# Patient Record
Sex: FEMALE | Race: WHITE | NOT HISPANIC OR LATINO | Employment: OTHER | URBAN - METROPOLITAN AREA
[De-identification: names, ages, dates, MRNs, and addresses within clinical notes are randomized per-mention and may not be internally consistent; named-entity substitution may affect disease eponyms.]

---

## 2020-02-13 ENCOUNTER — OFFICE VISIT (OUTPATIENT)
Dept: INTERNAL MEDICINE | Facility: CLINIC | Age: 85
End: 2020-02-13
Payer: MEDICARE

## 2020-02-13 VITALS
SYSTOLIC BLOOD PRESSURE: 116 MMHG | TEMPERATURE: 98.1 F | HEART RATE: 73 BPM | HEIGHT: 64 IN | DIASTOLIC BLOOD PRESSURE: 60 MMHG | RESPIRATION RATE: 16 BRPM | WEIGHT: 161 LBS | OXYGEN SATURATION: 96 % | BODY MASS INDEX: 27.49 KG/M2

## 2020-02-13 DIAGNOSIS — R73.03 PREDIABETES: ICD-10-CM

## 2020-02-13 DIAGNOSIS — R00.2 PALPITATION: ICD-10-CM

## 2020-02-13 DIAGNOSIS — R73.09 ABNORMAL GLUCOSE: ICD-10-CM

## 2020-02-13 DIAGNOSIS — K21.9 GASTROESOPHAGEAL REFLUX DISEASE WITHOUT ESOPHAGITIS: Primary | ICD-10-CM

## 2020-02-13 DIAGNOSIS — Z85.3 HISTORY OF BREAST CANCER: ICD-10-CM

## 2020-02-13 DIAGNOSIS — R05.9 COUGH: ICD-10-CM

## 2020-02-13 DIAGNOSIS — I10 ESSENTIAL HYPERTENSION: ICD-10-CM

## 2020-02-13 LAB
ALBUMIN SERPL-MCNC: 3.9 G/DL (ref 3.4–5)
ALP SERPL-CCNC: 47 IU/L (ref 35–126)
ALT SERPL-CCNC: 23 IU/L (ref 11–54)
ANION GAP SERPL CALC-SCNC: 10 MEQ/L (ref 3–15)
AST SERPL-CCNC: 16 IU/L (ref 15–41)
BASOPHILS # BLD: 0.06 K/UL (ref 0.01–0.1)
BASOPHILS NFR BLD: 0.9 %
BILIRUB SERPL-MCNC: 1.7 MG/DL (ref 0.3–1.2)
BUN SERPL-MCNC: 25 MG/DL (ref 8–20)
CALCIUM SERPL-MCNC: 9.7 MG/DL (ref 8.9–10.3)
CHLORIDE SERPL-SCNC: 102 MEQ/L (ref 98–109)
CHOLEST SERPL-MCNC: 188 MG/DL
CO2 SERPL-SCNC: 27 MEQ/L (ref 22–32)
CREAT SERPL-MCNC: 1.1 MG/DL (ref 0.6–1.1)
DIFFERENTIAL METHOD BLD: ABNORMAL
EOSINOPHIL # BLD: 0.09 K/UL (ref 0.04–0.36)
EOSINOPHIL NFR BLD: 1.3 %
ERYTHROCYTE [DISTWIDTH] IN BLOOD BY AUTOMATED COUNT: 13.1 % (ref 11.7–14.4)
EST. AVERAGE GLUCOSE BLD GHB EST-MCNC: 126 MG/DL
GFR SERPL CREATININE-BSD FRML MDRD: 47.4 ML/MIN/1.73M*2
GLUCOSE SERPL-MCNC: 78 MG/DL (ref 70–99)
HBA1C MFR BLD HPLC: 6 %
HCT VFR BLDCO AUTO: 40.9 % (ref 35–45)
HDLC SERPL-MCNC: 55 MG/DL
HDLC SERPL: 3.4 {RATIO}
HGB BLD-MCNC: 13.3 G/DL (ref 11.8–15.7)
IMM GRANULOCYTES # BLD AUTO: 0.01 K/UL (ref 0–0.08)
IMM GRANULOCYTES NFR BLD AUTO: 0.1 %
LDLC SERPL CALC-MCNC: 99 MG/DL
LYMPHOCYTES # BLD: 2.03 K/UL (ref 1.2–3.5)
LYMPHOCYTES NFR BLD: 29.8 %
MCH RBC QN AUTO: 28.7 PG (ref 28–33.2)
MCHC RBC AUTO-ENTMCNC: 32.5 G/DL (ref 32.2–35.5)
MCV RBC AUTO: 88.3 FL (ref 83–98)
MONOCYTES # BLD: 0.52 K/UL (ref 0.28–0.8)
MONOCYTES NFR BLD: 7.6 %
NEUTROPHILS # BLD: 4.1 K/UL (ref 1.7–7)
NEUTS SEG NFR BLD: 60.3 %
NONHDLC SERPL-MCNC: 133 MG/DL
NRBC BLD-RTO: 0 %
PDW BLD AUTO: 13.5 FL (ref 9.4–12.3)
PLATELET # BLD AUTO: 149 K/UL (ref 150–369)
POTASSIUM SERPL-SCNC: 4 MEQ/L (ref 3.6–5.1)
PROT SERPL-MCNC: 6.4 G/DL (ref 6–8.2)
RBC # BLD AUTO: 4.63 M/UL (ref 3.93–5.22)
SODIUM SERPL-SCNC: 139 MEQ/L (ref 136–144)
TRIGL SERPL-MCNC: 170 MG/DL (ref 30–149)
TSH SERPL DL<=0.05 MIU/L-ACNC: 2.71 MIU/L (ref 0.34–5.6)
WBC # BLD AUTO: 6.81 K/UL (ref 3.8–10.5)

## 2020-02-13 PROCEDURE — 99204 OFFICE O/P NEW MOD 45 MIN: CPT | Performed by: INTERNAL MEDICINE

## 2020-02-13 PROCEDURE — 80061 LIPID PANEL: CPT | Performed by: INTERNAL MEDICINE

## 2020-02-13 PROCEDURE — 83036 HEMOGLOBIN GLYCOSYLATED A1C: CPT | Performed by: INTERNAL MEDICINE

## 2020-02-13 PROCEDURE — 84443 ASSAY THYROID STIM HORMONE: CPT | Performed by: INTERNAL MEDICINE

## 2020-02-13 PROCEDURE — 85025 COMPLETE CBC W/AUTO DIFF WBC: CPT | Performed by: INTERNAL MEDICINE

## 2020-02-13 PROCEDURE — 80053 COMPREHEN METABOLIC PANEL: CPT | Performed by: INTERNAL MEDICINE

## 2020-02-13 RX ORDER — VIT C/E/ZN/COPPR/LUTEIN/ZEAXAN 250MG-90MG
1 CAPSULE ORAL 2 TIMES DAILY
COMMUNITY

## 2020-02-13 RX ORDER — LOSARTAN POTASSIUM 50 MG/1
50 TABLET ORAL AS NEEDED
COMMUNITY
Start: 2019-11-29 | End: 2022-10-20

## 2020-02-13 RX ORDER — OMEPRAZOLE 20 MG/1
20 CAPSULE, DELAYED RELEASE ORAL EVERY OTHER DAY
COMMUNITY
Start: 2019-12-14 | End: 2021-07-22

## 2020-02-13 RX ORDER — HYDROCODONE/ACETAMINOPHEN 5 MG-500MG
6 TABLET ORAL DAILY
COMMUNITY

## 2020-02-13 RX ORDER — HYDROCHLOROTHIAZIDE 25 MG/1
TABLET ORAL
Status: ON HOLD | COMMUNITY
Start: 2019-11-29 | End: 2022-02-09 | Stop reason: ALTCHOICE

## 2020-02-13 RX ORDER — AA/PROT/LYSINE/METHIO/VIT C/B6 50-12.5 MG
10 TABLET ORAL DAILY
COMMUNITY

## 2020-02-13 RX ORDER — CHOLECALCIFEROL (VITAMIN D3) 25 MCG
1000 TABLET ORAL DAILY
COMMUNITY
End: 2024-07-08

## 2020-02-13 RX ORDER — METOPROLOL TARTRATE 25 MG/1
25 TABLET, FILM COATED ORAL 2 TIMES DAILY
COMMUNITY
Start: 2019-11-29 | End: 2020-06-04

## 2020-02-13 RX ORDER — ASPIRIN 325 MG
325 TABLET ORAL DAILY
COMMUNITY
End: 2020-12-14

## 2020-02-13 RX ORDER — MULTIVITAMIN
1 TABLET ORAL 2 TIMES DAILY
COMMUNITY
End: 2023-05-02 | Stop reason: ALTCHOICE

## 2020-02-13 RX ORDER — AZITHROMYCIN 250 MG/1
TABLET, FILM COATED ORAL
Qty: 6 TABLET | Refills: 0 | Status: SHIPPED | OUTPATIENT
Start: 2020-02-13 | End: 2020-06-04

## 2020-02-13 SDOH — HEALTH STABILITY: MENTAL HEALTH: HOW OFTEN DO YOU HAVE A DRINK CONTAINING ALCOHOL?: 2-3 TIMES A WEEK

## 2020-02-13 ASSESSMENT — ENCOUNTER SYMPTOMS
TROUBLE SWALLOWING: 0
NECK PAIN: 0
CONFUSION: 0
SHORTNESS OF BREATH: 0
ABDOMINAL PAIN: 0
EYE REDNESS: 0
FEVER: 0
RHINORRHEA: 0
CHILLS: 0
COUGH: 1
DYSURIA: 0

## 2020-02-13 NOTE — PATIENT INSTRUCTIONS
Gastroesophageal reflux disease without esophagitis  Continue omeprazole every other day     Essential hypertension  Continue same medications.     Palpitation  Continue metoprolol  Continue to follow up with cardiology     Cough  Start azithromycin  Get chest xray done.       Orders Placed This Encounter   Procedures   • X-RAY CHEST 2 VIEWS     New Medications Ordered This Visit   Medications   • multivit-min/iron/folic/lutein (CENTRUM SILVER WOMEN ORAL)     Sig: Take by mouth daily.   • azithromycin (ZITHROMAX) 250 mg tablet     Sig: Take 2 tablets the first day, then 1 tablet daily for 4 days.     Dispense:  6 tablet     Refill:  0       Return in about 4 months (around 6/13/2020).  SANDI BLACKBURN MD

## 2020-02-13 NOTE — PROGRESS NOTES
Chief Complaint   Patient presents with   • New Patient       Subjective      Patient ID: Latrell Ojeda is a 84 y.o. female.    New patient:  Living in [facility] for 1 year and was seeing her old primary doctor in [place].  Switching here for convenience    Viral syndrome: About 3 weeks ago had nasal congestion, chest congestion.  Empirically treated with Tamiflu for possible influenza  Feels better now.  Still with cough    GERD: History of this.  Has reduced her PPI to q. OD.  Symptoms involved constant throat clearing.  This has remained stable on every other day dose    Palpitations: Stable on metoprolol.  Sees cardiology    HTN: Stable      The following have been reviewed and updated as appropriate in this visit:  Allergies  Meds  Problems         Past Medical History: She  has a past medical history of Breast cancer (CMS/HCC), High blood pressure, and UTI (urinary tract infection).  Past Surgical History: She  has a past surgical history that includes Hysterectomy (1985); Mastectomy (2005); and Laparoscopic liver cyst fenestration (1995).  Family Medical Historyfamily history includes Diabetes in her biological brother and biological father; Multiple myeloma in her biological son.   Medication:   Current Outpatient Medications:   •  aspirin 325 mg tablet, Take 325 mg by mouth daily., Disp: , Rfl:   •  calcium carbonate-vitamin D3 (CALCIUM WITH VITAMIN D) 600 mg(1,500mg) -400 unit per tablet, Take 2 tablets by mouth daily., Disp: , Rfl:   •  cholecalciferol, vitamin D3, 1,000 unit (25 mcg) tablet, Take 1,000 Units by mouth daily., Disp: , Rfl:   •  coenzyme Q10 (COQ10) 10 mg capsule, Take 100 mg by mouth daily.  , Disp: , Rfl:   •  hydrochlorothiazide (HYDRODIURIL) 25 mg tablet, , Disp: , Rfl:   •  losartan (COZAAR) 25 mg tablet, , Disp: , Rfl:   •  lutein 6 mg capsule, Take 6 mg by mouth., Disp: , Rfl:   •  metoprolol tartrate (LOPRESSOR) 25 mg tablet, Take 25 mg by mouth 2 (two) times a day. ,  "Disp: , Rfl:   •  multivit-min/iron/folic/lutein (CENTRUM SILVER WOMEN ORAL), Take by mouth daily., Disp: , Rfl:   •  omeprazole (PriLOSEC) 20 mg capsule, Take 20 mg by mouth every other day. , Disp: , Rfl:   •  red yeast rice 600 mg capsule, Take 1 capsule by mouth daily., Disp: , Rfl:   •  azithromycin (ZITHROMAX) 250 mg tablet, Take 2 tablets the first day, then 1 tablet daily for 4 days., Disp: 6 tablet, Rfl: 0  •  multivit-min/FA/lycopen/lutein (CENTRUM SILVER MEN ORAL), Take by mouth., Disp: , Rfl:   Allergies: She is allergic to clindamycin; codeine; and oxycodone-acetaminophen.  Social History: She  reports that she has never smoked. She has never used smokeless tobacco. She reports that she drinks alcohol. She reports that she does not use drugs.    Review of Systems:  Review of Systems   Constitutional: Negative for chills and fever.   HENT: Negative for congestion, rhinorrhea and trouble swallowing.    Eyes: Negative for redness.   Respiratory: Positive for cough. Negative for shortness of breath.    Cardiovascular: Negative for chest pain.   Gastrointestinal: Negative for abdominal pain.   Genitourinary: Negative for dysuria.   Musculoskeletal: Negative for neck pain.   Skin: Negative for rash.   Neurological: Negative for syncope.   Psychiatric/Behavioral: Negative for confusion.       Vitals:    02/13/20 1425   BP: 116/60   BP Location: Left upper arm   Patient Position: Sitting   Pulse: 73   Resp: 16   Temp: 36.7 °C (98.1 °F)   TempSrc: Oral   SpO2: 96%   Weight: 73 kg (161 lb)   Height: 1.626 m (5' 4\")     Physical Exam   Constitutional: She is oriented to person, place, and time. She appears well-developed. No distress.   HENT:   Head: Normocephalic and atraumatic.   Eyes: Conjunctivae are normal.   Neck: Neck supple.   Cardiovascular: Normal rate and regular rhythm.   Pulmonary/Chest: Effort normal.   Left mid and lower lung crackles   Abdominal: Soft. She exhibits no distension. There is no " tenderness.   Musculoskeletal: She exhibits no edema.   Neurological: She is alert and oriented to person, place, and time.   Skin: Skin is warm. Capillary refill takes less than 2 seconds.   Psychiatric: She has a normal mood and affect.   Vitals reviewed.      Assessment/Plan   Gastroesophageal reflux disease without esophagitis  Had symptoms of throat clearing which has improved  -Continue omeprazole every other day     Essential hypertension  Stable  -Continue losartan, metoprolol, HCTZ  -Check labs    Palpitation  History of SVT  Stable  -Continue metoprolol  -Continue to follow up with cardiology     Cough  Left mid and lower lung crackles on exam.  Recently had viral infection.  Possible superimposed pneumonia  -Start azithromycin  -Get chest xray done.     Prediabetes  Abnormal glucose in the past  -Check A1c    History of breast cancer  History of bilateral mastectomy with reconstruction in 2005 and 2009      Orders Placed This Encounter   Procedures   • X-RAY CHEST 2 VIEWS   • CBC and Differential   • Comprehensive metabolic panel   • Hemoglobin A1c   • TSH w reflex FT4   • Lipid panel     New Medications Ordered This Visit   Medications   • multivit-min/iron/folic/lutein (CENTRUM SILVER WOMEN ORAL)     Sig: Take by mouth daily.   • azithromycin (ZITHROMAX) 250 mg tablet     Sig: Take 2 tablets the first day, then 1 tablet daily for 4 days.     Dispense:  6 tablet     Refill:  0       Return in about 4 months (around 6/13/2020).  SANDI BLACKBURN MD

## 2020-02-13 NOTE — ASSESSMENT & PLAN NOTE
Left mid and lower lung crackles on exam.  Recently had viral infection.  Possible superimposed pneumonia  -Start azithromycin  -Get chest xray done.

## 2020-02-14 ENCOUNTER — HOSPITAL ENCOUNTER (OUTPATIENT)
Dept: RADIOLOGY | Age: 85
Discharge: HOME | End: 2020-02-14
Attending: INTERNAL MEDICINE
Payer: MEDICARE

## 2020-02-14 PROCEDURE — 71046 X-RAY EXAM CHEST 2 VIEWS: CPT

## 2020-06-04 ENCOUNTER — TELEMEDICINE (OUTPATIENT)
Dept: INTERNAL MEDICINE | Facility: CLINIC | Age: 85
End: 2020-06-04
Payer: MEDICARE

## 2020-06-04 DIAGNOSIS — R00.2 PALPITATION: ICD-10-CM

## 2020-06-04 DIAGNOSIS — R73.03 PREDIABETES: ICD-10-CM

## 2020-06-04 DIAGNOSIS — I10 ESSENTIAL HYPERTENSION: Primary | ICD-10-CM

## 2020-06-04 DIAGNOSIS — K21.9 GASTROESOPHAGEAL REFLUX DISEASE WITHOUT ESOPHAGITIS: ICD-10-CM

## 2020-06-04 PROBLEM — R05.9 COUGH: Status: RESOLVED | Noted: 2020-02-13 | Resolved: 2020-06-04

## 2020-06-04 PROCEDURE — 99443 PR PHYS/QHP TELEPHONE EVALUATION 21-30 MIN: CPT | Mod: 95 | Performed by: INTERNAL MEDICINE

## 2020-06-04 RX ORDER — METOPROLOL TARTRATE 50 MG/1
50 TABLET ORAL 2 TIMES DAILY
COMMUNITY
End: 2021-06-10

## 2020-06-04 NOTE — PROGRESS NOTES
Patient ID: Latrell Ojeda is a 84 y.o. female.    Verification of Patient Location:  The patient affirms they are currently located in the following state: [place]    Request for Consent:  You and I are about to have a telemedicine check-in or visit. This is allowed because you are already my patient, and you have requested it.  This telemedicine visit will be billed to your health insurance or you, if you are self-insured.  You understand you will be responsible for any copayments or coinsurances that apply to your telemedicine visit.  Before starting our telemedicine visit, I am required to get your consent for this virtual check-in or visit by telemedicine. Do you consent?      Patient Response to Request for Consent: Yes    The following have been reviewed and updated as appropriate in this visit:  Allergies  Meds  Problems              Visit Documentation:  HTN: Taking metoprolol double dose now.  BP this morning was 156/74 and then 127/72 later in the day    GERD: Stable    SVT: Was having increased palpitations and metoprolol increased.  Feels better now    Pre-DM: Stable      Current Outpatient Medications:   •  metoprolol tartrate (LOPRESSOR) 50 mg tablet, Take 50 mg by mouth 2 (two) times a day., Disp: , Rfl:   •  aspirin 325 mg tablet, Take 325 mg by mouth daily., Disp: , Rfl:   •  calcium carbonate-vitamin D3 (CALCIUM WITH VITAMIN D) 600 mg(1,500mg) -400 unit per tablet, Take 2 tablets by mouth daily., Disp: , Rfl:   •  cholecalciferol, vitamin D3, 1,000 unit (25 mcg) tablet, Take 1,000 Units by mouth daily., Disp: , Rfl:   •  coenzyme Q10 (COQ10) 10 mg capsule, Take 100 mg by mouth daily.  , Disp: , Rfl:   •  hydrochlorothiazide (HYDRODIURIL) 25 mg tablet, , Disp: , Rfl:   •  losartan (COZAAR) 25 mg tablet, , Disp: , Rfl:   •  lutein 6 mg capsule, Take 6 mg by mouth., Disp: , Rfl:   •  multivit-min/FA/lycopen/lutein (CENTRUM SILVER MEN ORAL), Take by mouth., Disp: , Rfl:   •   multivit-min/iron/folic/lutein (CENTRUM SILVER WOMEN ORAL), Take by mouth daily., Disp: , Rfl:   •  omeprazole (PriLOSEC) 20 mg capsule, Take 20 mg by mouth every other day. , Disp: , Rfl:   •  red yeast rice 600 mg capsule, Take 1 capsule by mouth daily., Disp: , Rfl:     Assessment/Plan   Essential hypertension  Higher dose of metoprolol now  -Continue losartan, metoprolol, HCTZ    Gastroesophageal reflux disease without esophagitis  Stable with omeprazole every other day    Palpitation  History of SVT.  Metoprolol increased due to more symptoms and now improved  -Continue higher dose of metoprolol  -Continue to follow with cardiology    Prediabetes  Last A1c 6.0  -Continue to monitor diet      No orders of the defined types were placed in this encounter.    New Medications Ordered This Visit   Medications   • metoprolol tartrate (LOPRESSOR) 50 mg tablet     Sig: Take 50 mg by mouth 2 (two) times a day.       Return in about 4 months (around 10/4/2020).  SANDI BLACKBURN MD      Time Spent in Medical Discussion During This Encounter:  22 minutes

## 2020-06-05 ENCOUNTER — CLINICAL SUPPORT (OUTPATIENT)
Dept: INTERNAL MEDICINE | Facility: CLINIC | Age: 85
End: 2020-06-05
Payer: MEDICARE

## 2020-06-05 ENCOUNTER — LAB REQUISITION (OUTPATIENT)
Dept: LAB | Facility: HOSPITAL | Age: 85
End: 2020-06-05
Attending: INTERNAL MEDICINE
Payer: MEDICARE

## 2020-06-05 DIAGNOSIS — I10 ESSENTIAL HYPERTENSION: ICD-10-CM

## 2020-06-05 DIAGNOSIS — E78.49 OTHER HYPERLIPIDEMIA: ICD-10-CM

## 2020-06-05 LAB
ALBUMIN SERPL-MCNC: 3.9 G/DL (ref 3.4–5)
ALP SERPL-CCNC: 59 IU/L (ref 35–126)
ALT SERPL-CCNC: 32 IU/L (ref 11–54)
ANION GAP SERPL CALC-SCNC: 9 MEQ/L (ref 3–15)
AST SERPL-CCNC: 25 IU/L (ref 15–41)
BILIRUB SERPL-MCNC: 3 MG/DL (ref 0.3–1.2)
BUN SERPL-MCNC: 23 MG/DL (ref 8–20)
CALCIUM SERPL-MCNC: 9.2 MG/DL (ref 8.9–10.3)
CHLORIDE SERPL-SCNC: 100 MEQ/L (ref 98–109)
CHOLEST SERPL-MCNC: 197 MG/DL
CO2 SERPL-SCNC: 28 MEQ/L (ref 22–32)
CREAT SERPL-MCNC: 1 MG/DL (ref 0.6–1.1)
ERYTHROCYTE [DISTWIDTH] IN BLOOD BY AUTOMATED COUNT: 13.2 % (ref 11.7–14.4)
GFR SERPL CREATININE-BSD FRML MDRD: 53 ML/MIN/1.73M*2
GLUCOSE SERPL-MCNC: 99 MG/DL (ref 70–99)
HCT VFR BLDCO AUTO: 42.8 % (ref 35–45)
HDLC SERPL-MCNC: 64 MG/DL
HDLC SERPL: 3.1 {RATIO}
HGB BLD-MCNC: 14.4 G/DL (ref 11.8–15.7)
LDLC SERPL CALC-MCNC: 118 MG/DL
MAGNESIUM SERPL-MCNC: 1.8 MG/DL (ref 1.8–2.5)
MCH RBC QN AUTO: 29.3 PG (ref 28–33.2)
MCHC RBC AUTO-ENTMCNC: 33.6 G/DL (ref 32.2–35.5)
MCV RBC AUTO: 87.2 FL (ref 83–98)
NONHDLC SERPL-MCNC: 133 MG/DL
PDW BLD AUTO: 12.8 FL (ref 9.4–12.3)
PLATELET # BLD AUTO: 142 K/UL (ref 150–369)
POTASSIUM SERPL-SCNC: 4 MEQ/L (ref 3.6–5.1)
PROT SERPL-MCNC: 6.5 G/DL (ref 6–8.2)
RBC # BLD AUTO: 4.91 M/UL (ref 3.93–5.22)
SODIUM SERPL-SCNC: 137 MEQ/L (ref 136–144)
TRIGL SERPL-MCNC: 77 MG/DL (ref 30–149)
TSH SERPL DL<=0.05 MIU/L-ACNC: 3.79 MIU/L (ref 0.34–5.6)
WBC # BLD AUTO: 6.45 K/UL (ref 3.8–10.5)

## 2020-06-05 PROCEDURE — 80053 COMPREHEN METABOLIC PANEL: CPT | Performed by: INTERNAL MEDICINE

## 2020-06-05 PROCEDURE — 36415 COLL VENOUS BLD VENIPUNCTURE: CPT | Performed by: INTERNAL MEDICINE

## 2020-06-05 PROCEDURE — 80061 LIPID PANEL: CPT | Performed by: INTERNAL MEDICINE

## 2020-06-05 PROCEDURE — 83735 ASSAY OF MAGNESIUM: CPT | Performed by: INTERNAL MEDICINE

## 2020-06-05 PROCEDURE — 85027 COMPLETE CBC AUTOMATED: CPT | Performed by: INTERNAL MEDICINE

## 2020-06-05 PROCEDURE — 84443 ASSAY THYROID STIM HORMONE: CPT | Performed by: INTERNAL MEDICINE

## 2020-06-05 NOTE — ASSESSMENT & PLAN NOTE
History of SVT.  Metoprolol increased due to more symptoms and now improved  -Continue higher dose of metoprolol  -Continue to follow with cardiology

## 2020-10-13 ENCOUNTER — TELEPHONE (OUTPATIENT)
Dept: INTERNAL MEDICINE | Facility: CLINIC | Age: 85
End: 2020-10-13

## 2020-10-13 DIAGNOSIS — M85.88 OTHER SPECIFIED DISORDERS OF BONE DENSITY AND STRUCTURE, OTHER SITE: ICD-10-CM

## 2020-10-13 DIAGNOSIS — M89.9 BONE DISORDER: Primary | ICD-10-CM

## 2020-10-13 NOTE — TELEPHONE ENCOUNTER
Pt called stating that she would also like a Dexa Bone Density test as well. She is aware that you called and confirmed her husbands script but she wanted to make sure she got one as well.     Please advise.     [phone]

## 2020-11-03 ENCOUNTER — HOSPITAL ENCOUNTER (OUTPATIENT)
Dept: RADIOLOGY | Age: 85
Discharge: HOME | End: 2020-11-03
Attending: INTERNAL MEDICINE
Payer: MEDICARE

## 2020-11-03 DIAGNOSIS — M89.9 BONE DISORDER: ICD-10-CM

## 2020-11-03 DIAGNOSIS — M85.88 OTHER SPECIFIED DISORDERS OF BONE DENSITY AND STRUCTURE, OTHER SITE: ICD-10-CM

## 2020-11-03 PROCEDURE — 77080 DXA BONE DENSITY AXIAL: CPT

## 2020-11-30 ENCOUNTER — TELEPHONE (OUTPATIENT)
Dept: INTERNAL MEDICINE | Facility: CLINIC | Age: 85
End: 2020-11-30

## 2020-11-30 ENCOUNTER — CLINICAL SUPPORT (OUTPATIENT)
Dept: INTERNAL MEDICINE | Facility: CLINIC | Age: 85
End: 2020-11-30
Payer: MEDICARE

## 2020-11-30 DIAGNOSIS — R00.2 PALPITATION: Primary | ICD-10-CM

## 2020-11-30 PROCEDURE — 93000 ELECTROCARDIOGRAM COMPLETE: CPT | Performed by: INTERNAL MEDICINE

## 2020-11-30 RX ORDER — FLECAINIDE ACETATE 50 MG/1
50 TABLET ORAL
COMMUNITY
Start: 2020-11-12 | End: 2021-03-16

## 2020-11-30 RX ORDER — RIVAROXABAN 20 MG/1
20 TABLET, FILM COATED ORAL EVERY EVENING
COMMUNITY
Start: 2020-10-20 | End: 2023-02-07 | Stop reason: DRUGHIGH

## 2020-12-14 ENCOUNTER — OFFICE VISIT (OUTPATIENT)
Dept: INTERNAL MEDICINE | Facility: CLINIC | Age: 85
End: 2020-12-14
Payer: MEDICARE

## 2020-12-14 VITALS
HEIGHT: 64 IN | SYSTOLIC BLOOD PRESSURE: 110 MMHG | HEART RATE: 88 BPM | TEMPERATURE: 97.1 F | WEIGHT: 159 LBS | DIASTOLIC BLOOD PRESSURE: 60 MMHG | RESPIRATION RATE: 16 BRPM | BODY MASS INDEX: 27.14 KG/M2 | OXYGEN SATURATION: 96 %

## 2020-12-14 DIAGNOSIS — Z00.00 MEDICARE ANNUAL WELLNESS VISIT, SUBSEQUENT: Primary | ICD-10-CM

## 2020-12-14 DIAGNOSIS — I48.20 CHRONIC ATRIAL FIBRILLATION (CMS/HCC): ICD-10-CM

## 2020-12-14 DIAGNOSIS — J34.89 RHINORRHEA: ICD-10-CM

## 2020-12-14 PROCEDURE — G0439 PPPS, SUBSEQ VISIT: HCPCS | Performed by: INTERNAL MEDICINE

## 2020-12-14 RX ORDER — FLUTICASONE PROPIONATE 50 MCG
2 SPRAY, SUSPENSION (ML) NASAL DAILY
Qty: 1 BOTTLE | Refills: 5
Start: 2020-12-14 | End: 2021-03-16

## 2020-12-14 ASSESSMENT — MINI COG
COMPLETED: YES
TOTAL SCORE: 5

## 2020-12-14 ASSESSMENT — PATIENT HEALTH QUESTIONNAIRE - PHQ9: SUM OF ALL RESPONSES TO PHQ9 QUESTIONS 1 & 2: 0

## 2020-12-14 NOTE — PROGRESS NOTES
Subjective     Latrell Ojeda is a 85 y.o. female who presents for a subsequent annual wellness visit.     Patient Care Team:  Aris Miller MD as PCP - General (Family Medicine)    Comprehensive Medical and Social History  Patient Active Problem List   Diagnosis   • Essential hypertension   • Gastroesophageal reflux disease without esophagitis   • Palpitation   • Prediabetes   • History of breast cancer   • Medicare annual wellness visit, subsequent   • Rhinorrhea   • Chronic atrial fibrillation (CMS/HCC)     Past Medical History:   Diagnosis Date   • Breast cancer (CMS/HCC)    • High blood pressure    • UTI (urinary tract infection)      Past Surgical History:   Procedure Laterality Date   • HYSTERECTOMY  1985   • LAPAROSCOPIC LIVER CYST FENESTRATION  1995   • MASTECTOMY  2005     Allergies   Allergen Reactions   • Clindamycin    • Codeine    • Oxycodone-Acetaminophen      Current Outpatient Medications   Medication Sig Dispense Refill   • cholecalciferol, vitamin D3, 1,000 unit (25 mcg) tablet Take 1,000 Units by mouth daily.     • coenzyme Q10 (COQ10) 10 mg capsule Take 100 mg by mouth daily.       • flecainide (TAMBOCOR) 50 mg tablet Take 50 mg by mouth every 12 (twelve) hours.     • hydrochlorothiazide (HYDRODIURIL) 25 mg tablet      • losartan (COZAAR) 25 mg tablet      • lutein 6 mg capsule Take 6 mg by mouth.     • metoprolol tartrate (LOPRESSOR) 50 mg tablet Take 50 mg by mouth 2 (two) times a day.     • omeprazole (PriLOSEC) 20 mg capsule Take 20 mg by mouth every other day.      • red yeast rice 600 mg capsule Take 1 capsule by mouth daily.     • XARELTO 20 mg tablet Take 20 mg by mouth once daily.     • calcium carbonate-vitamin D3 (CALCIUM WITH VITAMIN D) 600 mg(1,500mg) -400 unit per tablet Take 2 tablets by mouth daily.     • fluticasone propionate (FLONASE) 50 mcg/actuation nasal spray Administer 2 sprays into each nostril daily. 1 Bottle 5     No current facility-administered medications for  "this visit.      Social History     Tobacco Use   • Smoking status: Never Smoker   • Smokeless tobacco: Never Used   Substance Use Topics   • Alcohol use: Yes     Frequency: 2-3 times a week   • Drug use: Never     Family History   Problem Relation Age of Onset   • Diabetes Biological Father    • Diabetes Biological Brother    • Multiple myeloma Biological Son        Objective   Vitals  Vitals:    12/14/20 1356 12/14/20 1444   BP: 110/60    Pulse: 98 88   Resp: 16    Temp: 36.2 °C (97.1 °F)    TempSrc: Oral    SpO2: 96%    Weight: 72.1 kg (159 lb)    Height: 1.626 m (5' 4\")      Body mass index is 27.29 kg/m².    Advanced Care Plan  Does patient have advance directive?: Yes                                     PHQ  Will the patient answer the depression questions?: Yes   Over the past 2 weeks, how often have you been bothered by feeling little interest or pleasure in doing things?: Not at all   Over the past 2 weeks, how often have you been bothered by feeling down, depressed, or hopeless?: Not at all   Depression Risk: 0                                             Mini Cog  Completed: Yes  Score: 5  Result: Negative      Get Up and Go  Result: Pass    STEADI Falls Risk  One or more falls in the last year: Yes   How many times: 1   Was the patient injured in any fall: No   Has trouble stepping up onto a curb: No   Advised to use a cane or walker to get around safely: No   Often has to rush to the toilet: No   Feels unsteady when walking: No       Often feels sad or depressed: No   Steadies self on furniture while walking at home: No   Takes medication that makes him/her feel lightheaded or more tired than usual: No   Worried about falling: Yes   Takes medicine to sleep or improve mood: No   Needs to push with hands when rising from a chair: No   Falls screen completed: Yes     Hearing and Vision Screening  No exam data present  See HRA for relevant hearing screening response.    Assessment/Plan   Diagnoses and all " orders for this visit:    Medicare annual wellness visit, subsequent (Primary)  Assessment & Plan:  Up to date on vaccines  DEXA scan this year looked good      Rhinorrhea  Assessment & Plan:  Present for several months in left nostril only  Runny nose possibly due to allergies.   -start flonase and monitor.       Chronic atrial fibrillation (CMS/[facility])  Assessment & Plan:  Recently found to have A. fib year on EKG ordered by cardiology.  Her metoprolol was increased but she had increased fatigue  Yesterday she had increased palpitations and was advised to increase flecainide by cardiology  Heart rate okay today but irregular  -Please call cardiology  -Already on Xarelto      Other orders  -     fluticasone propionate (FLONASE) 50 mcg/actuation nasal spray; Administer 2 sprays into each nostril daily.      See Patient Instructions (the written plan) which was given to the patient for PPPS and health risk factors with interventions.

## 2020-12-14 NOTE — PATIENT INSTRUCTIONS
Diagnoses and all orders for this visit:    Medicare annual wellness visit, subsequent (Primary)  Assessment & Plan:  Up to date on vaccines  DEXA scan this year looks good      Rhinorrhea  Assessment & Plan:  runny nose possibly due to allergies.   -start flonase and monitor.       Chronic atrial fibrillation (CMS/[facility])  Assessment & Plan:  Please call cardiology       Other orders  -     fluticasone propionate (FLONASE) 50 mcg/actuation nasal spray; Administer 2 sprays into each nostril daily.      See Patient Instructions (the written plan) which was given to the patient for PPPS and health risk factors with interventions.                            Your Personalized Prevention Plan Services (PPPS)    Preventive Services Checklist (Assumes Average Risk Unless Otherwise Noted):    Health Maintenance Topics with due status: Overdue       Topic Date Due    Varicella Vaccines 07/30/1937    Medicare Annual Wellness Visit 07/30/1953    Hepatitis C Screening 07/30/1953    Zoster Vaccine 11/26/2019     Health Maintenance Topics with due status: Not Due       Topic Last Completion Date    DTaP, Tdap, and Td Vaccines 04/03/2013    DEXA Scan 11/03/2020     Health Maintenance Topics with due status: Completed       Topic Last Completion Date    Pneumococcal (65 years and older) 02/05/2015    Influenza Vaccine 09/29/2020    Annual Falls Risk Screening 12/14/2020     Health Maintenance Topics with due status: Aged Out       Topic Date Due    Meningococcal ACWY Aged Out    HIB Vaccines Aged Out    IPV Vaccines Aged Out    HPV Vaccines Aged Out    Pneumococcal Aged Out       You May Be Eligible for These Additional Preventive Services   (Assumes Average Risk Unless Otherwise Noted)  Diabetes Screening Any 1 risk factor: hypertension, dyslipidemia, obesity, high glucose; or Any 2 risk factors: >=64yo, overweight, family history diabetes (covered every 6 months)   Hepatitis C Screening Any 1 risk factor: 1) blood transfusion before  1992,   2) current or past injection drug use (annually for high risk; if born between [birth year]-77[birth year], see above for status).   Vaccine: Hepatitis B As necessary if at-risk: hemophilia, ESRD, diabetes, living with individual infected with hep B, healthcare worker with frequent contact with blood/bodily fluids (series covered once)   Sexually Transmitted Diseases (STDs) As necessary chlamydia, gonorrhea, syphilis, hepatitis B (covered annually)  HIV if any 1 risk factor present: 1) <14yo or >64yo and at increased risk or 2) 15-64yo and ask for it (covered annually)   Lung Cancer Screening Low dose chest CT if all 3 risk factors: 1) 55-78yo, 2) smoker or quit within last 15y, 3) >=30 pack years (covered annually).  No results found for this or any previous visit.     Cholesterol Screening Both risk factors: 1) >=19yo and 2)  increased risk coronary artery disease (covered every 5 years).     Breast Cancer Screening Covered once 35-40yo, annually >=39yo (if >=49yo, see above for status).         Health Risk Factors with Personalized Education:  ----------------------------------------------------------------------------------------------------------------------  Controlling Your Blood Pressure  · Maintain a normal weight (body mass index between 18.5 and 24.9).  · Eat more fruit, vegetables and low-fat dairy.  · Eat less saturated fat and total fat.  · Lower your sodium (salt) intake.  Try to stay under 1500 mg per day, but if you cannot get your intake to be that low, at least lower it by 1000 mg.  · Stay active.  Try to get at least 90 to 150 minutes of exercise per week.  Try brisk walking, swimming, bicycling or dancing.  · Limit alcohol intake.  When you do consume alcohol, drink no more than 1 drink per day.  · If you have been prescribed medication, take it regularly and exactly as prescribed.  Let your PCP know if you have any problems or questions about your medication.  · Check your blood pressure at home or at  the store.  Write down your readings and share them with your PCP  ----------------------------------------------------------------------------------------------------------------------  Controlling Your Glucose (Sugar) Levels  · Stress can raise your blood sugar.  Learn what causes your stress.  Learn to lower your stress by spending time with family and friends, exercising, deep breathing, trying meditation or yoga, enjoying hobbies and getting enough rest.  Let your PCP know if you’re having problems limiting your stress.  · Maintain a healthy weight by balancing your diet and exercise.  · Choose foods that are lower in calories, saturated fat, trans fat, sugar and salt.  Eat foods with more fiber, like whole grain cereals, bread, crackers, rice or pasta.  Choose to eat fruit, vegetables, and low-fat or fat-free/skim dairy.  · Reduce the portion size of your meals.  Make half of your meal vegetables and fruit, and divide the other half between lean protein and whole grains.  · Drink water instead of juice and regular soda.  · Spend at least some time being active on most days of the week.  · Work to increase your muscle strength at least twice per week.  Try things like light weights, stretch bands, yoga, heavy gardening (digging, planting with tools) or push-ups  ----------------------------------------------------------------------------------------------------------------------  Controlling Your Cholesterol  · Reduce the amount of saturated and trans fat in your diet.  Limit intake of red meat.  Consume only low-fat or non-fat/skim dairy.  Limit fried food.  Cook with vegetable oils.  · Reduce your intake of sugary foods, sugary drinks and alcohol.  · Eat a diet high in fruit, vegetables and whole grains.  · Get protein from fish, poultry and a small portion of nuts.  · Stay active.  Try to get at least 90 to 150 minutes of exercise per week.  Try brisk walking, swimming, bicycling or dancing.  · Maintain a  healthy weight by balancing your diet and exercise.  · If you have been prescribed medication, take it regularly and exactly as prescribed. Let your PCP know if you have any problems or questions about your medication.  · It’s important to know your cholesterol numbers.  When recommended by your PCP, get the cholesterol blood test.  ----------------------------------------------------------------------------------------------------------------------  Maintaining Strong Bones  · Try to get at least 90 to 150 minutes of weight-bearing exercise per week.  · Ensure intake of at least 1200mg of calcium per day.  Eat foods high in calcium like milk and other dairy, green vegetables, fruit, canned fish with soft and edible bones, nuts, calcium-set tofu.  Some foods are calcium-fortified, like bread, cereal, fruit juices and mineral water.  · Help your body make vitamin D by getting 10-15 minutes per day of sunlight.    · Ensure intake of at least 600IU of vitamin D per day.  Eat foods high in vitamin D like oily fish (salmon, sardines, mackerel) and eggs.  Some foods are fortified with vitamin D, like dairy and cereals.  · Avoid high amounts of caffeine and salt, since they can cause the body to loose calcium.  · Limit alcohol intake, since it is associated with weaker bones and is associated with falls and fractures.  · Limit intake of fizzy drinks.  ----------------------------------------------------------------------------------------------------------------------  Reducing Your Risk of Falls  · Tell your PCP if any of your medications make you feel tired, dizzy, lightheaded or off-balance.  · Maintain coordination, flexibility and balance by ensuring regular physical activity.  · Limit alcohol intake to 1 drink per day.  Consider avoiding all alcohol intake.  · Ensure good vision.  Visit an ophthalmologist or optometrist regularly for vision screening or to make sure your glasses / contact lens prescription is  correct.  If you need glasses or contacts, wear them.  When you get new glasses or contacts, take time to get used to them.  Do not wear sunglasses or tinted lenses when indoors.  · Ensure good hearing.  Have your hearing checked if you are having trouble hearing, or family and friends think you cannot hear them.  If you need a hearing aid, be sure it fits well and wear it.  · Get enough rest.  Ensure about 7-9 hours of sleep every day.  · Get up slowly from your bed or chairs.  Do not start walking until you are sure you feel steady.  · Wear non-skid, rubber-soled, low-heeled shoes.  Do not walk in socks, or in shoes and slippers with smooth soles.  · If your PCP or therapist recommends using a cane or walker, use it regularly.  · Make your home safer.  Increase lighting throughout the house, especially at the top and bottom of stairs.  Ensure lighting is easily turned on when getting up in the middle of the night.  Make sure there are two secure rails on all stairs.  Install grab bars in the bathtub / shower and near the toilet.  Consider using a shower chair and / or a hand-held shower.  · Spread sand or salt on icy surfaces.  Beware of wet surfaces, which can be icy.  · Tell your PCP if you have fallen.

## 2020-12-14 NOTE — ASSESSMENT & PLAN NOTE
Recently found to have A. fib year on EKG ordered by cardiology.  Her metoprolol was increased but she had increased fatigue  Yesterday she had increased palpitations and was advised to increase flecainide by cardiology  Heart rate okay today but irregular  -Please call cardiology  -Already on Xarelto

## 2021-01-25 ENCOUNTER — TELEPHONE (OUTPATIENT)
Dept: INTERNAL MEDICINE | Facility: CLINIC | Age: 86
End: 2021-01-25

## 2021-01-25 NOTE — TELEPHONE ENCOUNTER
Spoke to pt.   Some blood tinged nasal discharge  -stop flonase  -start nasal saline spray 3-4 times a day   -start humidifier

## 2021-03-12 ENCOUNTER — DOCUMENTATION (OUTPATIENT)
Dept: INTERNAL MEDICINE | Facility: CLINIC | Age: 86
End: 2021-03-12

## 2021-03-12 ENCOUNTER — CLINICAL SUPPORT (OUTPATIENT)
Dept: INTERNAL MEDICINE | Facility: CLINIC | Age: 86
End: 2021-03-12
Payer: MEDICARE

## 2021-03-12 ENCOUNTER — LAB REQUISITION (OUTPATIENT)
Dept: LAB | Facility: HOSPITAL | Age: 86
End: 2021-03-12
Attending: INTERNAL MEDICINE
Payer: MEDICARE

## 2021-03-12 DIAGNOSIS — I48.91 UNSPECIFIED ATRIAL FIBRILLATION (CMS/HCC): ICD-10-CM

## 2021-03-12 DIAGNOSIS — R73.03 PREDIABETES: ICD-10-CM

## 2021-03-12 DIAGNOSIS — I48.20 CHRONIC ATRIAL FIBRILLATION (CMS/HCC): ICD-10-CM

## 2021-03-12 DIAGNOSIS — I10 ESSENTIAL HYPERTENSION: ICD-10-CM

## 2021-03-12 DIAGNOSIS — N18.31 CHRONIC KIDNEY DISEASE, STAGE 3A (CMS/HCC): ICD-10-CM

## 2021-03-12 LAB
ALBUMIN SERPL-MCNC: 3.6 G/DL (ref 3.4–5)
ALP SERPL-CCNC: 62 IU/L (ref 35–126)
ALT SERPL-CCNC: 30 IU/L (ref 11–54)
ANION GAP SERPL CALC-SCNC: 14 MEQ/L (ref 3–15)
AST SERPL-CCNC: 19 IU/L (ref 15–41)
BILIRUB SERPL-MCNC: 2.1 MG/DL (ref 0.3–1.2)
BUN SERPL-MCNC: 23 MG/DL (ref 8–20)
CALCIUM SERPL-MCNC: 9.2 MG/DL (ref 8.9–10.3)
CHLORIDE SERPL-SCNC: 98 MEQ/L (ref 98–109)
CO2 SERPL-SCNC: 26 MEQ/L (ref 22–32)
CREAT SERPL-MCNC: 1.2 MG/DL (ref 0.6–1.1)
ERYTHROCYTE [DISTWIDTH] IN BLOOD BY AUTOMATED COUNT: 13.7 % (ref 11.7–14.4)
GFR SERPL CREATININE-BSD FRML MDRD: 42.8 ML/MIN/1.73M*2
GLUCOSE SERPL-MCNC: 191 MG/DL (ref 70–99)
HCT VFR BLDCO AUTO: 41 % (ref 35–45)
HGB BLD-MCNC: 13.5 G/DL (ref 11.8–15.7)
MCH RBC QN AUTO: 29 PG (ref 28–33.2)
MCHC RBC AUTO-ENTMCNC: 32.9 G/DL (ref 32.2–35.5)
MCV RBC AUTO: 88.2 FL (ref 83–98)
PDW BLD AUTO: 12.9 FL (ref 9.4–12.3)
PLATELET # BLD AUTO: 167 K/UL (ref 150–369)
POTASSIUM SERPL-SCNC: 4.4 MEQ/L (ref 3.6–5.1)
PROT SERPL-MCNC: 6.1 G/DL (ref 6–8.2)
RBC # BLD AUTO: 4.65 M/UL (ref 3.93–5.22)
SODIUM SERPL-SCNC: 138 MEQ/L (ref 136–144)
TSH SERPL DL<=0.05 MIU/L-ACNC: 4.76 MIU/L (ref 0.34–5.6)
WBC # BLD AUTO: 5.75 K/UL (ref 3.8–10.5)

## 2021-03-12 PROCEDURE — 84443 ASSAY THYROID STIM HORMONE: CPT | Performed by: INTERNAL MEDICINE

## 2021-03-12 PROCEDURE — 85027 COMPLETE CBC AUTOMATED: CPT | Performed by: INTERNAL MEDICINE

## 2021-03-12 PROCEDURE — 82374 ASSAY BLOOD CARBON DIOXIDE: CPT | Performed by: INTERNAL MEDICINE

## 2021-03-12 PROCEDURE — 82040 ASSAY OF SERUM ALBUMIN: CPT | Performed by: INTERNAL MEDICINE

## 2021-03-12 PROCEDURE — 36415 COLL VENOUS BLD VENIPUNCTURE: CPT | Performed by: INTERNAL MEDICINE

## 2021-03-12 RX ORDER — METOPROLOL TARTRATE 25 MG/1
TABLET, FILM COATED ORAL 2 TIMES DAILY
COMMUNITY
Start: 2021-01-19 | End: 2021-03-16

## 2021-03-12 RX ORDER — AMIODARONE HYDROCHLORIDE 200 MG/1
100 TABLET ORAL DAILY
COMMUNITY
Start: 2021-03-08 | End: 2023-02-20

## 2021-03-12 NOTE — PROGRESS NOTES
Coding Clarification (ML) - [facility]  Note       DATE: 3/12/2021    RE: Latrell Lynette  : 1935      Under the direction of SANDI BLACKBURN MD, the following adjustments were made to this patients Problem List:        New Code: N18.31            Code Description: Chronic kidney disease, stage 3a  Clarification Type EMR System Encounter Type Date of Service Provider   New Code Epic Lab Note 2020 Pepe Borges MD   Rationale: Recent GFR value from Lab on 2020 is 53.0

## 2021-03-16 ENCOUNTER — OFFICE VISIT (OUTPATIENT)
Dept: INTERNAL MEDICINE | Facility: CLINIC | Age: 86
End: 2021-03-16
Payer: MEDICARE

## 2021-03-16 VITALS
TEMPERATURE: 97.4 F | WEIGHT: 165 LBS | RESPIRATION RATE: 16 BRPM | SYSTOLIC BLOOD PRESSURE: 112 MMHG | DIASTOLIC BLOOD PRESSURE: 60 MMHG | OXYGEN SATURATION: 98 % | HEART RATE: 53 BPM | BODY MASS INDEX: 28.32 KG/M2

## 2021-03-16 DIAGNOSIS — I10 ESSENTIAL HYPERTENSION: ICD-10-CM

## 2021-03-16 DIAGNOSIS — N18.31 CHRONIC KIDNEY DISEASE, STAGE 3A (CMS/HCC): Primary | ICD-10-CM

## 2021-03-16 DIAGNOSIS — I48.20 CHRONIC ATRIAL FIBRILLATION (CMS/HCC): ICD-10-CM

## 2021-03-16 DIAGNOSIS — R73.03 PREDIABETES: ICD-10-CM

## 2021-03-16 DIAGNOSIS — J34.89 RHINORRHEA: ICD-10-CM

## 2021-03-16 DIAGNOSIS — E55.9 VITAMIN D DEFICIENCY: ICD-10-CM

## 2021-03-16 LAB
25(OH)D3 SERPL-MCNC: 45 NG/ML (ref 30–100)
ANION GAP SERPL CALC-SCNC: 10 MEQ/L (ref 3–15)
BUN SERPL-MCNC: 20 MG/DL (ref 8–20)
CALCIUM SERPL-MCNC: 9 MG/DL (ref 8.9–10.3)
CHLORIDE SERPL-SCNC: 102 MEQ/L (ref 98–109)
CO2 SERPL-SCNC: 28 MEQ/L (ref 22–32)
CREAT SERPL-MCNC: 1.2 MG/DL (ref 0.6–1.1)
EST. AVERAGE GLUCOSE BLD GHB EST-MCNC: 143 MG/DL
GFR SERPL CREATININE-BSD FRML MDRD: 42.8 ML/MIN/1.73M*2
GLUCOSE SERPL-MCNC: 145 MG/DL (ref 70–99)
HBA1C MFR BLD HPLC: 6.6 %
POTASSIUM SERPL-SCNC: 4 MEQ/L (ref 3.6–5.1)
SODIUM SERPL-SCNC: 140 MEQ/L (ref 136–144)

## 2021-03-16 PROCEDURE — 99214 OFFICE O/P EST MOD 30 MIN: CPT | Performed by: INTERNAL MEDICINE

## 2021-03-16 PROCEDURE — G8752 SYS BP LESS 140: HCPCS | Performed by: INTERNAL MEDICINE

## 2021-03-16 PROCEDURE — 83036 HEMOGLOBIN GLYCOSYLATED A1C: CPT | Performed by: INTERNAL MEDICINE

## 2021-03-16 PROCEDURE — 82306 VITAMIN D 25 HYDROXY: CPT | Performed by: INTERNAL MEDICINE

## 2021-03-16 PROCEDURE — 80048 BASIC METABOLIC PNL TOTAL CA: CPT | Performed by: INTERNAL MEDICINE

## 2021-03-16 PROCEDURE — G8754 DIAS BP LESS 90: HCPCS | Performed by: INTERNAL MEDICINE

## 2021-03-16 RX ORDER — METOPROLOL TARTRATE 25 MG/1
25 TABLET, FILM COATED ORAL 2 TIMES DAILY
Start: 2021-03-16 | End: 2021-06-17

## 2021-03-16 NOTE — PROGRESS NOTES
Chief Complaint   Patient presents with   • Other     3 month follow up        Subjective      Patient ID: Latrell Ojeda is a 85 y.o. female.    Sinus congestion: Flonase was not helpful so she stopped it.  Tried [place] pot which helped.    A. fib: Saw cardiologist.  Flecainide was stopped.  Now on amiodarone.    HTN: Stable    Prediabetes: Stable.  Needs labs.      The following have been reviewed and updated as appropriate in this visit:  Allergies  Meds  Problems        Review of Systems   Constitutional: Negative for chills and fever.   Cardiovascular: Negative for chest pain.   Gastrointestinal: Negative for abdominal pain.         Current Outpatient Medications:   •  amiodarone (PACERONE) 200 mg tablet, Take 200 mg by mouth daily.  , Disp: , Rfl:   •  calcium carbonate-vitamin D3 (CALCIUM WITH VITAMIN D) 600 mg(1,500mg) -400 unit per tablet, Take 2 tablets by mouth daily., Disp: , Rfl:   •  cholecalciferol, vitamin D3, 1,000 unit (25 mcg) tablet, Take 1,000 Units by mouth daily., Disp: , Rfl:   •  coenzyme Q10 (COQ10) 10 mg capsule, Take 100 mg by mouth daily.  , Disp: , Rfl:   •  hydrochlorothiazide (HYDRODIURIL) 25 mg tablet, , Disp: , Rfl:   •  losartan (COZAAR) 25 mg tablet, , Disp: , Rfl:   •  lutein 6 mg capsule, Take 6 mg by mouth., Disp: , Rfl:   •  metoprolol tartrate (LOPRESSOR) 25 mg tablet, Take 1 tablet (25 mg total) by mouth 2 (two) times a day., Disp: , Rfl:   •  metoprolol tartrate (LOPRESSOR) 50 mg tablet, Take 50 mg by mouth 2 (two) times a day., Disp: , Rfl:   •  omeprazole (PriLOSEC) 20 mg capsule, Take 20 mg by mouth every other day. , Disp: , Rfl:   •  red yeast rice 600 mg capsule, Take 1 capsule by mouth daily., Disp: , Rfl:   •  XARELTO 20 mg tablet, Take 20 mg by mouth once daily., Disp: , Rfl:     Objective     Vitals:    03/16/21 1409   BP: 112/60   BP Location: Left upper arm   Patient Position: Sitting   Pulse: (!) 53   Resp: 16   Temp: 36.3 °C (97.4 °F)   TempSrc:  Temporal   SpO2: 98%   Weight: 74.8 kg (165 lb)     Physical Exam  Vitals signs reviewed.   Constitutional:       Appearance: She is well-developed.   HENT:      Head: Normocephalic and atraumatic.   Eyes:      Conjunctiva/sclera: Conjunctivae normal.   Cardiovascular:      Rate and Rhythm: Normal rate.   Pulmonary:      Effort: Pulmonary effort is normal. No respiratory distress.      Breath sounds: No wheezing or rales.   Abdominal:      Palpations: Abdomen is soft.      Tenderness: There is no abdominal tenderness. There is no guarding.           Assessment/Plan   Chronic kidney disease, stage 3a (CMS/HCC)  Baseline CR 1.1  -Recheck labs    Prediabetes  Check a1c today     Chronic atrial fibrillation (CMS/HCC)  Higher dose of metoprolol led to fatigue  Stable currently  Off flecainide and on amiodarone  -Continue to follow up with cardiology  -Continue metoprolol, amiodarone, Xarelto    Essential hypertension  Controlled  -Continue losartan, metoprolol, HCTZ  -Please check how much metoprolol dose you are on    Rhinorrhea  Flonase did not help  -Continue Marija pot as needed    Palpitation  Currently on lower dose of metoprolol  -Continue metoprolol  -Follow-up with cardiology      Orders Placed This Encounter   Procedures   • Basic metabolic panel   • Hemoglobin A1c   • Vitamin D 25 hydroxy     New Medications Ordered This Visit   Medications   • metoprolol tartrate (LOPRESSOR) 25 mg tablet     Sig: Take 1 tablet (25 mg total) by mouth 2 (two) times a day.       Return in about 4 months (around 7/16/2021).     SANDI BLACKBURN MD

## 2021-03-16 NOTE — PATIENT INSTRUCTIONS
Chronic kidney disease, stage 3a (CMS/[facility])  Recheck labs    Prediabetes  Check a1c today     Chronic atrial fibrillation (CMS/[facility])  Continue to follow up with cardiology     Essential hypertension  Please check how much metoprolol dose you are on      Orders Placed This Encounter   Procedures   • Basic metabolic panel   • Hemoglobin A1c   • Vitamin D 25 hydroxy     New Medications Ordered This Visit   Medications   • metoprolol tartrate (LOPRESSOR) 25 mg tablet     Sig: Take 1 tablet (25 mg total) by mouth 2 (two) times a day.       Return in about 4 months (around 7/16/2021).     SANDI BLACKBURN MD

## 2021-03-16 NOTE — ASSESSMENT & PLAN NOTE
Higher dose of metoprolol led to fatigue  Stable currently  Off flecainide and on amiodarone  -Continue to follow up with cardiology  -Continue metoprolol, amiodarone, Xarelto

## 2021-03-16 NOTE — ASSESSMENT & PLAN NOTE
Controlled  -Continue losartan, metoprolol, HCTZ  -Please check how much metoprolol dose you are on

## 2021-03-17 ASSESSMENT — ENCOUNTER SYMPTOMS
FEVER: 0
CHILLS: 0
ABDOMINAL PAIN: 0

## 2021-05-07 ENCOUNTER — OFFICE VISIT (OUTPATIENT)
Dept: INTERNAL MEDICINE | Facility: CLINIC | Age: 86
End: 2021-05-07
Payer: MEDICARE

## 2021-05-07 VITALS
BODY MASS INDEX: 27.88 KG/M2 | SYSTOLIC BLOOD PRESSURE: 124 MMHG | RESPIRATION RATE: 16 BRPM | DIASTOLIC BLOOD PRESSURE: 70 MMHG | HEART RATE: 50 BPM | WEIGHT: 162.4 LBS | OXYGEN SATURATION: 97 %

## 2021-05-07 DIAGNOSIS — L24.9 IRRITANT CONTACT DERMATITIS, UNSPECIFIED TRIGGER: Primary | ICD-10-CM

## 2021-05-07 PROCEDURE — 99214 OFFICE O/P EST MOD 30 MIN: CPT | Performed by: NURSE PRACTITIONER

## 2021-05-07 PROCEDURE — G8754 DIAS BP LESS 90: HCPCS | Performed by: NURSE PRACTITIONER

## 2021-05-07 PROCEDURE — G8752 SYS BP LESS 140: HCPCS | Performed by: NURSE PRACTITIONER

## 2021-05-07 NOTE — ASSESSMENT & PLAN NOTE
Wash linens, towels, and clothing with gentle laundry detergent (free and clear).    May need to change dryer sheets as well.  Can take Claritin to calm down the itching.

## 2021-05-07 NOTE — PROGRESS NOTES
Chief Complaint   Patient presents with   • Other     rash on ankles,, patient noticed yesterday, red raised slightly itchy, no change in lotion detergents etc...       Subjective      Patient ID: Latrell Ojeda is a 85 y.o. female.    Notice the rash yesterday around her ankles.  Rash spreading today up the legs and arms, backs, and chest.  It is mildly itchy. Arms are more itchy.    Denies SOB, cp, n/v/d.    Denies new detergents, soap, perfume. Using a new cream but did not apply to her legs.        The following have been reviewed and updated as appropriate in this visit:  Allergies  Meds  Problems         Past Medical History: She  has a past medical history of Breast cancer (CMS/HCC), High blood pressure, and UTI (urinary tract infection).  Past Surgical History: She  has a past surgical history that includes Hysterectomy (1985); Mastectomy (2005); and Laparoscopic liver cyst fenestration (1995).  Family Medical Historyfamily history includes Diabetes in her biological brother and biological father; Multiple myeloma in her biological son.   Medication:   Current Outpatient Medications:   •  amiodarone (PACERONE) 200 mg tablet, Take 200 mg by mouth daily.  , Disp: , Rfl:   •  calcium carbonate-vitamin D3 (CALCIUM WITH VITAMIN D) 600 mg(1,500mg) -400 unit per tablet, Take 2 tablets by mouth daily., Disp: , Rfl:   •  cholecalciferol, vitamin D3, 1,000 unit (25 mcg) tablet, Take 1,000 Units by mouth daily., Disp: , Rfl:   •  coenzyme Q10 (COQ10) 10 mg capsule, Take 100 mg by mouth daily.  , Disp: , Rfl:   •  hydrochlorothiazide (HYDRODIURIL) 25 mg tablet, , Disp: , Rfl:   •  losartan (COZAAR) 25 mg tablet, , Disp: , Rfl:   •  lutein 6 mg capsule, Take 6 mg by mouth., Disp: , Rfl:   •  omeprazole (PriLOSEC) 20 mg capsule, Take 20 mg by mouth every other day. , Disp: , Rfl:   •  red yeast rice 600 mg capsule, Take 1 capsule by mouth daily., Disp: , Rfl:   •  XARELTO 20 mg tablet, Take 20 mg by mouth once  daily., Disp: , Rfl:   •  metoprolol tartrate (LOPRESSOR) 25 mg tablet, Take 1 tablet (25 mg total) by mouth 2 (two) times a day., Disp: , Rfl:   •  metoprolol tartrate (LOPRESSOR) 50 mg tablet, Take 50 mg by mouth 2 (two) times a day., Disp: , Rfl:   Allergies: She is allergic to clindamycin, codeine, and oxycodone-acetaminophen.  Social History: She  reports that she has never smoked. She has never used smokeless tobacco. She reports current alcohol use. She reports that she does not use drugs.    Review of Systems:  Review of Systems   Cardiovascular: Negative for leg swelling.   Skin: Positive for rash.   All other systems reviewed and are negative.      Vitals:    05/07/21 1301   BP: 124/70   BP Location: Left upper arm   Patient Position: Sitting   Pulse: (!) 50   Resp: 16   SpO2: 97%   Weight: 73.7 kg (162 lb 6.4 oz)     Physical Exam  Vitals and nursing note reviewed.   Constitutional:       Appearance: Normal appearance.   HENT:      Head: Normocephalic and atraumatic.   Eyes:      General: No scleral icterus.  Cardiovascular:      Rate and Rhythm: Regular rhythm. Bradycardia present.   Pulmonary:      Effort: Pulmonary effort is normal.      Breath sounds: Normal breath sounds.   Abdominal:      General: Bowel sounds are normal.      Palpations: Abdomen is soft.   Skin:     General: Skin is warm.      Findings: Petechiae and rash present. Rash is macular and urticarial.          Neurological:      Mental Status: She is alert.         Assessment/Plan   Irritant contact dermatitis  Wash linens, towels, and clothing with gentle laundry detergent (free and clear).    May need to change dryer sheets as well.  Can take Claritin to calm down the itching.          No orders of the defined types were placed in this encounter.    No orders of the defined types were placed in this encounter.      Return if symptoms worsen or fail to improve, for Next scheduled follow-up.  HAMZAH Brady

## 2021-06-07 ENCOUNTER — TELEPHONE (OUTPATIENT)
Dept: INTERNAL MEDICINE | Facility: CLINIC | Age: 86
End: 2021-06-07

## 2021-06-07 NOTE — TELEPHONE ENCOUNTER
Spoke to pt. Currently on macrobid from urgent care.  She will drop off another sample here tomorrow

## 2021-06-07 NOTE — TELEPHONE ENCOUNTER
Pt called stating that she was recently in the ER for possible UTI. Pt received ABX for this but she stated that she doesnt believe it is doing anything for them. She is now having abd pain and back pain. She is not sure if this has something to do with her kidneys.     She would like a call back at your earliest convenience to discuss.     [phone]

## 2021-06-08 ENCOUNTER — LAB REQUISITION (OUTPATIENT)
Dept: LAB | Facility: HOSPITAL | Age: 86
End: 2021-06-08
Attending: INTERNAL MEDICINE
Payer: MEDICARE

## 2021-06-08 DIAGNOSIS — R30.0 DYSURIA: Primary | ICD-10-CM

## 2021-06-08 DIAGNOSIS — R30.0 DYSURIA: ICD-10-CM

## 2021-06-08 LAB
BACTERIA URNS QL MICRO: ABNORMAL /HPF
BILIRUB UR QL STRIP.AUTO: NEGATIVE MG/DL
BILIRUBIN, POC: NEGATIVE
BLOOD URINE, POC: POSITIVE
CLARITY UR REFRACT.AUTO: ABNORMAL
CLARITY, POC: CLEAR
COLOR UR AUTO: YELLOW
COLOR, POC: YELLOW
GLUCOSE UR STRIP.AUTO-MCNC: NEGATIVE MG/DL
GLUCOSE URINE, POC: NEGATIVE
HGB UR QL STRIP.AUTO: 1
HYALINE CASTS #/AREA URNS LPF: ABNORMAL /LPF
KETONES UR STRIP.AUTO-MCNC: NEGATIVE MG/DL
KETONES, POC: NEGATIVE
LEUKOCYTE EST, POC: ABNORMAL
LEUKOCYTE ESTERASE UR QL STRIP.AUTO: 1
NITRITE UR QL STRIP.AUTO: NEGATIVE
NITRITE, POC: NEGATIVE
PH UR STRIP.AUTO: 6 [PH]
PH, POC: 6
PROT UR QL STRIP.AUTO: NEGATIVE
PROTEIN, POC: NEGATIVE
RBC #/AREA URNS HPF: ABNORMAL /HPF
SP GR UR REFRACT.AUTO: 1.02
SPECIFIC GRAVITY, POC: 1.02
SQUAMOUS URNS QL MICRO: 1 /HPF
UROBILINOGEN UR STRIP-ACNC: 1 EU/DL
UROBILINOGEN, POC: 0.2
WBC #/AREA URNS HPF: ABNORMAL /HPF

## 2021-06-08 PROCEDURE — 81001 URINALYSIS AUTO W/SCOPE: CPT | Performed by: INTERNAL MEDICINE

## 2021-06-08 PROCEDURE — 87086 URINE CULTURE/COLONY COUNT: CPT | Performed by: INTERNAL MEDICINE

## 2021-06-08 PROCEDURE — 99999 POCT URINALYSIS DIPSTICK: CPT | Performed by: INTERNAL MEDICINE

## 2021-06-09 ENCOUNTER — TELEPHONE (OUTPATIENT)
Dept: INTERNAL MEDICINE | Facility: CLINIC | Age: 86
End: 2021-06-09

## 2021-06-09 LAB — BACTERIA UR CULT: NORMAL

## 2021-06-09 NOTE — TELEPHONE ENCOUNTER
Pt called stating that she would like to go over her culture results that she got from [facility]. They are in the system and shes stating that she still does not feel good even with her current abx.     Please call pt at your earliest convenience.     [phone]

## 2021-06-10 ENCOUNTER — APPOINTMENT (EMERGENCY)
Dept: RADIOLOGY | Facility: HOSPITAL | Age: 86
DRG: 813 | End: 2021-06-10
Attending: EMERGENCY MEDICINE
Payer: MEDICARE

## 2021-06-10 ENCOUNTER — HOSPITAL ENCOUNTER (EMERGENCY)
Facility: HOSPITAL | Age: 86
Discharge: HOME | DRG: 813 | End: 2021-06-10
Attending: EMERGENCY MEDICINE
Payer: MEDICARE

## 2021-06-10 VITALS
HEIGHT: 65 IN | OXYGEN SATURATION: 97 % | HEART RATE: 58 BPM | TEMPERATURE: 97.5 F | RESPIRATION RATE: 18 BRPM | DIASTOLIC BLOOD PRESSURE: 67 MMHG | BODY MASS INDEX: 26.66 KG/M2 | WEIGHT: 160 LBS | SYSTOLIC BLOOD PRESSURE: 144 MMHG

## 2021-06-10 DIAGNOSIS — R50.9 FEVER, UNSPECIFIED FEVER CAUSE: Primary | ICD-10-CM

## 2021-06-10 LAB
ALBUMIN SERPL-MCNC: 3.6 G/DL (ref 3.4–5)
ALBUMIN SERPL-MCNC: 3.6 G/DL (ref 3.4–5)
ALP SERPL-CCNC: 67 IU/L (ref 35–126)
ALP SERPL-CCNC: 67 IU/L (ref 35–126)
ALT SERPL-CCNC: 49 IU/L (ref 11–54)
ALT SERPL-CCNC: 49 IU/L (ref 11–54)
ANION GAP SERPL CALC-SCNC: 10 MEQ/L (ref 3–15)
APTT PPP: 41 SEC (ref 23–35)
AST SERPL-CCNC: 32 IU/L (ref 15–41)
AST SERPL-CCNC: 32 IU/L (ref 15–41)
BACTERIA URNS QL MICRO: ABNORMAL /HPF
BASOPHILS # BLD: 0.03 K/UL (ref 0.01–0.1)
BASOPHILS NFR BLD: 0.3 %
BILIRUB DIRECT SERPL-MCNC: 0.4 MG/DL
BILIRUB SERPL-MCNC: 2.9 MG/DL (ref 0.3–1.2)
BILIRUB SERPL-MCNC: 2.9 MG/DL (ref 0.3–1.2)
BILIRUB UR QL STRIP.AUTO: NEGATIVE MG/DL
BUN SERPL-MCNC: 17 MG/DL (ref 8–20)
CALCIUM SERPL-MCNC: 8.8 MG/DL (ref 8.9–10.3)
CHLORIDE SERPL-SCNC: 95 MEQ/L (ref 98–109)
CLARITY UR REFRACT.AUTO: CLEAR
CO2 SERPL-SCNC: 28 MEQ/L (ref 22–32)
COLOR UR AUTO: YELLOW
CREAT SERPL-MCNC: 1 MG/DL (ref 0.6–1.1)
DIFFERENTIAL METHOD BLD: ABNORMAL
EOSINOPHIL # BLD: 0.06 K/UL (ref 0.04–0.36)
EOSINOPHIL NFR BLD: 0.6 %
ERYTHROCYTE [DISTWIDTH] IN BLOOD BY AUTOMATED COUNT: 12.6 % (ref 11.7–14.4)
GFR SERPL CREATININE-BSD FRML MDRD: 52.8 ML/MIN/1.73M*2
GLUCOSE SERPL-MCNC: 160 MG/DL (ref 70–99)
GLUCOSE UR STRIP.AUTO-MCNC: NEGATIVE MG/DL
HCT VFR BLDCO AUTO: 39.9 % (ref 35–45)
HGB BLD-MCNC: 13.7 G/DL (ref 11.8–15.7)
HGB UR QL STRIP.AUTO: 3
HYALINE CASTS #/AREA URNS LPF: ABNORMAL /LPF
IMM GRANULOCYTES # BLD AUTO: 0.03 K/UL (ref 0–0.08)
IMM GRANULOCYTES NFR BLD AUTO: 0.3 %
INR PPP: 2.9
KETONES UR STRIP.AUTO-MCNC: ABNORMAL MG/DL
LACTATE SERPL-SCNC: 1.2 MMOL/L (ref 0.4–2)
LEUKOCYTE ESTERASE UR QL STRIP.AUTO: NEGATIVE
LIPASE SERPL-CCNC: 22 U/L (ref 20–51)
LYMPHOCYTES # BLD: 0.34 K/UL (ref 1.2–3.5)
LYMPHOCYTES NFR BLD: 3.5 %
MCH RBC QN AUTO: 29.5 PG (ref 28–33.2)
MCHC RBC AUTO-ENTMCNC: 34.3 G/DL (ref 32.2–35.5)
MCV RBC AUTO: 85.8 FL (ref 83–98)
MONOCYTES # BLD: 0.52 K/UL (ref 0.28–0.8)
MONOCYTES NFR BLD: 5.3 %
NEUTROPHILS # BLD: 8.81 K/UL (ref 1.7–7)
NEUTS SEG NFR BLD: 90 %
NITRITE UR QL STRIP.AUTO: NEGATIVE
NRBC BLD-RTO: 0 %
PDW BLD AUTO: 12 FL (ref 9.4–12.3)
PH UR STRIP.AUTO: 6 [PH]
PLATELET # BLD AUTO: 115 K/UL (ref 150–369)
POTASSIUM SERPL-SCNC: 3.2 MEQ/L (ref 3.6–5.1)
PROT SERPL-MCNC: 6.6 G/DL (ref 6–8.2)
PROT SERPL-MCNC: 6.6 G/DL (ref 6–8.2)
PROT UR QL STRIP.AUTO: ABNORMAL
PROTHROMBIN TIME: 28.5 SEC (ref 12.2–14.5)
RBC # BLD AUTO: 4.65 M/UL (ref 3.93–5.22)
RBC #/AREA URNS HPF: ABNORMAL /HPF
SARS-COV-2 RNA RESP QL NAA+PROBE: NEGATIVE
SODIUM SERPL-SCNC: 133 MEQ/L (ref 136–144)
SP GR UR REFRACT.AUTO: 1.01
SQUAMOUS URNS QL MICRO: 1 /HPF
TROPONIN I SERPL-MCNC: <0.02 NG/ML
UROBILINOGEN UR STRIP-ACNC: 1 EU/DL
WBC # BLD AUTO: 9.79 K/UL (ref 3.8–10.5)
WBC #/AREA URNS HPF: ABNORMAL /HPF

## 2021-06-10 PROCEDURE — 96360 HYDRATION IV INFUSION INIT: CPT

## 2021-06-10 PROCEDURE — 71045 X-RAY EXAM CHEST 1 VIEW: CPT

## 2021-06-10 PROCEDURE — U0003 INFECTIOUS AGENT DETECTION BY NUCLEIC ACID (DNA OR RNA); SEVERE ACUTE RESPIRATORY SYNDROME CORONAVIRUS 2 (SARS-COV-2) (CORONAVIRUS DISEASE [COVID-19]), AMPLIFIED PROBE TECHNIQUE, MAKING USE OF HIGH THROUGHPUT TECHNOLOGIES AS DESCRIBED BY CMS-2020-01-R: HCPCS | Performed by: EMERGENCY MEDICINE

## 2021-06-10 PROCEDURE — 83690 ASSAY OF LIPASE: CPT | Performed by: EMERGENCY MEDICINE

## 2021-06-10 PROCEDURE — 80053 COMPREHEN METABOLIC PANEL: CPT | Performed by: EMERGENCY MEDICINE

## 2021-06-10 PROCEDURE — 84484 ASSAY OF TROPONIN QUANT: CPT | Performed by: EMERGENCY MEDICINE

## 2021-06-10 PROCEDURE — 63600105 HC IODINE BASED CONTRAST: Performed by: EMERGENCY MEDICINE

## 2021-06-10 PROCEDURE — 85730 THROMBOPLASTIN TIME PARTIAL: CPT | Performed by: EMERGENCY MEDICINE

## 2021-06-10 PROCEDURE — 85610 PROTHROMBIN TIME: CPT | Performed by: EMERGENCY MEDICINE

## 2021-06-10 PROCEDURE — 87040 BLOOD CULTURE FOR BACTERIA: CPT | Performed by: EMERGENCY MEDICINE

## 2021-06-10 PROCEDURE — 99284 EMERGENCY DEPT VISIT MOD MDM: CPT | Mod: 25

## 2021-06-10 PROCEDURE — 82248 BILIRUBIN DIRECT: CPT | Performed by: EMERGENCY MEDICINE

## 2021-06-10 PROCEDURE — 25800000 HC PHARMACY IV SOLUTIONS: Performed by: EMERGENCY MEDICINE

## 2021-06-10 PROCEDURE — G1004 CDSM NDSC: HCPCS

## 2021-06-10 PROCEDURE — 83605 ASSAY OF LACTIC ACID: CPT | Performed by: EMERGENCY MEDICINE

## 2021-06-10 PROCEDURE — 36415 COLL VENOUS BLD VENIPUNCTURE: CPT | Performed by: EMERGENCY MEDICINE

## 2021-06-10 PROCEDURE — 74177 CT ABD & PELVIS W/CONTRAST: CPT | Mod: ME

## 2021-06-10 PROCEDURE — 81001 URINALYSIS AUTO W/SCOPE: CPT | Performed by: EMERGENCY MEDICINE

## 2021-06-10 PROCEDURE — 93005 ELECTROCARDIOGRAM TRACING: CPT | Performed by: EMERGENCY MEDICINE

## 2021-06-10 PROCEDURE — 85025 COMPLETE CBC W/AUTO DIFF WBC: CPT | Performed by: EMERGENCY MEDICINE

## 2021-06-10 PROCEDURE — 96361 HYDRATE IV INFUSION ADD-ON: CPT

## 2021-06-10 PROCEDURE — 63700000 HC SELF-ADMINISTRABLE DRUG: Performed by: EMERGENCY MEDICINE

## 2021-06-10 RX ORDER — ACETAMINOPHEN 325 MG/1
650 TABLET ORAL ONCE
Status: COMPLETED | OUTPATIENT
Start: 2021-06-10 | End: 2021-06-10

## 2021-06-10 RX ORDER — NITROFURANTOIN 25; 75 MG/1; MG/1
100 CAPSULE ORAL 2 TIMES DAILY
COMMUNITY
End: 2021-06-13 | Stop reason: HOSPADM

## 2021-06-10 RX ADMIN — SODIUM CHLORIDE 1000 ML: 9 INJECTION, SOLUTION INTRAVENOUS at 11:54

## 2021-06-10 RX ADMIN — IOHEXOL 100 ML: 350 INJECTION, SOLUTION INTRAVENOUS at 12:37

## 2021-06-10 RX ADMIN — ACETAMINOPHEN 650 MG: 325 TABLET, FILM COATED ORAL at 12:47

## 2021-06-10 NOTE — ED ATTESTATION NOTE
6/10/14225:15 PM  I have personally seen and examined the patient.  I reviewed and agree with the PA/NP/Resident's assessment and plan of care.    My examination, assessment, and plan of care of Latrell Ojeda is as follows:  The patient presents with abdominal discomfort.  Low-grade fever.  Blood in urine.  85-year-old female who is on Xarelto for A. fib with prior UTIs.  Was seen in urgent care last week after passing gross blood.  Was started on Macrobid for suspected UTI.  Culture grew no significant bacteria and Macrobid was stopped.  Comes in today with continued symptoms including the lower abdominal discomfort.  No diarrhea.  Exam: Well-developed female no significant distress.  HEENT is unremarkable.  Neck is supple.  No respiratory distress.  Abdomen is soft with some mild right lower tenderness no rebound or guarding.  Awake and alert.  Impression/Plan: Vital signs are notable for elevated blood pressure otherwise unremarkable.  Laboratory studies urine showed 10-19 red cells no white cells no bacteria.  Does not appear to have a urinary tract infection currently.  CBC was normal.  Lactate was negative.  Chemistry studies were notable for mild hypokalemia.  Elevated bilirubin which the patient states is baseline for her.  Troponin is negative.  Covid is negative.  CT the abdomen shows some mildly dilated small bowel loops with no other indications of obstruction.  The renal collecting system was negative.  No surgical etiology found for the patient's symptoms.    The work-up in the ER was unremarkable.  At this point were not finding anything that would require admission.  We will do p.o. trial.    Vital Signs Review: Vital signs have been reviewed. The oxygen saturation is  SpO2: 98 % which is normal.    I was physically present for the key/critical portions of the following procedures: None    This document was created using dragon dictation software.  There might be some typographical errors due  to this technology.     Edson Cowart MD  06/10/21 7881

## 2021-06-10 NOTE — DISCHARGE INSTRUCTIONS
Ms. Cintrongary, you were the emergency department today for fever.  We did a medical examination including imaging, and lab work which was all normal.  Please continue to monitor for worsening signs of abdominal pain, inability to eat or drink, vomiting, or any other worsening symptoms.  I sent a message to Dr. Miller, letting him know that you were in the emergency department, but you should follow-up with him in a few days to make sure things are improving.

## 2021-06-10 NOTE — ED PROVIDER NOTES
Emergency Medicine Note  HPI   HISTORY OF PRESENT ILLNESS     85-year-old female past medical history of breast cancer, A. fib on Xarelto, prior UTIs resenting with abdominal pain, hematuria, fever.  Patient endorses hematuria, dysuria, frequency roughly 1 week ago.  She was seen in urgent care and provided with Macrobid.  Since then she has not had hematuria, however has not improved.  She feels generalized fatigue, has had fevers T-max 101, with abdominal pain.  Abdominal pain is intermittent, sharp, right lower quadrant and flank.            Patient History   PAST HISTORY     Reviewed from Nursing Triage:      Past Medical History:   Diagnosis Date   • Breast cancer (CMS/HCC)    • High blood pressure    • UTI (urinary tract infection)        Past Surgical History:   Procedure Laterality Date   • HYSTERECTOMY  1985   • LAPAROSCOPIC LIVER CYST FENESTRATION  1995   • MASTECTOMY  2005       Family History   Problem Relation Age of Onset   • Diabetes Biological Father    • Diabetes Biological Brother    • Multiple myeloma Biological Son        Social History     Tobacco Use   • Smoking status: Never Smoker   • Smokeless tobacco: Never Used   Substance Use Topics   • Alcohol use: Yes   • Drug use: Never         Review of Systems   REVIEW OF SYSTEMS     Review of Systems      VITALS     ED Vitals    Date/Time Temp Pulse Resp BP SpO2 [facility]   06/10/21 1038 37.2 °C (99 °F) 66 18 179/76 98 % CEP                       Physical Exam   PHYSICAL EXAM     Physical Exam      PROCEDURES     Procedures     DATA     Results     None          Imaging Results    None         No orders to display       Scoring tools                                 ED Course & MDM   MDM / ED COURSE and CLINICAL IMPRESSIONS     MDM  Number of Diagnoses or Management Options  Diagnosis management comments: 85-year-old female past medical history of breast cancer, A. fib on Xarelto, prior UTIs resenting with abdominal pain, hematuria, fever    Patient is  hypertensive, afebrile here.  On physical exam she has tenderness to palpation not focally throughout her abdomen.  Differential diagnosis includes intra-abdominal infection, nephrolithiasis, pyelonephritis, mesenteric ischemia, ACS, COVID-19.  Review of prior urinalysis and culture results from yesterday ordered by her PCP, patient is negative for UTI.  It is possible that the Macrobid she has been taking worked however she continues to have fevers nausea and abdominal pain.  Also consider nephrolithiasis given the blood in the urine, and flank pain.  However she is not focally tender on exam, will get CT of her abdomen and pelvis to rule out other intra-abdominal sources.  Is fully vaccinated for COVID-19 has had no respiratory symptoms.          ED Course as of Walt 10 2116   Thu Walt 10, 2021   1203 Lactate: 1.2 [SVETA]   1227 Bilirubin, Total(!): 2.9 [SVETA]   1322 IMPRESSION:     1.  No acute infectious or inflammatory changes in the abdomen or pelvis.     2.  Multiple hypodensities in the liver which are too small to accurately  characterize.  There is also a linear defect in the right dome of the liver with  small foci of calcification likely related to prior treatment-related changes.  Correlate with patient's clinical and surgical history.     3.  There are a few prominent loops of small bowel predominantly in the region  of the jejunum with air-fluid levels measuring 3.2 cm.  These findings are  nonspecific.  However, differential consideration includes possible early  developing small bowel obstruction.  If there is persistent clinical symptoms,  short interval follow-up imaging is recommended.   CT ABDOMEN PELVIS WITH IV CONTRAST [SVETA]   1322 RUQ US: No stones in the gallbladder, gallbladder wall within normal limits, normal CBD, no pericholecystic fluid    [SVETA]   1519 SARS-CoV-2 (COVID-19): Negative [SVETA]   1529 Shared decision making discussion made with the patient and her daughter, patient offered admission  for observation for fever of unknown origin.  Given unremarkable work-up.  Patient and her daughter decided to go home with strict return precautions.  Notified patient's PCP of her trip through epic.    [SVETA]   2628 IMPRESSION:     No evidence of acute pulmonary disease.      X-RAY CHEST 1 VIEW [SVETA]      ED Course User Index  [SVETA] Andres Weiner DO         Clinical Impressions as of Walt 10 2116   Fever, unspecified fever cause            Andres Weiner DO  Resident  06/10/21 2116

## 2021-06-11 ENCOUNTER — HOSPITAL ENCOUNTER (INPATIENT)
Facility: HOSPITAL | Age: 86
LOS: 1 days | Discharge: HOME | DRG: 813 | End: 2021-06-13
Attending: EMERGENCY MEDICINE | Admitting: HOSPITALIST
Payer: MEDICARE

## 2021-06-11 ENCOUNTER — OFFICE VISIT (OUTPATIENT)
Dept: INTERNAL MEDICINE | Facility: CLINIC | Age: 86
End: 2021-06-11
Payer: MEDICARE

## 2021-06-11 VITALS
DIASTOLIC BLOOD PRESSURE: 56 MMHG | BODY MASS INDEX: 26.96 KG/M2 | SYSTOLIC BLOOD PRESSURE: 122 MMHG | WEIGHT: 162 LBS | HEART RATE: 66 BPM | TEMPERATURE: 98.9 F | OXYGEN SATURATION: 92 %

## 2021-06-11 DIAGNOSIS — R50.9 FEVER, UNKNOWN ORIGIN: Primary | ICD-10-CM

## 2021-06-11 DIAGNOSIS — R21 RASH: Primary | ICD-10-CM

## 2021-06-11 PROBLEM — R50.2 DRUG-INDUCED FEVER: Status: ACTIVE | Noted: 2021-06-11

## 2021-06-11 PROBLEM — D72.829 LEUKOCYTOSIS: Status: ACTIVE | Noted: 2021-06-11

## 2021-06-11 LAB
ALBUMIN SERPL-MCNC: 3.9 G/DL (ref 3.4–5)
ALP SERPL-CCNC: 72 IU/L (ref 35–126)
ALT SERPL-CCNC: 46 IU/L (ref 11–54)
ANION GAP SERPL CALC-SCNC: 12 MEQ/L (ref 3–15)
AST SERPL-CCNC: 28 IU/L (ref 15–41)
ATRIAL RATE: 64
BASOPHILS # BLD: 0.05 K/UL (ref 0.01–0.1)
BASOPHILS NFR BLD: 0.5 %
BILIRUB SERPL-MCNC: 3.1 MG/DL (ref 0.3–1.2)
BUN SERPL-MCNC: 19 MG/DL (ref 8–20)
CALCIUM SERPL-MCNC: 8.7 MG/DL (ref 8.9–10.3)
CHLORIDE SERPL-SCNC: 95 MEQ/L (ref 98–109)
CO2 SERPL-SCNC: 26 MEQ/L (ref 22–32)
CREAT SERPL-MCNC: 1.2 MG/DL (ref 0.6–1.1)
DIFFERENTIAL METHOD BLD: ABNORMAL
EOSINOPHIL # BLD: 0.32 K/UL (ref 0.04–0.36)
EOSINOPHIL NFR BLD: 2.9 %
ERYTHROCYTE [DISTWIDTH] IN BLOOD BY AUTOMATED COUNT: 12.7 % (ref 11.7–14.4)
GFR SERPL CREATININE-BSD FRML MDRD: 42.8 ML/MIN/1.73M*2
GLUCOSE SERPL-MCNC: 122 MG/DL (ref 70–99)
HCT VFR BLDCO AUTO: 42.4 % (ref 35–45)
HGB BLD-MCNC: 14.3 G/DL (ref 11.8–15.7)
IMM GRANULOCYTES # BLD AUTO: 0.06 K/UL (ref 0–0.08)
IMM GRANULOCYTES NFR BLD AUTO: 0.6 %
LYMPHOCYTES # BLD: 1.23 K/UL (ref 1.2–3.5)
LYMPHOCYTES NFR BLD: 11.3 %
MCH RBC QN AUTO: 29.3 PG (ref 28–33.2)
MCHC RBC AUTO-ENTMCNC: 33.7 G/DL (ref 32.2–35.5)
MCV RBC AUTO: 86.9 FL (ref 83–98)
MONOCYTES # BLD: 0.79 K/UL (ref 0.28–0.8)
MONOCYTES NFR BLD: 7.3 %
NEUTROPHILS # BLD: 8.43 K/UL (ref 1.7–7)
NEUTS SEG NFR BLD: 77.4 %
NRBC BLD-RTO: 0 %
P AXIS: 80
PDW BLD AUTO: 12 FL (ref 9.4–12.3)
PLATELET # BLD AUTO: 137 K/UL (ref 150–369)
POTASSIUM SERPL-SCNC: 3.1 MEQ/L (ref 3.6–5.1)
PR INTERVAL: 160
PROT SERPL-MCNC: 7.2 G/DL (ref 6–8.2)
QRS DURATION: 70
QT INTERVAL: 454
QTC CALCULATION(BAZETT): 468
R AXIS: 40
RBC # BLD AUTO: 4.88 M/UL (ref 3.93–5.22)
SODIUM SERPL-SCNC: 133 MEQ/L (ref 136–144)
T WAVE AXIS: 53
VENTRICULAR RATE: 64
WBC # BLD AUTO: 10.88 K/UL (ref 3.8–10.5)

## 2021-06-11 PROCEDURE — 83605 ASSAY OF LACTIC ACID: CPT | Performed by: HOSPITALIST

## 2021-06-11 PROCEDURE — 85652 RBC SED RATE AUTOMATED: CPT | Performed by: EMERGENCY MEDICINE

## 2021-06-11 PROCEDURE — 36415 COLL VENOUS BLD VENIPUNCTURE: CPT | Performed by: EMERGENCY MEDICINE

## 2021-06-11 PROCEDURE — 87207 SMEAR SPECIAL STAIN: CPT | Performed by: EMERGENCY MEDICINE

## 2021-06-11 PROCEDURE — 63700000 HC SELF-ADMINISTRABLE DRUG: Performed by: EMERGENCY MEDICINE

## 2021-06-11 PROCEDURE — 85025 COMPLETE CBC W/AUTO DIFF WBC: CPT | Performed by: EMERGENCY MEDICINE

## 2021-06-11 PROCEDURE — G8752 SYS BP LESS 140: HCPCS | Performed by: NURSE PRACTITIONER

## 2021-06-11 PROCEDURE — 96374 THER/PROPH/DIAG INJ IV PUSH: CPT

## 2021-06-11 PROCEDURE — 87651 STREP A DNA AMP PROBE: CPT | Performed by: EMERGENCY MEDICINE

## 2021-06-11 PROCEDURE — 96375 TX/PRO/DX INJ NEW DRUG ADDON: CPT

## 2021-06-11 PROCEDURE — G8754 DIAS BP LESS 90: HCPCS | Performed by: NURSE PRACTITIONER

## 2021-06-11 PROCEDURE — 99219 PR INITIAL OBSERVATION CARE/DAY 50 MINUTES: CPT | Performed by: HOSPITALIST

## 2021-06-11 PROCEDURE — 99285 EMERGENCY DEPT VISIT HI MDM: CPT | Mod: 25

## 2021-06-11 PROCEDURE — 25800000 HC PHARMACY IV SOLUTIONS: Performed by: EMERGENCY MEDICINE

## 2021-06-11 PROCEDURE — 80053 COMPREHEN METABOLIC PANEL: CPT | Performed by: EMERGENCY MEDICINE

## 2021-06-11 PROCEDURE — 63600000 HC DRUGS/DETAIL CODE: Performed by: EMERGENCY MEDICINE

## 2021-06-11 PROCEDURE — 86618 LYME DISEASE ANTIBODY: CPT | Performed by: EMERGENCY MEDICINE

## 2021-06-11 PROCEDURE — 99214 OFFICE O/P EST MOD 30 MIN: CPT | Performed by: NURSE PRACTITIONER

## 2021-06-11 RX ORDER — LORAZEPAM 2 MG/ML
0.5 INJECTION INTRAMUSCULAR ONCE
Status: COMPLETED | OUTPATIENT
Start: 2021-06-11 | End: 2021-06-11

## 2021-06-11 RX ORDER — TRIAMCINOLONE ACETONIDE 1 MG/G
CREAM TOPICAL 2 TIMES DAILY
Qty: 30 G | Refills: 1 | Status: SHIPPED | OUTPATIENT
Start: 2021-06-11 | End: 2022-02-22

## 2021-06-11 RX ORDER — ACETAMINOPHEN 325 MG/1
650 TABLET ORAL ONCE
Status: COMPLETED | OUTPATIENT
Start: 2021-06-11 | End: 2021-06-11

## 2021-06-11 RX ADMIN — SODIUM CHLORIDE 1000 ML: 9 INJECTION, SOLUTION INTRAVENOUS at 23:47

## 2021-06-11 RX ADMIN — ACETAMINOPHEN 650 MG: 325 TABLET, FILM COATED ORAL at 23:48

## 2021-06-11 RX ADMIN — METHYLPREDNISOLONE SODIUM SUCCINATE 125 MG: 125 INJECTION, POWDER, FOR SOLUTION INTRAMUSCULAR; INTRAVENOUS at 23:53

## 2021-06-11 RX ADMIN — LORAZEPAM 0.5 MG: 2 INJECTION INTRAMUSCULAR; INTRAVENOUS at 23:48

## 2021-06-11 NOTE — ASSESSMENT & PLAN NOTE
Apply triamcinolone cream to arms and legs twice daily.  Change your creams    Call the office if symptoms persists or worsens.

## 2021-06-11 NOTE — PROGRESS NOTES
Chief Complaint   Patient presents with   • Rash     was at the hospital yesterday low grade fever, vomitied, did a bunch of tests, today now has a rash on both legs from knees down, itchy       Subjective      Patient ID: Latrell Ojeda is a 85 y.o. female.    Seen today for a rash on both legs.  Since this morning.    They are itchy.  Denies new soaps, lotions, fragrances.  No pets.     Has not tried anything.        The following have been reviewed and updated as appropriate in this visit:  Allergies  Meds  Problems         Past Medical History: She  has a past medical history of Breast cancer (CMS/HCC), High blood pressure, and UTI (urinary tract infection).  Past Surgical History: She  has a past surgical history that includes Hysterectomy (1985); Mastectomy (2005); and Laparoscopic liver cyst fenestration (1995).  Family Medical Historyfamily history includes Diabetes in her biological brother and biological father; Multiple myeloma in her biological son.   Medication:   Current Outpatient Medications:   •  amiodarone (PACERONE) 200 mg tablet, Take 200 mg by mouth daily.  , Disp: , Rfl:   •  calcium carbonate-vitamin D3 (CALCIUM WITH VITAMIN D) 600 mg(1,500mg) -400 unit per tablet, Take 2 tablets by mouth daily., Disp: , Rfl:   •  cholecalciferol, vitamin D3, 1,000 unit (25 mcg) tablet, Take 1,000 Units by mouth daily., Disp: , Rfl:   •  coenzyme Q10 (COQ10) 10 mg capsule, Take 100 mg by mouth daily.  , Disp: , Rfl:   •  hydrochlorothiazide (HYDRODIURIL) 25 mg tablet, , Disp: , Rfl:   •  losartan (COZAAR) 25 mg tablet, , Disp: , Rfl:   •  lutein 6 mg capsule, Take 6 mg by mouth., Disp: , Rfl:   •  metoprolol tartrate (LOPRESSOR) 25 mg tablet, Take 1 tablet (25 mg total) by mouth 2 (two) times a day., Disp: , Rfl:   •  nitrofurantoin, macrocrystal-monohydrate, (MACROBID) 100 mg capsule, Take 100 mg by mouth 2 (two) times a day., Disp: , Rfl:   •  omeprazole (PriLOSEC) 20 mg capsule, Take 20 mg by mouth  every other day. , Disp: , Rfl:   •  red yeast rice 600 mg capsule, Take 1 capsule by mouth daily., Disp: , Rfl:   •  XARELTO 20 mg tablet, Take 20 mg by mouth once daily., Disp: , Rfl:   •  triamcinolone (KENALOG) 0.1 % cream, Apply topically 2 (two) times a day., Disp: 30 g, Rfl: 1  Allergies: She is allergic to clindamycin, codeine, and oxycodone-acetaminophen.  Social History: She  reports that she has never smoked. She has never used smokeless tobacco. She reports current alcohol use. She reports that she does not use drugs.    Review of Systems:  Review of Systems   Skin: Positive for rash.   All other systems reviewed and are negative.      Vitals:    06/11/21 1125   BP: (!) 122/56   Pulse: 66   Temp: 37.2 °C (98.9 °F)   TempSrc: Oral   SpO2: 92%   Weight: 73.5 kg (162 lb)     Physical Exam  Vitals and nursing note reviewed.   Constitutional:       Appearance: Normal appearance.   Eyes:      General: No scleral icterus.  Pulmonary:      Effort: Pulmonary effort is normal.   Musculoskeletal:         General: Swelling (right ankle, mild) present.   Skin:     General: Skin is warm and dry.      Capillary Refill: Capillary refill takes less than 2 seconds.      Findings: Rash present. Rash is macular and papular.          Neurological:      Mental Status: She is alert.         Assessment/Plan   Rash  Apply triamcinolone cream to arms and legs twice daily.  Change your creams    Call the office if symptoms persists or worsens.      No orders of the defined types were placed in this encounter.    New Medications Ordered This Visit   Medications   • triamcinolone (KENALOG) 0.1 % cream     Sig: Apply topically 2 (two) times a day.     Dispense:  30 g     Refill:  1       Return if symptoms worsen or fail to improve, for Next scheduled follow-up.  HAMZAH Brady

## 2021-06-11 NOTE — PATIENT INSTRUCTIONS
Apply triamcinolone cream to arms and legs twice daily for a week or two.    Try Cerave' moisturing cream twice daily.  Call the office if symptoms persists or worsens

## 2021-06-12 ENCOUNTER — APPOINTMENT (EMERGENCY)
Dept: RADIOLOGY | Facility: HOSPITAL | Age: 86
DRG: 813 | End: 2021-06-12
Attending: HOSPITALIST
Payer: MEDICARE

## 2021-06-12 PROBLEM — R10.10 PAIN OF UPPER ABDOMEN: Status: ACTIVE | Noted: 2021-06-12

## 2021-06-12 PROBLEM — R50.9 FEVER, UNKNOWN ORIGIN: Status: ACTIVE | Noted: 2021-06-12

## 2021-06-12 PROBLEM — R50.9 FEVER: Status: ACTIVE | Noted: 2021-06-12

## 2021-06-12 PROBLEM — I48.91 A-FIB (CMS/HCC): Status: ACTIVE | Noted: 2020-12-14

## 2021-06-12 PROBLEM — R50.2 DRUG-INDUCED FEVER: Chronic | Status: ACTIVE | Noted: 2021-06-11

## 2021-06-12 LAB
ANION GAP SERPL CALC-SCNC: 12 MEQ/L (ref 3–15)
BUN SERPL-MCNC: 19 MG/DL (ref 8–20)
CALCIUM SERPL-MCNC: 8.2 MG/DL (ref 8.9–10.3)
CHLORIDE SERPL-SCNC: 99 MEQ/L (ref 98–109)
CO2 SERPL-SCNC: 25 MEQ/L (ref 22–32)
CREAT SERPL-MCNC: 1.2 MG/DL (ref 0.6–1.1)
ERYTHROCYTE [DISTWIDTH] IN BLOOD BY AUTOMATED COUNT: 12.6 % (ref 11.7–14.4)
ERYTHROCYTE [SEDIMENTATION RATE] IN BLOOD BY WESTERGREN METHOD: 37 MM/HR
GFR SERPL CREATININE-BSD FRML MDRD: 42.8 ML/MIN/1.73M*2
GLUCOSE SERPL-MCNC: 187 MG/DL (ref 70–99)
HCT VFR BLDCO AUTO: 39.9 % (ref 35–45)
HGB BLD-MCNC: 13.4 G/DL (ref 11.8–15.7)
LACTATE SERPL-SCNC: 1.8 MMOL/L (ref 0.4–2)
MAGNESIUM SERPL-MCNC: 1.6 MG/DL (ref 1.8–2.5)
MCH RBC QN AUTO: 29.3 PG (ref 28–33.2)
MCHC RBC AUTO-ENTMCNC: 33.6 G/DL (ref 32.2–35.5)
MCV RBC AUTO: 87.3 FL (ref 83–98)
PDW BLD AUTO: 12.4 FL (ref 9.4–12.3)
PLATELET # BLD AUTO: 130 K/UL (ref 150–369)
POTASSIUM SERPL-SCNC: 3.1 MEQ/L (ref 3.6–5.1)
RBC # BLD AUTO: 4.57 M/UL (ref 3.93–5.22)
S PYO DNA THROAT QL NAA+PROBE: NOT DETECTED
SODIUM SERPL-SCNC: 136 MEQ/L (ref 136–144)
WBC # BLD AUTO: 8.09 K/UL (ref 3.8–10.5)

## 2021-06-12 PROCEDURE — 86039 ANTINUCLEAR ANTIBODIES (ANA): CPT | Performed by: INTERNAL MEDICINE

## 2021-06-12 PROCEDURE — 86235 NUCLEAR ANTIGEN ANTIBODY: CPT | Performed by: INTERNAL MEDICINE

## 2021-06-12 PROCEDURE — 83735 ASSAY OF MAGNESIUM: CPT | Performed by: HOSPITALIST

## 2021-06-12 PROCEDURE — 36415 COLL VENOUS BLD VENIPUNCTURE: CPT | Performed by: HOSPITALIST

## 2021-06-12 PROCEDURE — 86160 COMPLEMENT ANTIGEN: CPT | Performed by: INTERNAL MEDICINE

## 2021-06-12 PROCEDURE — 80048 BASIC METABOLIC PNL TOTAL CA: CPT | Performed by: HOSPITALIST

## 2021-06-12 PROCEDURE — 99232 SBSQ HOSP IP/OBS MODERATE 35: CPT | Performed by: INTERNAL MEDICINE

## 2021-06-12 PROCEDURE — 86431 RHEUMATOID FACTOR QUANT: CPT | Performed by: INTERNAL MEDICINE

## 2021-06-12 PROCEDURE — 20600000 HC ROOM AND CARE INTERMEDIATE/TELEMETRY

## 2021-06-12 PROCEDURE — 25800000 HC PHARMACY IV SOLUTIONS: Performed by: HOSPITALIST

## 2021-06-12 PROCEDURE — 83516 IMMUNOASSAY NONANTIBODY: CPT | Performed by: INTERNAL MEDICINE

## 2021-06-12 PROCEDURE — 63700000 HC SELF-ADMINISTRABLE DRUG: Performed by: HOSPITALIST

## 2021-06-12 PROCEDURE — 85027 COMPLETE CBC AUTOMATED: CPT | Performed by: HOSPITALIST

## 2021-06-12 PROCEDURE — 63600000 HC DRUGS/DETAIL CODE: Performed by: HOSPITALIST

## 2021-06-12 PROCEDURE — 63700000 HC SELF-ADMINISTRABLE DRUG: Performed by: INTERNAL MEDICINE

## 2021-06-12 PROCEDURE — 76770 US EXAM ABDO BACK WALL COMP: CPT

## 2021-06-12 PROCEDURE — 86038 ANTINUCLEAR ANTIBODIES: CPT | Performed by: INTERNAL MEDICINE

## 2021-06-12 RX ORDER — POTASSIUM CHLORIDE 750 MG/1
20 TABLET, FILM COATED, EXTENDED RELEASE ORAL AS NEEDED
Status: DISCONTINUED | OUTPATIENT
Start: 2021-06-12 | End: 2021-06-13 | Stop reason: HOSPADM

## 2021-06-12 RX ORDER — AMIODARONE HYDROCHLORIDE 200 MG/1
200 TABLET ORAL DAILY
Status: DISCONTINUED | OUTPATIENT
Start: 2021-06-12 | End: 2021-06-13 | Stop reason: HOSPADM

## 2021-06-12 RX ORDER — POTASSIUM CHLORIDE 750 MG/1
40 TABLET, FILM COATED, EXTENDED RELEASE ORAL AS NEEDED
Status: DISCONTINUED | OUTPATIENT
Start: 2021-06-12 | End: 2021-06-13 | Stop reason: HOSPADM

## 2021-06-12 RX ORDER — ACETAMINOPHEN 500 MG
5 TABLET ORAL NIGHTLY PRN
Status: DISCONTINUED | OUTPATIENT
Start: 2021-06-12 | End: 2021-06-13 | Stop reason: HOSPADM

## 2021-06-12 RX ORDER — ACETAMINOPHEN 325 MG/1
650 TABLET ORAL EVERY 4 HOURS PRN
Status: DISCONTINUED | OUTPATIENT
Start: 2021-06-12 | End: 2021-06-13 | Stop reason: HOSPADM

## 2021-06-12 RX ORDER — LOSARTAN POTASSIUM 25 MG/1
25 TABLET ORAL
Status: DISCONTINUED | OUTPATIENT
Start: 2021-06-12 | End: 2021-06-13 | Stop reason: HOSPADM

## 2021-06-12 RX ORDER — SODIUM CHLORIDE 9 MG/ML
INJECTION, SOLUTION INTRAVENOUS CONTINUOUS
Status: DISCONTINUED | OUTPATIENT
Start: 2021-06-12 | End: 2021-06-12

## 2021-06-12 RX ORDER — IBUPROFEN 200 MG
16-32 TABLET ORAL AS NEEDED
Status: DISCONTINUED | OUTPATIENT
Start: 2021-06-12 | End: 2021-06-13 | Stop reason: HOSPADM

## 2021-06-12 RX ORDER — LOSARTAN POTASSIUM 25 MG/1
25 TABLET ORAL DAILY
Status: DISCONTINUED | OUTPATIENT
Start: 2021-06-12 | End: 2021-06-12

## 2021-06-12 RX ORDER — PANTOPRAZOLE SODIUM 20 MG/1
20 TABLET, DELAYED RELEASE ORAL DAILY
Status: DISCONTINUED | OUTPATIENT
Start: 2021-06-12 | End: 2021-06-13 | Stop reason: HOSPADM

## 2021-06-12 RX ORDER — POTASSIUM CHLORIDE 14.9 MG/ML
20 INJECTION INTRAVENOUS AS NEEDED
Status: DISCONTINUED | OUTPATIENT
Start: 2021-06-12 | End: 2021-06-13 | Stop reason: HOSPADM

## 2021-06-12 RX ORDER — LORAZEPAM 2 MG/ML
0.5 INJECTION INTRAMUSCULAR ONCE
Status: ACTIVE | OUTPATIENT
Start: 2021-06-12 | End: 2021-06-12

## 2021-06-12 RX ORDER — DEXTROSE 40 %
15-30 GEL (GRAM) ORAL AS NEEDED
Status: DISCONTINUED | OUTPATIENT
Start: 2021-06-12 | End: 2021-06-13 | Stop reason: HOSPADM

## 2021-06-12 RX ORDER — DEXTROSE 50 % IN WATER (D50W) INTRAVENOUS SYRINGE
25 AS NEEDED
Status: DISCONTINUED | OUTPATIENT
Start: 2021-06-12 | End: 2021-06-13 | Stop reason: HOSPADM

## 2021-06-12 RX ORDER — HYDROCHLOROTHIAZIDE 25 MG/1
25 TABLET ORAL DAILY
Status: DISCONTINUED | OUTPATIENT
Start: 2021-06-12 | End: 2021-06-13 | Stop reason: HOSPADM

## 2021-06-12 RX ORDER — METOPROLOL TARTRATE 25 MG/1
25 TABLET, FILM COATED ORAL 2 TIMES DAILY
Status: DISCONTINUED | OUTPATIENT
Start: 2021-06-12 | End: 2021-06-13 | Stop reason: HOSPADM

## 2021-06-12 RX ORDER — SODIUM CHLORIDE 9 MG/ML
INJECTION, SOLUTION INTRAVENOUS CONTINUOUS
Status: DISCONTINUED | OUTPATIENT
Start: 2021-06-12 | End: 2021-06-12 | Stop reason: SDUPTHER

## 2021-06-12 RX ADMIN — METOPROLOL TARTRATE 25 MG: 25 TABLET, FILM COATED ORAL at 09:52

## 2021-06-12 RX ADMIN — METOPROLOL TARTRATE 25 MG: 25 TABLET, FILM COATED ORAL at 20:32

## 2021-06-12 RX ADMIN — POTASSIUM BICARBONATE 40 MEQ: 782 TABLET, EFFERVESCENT ORAL at 10:55

## 2021-06-12 RX ADMIN — AMIODARONE HYDROCHLORIDE 200 MG: 200 TABLET ORAL at 09:51

## 2021-06-12 RX ADMIN — SODIUM CHLORIDE: 9 INJECTION, SOLUTION INTRAVENOUS at 02:39

## 2021-06-12 RX ADMIN — RIVAROXABAN 15 MG: 15 TABLET, FILM COATED ORAL at 20:52

## 2021-06-12 RX ADMIN — HYDROCHLOROTHIAZIDE 25 MG: 25 TABLET ORAL at 09:52

## 2021-06-12 RX ADMIN — MAGNESIUM SULFATE HEPTAHYDRATE 2 G: 40 INJECTION, SOLUTION INTRAVENOUS at 10:55

## 2021-06-12 RX ADMIN — LOSARTAN POTASSIUM 25 MG: 25 TABLET, FILM COATED ORAL at 20:31

## 2021-06-12 ASSESSMENT — COGNITIVE AND FUNCTIONAL STATUS - GENERAL
HELP NEEDED FOR BATHING: 3 - A LITTLE
STANDING UP FROM CHAIR USING ARMS: 4 - NONE
WALKING IN HOSPITAL ROOM: 3 - A LITTLE
CLIMB 3 TO 5 STEPS WITH RAILING: 3 - A LITTLE
MOVING TO AND FROM BED TO CHAIR: 4 - NONE
EATING MEALS: 4 - NONE
TOILETING: 3 - A LITTLE
HELP NEEDED FOR PERSONAL GROOMING: 4 - NONE
DRESSING REGULAR UPPER BODY CLOTHING: 3 - A LITTLE
DRESSING REGULAR LOWER BODY CLOTHING: 3 - A LITTLE

## 2021-06-12 ASSESSMENT — PATIENT HEALTH QUESTIONNAIRE - PHQ9: SUM OF ALL RESPONSES TO PHQ9 QUESTIONS 1 & 2: 0

## 2021-06-12 NOTE — ASSESSMENT & PLAN NOTE
Same as above  Could be drug releated  Consult id  Iv steroid  6/12-drug-related versus vasculitic

## 2021-06-12 NOTE — H&P
Hospital Medicine Service -  History & Physical        CHIEF COMPLAINT     Chief Complaint   Patient presents with   • Rash        HISTORY OF PRESENT ILLNESS      85 y.o. female with a past medical history ofhistory of A. fib on Xarelto.  Breast CA in remission, recurrent UTIs.  Patient over the past week has noticed of changes of BM from full volume to string-like.  Had a BM today.  Patient also has been complaining of abdominal pain mid epigastric down to suprapubic area.  Patient had seen family physician who was started on Macrobid as there urinalysis showed UTI.  However 3 days ago patient's urine culture was negative. Was seen here yesterday with low-grade fevers.  Moreover she complained about hematuria as it  secondary to the Xarelto.  Urine seems to have cleared up.  Tonight comes in with a rash which developed on her legs bilaterally today.  Seems to be progressing proximally up the leg.  No itching no pain.  Here in the ED patient was febrile of 38.2 blood pressure was slightly elevated, CT of the abdomen showed no inflammation around the bladder or kidneys but there was a possibility of an SBO.  Lactic acid was normal WBC was 10.88 potassium 3.1 BUN 19 creatinine 1.2.  Because of the fever of unknown origin with the rash patient will be admitted for further evaluation and treatment    PAST MEDICAL AND SURGICAL HISTORY      Past Medical History:   Diagnosis Date   • Breast cancer (CMS/HCC)    • High blood pressure    • UTI (urinary tract infection)        Past Surgical History:   Procedure Laterality Date   • HYSTERECTOMY  1985   • LAPAROSCOPIC LIVER CYST FENESTRATION  1995   • MASTECTOMY  2005       MEDICATIONS      Prior to Admission medications    Medication Sig Start Date End Date Taking? Authorizing Provider   amiodarone (PACERONE) 200 mg tablet Take 200 mg by mouth daily.   3/8/21   Provider, MD Andrade   calcium carbonate-vitamin D3 (CALCIUM WITH VITAMIN D) 600 mg(1,500mg) -400 unit per  tablet Take 2 tablets by mouth daily.    Andrade Hernández MD   cholecalciferol, vitamin D3, 1,000 unit (25 mcg) tablet Take 1,000 Units by mouth daily.    Andrade Hernández MD   coenzyme Q10 (COQ10) 10 mg capsule Take 100 mg by mouth daily.      Andrade Hernández MD   hydrochlorothiazide (HYDRODIURIL) 25 mg tablet  11/29/19   Andrade Hernández MD   losartan (COZAAR) 25 mg tablet  11/29/19   Andrade Hernández MD   lutein 6 mg capsule Take 6 mg by mouth.    Andrade Hernández MD   metoprolol tartrate (LOPRESSOR) 25 mg tablet Take 1 tablet (25 mg total) by mouth 2 (two) times a day. 3/16/21   Aris Miller MD   nitrofurantoin, macrocrystal-monohydrate, (MACROBID) 100 mg capsule Take 100 mg by mouth 2 (two) times a day.    Andrade Hernández MD   omeprazole (PriLOSEC) 20 mg capsule Take 20 mg by mouth every other day.  12/14/19   Andrade Hernández MD   red yeast rice 600 mg capsule Take 1 capsule by mouth daily.    Andrade Hernández MD   triamcinolone (KENALOG) 0.1 % cream Apply topically 2 (two) times a day. 6/11/21 7/11/21  Xander Waller CRNP   XARELTO 20 mg tablet Take 20 mg by mouth once daily. 10/20/20   Andrade Hernández MD       ALLERGIES      Clindamycin, Codeine, and Oxycodone-acetaminophen    FAMILY HISTORY      Family History   Problem Relation Age of Onset   • Diabetes Biological Father    • Diabetes Biological Brother    • Multiple myeloma Biological Son        SOCIAL HISTORY      Social History     Socioeconomic History   • Marital status:      Spouse name: None   • Number of children: None   • Years of education: None   • Highest education level: None   Occupational History   • Occupation: commercial design      Comment: retired    Tobacco Use   • Smoking status: Never Smoker   • Smokeless tobacco: Never Used   Substance and Sexual Activity   • Alcohol use: Yes   • Drug use: Never   • Sexual activity: Yes   Other Topics Concern   • None   Social History  Narrative   • None     Social Determinants of Health     Financial Resource Strain:    • Difficulty of Paying Living Expenses:    Food Insecurity: No Food Insecurity   • Worried About Running Out of Food in the Last Year: Never true   • Ran Out of Food in the Last Year: Never true   Transportation Needs:    • Lack of Transportation (Medical):    • Lack of Transportation (Non-Medical):    Physical Activity:    • Days of Exercise per Week:    • Minutes of Exercise per Session:    Stress:    • Feeling of Stress :    Social Connections:    • Frequency of Communication with Friends and Family:    • Frequency of Social Gatherings with Friends and Family:    • Attends Religion Services:    • Active Member of Clubs or Organizations:    • Attends Club or Organization Meetings:    • Marital Status:    Intimate Partner Violence:    • Fear of Current or Ex-Partner:    • Emotionally Abused:    • Physically Abused:    • Sexually Abused:        REVIEW OF SYSTEMS        Review of Systems  Constitutional: Negative for fatigue and unexpected weight change.  Fever  Eyes: Negative for visual disturbance. Mouth no sore throat  Respiratory: Negative for cough and shortness of breath.    Cardiovascular: Negative for chest pain and palpitations.   Gastrointestinal: On and off abdominal pain, no constipation, diarrhea, nausea and vomiting.  Fair appetite but changes of consistency of bowel movements to less volume  Genitourinary: Negative for difficulty urinating. no hematuria no frequency no urgency no discharge  Musculoskeletal: Negative for arthralgias.   Skin: Has lower extremity and hand rash.   Neurological: Negative for dizziness and headaches.   Hematological: Does not bruise/bleed easily.   Psychiatric/Behavioral: Negative for confusion and dysphoric mood no suicidal ideations          PHYSICAL EXAMINATION      Temp:  [36.1 °C (97 °F)-38.2 °C (100.8 °F)] 36.5 °C (97.7 °F)  Heart Rate:  [62-78] 66  Resp:  [16-18] 18  BP:  (122-220)/(56-91) 181/80  Body mass index is 27.96 kg/m².    General exam : appears age stated, well nourished, not in distress  Head: atraumatic, normocephalic  Eyes : PERRLA, EOMI, no pallor, no icterus  ENT: no lesions, oropharynx pink, mucous membranes moist   Neck: supple, no Lymph nodes, no Thyromegaly, no JVD   CVS : I RRR S1 and S2 heard, no murmurs, rubs or gallops  Resp:normal accessory muscle usage, clear to auscultation Bilaterally  Abdomen : soft, midepigastric tenderness mild muscle guarding no rebound, BS + decreased, no organomegaly   Extremities : no edema, no cyanosis   MSK: no DJD, no joint swellings, no joint tenderness   Skin: intact, warm, erythematous rash maculopapular nonraised nonblanching lower extremities as well as upper extremity some in the lower abdomen  Neuro: AAO x3, CN 2-12 intact,  motor strength in all extremities, sensations, DTRs, coordination intact.  Psych: normal mood.cooperative      LABS / IMAGING / EKG        Labs  CBC Results       06/12/21 06/11/21 06/10/21                    0506 2131 1134         WBC 8.09 10.88 9.79         RBC 4.57 4.88 4.65         HGB 13.4 14.3 13.7         HCT 39.9 42.4 39.9         MCV 87.3 86.9 85.8         MCH 29.3 29.3 29.5         MCHC 33.6 33.7 34.3          137 115         Comment for PLT at 0506 on 06/12/21: RESULTS CHECKED. CONSISTENT WITH PREVIOUS RESULTS        CMP Results       06/12/21 06/11/21 06/10/21                    0506 2131 1134          133 133         K 3.1 3.1 3.2         Cl 99 95 95         CO2 25 26 28         Glucose 187 122 160         BUN 19 19 17         Creatinine 1.2 1.2 1.0         Calcium 8.2 8.7 8.8         Anion Gap 12 12 10         AST -- 28 32            32         ALT -- 46 49            49         Albumin -- 3.9 3.6            3.6         EGFR 42.8 42.8 52.8         Comment for K at 2131 on 06/11/21: Results obtained on plasma. Plasma Potassium values may be up to 0.4 mEQ/L less than serum  values. The differences may be greater for patients with high platelet or white cell counts.    Comment for K at 1134 on 06/10/21: Results obtained on plasma. Plasma Potassium values may be up to 0.4 mEQ/L less than serum values. The differences may be greater for patients with high platelet or white cell counts.          Troponin I Results       06/10/21                          1134           Troponin I <0.02                         Microbiology Results     Procedure Component Value Units Date/Time    Group A Strep by PCR, Throat Throat Swab [[id number]]  (Normal) Collected: 06/11/21 2345    Specimen: Throat Swab Updated: 06/12/21 0108     Strep A PCR, Throat Not Detected    SARS-CoV-2 (COVID-19), PCR Nasopharynx [[id number]]  (Normal) Collected: 06/10/21 1141    Specimen: Nasopharyngeal Swab from Nasopharynx Updated: 06/10/21 1246    Narrative:      The following orders were created for panel order SARS-CoV-2 (COVID-19), PCR Nasopharynx.  Procedure                               Abnormality         Status                     ---------                               -----------         ------                     SARS-CoV-2 (COVID-19), P...[[id number]]  Normal              Final result                 Please view results for these tests on the individual orders.    SARS-CoV-2 (COVID-19), PCR Nasopharynx [[id number]]  (Normal) Collected: 06/10/21 1141    Specimen: Nasopharyngeal Swab from Nasopharynx Updated: 06/10/21 1246     SARS-CoV-2 (COVID-19) Negative    Blood Culture Blood, Venous [[id number]] Collected: 06/10/21 1134    Specimen: Blood, Venous Updated: 06/11/21 1601     Culture No growth at 18-24 hours    Urine culture [[id number]]  (Normal) Collected: 06/08/21 1300    Specimen: Urine, Clean Catch Updated: 06/09/21 1106     Urine Culture <10,000 cfu/mL        UA Results       06/10/21                          1227           Color Yellow           Clarity Clear           Glucose Negative           Bilirubin Negative            Ketones Trace           Sp Grav 1.014           Blood +3           Ph 6.0           Protein Trace           Urobilinogen 1.0           Nitrite Negative           Leuk Est Negative           WBC 0 TO 3           RBC 10 TO 19           Bacteria None Seen           Comment for Ketones at 1227 on 06/10/21: Free sulfhydryl drugs such as Mesna, Capoten, and Acetylcysteine (Mucomyst) may cause false positive ketonuria.    Comment for Blood at 1227 on 06/10/21: The sensitivity of the occult blood test is equivalent to approximately 4 intact RBC/HPF.    Comment for Protein at 1227 on 06/10/21: Trace False Positive Protein can be seen with alkaline or highly buffered urines or urine with high specific gravity. Suggest clinical correlation.    Comment for Leuk Est at 1227 on 06/10/21: Results can be falsely negative due to high specific gravity, some antibiotics, glucose >3 g/dl, or WBC other than neutrophils.          Imaging  ULTRASOUND KIDNEYS / BLADDER   Final Result   IMPRESSION:   Normal ultrasound of the kidneys and bladder.               ECG/Telemetry  reviewed by me    ASSESSMENT AND PLAN           Rash  Assessment & Plan  Same as above  Could be drug releated  Consult id  Iv steroid    Leukocytosis  Assessment & Plan  Same as above  Could be reactive    A-fib (CMS/HCC)  Assessment & Plan  Monitor on telemetry  Continue O2 therapy to maintain saturations  Continue Cardizem drip prn  Continue home regimen with parameters   Continue AC with eliquis  Monitor electrolytes and replace as needed   Cardiology consult    History of breast cancer  Assessment & Plan  Repeat labs in am    * Drug-induced fever  Assessment & Plan  Monitor on telemetry   Monitor hemodynamics  Continue IV fluid hydration  Hold antibiotics  Follow cultures  Trend lactate  ID consult      Essential hypertension  Assessment & Plan  Continue with home meds  bp parameters for hypotension  dvt prophylaxis with heparin   Pt is full code  Spent 55  minutes assessing planning for patient care    Pain of upper abdomen  Assessment & Plan  Per ct abd may have sbo  Consult surgery        VTE Assessment: Padua    VTE Prophylaxis Plan:   Code Status: Full Code       Cori Zapata MD  6/12/2021

## 2021-06-12 NOTE — CONSULTS
Infectious Disease Consult Note    Patient Name: Latrell Ojeda  MR#: [id number]  : 1935  Admission Date: 2021  Consult Date: 21 1:43 PM   Consultant: Dillan Robles MD    Reason for Consult: Fever/ LE rash  Referring Provider:  Alejoyifanrodla        Latrell Ojeda is a 85 y.o. female who was admitted on 2021.  This patient has a history of atrial fibrillation breast cancer and recurrent UTIs.  5 days ago she developed dysuria, hematuria and her PCP prescribed Macrobid.  In this setting, reportedly the urine culture was negative and the Macrobid was stopped after couple days.  She was having fever all along.  In addition she noted some midepigastric and suprapubic abdominal pain and change in the consistency of her bowel movements.  2 days ago she developed a rash on the lower extremities bilaterally does not hurt or sitting.  She did have a fever last night of 100.8.  Blood pressure has been elevated and she has been satting well on room air.  Blood work reveals creatinine of 1.2, normal transaminases, total bilirubin of 3.1 with normal direct bilirubin (this is not new)  Her white count has been normal and her platelets slightly low at 130  The urinalysis from  showed 1+ blood but no RBCs and the 1 from 6/10 showed 3+ blood with 10-19 RBCs.  CAT scan of the abdomen and pelvis on 6/10 showed liver hypodensities, if you air-fluid levels on the small bowel    Allergies:   Allergies   Allergen Reactions   • Clindamycin    • Codeine    • Oxycodone-Acetaminophen        Medical History:   Past Medical History:   Diagnosis Date   • Breast cancer (CMS/HCC)    • High blood pressure    • UTI (urinary tract infection)        Surgical History:   Past Surgical History:   Procedure Laterality Date   • HYSTERECTOMY     • LAPAROSCOPIC LIVER CYST FENESTRATION     • MASTECTOMY         Social History     Tobacco Use   • Smoking status: Never Smoker   • Smokeless tobacco: Never Used    Substance Use Topics   • Alcohol use: Yes   • Drug use: Never       Family History:   Family History   Problem Relation Age of Onset   • Diabetes Biological Father    • Diabetes Biological Brother    • Multiple myeloma Biological Son        Review of Systems    All other systems reviewed and negative except as noted in the HPI.    Medications:    Current IP Meds (From admission, onward)        Frequency     losartan (COZAAR) tablet 25 mg      Daily (9p)     amiodarone (PACERONE) tablet 200 mg      Daily     hydrochlorothiazide (HYDRODIURIL) tablet 25 mg      Daily     losartan (COZAAR) tablet 25 mg  Status:  Discontinued      Daily     metoprolol tartrate (LOPRESSOR) tablet 25 mg      2 times daily     pantoprazole (PROTONIX) tablet,delayed release (DR/EC) 20 mg      Daily     sodium chloride 0.9 % infusion  (Saline Fluids)  Status:  Discontinued      Continuous     glucose chewable tablet 16-32 g of dextrose  (Hypoglycemia Treatment Protocol and Hyperglycemia Validation Protocol)      As needed     dextrose 40 % oral gel 15-30 g of dextrose  (Hypoglycemia Treatment Protocol and Hyperglycemia Validation Protocol)      As needed     glucagon (GLUCAGEN) injection 1 mg  (Hypoglycemia Treatment Protocol and Hyperglycemia Validation Protocol)      As needed     dextrose in water injection 12.5 g  (Hypoglycemia Treatment Protocol and Hyperglycemia Validation Protocol)      As needed     sodium chloride 0.9 % infusion      Continuous     LORazepam (ATIVAN) injection 0.5 mg      Once     acetaminophen (TYLENOL) tablet 650 mg      Every 4 hours PRN     melatonin tablet 5 mg      Nightly PRN     magnesium sulfate IVPB 2g in 50 mL NSS/D5W/SWFI      As needed     potassium chloride (KLOR-CON) tablet extended release 20 mEq  (Potassium IV/oral replacement)      As needed     potassium chloride (KLOR-CON) tablet extended release 40 mEq  (Potassium IV/oral replacement)      As needed     potassium chloride 20 mEq in 100 mL IVPB   (premix)  (Potassium IV/oral replacement)      As needed     potassium chloride (KCL) 40 mEq/250 mL IVPB in NSS 40 mEq  (Potassium IV/oral replacement)      As needed     potassium bicarbonate (EFFER-K) disintegrating tablet 20 mEq  (Potassium IV/oral replacement)      As needed     potassium bicarbonate (EFFER-K) disintegrating tablet 40 mEq  (Potassium IV/oral replacement)      As needed     acetaminophen (TYLENOL) tablet 650 mg      Once     methylPREDNISolone sod suc(PF) (Solu-MEDROL) injection 125 mg      Once     sodium chloride 0.9 % bolus 1,000 mL      Once     LORazepam (ATIVAN) injection 0.5 mg      Once                Vital Signs:    Temp:  [36.1 °C (96.9 °F)-38.2 °C (100.8 °F)] 36.1 °C (97 °F)  Heart Rate:  [61-78] 61  Resp:  [16-18] 18  BP: (152-220)/(66-91) 177/76    Temp (72hrs), Av.8 °C (98.2 °F), Min:36.1 °C (96.9 °F), Max:38.2 °C (100.8 °F)      Physical Exam     Gen: Aox3  HEENT: OP clear  Neck: Supple  LAD: No cervical LAD  Lungs: CTAB  CV: RRR no murmurs  Abd: Soft NTND +BS  Ext: No c/c/e  Skin: Raised macules from the knee down  Neuro: II-XII intact        Lines, Drains, Airways, Wounds:  Peripheral IV 21 Right Forearm (Active)   Number of days: 0       Rash 21 0215 Bilateral lower leg macular (Active)   Number of days: 0       Labs:    Lab Results   Component Value Date    WBC 8.09 2021    HGB 13.4 2021    HCT 39.9 2021    MCV 87.3 2021     (L) 2021     Lab Results   Component Value Date    GLUCOSE 187 (H) 2021    CALCIUM 8.2 (L) 2021     2021    K 3.1 (L) 2021    CO2 25 2021    CL 99 2021    BUN 19 2021    CREATININE 1.2 (H) 2021     Lab Results   Component Value Date    ALT 46 2021    AST 28 2021    ALKPHOS 72 2021    BILITOT 3.1 (H) 2021     UA Results       06/10/21                          1227           Color Yellow           Clarity Clear           Glucose  Negative           Bilirubin Negative           Ketones Trace           Sp Grav 1.014           Blood +3           Ph 6.0           Protein Trace           Urobilinogen 1.0           Nitrite Negative           Leuk Est Negative           WBC 0 TO 3           RBC 10 TO 19           Bacteria None Seen           Comment for Ketones at 1227 on 06/10/21: Free sulfhydryl drugs such as Mesna, Capoten, and Acetylcysteine (Mucomyst) may cause false positive ketonuria.    Comment for Blood at 1227 on 06/10/21: The sensitivity of the occult blood test is equivalent to approximately 4 intact RBC/HPF.    Comment for Protein at 1227 on 06/10/21: Trace False Positive Protein can be seen with alkaline or highly buffered urines or urine with high specific gravity. Suggest clinical correlation.    Comment for Leuk Est at 1227 on 06/10/21: Results can be falsely negative due to high specific gravity, some antibiotics, glucose >3 g/dl, or WBC other than neutrophils.        Microbiology Results     Procedure Component Value Units Date/Time    Group A Strep by PCR, Throat Throat Swab [[id number]]  (Normal) Collected: 06/11/21 2345    Specimen: Throat Swab Updated: 06/12/21 0108     Strep A PCR, Throat Not Detected    SARS-CoV-2 (COVID-19), PCR Nasopharynx [[id number]]  (Normal) Collected: 06/10/21 1141    Specimen: Nasopharyngeal Swab from Nasopharynx Updated: 06/10/21 1246    Narrative:      The following orders were created for panel order SARS-CoV-2 (COVID-19), PCR Nasopharynx.  Procedure                               Abnormality         Status                     ---------                               -----------         ------                     SARS-CoV-2 (COVID-19), P...[[id number]]  Normal              Final result                 Please view results for these tests on the individual orders.    SARS-CoV-2 (COVID-19), PCR Nasopharynx [[id number]]  (Normal) Collected: 06/10/21 1141    Specimen: Nasopharyngeal Swab from Nasopharynx  Updated: 06/10/21 1246     SARS-CoV-2 (COVID-19) Negative    Blood Culture Blood, Venous [[id number]] Collected: 06/10/21 1134    Specimen: Blood, Venous Updated: 06/11/21 1601     Culture No growth at 18-24 hours    Urine culture [[id number]]  (Normal) Collected: 06/08/21 1300    Specimen: Urine, Clean Catch Updated: 06/09/21 1106     Urine Culture <10,000 cfu/mL           Pathology Results     ** No results found for the last 720 hours. **          Echo:         Imaging:    Radiology Imaging    XR CHEST 1 VW    Narrative  CLINICAL HISTORY:   Fever of unknown origin.    COMMENT:    Comparison:   Chest radiographs dating 2/14/2020.    Single frontal AP view of the chest was obtained.    The cardiac silhouette and mediastinal contour are unremarkable for technique.  The lungs are clear for technique.  No pleural effusion or evidence of a  significant pneumothorax.    --    Impression  No evidence of acute pulmonary disease.      Patient Active Problem List   Diagnosis Code   • Essential hypertension I10   • Gastroesophageal reflux disease without esophagitis K21.9   • Palpitation R00.2   • Prediabetes R73.03   • History of breast cancer Z85.3   • Medicare annual wellness visit, subsequent Z00.00   • Rhinorrhea J34.89   • A-fib (CMS/[facility]) I48.91   • Chronic kidney disease, stage 3a (CMS/[facility]) N18.31   • Irritant contact dermatitis L24.9   • Rash R21   • Drug-induced fever R50.2   • Rash R21   • Leukocytosis D72.829   • Fever, unknown origin R50.9   • Pain of upper abdomen R10.10   • Fever R50.9     Fever  Assessment & Plan  Doubt infection  This is not a tickborne illness as clinically questioned  No antibiotics    Rash  Assessment & Plan  Looks vasculitic   Recommend rheumatology evaluation and rheumatology labs  Drug reaction to Macrobid is possible however unlikely to be localized to lower extremities          Dillan Robles MD  6/12/2021 1:43 PM

## 2021-06-12 NOTE — PLAN OF CARE
Plan of Care Review  Plan of Care Reviewed With: patient  Progress: improving  Outcome Summary: Pt admitted to 4A, oriented to care setting. Rash to b/l lower extremeties, pt denies any discomfort/itching. Pt resting at this time, bed alarm set, call bell in reach, will continue to monitor.

## 2021-06-12 NOTE — PLAN OF CARE
Plan of Care Review  Plan of Care Reviewed With: patient  Progress: no change  Outcome Summary: up ad andrew - steady on feet, BM today, electrolytes replaced, rash outlined and ID sent more testing/consulted rheumatology

## 2021-06-12 NOTE — ED ATTESTATION NOTE
6/11/20219:15 PM  I have personally seen and examined the patient.  I reviewed and agree with the PA/NP/Resident's assessment and plan of care.    My examination, assessment, and plan of care of Latrell Ojeda is as follows:  The patient presents with 85-year-old female with a history of A. fib on Xarelto.  Was seen here yesterday with low-grade fevers.  Was thought to have a UTI last week and put on Macrobid.  Took it for 5 days until the urine cultures came back negative.  Likely had hematuria secondary to the Xarelto.  Urine seems to have cleared up.  Tonight comes in with a rash which developed on her legs bilaterally today.  Seems to be progressing proximally up the leg.  No itching no pain.  Exam: Developed female no significant distress.  HEENT is unremarkable.  No respiratory distress.  Bilateral lower extremities with several areas of erythema which seem to be coalescing into larger spots.  Do not appear to be petechial.  Do not appear to be raised.  Impression/Plan: We will check labs.  Patient had a platelet count of 115,000 yesterday make sure it does not drop significantly today.    Vital Signs Review: Vital signs have been reviewed. The oxygen saturation is  SpO2: 98 % which is normal.    I was physically present for the key/critical portions of the following procedures: None    This document was created using dragon dictation software.  There might be some typographical errors due to this technology.     Edson Cowart MD  06/11/21 2242

## 2021-06-12 NOTE — CONSULTS
General Surgery Consult    Subjective     Latrell Ojeda is a 85 y.o. female who was admitted for Fever, unknown origin [R50.9]. Patient was seen in consultation at the request of referring physician for management recommendations.  Pleasant 85-year-old female who over the past week had decrease in volume of her bowel movements.  This was associated with some abdominal and suprapubic pain.  She was prescribed antibiotics for a presumed UTI several days ago by her primary care physician, though this was stopped after no evidence of significant bacterial growth on culture.  At that time she also did have some hematuria, patient is on Xarelto.  She returned to the ER yesterday with rash on her legs, and febrile to 38.2.  We are consulted for possible small bowel obstruction.  The patient is currently eating her breakfast, states no nausea or vomiting, states improved abdominal discomfort.    Medical History:   Past Medical History:   Diagnosis Date   • Breast cancer (CMS/HCC)    • High blood pressure    • UTI (urinary tract infection)        Surgical History:   Past Surgical History:   Procedure Laterality Date   • HYSTERECTOMY  1985   • LAPAROSCOPIC LIVER CYST FENESTRATION  1995   • MASTECTOMY  2005       Social History:   Social History     Social History Narrative   • Not on file       Family History:   Family History   Problem Relation Age of Onset   • Diabetes Biological Father    • Diabetes Biological Brother    • Multiple myeloma Biological Son        Allergies: Clindamycin, Codeine, and Oxycodone-acetaminophen    Home Medications:  •  amiodarone, Take 200 mg by mouth daily.    •  calcium carbonate-vitamin D3, Take 2 tablets by mouth daily.  •  cholecalciferol (vitamin D3), Take 1,000 Units by mouth daily.  •  coenzyme Q10, Take 100 mg by mouth daily.    •  hydrochlorothiazide,   •  losartan,   •  lutein, Take 6 mg by mouth.  •  metoprolol tartrate, Take 1 tablet (25 mg total) by mouth 2 (two) times a  "day.  •  nitrofurantoin (macrocrystal-monohydrate), Take 100 mg by mouth 2 (two) times a day.  •  omeprazole, Take 20 mg by mouth every other day.   •  red yeast rice, Take 1 capsule by mouth daily.  •  triamcinolone, Apply topically 2 (two) times a day.  •  XARELTO, Take 20 mg by mouth once daily.    Current Medications:  •  acetaminophen, 650 mg, oral, q4h PRN  •  amiodarone, 200 mg, oral, Daily  •  glucose, 16-32 g of dextrose, oral, PRN **OR** dextrose, 15-30 g of dextrose, oral, PRN **OR** glucagon, 1 mg, intramuscular, PRN **OR** dextrose in water, 25 mL, intravenous, PRN  •  hydrochlorothiazide, 25 mg, oral, Daily  •  LORazepam, 0.5 mg, intravenous, Once  •  losartan, 25 mg, oral, Daily (9p)  •  magnesium sulfate, 2 g, intravenous, PRN  •  melatonin, 5 mg, oral, Nightly PRN  •  metoprolol tartrate, 25 mg, oral, BID  •  pantoprazole, 20 mg, oral, Daily  •  potassium chloride, 20 mEq, oral, PRN **OR** potassium chloride, 40 mEq, oral, PRN **OR** potassium chloride, 20 mEq, intravenous, PRN **OR** potassium chloride, 40 mEq, intravenous, PRN **OR** potassium bicarbonate, 20 mEq, oral, PRN **OR** potassium bicarbonate, 40 mEq, oral, PRN  •  sodium chloride 0.9 %, , intravenous, Continuous    Review of Systems  Pertinent items are noted in HPI.    Objective     Physicial Exam  Visit Vitals  BP (!) 182/84 (BP Location: Right upper arm, Patient Position: Lying)   Pulse 68   Temp (!) 36.1 °C (96.9 °F) (Temporal)   Resp 18   Ht 1.651 m (5' 5\")   Wt 76.2 kg (168 lb)   SpO2 95%   BMI 27.96 kg/m²       General appearance: alert, appears stated age and cooperative  Lungs: clear to auscultation bilaterally  Heart: regular rate and rhythm, S1, S2 normal, no murmur, click, rub or gallop  Abdomen: Soft, obese, nondistended, nontender    Labs  CBC Results       06/12/21 06/11/21 06/10/21                    0506 2131 1134         WBC 8.09 10.88 9.79         RBC 4.57 4.88 4.65         HGB 13.4 14.3 13.7         HCT 39.9 42.4 " 39.9         MCV 87.3 86.9 85.8         MCH 29.3 29.3 29.5         MCHC 33.6 33.7 34.3          137 115         Comment for PLT at 0506 on 06/12/21: RESULTS CHECKED. CONSISTENT WITH PREVIOUS RESULTS        BMP Results       06/12/21 06/11/21 06/10/21                    0506 2131 1134          133 133         K 3.1 3.1 3.2         Cl 99 95 95         CO2 25 26 28         Glucose 187 122 160         BUN 19 19 17         Creatinine 1.2 1.2 1.0         Calcium 8.2 8.7 8.8         Anion Gap 12 12 10         EGFR 42.8 42.8 52.8         Comment for K at 2131 on 06/11/21: Results obtained on plasma. Plasma Potassium values may be up to 0.4 mEQ/L less than serum values. The differences may be greater for patients with high platelet or white cell counts.    Comment for K at 1134 on 06/10/21: Results obtained on plasma. Plasma Potassium values may be up to 0.4 mEQ/L less than serum values. The differences may be greater for patients with high platelet or white cell counts.        Imaging  CT abdomen pelvis reviewed by myself.  Concerning the small bowel, I see no appreciable significant distention, no transition points to suggest small bowel obstruction.  Radiologist impression below.  IMPRESSION:     1.  No acute infectious or inflammatory changes in the abdomen or pelvis.     2.  Multiple hypodensities in the liver which are too small to accurately  characterize.  There is also a linear defect in the right dome of the liver with  small foci of calcification likely related to prior treatment-related changes.  Correlate with patient's clinical and surgical history.     3.  There are a few prominent loops of small bowel predominantly in the region  of the jejunum with air-fluid levels measuring 3.2 cm.  These findings are  nonspecific.  However, differential consideration includes possible early  developing small bowel obstruction.  If there is persistent clinical symptoms,  short interval follow-up imaging is  recommended.    Assessment     85-year-old female presents for rash on her legs and febrile yesterday, unclear etiology.  Consulted for small bowel obstruction, yet patient was started on a regular diet.  Benign abdominal exam, CT not convincing of obstructive pathology.  Passing flatus.  No intervention warranted from a surgical standpoint. Total bilirubin elevation to 3.1. Would trend LFTs.         Plan     Diet as already ordered  Call with any questions  Increased total bilirubin, evaluation per medicine team    Nikhil Marie MD

## 2021-06-12 NOTE — ASSESSMENT & PLAN NOTE
Monitor on telemetry   Monitor hemodynamics  Continue IV fluid hydration  Hold antibiotics  Follow cultures  Trend lactate  ID consult    6/12-seen by infectious disease likely vasculitis > rheumatology consult recommended.

## 2021-06-12 NOTE — ASSESSMENT & PLAN NOTE
Monitor on telemetry  Continue O2 therapy to maintain saturations  Continue Cardizem drip prn  Continue home regimen with parameters   Continue AC with eliquis  Monitor electrolytes and replace as needed   Cardiology consult    6/12-continue with amiodarone

## 2021-06-12 NOTE — PROGRESS NOTES
Hospital Medicine Service -  Daily Progress Note       SUBJECTIVE   -No acute issues overnight noted.  Feels slightly stronger than yesterday.     OBJECTIVE      Vital signs in last 24 hours:  Temp:  [36.1 °C (96.9 °F)-38.2 °C (100.8 °F)] 36.2 °C (97.2 °F)  Heart Rate:  [61-78] 62  Resp:  [18] 18  BP: (146-220)/(62-91) 146/62    Intake/Output Summary (Last 24 hours) at 6/12/2021 1924  Last data filed at 6/12/2021 1748  Gross per 24 hour   Intake 1374 ml   Output --   Net 1374 ml       PHYSICAL EXAMINATION      Physical Exam  HENT:      Mouth/Throat:      Mouth: Mucous membranes are moist.   Eyes:      Pupils: Pupils are equal, round, and reactive to light.   Cardiovascular:      Rate and Rhythm: Normal rate.   Pulmonary:      Effort: Pulmonary effort is normal.   Abdominal:      General: Abdomen is flat.   Musculoskeletal:         General: Normal range of motion.      Cervical back: Normal range of motion.   Skin:     Comments: Diffuse macular rash in the lower extremity.   Neurological:      Mental Status: She is oriented to person, place, and time.   Psychiatric:         Mood and Affect: Mood normal.        LABS / IMAGING / TELE      Labs  Lab Results   Component Value Date    GLUCOSE 187 (H) 06/12/2021    CALCIUM 8.2 (L) 06/12/2021     06/12/2021    K 3.1 (L) 06/12/2021    CO2 25 06/12/2021    CL 99 06/12/2021    BUN 19 06/12/2021    CREATININE 1.2 (H) 06/12/2021     Lab Results   Component Value Date    WBC 8.09 06/12/2021    HGB 13.4 06/12/2021    HCT 39.9 06/12/2021    MCV 87.3 06/12/2021     (L) 06/12/2021     Microbiology Results     Procedure Component Value Units Date/Time    Group A Strep by PCR, Throat Throat Swab [[id number]]  (Normal) Collected: 06/11/21 2345    Specimen: Throat Swab Updated: 06/12/21 0108     Strep A PCR, Throat Not Detected    SARS-CoV-2 (COVID-19), PCR Nasopharynx [[id number]]  (Normal) Collected: 06/10/21 1141    Specimen: Nasopharyngeal Swab from Nasopharynx Updated:  06/10/21 1246    Narrative:      The following orders were created for panel order SARS-CoV-2 (COVID-19), PCR Nasopharynx.  Procedure                               Abnormality         Status                     ---------                               -----------         ------                     SARS-CoV-2 (COVID-19), P...[[id number]]  Normal              Final result                 Please view results for these tests on the individual orders.    SARS-CoV-2 (COVID-19), PCR Nasopharynx [[id number]]  (Normal) Collected: 06/10/21 1141    Specimen: Nasopharyngeal Swab from Nasopharynx Updated: 06/10/21 1246     SARS-CoV-2 (COVID-19) Negative    Blood Culture Blood, Venous [[id number]] Collected: 06/10/21 1134    Specimen: Blood, Venous Updated: 06/11/21 1601     Culture No growth at 18-24 hours    Urine culture [[id number]]  (Normal) Collected: 06/08/21 1300    Specimen: Urine, Clean Catch Updated: 06/09/21 1106     Urine Culture <10,000 cfu/mL        Imaging  CT ABDOMEN PELVIS WITH IV CONTRAST    Result Date: 6/10/2021  IMPRESSION: 1.  No acute infectious or inflammatory changes in the abdomen or pelvis. 2.  Multiple hypodensities in the liver which are too small to accurately characterize.  There is also a linear defect in the right dome of the liver with small foci of calcification likely related to prior treatment-related changes. Correlate with patient's clinical and surgical history. 3.  There are a few prominent loops of small bowel predominantly in the region of the jejunum with air-fluid levels measuring 3.2 cm.  These findings are nonspecific.  However, differential consideration includes possible early developing small bowel obstruction.  If there is persistent clinical symptoms, short interval follow-up imaging is recommended.    ULTRASOUND KIDNEYS / BLADDER    Result Date: 6/12/2021  IMPRESSION: Normal ultrasound of the kidneys and bladder.     X-RAY CHEST 1 VIEW    Result Date: 6/10/2021  IMPRESSION: No  evidence of acute pulmonary disease.       • amiodarone  200 mg oral Daily   • hydrochlorothiazide  25 mg oral Daily   • losartan  25 mg oral Daily (9p)   • metoprolol tartrate  25 mg oral BID   • pantoprazole  20 mg oral Daily       85 y.o F with PMHx of AF, Breast ca a/w LE rash + fever, CT showed incidental SBO.   ASSESSMENT AND PLAN      * Drug-induced fever  Assessment & Plan  Monitor on telemetry   Monitor hemodynamics  Continue IV fluid hydration  Hold antibiotics  Follow cultures  Trend lactate  ID consult    6/12-seen by infectious disease likely vasculitis > rheumatology consult recommended.    Rash  Assessment & Plan  Same as above  Could be drug releated  Consult id  Iv steroid  6/12-drug-related versus vasculitic    Pain of upper abdomen  Assessment & Plan  Per ct abd may have sbo  Consult surgery    6/12-seen by surgery no SBO.    Leukocytosis  Assessment & Plan  Same as above  Could be reactive    A-fib (CMS/HCC)  Assessment & Plan  Monitor on telemetry  Continue O2 therapy to maintain saturations  Continue Cardizem drip prn  Continue home regimen with parameters   Continue AC with eliquis  Monitor electrolytes and replace as needed   Cardiology consult    6/12-continue with amiodarone    History of breast cancer  Assessment & Plan  Repeat labs in am    Essential hypertension  Assessment & Plan  -Continue with home medications.         VTE Assessment: [place]    Code Status: Full Code  Estimated discharge date: 6/13/2021     Kilo Lazaro MD  6/12/2021  7:24 PM

## 2021-06-13 VITALS
SYSTOLIC BLOOD PRESSURE: 173 MMHG | RESPIRATION RATE: 16 BRPM | HEIGHT: 65 IN | DIASTOLIC BLOOD PRESSURE: 75 MMHG | HEART RATE: 62 BPM | OXYGEN SATURATION: 95 % | WEIGHT: 168 LBS | TEMPERATURE: 98.1 F | BODY MASS INDEX: 27.99 KG/M2

## 2021-06-13 LAB
C3 SERPL-MCNC: 130 MG/DL (ref 78–175)
C4 SERPL-MCNC: 21 MG/DL (ref 12.9–39.2)
RHEUMATOID FACT SERPL-ACNC: 11 IU/ML

## 2021-06-13 PROCEDURE — 99239 HOSP IP/OBS DSCHRG MGMT >30: CPT | Performed by: INTERNAL MEDICINE

## 2021-06-13 PROCEDURE — 36415 COLL VENOUS BLD VENIPUNCTURE: CPT | Performed by: INTERNAL MEDICINE

## 2021-06-13 PROCEDURE — 63700000 HC SELF-ADMINISTRABLE DRUG: Performed by: HOSPITALIST

## 2021-06-13 PROCEDURE — 86705 HEP B CORE ANTIBODY IGM: CPT | Performed by: INTERNAL MEDICINE

## 2021-06-13 PROCEDURE — 82595 ASSAY OF CRYOGLOBULIN: CPT | Performed by: INTERNAL MEDICINE

## 2021-06-13 RX ADMIN — AMIODARONE HYDROCHLORIDE 200 MG: 200 TABLET ORAL at 08:58

## 2021-06-13 RX ADMIN — METOPROLOL TARTRATE 25 MG: 25 TABLET, FILM COATED ORAL at 08:54

## 2021-06-13 RX ADMIN — HYDROCHLOROTHIAZIDE 25 MG: 25 TABLET ORAL at 08:58

## 2021-06-13 RX ADMIN — PANTOPRAZOLE SODIUM 20 MG: 20 TABLET, DELAYED RELEASE ORAL at 08:58

## 2021-06-13 NOTE — NURSING NOTE
Patient labs sent. Discharge instructions reviewed with patient and daughter. Verbalized understanding. IV removed and hemostasis achieved. All belongings in possession at time of discharge.

## 2021-06-13 NOTE — PLAN OF CARE
Problem: Adult Inpatient Plan of Care  Goal: Readiness for Transition of Care  Intervention: Mutually Develop Transition Plan  Flowsheets (Taken 6/13/2021 1132)  Anticipated Discharge Disposition: home without services  Discharge Coordination/Progress: pt denies any d/c needs at this time  Assistive Device/Animal Currently Used at Home: none  Concerns Comments: pt denies any d/c needs at this time  Readmission Within the Last 30 Days: no previous admission in last 30 days  Patient/Family Anticipated Services at Transition: none  Patient/Family Anticipates Transition to: home  Transportation Anticipated: family or friend will provide  Concerns to be Addressed: denies needs/concerns at this time   Chart reviewed and met with pt.  Pt resides with her  at [facility].  Pt was I amb and ADL prior to admission.  Pt stated that her  is currently at the rehab at [facility] but that she is usually his caretaker.  Pt drives and is retired.  Pt denies any prior homecare or DME at home.  Pt denies any difficulty getting food or medications into the home.  Pt denies any d/c needs at this time.  PLAN: [facility],  no needs.  Bernarda álvarez MSW LSW

## 2021-06-13 NOTE — PROGRESS NOTES
Infectious Disease Progress Note    Patient Name: Latrell Ojeda  MR#: [id number]  : 1935  Admission Date: 2021  Date: 21   Time: 2:49 PM   Author: Dillan Robles MD    OBJECTIVE:  No more fever eating well no complaints  Ongoing rash on the lower extremities  C3-C4 rheumatoid factor negative everything else is pending    Antibiotics:        ROS  Other systems negative  Vital Signs:    Temp:  [36.2 °C (97.1 °F)-36.7 °C (98.1 °F)] 36.7 °C (98.1 °F)  Heart Rate:  [56-70] 62  Resp:  [16-20] 16  BP: (100-173)/(49-79) 173/75    Temp (72hrs), Av.6 °C (97.9 °F), Min:36.1 °C (96.9 °F), Max:38.2 °C (100.8 °F)      Physical Exam    Gen: Aox3  HEENT: OP clear  Neck: Supple  LAD: No cervical LAD  Lungs: CTAB  CV: RRR no murmurs  Abd: Soft NTND +BS  Ext: No c/c/e  Skin: Rash lower extremities  Neuro: II-XII intact        Lines, Drains, Airways, Wounds:  Rash 21 0215 Bilateral lower leg macular (Active)   Number of days: 1       Labs:    Lab Results   Component Value Date    WBC 8.09 2021    HGB 13.4 2021    HCT 39.9 2021    MCV 87.3 2021     (L) 2021     Lab Results   Component Value Date    GLUCOSE 187 (H) 2021    CALCIUM 8.2 (L) 2021     2021    K 3.1 (L) 2021    CO2 25 2021    CL 99 2021    BUN 19 2021    CREATININE 1.2 (H) 2021     Lab Results   Component Value Date    ALT 46 2021    AST 28 2021    ALKPHOS 72 2021    BILITOT 3.1 (H) 2021         Patient Active Problem List   Diagnosis Code   • Essential hypertension I10   • Gastroesophageal reflux disease without esophagitis K21.9   • Palpitation R00.2   • Prediabetes R73.03   • History of breast cancer Z85.3   • Medicare annual wellness visit, subsequent Z00.00   • Rhinorrhea J34.89   • A-fib (CMS/[facility]) I48.91   • Chronic kidney disease, stage 3a (CMS/[facility]) N18.31   • Irritant contact dermatitis L24.9   • Rash R21   •  Drug-induced fever R50.2   • Rash R21   • Leukocytosis D72.829   • Fever, unknown origin R50.9   • Pain of upper abdomen R10.10   • Fever R50.9     Fever  Assessment & Plan  Resolved  No infection  No antibiotics    Rash  Assessment & Plan  The patient reports having had a similar rash a few months ago  This raises a question of vasculitis  Autoimmune labs pending  Rheumatology and dermatology evaluation            Dillan Robles MD  6/13/20212:49 PM

## 2021-06-13 NOTE — DISCHARGE SUMMARY
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     SUMMARY OF HOSPITALIZATION      Presenting Problem/History of Present Illness/ Hospital course   86 y/o F with PMhx of Afib -on Xarelto, remote breast ca in remission, recurrent UTIs. Pt reportedly was treated with Macrobid for UTI, urine cultures came back negative negative, antibiotics were discontinued.  Patient was having fever along with some suprapubic discomfort and 1 days prior to admission noticed lower extremity rash in both lower extremities, not pruritic.  Patient was hence brought to the emergency room.  Lab work creatinine might creatinine of 1.2 normal transaminases T bili was 3.1 with normal direct bilirubin.  Her WBC was normal.  Platelets slightly low at 130.  CT scan showed liver hypodensities and air-fluid levels in the small intestines.  Per family the liver hypodensities are chronic.  Patient did not have any further fever spikes since admission.  Remained hemodynamically stable.    Patient was seen by infectious disease.  Blood cultures remain negative.  Smear for Babesia was negative.  The rash appeared vasculitic.  Discussed with rheumatology Dr. 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  ESR was not significant elevated.  RF factor was negative.  ANCA's/SWAPNA/cryoglobulins/hepatitis panel are pending at this time.  Rheum expects this to improve over few weeks time and wants patient to follow-up with her primary care physician next week to evaluate the progress of the lesion and follow-up on rheumatology labs..    No steroids recommended at this time.  Discussed at length with patient as well as daughter at bedside when to seek medical attention.    Exam on Day of Discharge  Physical Exam  Vitals and nursing note reviewed. Exam conducted with a chaperone present.   Constitutional:       Comments: Daughter at bedside   HENT:      Nose: Nose normal.      Mouth/Throat:      Mouth: Mucous membranes are moist.   Cardiovascular:      Rate and Rhythm: Normal rate.   Pulmonary:      Effort: Pulmonary effort is normal.   Abdominal:      General: Abdomen is flat.   Skin:     Comments: Purplish macular rash various sizes in the lower extremities bilaterally.   Neurological:      General: No focal deficit present.      Mental Status: She is oriented to person, place, and time.       Consults During Admission  IP CONSULT TO GENERAL SURGERY  IP CONSULT TO CASE MANAGEMENT  IP CONSULT TO INFECTIOUS DISEASE  IP CONSULT TO RHEUMATOLOGY    PROCEDURES / LABS / IMAGING      Operative Procedures   None   Pertinent Labs  Lab Results   Component Value Date    GLUCOSE 187 (H) 06/12/2021    CALCIUM 8.2 (L) 06/12/2021     06/12/2021    K 3.1 (L) 06/12/2021    CO2 25 06/12/2021    CL 99 06/12/2021    BUN 19 06/12/2021    CREATININE 1.2 (H) 06/12/2021     Lab Results   Component Value Date    WBC 8.09 06/12/2021    HGB 13.4 06/12/2021    HCT 39.9 06/12/2021    MCV 87.3 06/12/2021     (L) 06/12/2021       Pertinent Imaging  CT ABDOMEN PELVIS WITH IV CONTRAST    Result Date: 6/10/2021  IMPRESSION: 1.  No acute  infectious or inflammatory changes in the abdomen or pelvis. 2.  Multiple hypodensities in the liver which are too small to accurately characterize.  There is also a linear defect in the right dome of the liver with small foci of calcification likely related to prior treatment-related changes. Correlate with patient's clinical and surgical history. 3.  There are a few prominent loops of small bowel predominantly in the region of the jejunum with air-fluid levels measuring 3.2 cm.  These findings are nonspecific.  However, differential consideration includes possible early developing small bowel obstruction.  If there is persistent clinical symptoms, short interval follow-up imaging is recommended.    ULTRASOUND KIDNEYS / BLADDER    Result Date: 6/12/2021  IMPRESSION: Normal ultrasound of the kidneys and bladder.     X-RAY CHEST 1 VIEW    Result Date: 6/10/2021  IMPRESSION: No evidence of acute pulmonary disease.     OUTPATIENT  FOLLOW-UP / REFERRALS / PENDING TESTS        Outpatient Follow-Up Appointments            In 2 weeks SANDI BLACKBURN MD [facility] at [facility]        Referrals  No orders of the defined types were placed in this encounter.  Test Results Pending at Discharge  Unresulted Labs (From admission, onward)     Start     Ordered    06/13/21 1354  Cryoglobulin  Once     Question:  Release to patient  Answer:  Immediate    06/13/21 1353    06/13/21 1354  Hepatitis panel, acute  Once     Question:  Release to patient  Answer:  Immediate    06/13/21 1353    06/12/21 1354  ANCA-Myeloperoxidase IgG Antibody  Once     Question:  Release to patient  Answer:  Immediate    06/12/21 1353    06/12/21 1354  ANCA-PR3 IgG Antibody  Once     Question:  Release to patient  Answer:  Immediate    06/12/21 1353    06/12/21 1353  SWAPNA Screen (Automated)  Once     Question:  Release to patient  Answer:  Immediate    06/12/21 1353    06/11/21 2251  Lyme EIA reflex WB  ([facility])  STAT      Question:  Release to patient  Answer:  Immediate    06/11/21 0640                 Medication List      CONTINUE taking these medications    amiodarone 200 mg tablet  Commonly known as: PACERONE  Take 200 mg by mouth daily.  Dose: 200 mg     CALCIUM WITH VITAMIN D 600 mg(1,500mg) -400 unit per tablet  Take 2 tablets by mouth daily.  Dose: 2 tablet  Generic drug: calcium carbonate-vitamin D3     cholecalciferol (vitamin D3) 1,000 unit (25 mcg) tablet  Take 1,000 Units by mouth daily.  Dose: 1,000 Units     coenzyme Q10 10 mg capsule  Commonly known as: COQ10  Take 100 mg by mouth daily.  Dose: 100 mg     hydrochlorothiazide 25 mg tablet  Commonly known as: HYDRODIURIL     losartan 25 mg tablet  Commonly known as: COZAAR     lutein 6 mg capsule  Take 6 mg by mouth.  Dose: 6 mg     metoprolol tartrate 25 mg tablet  Commonly known as: LOPRESSOR  Take 1 tablet (25 mg total) by mouth 2 (two) times a day.  Dose: 25 mg     omeprazole 20 mg capsule  Commonly known as: PriLOSEC  Take 20 mg by mouth every other day.  Dose: 20 mg     red yeast rice 600 mg capsule  Take 1 capsule by mouth daily.  Dose: 1 capsule     triamcinolone 0.1 % cream  Commonly known as: KENALOG  Apply topically 2 (two) times a day.     XARELTO 20 mg tablet  Take 20 mg by mouth once daily.  Dose: 20 mg  Generic drug: rivaroxaban        STOP taking these medications    nitrofurantoin (macrocrystal-monohydrate) 100 mg capsule  Commonly known as: MACROBID            Important Issues to Address in Follow-Up  -will follow up with PCP in 1 week   DISCHARGE DISPOSITION      Disposition: Home     Code Status At Discharge: Full Code  Time spent w/ DC coordination: 32min  Physician Order for Life-Sustaining Treatment Document Status      No documents found

## 2021-06-14 ENCOUNTER — TELEPHONE (OUTPATIENT)
Dept: INTERNAL MEDICINE | Facility: CLINIC | Age: 86
End: 2021-06-14

## 2021-06-14 ENCOUNTER — PATIENT OUTREACH (OUTPATIENT)
Dept: CASE MANAGEMENT | Facility: CLINIC | Age: 86
End: 2021-06-14

## 2021-06-14 LAB
B BURGDOR AB SER IA-ACNC: 0.29 RATIO
HAV IGM SER QL: ABNORMAL
HBV CORE IGM SER QL: NONREACTIVE
HBV SURFACE AG SER QL: NONREACTIVE
HCV AB SER QL: NONREACTIVE
PARASITE BLD: NORMAL
PARASITE BLD: NORMAL

## 2021-06-14 ASSESSMENT — ENCOUNTER SYMPTOMS
FEVER: 0
COLOR CHANGE: 0
DIFFICULTY URINATING: 0
FEVER: 1
FLANK PAIN: 0
SHORTNESS OF BREATH: 0
COUGH: 0
NUMBNESS: 0
DIFFICULTY URINATING: 0
NAUSEA: 0
WEAKNESS: 0
SORE THROAT: 0
VOMITING: 0
APPETITE CHANGE: 0
FATIGUE: 1
DIARRHEA: 0
FREQUENCY: 0
ABDOMINAL PAIN: 0

## 2021-06-14 NOTE — PROGRESS NOTES
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 She had been on Macrobid as an OP for a UTI. She noticed a bilateral LE rash on day or presentation. Urine cultures were negative, WBC was normal, platelets 130. Patient was seen by rheumatology, ID and dermatology. 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 ESR was not significantly elevated, RF factor was negative. Rheumatology labs pending at time of DC. Patient DC'd home with no medication changes. Patient reported she was not feeling that well today. Her SINTIA was 183/82 and she has a call into cardiologist. She noted her LE rash is \"less angry looking\" today. Her  is being DC'd from rehab tomorrow 6/15 and she is concerned that she may not be able to care for him.              Review of Systems   Constitutional: Positive for fatigue. Negative for appetite change and fever.   Cardiovascular: Negative for leg swelling.   Genitourinary: Negative for difficulty urinating.   Skin: Positive for rash.       Medication Review:    Medication Review: Yes     Reported by: Patient  Any new medications prescribed at discharge?: No  Is the patient having any side effects they believe may be caused by any medication additions or changes?: Yes        Do you have enough of your regularly prescribed medications?: Yes       Medication adherence problem?: No  Was a medication discrepancy indentified?: No       Nursing Interventions: " Nurse provided patient education  Reconciled the current and discharge medications: Yes  Reviewed AVS (Discharge Instructions)?: Yes         Acute Pain:    Acute pain: No                Chronic Pain:  No                   Diet/Nutrition:    Type of Diet: Cardiac 2mg sodium  Diet Adherence: Adherent with diet          Home Care Services:                Post-Discharge Durable Medical Equipment::    Durable Medical Equipment: Shower/tub seat, Grab bars, Blood pressure cuff  Oxygen Use: No                   Home Management:    Living Arrangement: Spouse  Support System:: Family  Type of Residence: Independent Living Facility  Home Monitoring: Blood Pressure  Any patient reported falls in the last 3 months?: No  Mandaen or spiritual beliefs that impact treatment?: No    Appointment Scheduling:    TCM Appointment Types: Hospital Follow-Up  Appointment Date: 06/29/21     Appointment Provider: PCP  Patient Scheduling Dispositions: Appointment Scheduled by Pt     Follow-Ups:            Interventions/ Care Coordination:    Interventions/ Care Coordination: Addressed a knowledge deficit          Reviewed signs/symptoms of worsening condition or complication that necessitate a call to the Physician's office.  Educated patient on access to care.  RN phone number given for future care management.    Elisha Tavares RN  [phone]

## 2021-06-14 NOTE — TELEPHONE ENCOUNTER
Patient called.    Discharged on 6/13 from [facility].    Had a lot of tests done, no diagnosis determined.    She has HBP. She would like to speak to you at your convenience.    Thank you

## 2021-06-14 NOTE — TELEPHONE ENCOUNTER
Has appt on 6/29 for TCM. She reschedule to closer date if she'd like. She spoke to one of the nurse care coordinators already.

## 2021-06-14 NOTE — ED PROVIDER NOTES
Emergency Medicine Note  HPI   HISTORY OF PRESENT ILLNESS     85 y.o. female with a past medical history of history of A. fib on Xarelto, breast CA in remission, and recurrent UTIs presents to the ED today c/o bilateral lower extremity rash. Of note patient in the ED yesterday for low grade fever of unknown origin. Patient originally had seen family physician who was started on Macrobid as there urinalysis showed UTI.  However 3 days ago patient's urine culture was negative and she d/c the abx. Was still having low grade fevers so she presented to the ED yesterday and after an unremarkable workup preferred to go home. Rash that started this morning prompted ED return. First noted on distal legs and has continued to progress proximally. Family expresses concerns for platelets as they were noted low on blood work yesterday.  She denies nausea/vomiting, cp, shortness of breath, abdominal pain, urinary symptoms, changes in bowel.             Patient History   PAST HISTORY     Reviewed from Nursing Triage: Problems       Past Medical History:   Diagnosis Date   • Breast cancer (CMS/HCC)    • High blood pressure    • UTI (urinary tract infection)        Past Surgical History:   Procedure Laterality Date   • HYSTERECTOMY  1985   • LAPAROSCOPIC LIVER CYST FENESTRATION  1995   • MASTECTOMY  2005       Family History   Problem Relation Age of Onset   • Diabetes Biological Father    • Diabetes Biological Brother    • Multiple myeloma Biological Son        Social History     Tobacco Use   • Smoking status: Never Smoker   • Smokeless tobacco: Never Used   Substance Use Topics   • Alcohol use: Yes   • Drug use: Never         Review of Systems   REVIEW OF SYSTEMS     Review of Systems   Constitutional: Positive for fever.   HENT: Negative for sore throat.    Respiratory: Negative for cough and shortness of breath.    Cardiovascular: Negative for chest pain and leg swelling.   Gastrointestinal: Negative for abdominal pain,  diarrhea, nausea and vomiting.   Genitourinary: Negative for difficulty urinating, flank pain and frequency.   Skin: Positive for rash (BLE). Negative for color change.   Neurological: Negative for weakness and numbness.         VITALS     ED Vitals    Date/Time Temp Pulse Resp BP SpO2 [facility]   06/12/21 0102 36.9 °C (98.4 °F) 62 -- 152/66 97 % MG   06/11/21 2353 -- 78 18 206/86 95 % MG   06/11/21 2210 38.2 °C (100.8 °F) 70 18 218/85 95 % JLG   06/11/21 2156 -- 72 -- -- 96 % JLG   06/11/21 2119 -- 70 18 220/91 95 % EF   06/11/21 1826 36.1 °C (97 °F) 67 16 198/83 98 % PB                       Physical Exam   PHYSICAL EXAM     Physical Exam  Vitals and nursing note reviewed.   Constitutional:       General: She is not in acute distress.     Appearance: She is well-developed.   HENT:      Head: Atraumatic.   Eyes:      Conjunctiva/sclera: Conjunctivae normal.   Cardiovascular:      Rate and Rhythm: Normal rate.   Pulmonary:      Effort: Pulmonary effort is normal.   Abdominal:      General: There is no distension.      Tenderness: There is no abdominal tenderness. There is no guarding.   Musculoskeletal:         General: No swelling, tenderness or deformity.      Cervical back: Normal range of motion.   Skin:     General: Skin is warm and dry.      Findings: Rash present.      Comments: BLE erythematous rash maculopapular nonraised; nonblanching. Mildly noted on upper extremities and lower abdomen.    Neurological:      General: No focal deficit present.      Mental Status: She is alert.   Psychiatric:         Behavior: Behavior normal.           PROCEDURES     Procedures     DATA     Results     Procedure Component Value Units Date/Time    Lyme EIA reflex WB [[id number]]  (Normal) Collected: 06/11/21 2131    Specimen: Blood, Venous Updated: 06/14/21 1029     Lyme Ab 0.29 Ratio      Comment: Interpretation:  <=0.90           No Antibody Detected.  0.91 - 1.09:     Equivocal.  >=1.10           Positive.    An index of 1.10  or > is presumptive evidence of Lyme disease in patients with compatible symptoms. Absence of Lyme Antibody may not rule out Lyme disease when clinical symptoms are present. In cases of negative or equivocal results, repeat analysis in 2 to 4 weeks may be warranted.       Babesia smear Blood, Venous [[id number]]  (Normal) Collected: 06/11/21 2131    Specimen: Blood, Venous Updated: 06/14/21 0956     Babesia Smear No Parasites Seen    Anaplasma / Ehrlicha smear Blood, Venous [[id number]]  (Normal) Collected: 06/11/21 2131    Specimen: Blood, Venous Updated: 06/14/21 0924     Anaplasma / Ehrlicha Smear No Anaplasma/Ehrlichia species observed.     [place] Draw Panel [[id number]] Collected: 06/11/21 2131    Specimen: Blood, Venous Updated: 06/12/21 0600    Narrative:      The following orders were created for panel order [place] Draw Panel.  Procedure                               Abnormality         Status                     ---------                               -----------         ------                     RAINBOW RED[[id number]]                                      Final result               RAINBOW LT BLUE[[id number]]                                  Final result               RAINBOW LT GREEN[[id number]]                                 Final result               RAINBOW GOLD[[id number]]                                     Final result                 Please view results for these tests on the individual orders.    RAINBOW RED [[id number]] Collected: 06/11/21 2131    Specimen: Blood, Venous Updated: 06/12/21 0600    RAINBOW LT GREEN [[id number]] Collected: 06/11/21 2131    Specimen: Blood, Venous Updated: 06/12/21 0600    RAINBOW LT BLUE [[id number]] Collected: 06/11/21 2131    Specimen: Blood, Venous Updated: 06/12/21 0600    RAINBOW GOLD [[id number]] Collected: 06/11/21 2131    Specimen: Blood, Venous Updated: 06/12/21 0600    Sedimentation rate [[id number]]  (Abnormal) Collected: 06/11/21 2337    Specimen: Blood, Venous  Updated: 06/12/21 0442     Sed Rate 37 mm/hr     Group A Strep by PCR, Throat Throat Swab [[id number]]  (Normal) Collected: 06/11/21 2345    Specimen: Throat Swab Updated: 06/12/21 0108     Strep A PCR, Throat Not Detected    Comprehensive metabolic panel [[id number]]  (Abnormal) Collected: 06/11/21 2131    Specimen: Blood, Venous Updated: 06/11/21 2204     Sodium 133 mEQ/L      Potassium 3.1 mEQ/L      Comment: Results obtained on plasma. Plasma Potassium values may be up to 0.4 mEQ/L less than serum values. The differences may be greater for patients with high platelet or white cell counts.        Chloride 95 mEQ/L      CO2 26 mEQ/L      BUN 19 mg/dL      Creatinine 1.2 mg/dL      Glucose 122 mg/dL      Calcium 8.7 mg/dL      AST (SGOT) 28 IU/L      ALT (SGPT) 46 IU/L      Alkaline Phosphatase 72 IU/L      Total Protein 7.2 g/dL      Comment: Test performed on plasma which typically contains approximately 0.4 g/dL more protein than serum.        Albumin 3.9 g/dL      Bilirubin, Total 3.1 mg/dL      eGFR 42.8 mL/min/1.73m*2      Anion Gap 12 mEQ/L     CBC and differential [[id number]]  (Abnormal) Collected: 06/11/21 2131    Specimen: Blood, Venous Updated: 06/11/21 2156     WBC 10.88 K/uL      RBC 4.88 M/uL      Hemoglobin 14.3 g/dL      Hematocrit 42.4 %      MCV 86.9 fL      MCH 29.3 pg      MCHC 33.7 g/dL      RDW 12.7 %      Platelets 137 K/uL      MPV 12.0 fL      Differential Type Auto     nRBC 0.0 %      Immature Granulocytes 0.6 %      Neutrophils 77.4 %      Lymphocytes 11.3 %      Monocytes 7.3 %      Eosinophils 2.9 %      Basophils 0.5 %      Immature Granulocytes, Absolute 0.06 K/uL      Neutrophils, Absolute 8.43 K/uL      Lymphocytes, Absolute 1.23 K/uL      Monocytes, Absolute 0.79 K/uL      Eosinophils, Absolute 0.32 K/uL      Basophils, Absolute 0.05 K/uL           Imaging Results    None         No orders to display       Scoring tools                                 ED Course & MDM   MDM / ED  COURSE and CLINICAL IMPRESSIONS     [facility]    ED Course as of Jun 14 1839 Fri Jun 11, 2021 2116 Impression: Bilateral lower extremity rash started this morning.  Seen in ED yesterday fever rendered region and preferred to be discharged after work-up unremarkable.Continued low-grade fever.Plan: Repeat lab work; patient evaluated by attending Dr. Cowart who also saw patient yesterday, unsure if it is a reaction to Macrobid    [BB]   2228 Platelets(!): 137 [BB]   2229 Reevaluated patient and discussed with her and her daughter pleural improvement.  Still unknown why she now has a fever of 100.8.  Discussed concerning the hospital for close observation possibly seen by ID tomorrow, and they would like to see.  Patient reports that she is very anxious that nobody knows what is wrong and now why she has this rash.She reports her BP was 125/85 at her cardiology office 2 days ago.  Discussed trialing Ativan for her anxiety and hypertension at this time and she is in agreement.    [BB]   2252 Talked to B[facility] about patient in detail. Accepting patient into [facility] services. Discussed and added addition testing and medications from ED.    [BB]      ED Course User Index  [BB] Lillian Madison PA C         Clinical Impressions as of Jun 14 1839   Fever, unknown origin            Lillian Madison PA C  06/14/21 1839

## 2021-06-15 LAB
BACTERIA BLD CULT: NORMAL
MYELOPEROXIDASE AB SER IA-ACNC: 2.4 U/ML
PROTEINASE3 AB SER IA-ACNC: <0.7 U/ML

## 2021-06-16 LAB — CRYOGLOB SER QL 3D COLD INC: NEGATIVE

## 2021-06-17 ENCOUNTER — OFFICE VISIT (OUTPATIENT)
Dept: INTERNAL MEDICINE | Facility: CLINIC | Age: 86
End: 2021-06-17
Payer: MEDICARE

## 2021-06-17 VITALS
RESPIRATION RATE: 16 BRPM | BODY MASS INDEX: 26.63 KG/M2 | SYSTOLIC BLOOD PRESSURE: 140 MMHG | OXYGEN SATURATION: 96 % | WEIGHT: 160 LBS | HEART RATE: 57 BPM | DIASTOLIC BLOOD PRESSURE: 70 MMHG | TEMPERATURE: 98.4 F

## 2021-06-17 DIAGNOSIS — R31.9 HEMATURIA, UNSPECIFIED TYPE: Primary | ICD-10-CM

## 2021-06-17 DIAGNOSIS — R21 RASH: ICD-10-CM

## 2021-06-17 DIAGNOSIS — R50.9 FEVER OF UNKNOWN ORIGIN: ICD-10-CM

## 2021-06-17 DIAGNOSIS — E87.6 HYPOKALEMIA: ICD-10-CM

## 2021-06-17 DIAGNOSIS — I10 ESSENTIAL HYPERTENSION: ICD-10-CM

## 2021-06-17 LAB
ALBUMIN SERPL-MCNC: 3.3 G/DL (ref 3.4–5)
ALP SERPL-CCNC: 66 IU/L (ref 35–126)
ALT SERPL-CCNC: 67 IU/L (ref 11–54)
ANA SER QL IA: POSITIVE
ANION GAP SERPL CALC-SCNC: 11 MEQ/L (ref 3–15)
AST SERPL-CCNC: 25 IU/L (ref 15–41)
BACTERIA URNS QL MICRO: ABNORMAL /HPF
BASOPHILS # BLD: 0.05 K/UL (ref 0.01–0.1)
BASOPHILS NFR BLD: 0.6 %
BILIRUB SERPL-MCNC: 1.8 MG/DL (ref 0.3–1.2)
BILIRUB UR QL STRIP.AUTO: NEGATIVE MG/DL
BUN SERPL-MCNC: 24 MG/DL (ref 8–20)
CALCIUM SERPL-MCNC: 9 MG/DL (ref 8.9–10.3)
CENTROMERE B AB SER-ACNC: 60 AU/ML
CHLORIDE SERPL-SCNC: 96 MEQ/L (ref 98–109)
CLARITY UR REFRACT.AUTO: CLEAR
CO2 SERPL-SCNC: 29 MEQ/L (ref 22–32)
COLOR UR AUTO: YELLOW
CREAT SERPL-MCNC: 1.1 MG/DL (ref 0.6–1.1)
DIFFERENTIAL METHOD BLD: NORMAL
DSDNA IGG SER-ACNC: 21 IU/ML
ENA JO1 AB SER-ACNC: 15 AU/ML
ENA RNP AB SER-ACNC: 40 AU/ML
ENA SCL70 AB SER-ACNC: 186 AU/ML
ENA SM AB SER-ACNC: 10 AU/ML
ENA SS-A IGG SER-ACNC: 15 AU/ML
ENA SS-B IGG SER-ACNC: 12 AU/ML
EOSINOPHIL # BLD: 0.28 K/UL (ref 0.04–0.36)
EOSINOPHIL NFR BLD: 3.2 %
ERYTHROCYTE [DISTWIDTH] IN BLOOD BY AUTOMATED COUNT: 12.8 % (ref 11.7–14.4)
GFR SERPL CREATININE-BSD FRML MDRD: 47.3 ML/MIN/1.73M*2
GLUCOSE SERPL-MCNC: 107 MG/DL (ref 70–99)
GLUCOSE UR STRIP.AUTO-MCNC: NEGATIVE MG/DL
HCT VFR BLDCO AUTO: 41.9 % (ref 35–45)
HGB BLD-MCNC: 13.6 G/DL (ref 11.8–15.7)
HGB UR QL STRIP.AUTO: ABNORMAL
HISTONE IGG SER-ACNC: 40 AU/ML
HYALINE CASTS #/AREA URNS LPF: ABNORMAL /LPF
IMM GRANULOCYTES # BLD AUTO: 0.03 K/UL (ref 0–0.08)
IMM GRANULOCYTES NFR BLD AUTO: 0.3 %
KETONES UR STRIP.AUTO-MCNC: NEGATIVE MG/DL
LEUKOCYTE ESTERASE UR QL STRIP.AUTO: ABNORMAL
LYMPHOCYTES # BLD: 1.6 K/UL (ref 1.2–3.5)
LYMPHOCYTES NFR BLD: 18.1 %
MCH RBC QN AUTO: 29.2 PG (ref 28–33.2)
MCHC RBC AUTO-ENTMCNC: 32.5 G/DL (ref 32.2–35.5)
MCV RBC AUTO: 90.1 FL (ref 83–98)
MONOCYTES # BLD: 0.63 K/UL (ref 0.28–0.8)
MONOCYTES NFR BLD: 7.1 %
NEUTROPHILS # BLD: 6.27 K/UL (ref 1.7–7)
NEUTS SEG NFR BLD: 70.7 %
NITRITE UR QL STRIP.AUTO: NEGATIVE
NRBC BLD-RTO: 0 %
PDW BLD AUTO: 11.6 FL (ref 9.4–12.3)
PH UR STRIP.AUTO: 7 [PH]
PLATELET # BLD AUTO: 219 K/UL (ref 150–369)
POTASSIUM SERPL-SCNC: 3.6 MEQ/L (ref 3.6–5.1)
PROT SERPL-MCNC: 5.9 G/DL (ref 6–8.2)
PROT UR QL STRIP.AUTO: NEGATIVE
RBC # BLD AUTO: 4.65 M/UL (ref 3.93–5.22)
RBC #/AREA URNS HPF: ABNORMAL /HPF
SODIUM SERPL-SCNC: 136 MEQ/L (ref 136–144)
SP GR UR REFRACT.AUTO: 1.01
SQUAMOUS URNS QL MICRO: ABNORMAL /HPF
UROBILINOGEN UR STRIP-ACNC: 1 EU/DL
WBC # BLD AUTO: 8.86 K/UL (ref 3.8–10.5)
WBC #/AREA URNS HPF: ABNORMAL /HPF

## 2021-06-17 PROCEDURE — 84450 TRANSFERASE (AST) (SGOT): CPT | Performed by: INTERNAL MEDICINE

## 2021-06-17 PROCEDURE — 81001 URINALYSIS AUTO W/SCOPE: CPT | Performed by: INTERNAL MEDICINE

## 2021-06-17 PROCEDURE — 85025 COMPLETE CBC W/AUTO DIFF WBC: CPT | Performed by: INTERNAL MEDICINE

## 2021-06-17 PROCEDURE — 87086 URINE CULTURE/COLONY COUNT: CPT | Performed by: INTERNAL MEDICINE

## 2021-06-17 PROCEDURE — G8754 DIAS BP LESS 90: HCPCS | Performed by: INTERNAL MEDICINE

## 2021-06-17 PROCEDURE — G8753 SYS BP > OR = 140: HCPCS | Performed by: INTERNAL MEDICINE

## 2021-06-17 PROCEDURE — 86708 HEPATITIS A ANTIBODY: CPT | Performed by: INTERNAL MEDICINE

## 2021-06-17 PROCEDURE — 99495 TRANSJ CARE MGMT MOD F2F 14D: CPT | Performed by: INTERNAL MEDICINE

## 2021-06-17 PROCEDURE — 86709 HEPATITIS A IGM ANTIBODY: CPT | Performed by: INTERNAL MEDICINE

## 2021-06-17 RX ORDER — METOPROLOL TARTRATE 25 MG/1
12.5 TABLET, FILM COATED ORAL 2 TIMES DAILY
Status: ON HOLD
Start: 2021-06-17 | End: 2022-02-09 | Stop reason: ALTCHOICE

## 2021-06-17 NOTE — ASSESSMENT & PLAN NOTE
Resolved.  Unclear etiology  Labs from hospital pending  Hep a slightly reactive  · Recheck labs today

## 2021-06-17 NOTE — PROGRESS NOTES
Chief Complaint   Patient presents with   • Other     TCM visit , d/c from [facility]      Patient ID: Latrell Ojeda is a 85 y.o. female.    Date of Admission: 06/12/21  Date of Discharge: 06/13/21    Hematuria/dysuria: Earlier this month she had dysuria and hematuria..  Went to urgent care.  Was given Macrobid.  Urine culture without significant growth  Repeat UA was negative  Had nausea, headache, elevated temp.  Went to the hospital.  Acute work-up negative.  Seen by ID.  Most symptoms have improved.  Eating okay.  Having good bowel movements    Rash: Noted on legs.  Possible vasculitis.  Rheumatological labs were ordered  Rash is improving    HTN: She spoke to her cardiologist.  Losartan was doubled and metoprolol decreased due to questionable side effects    She denies any recent travel.   is in rehab.          The following have been reviewed and updated as appropriate in this visit:        Review of Systems   Constitutional: Negative for chills and fever.   Cardiovascular: Negative for chest pain.   Gastrointestinal: Negative for abdominal pain.         Current Outpatient Medications:   •  amiodarone (PACERONE) 200 mg tablet, Take 200 mg by mouth daily.  , Disp: , Rfl:   •  calcium carbonate-vitamin D3 (CALCIUM WITH VITAMIN D) 600 mg(1,500mg) -400 unit per tablet, Take 2 tablets by mouth daily., Disp: , Rfl:   •  cholecalciferol, vitamin D3, 1,000 unit (25 mcg) tablet, Take 1,000 Units by mouth daily., Disp: , Rfl:   •  coenzyme Q10 (COQ10) 10 mg capsule, Take 100 mg by mouth daily.  , Disp: , Rfl:   •  hydrochlorothiazide (HYDRODIURIL) 25 mg tablet, , Disp: , Rfl:   •  losartan (COZAAR) 25 mg tablet, 25 mg 2 (two) times a day.  , Disp: , Rfl:   •  lutein 6 mg capsule, Take 6 mg by mouth., Disp: , Rfl:   •  metoprolol tartrate (LOPRESSOR) 25 mg tablet, Take 0.5 tablets (12.5 mg total) by mouth 2 (two) times a day., Disp: , Rfl:   •  omeprazole (PriLOSEC) 20 mg capsule, Take 20 mg  by mouth every other day. , Disp: , Rfl:   •  red yeast rice 600 mg capsule, Take 1 capsule by mouth daily., Disp: , Rfl:   •  XARELTO 20 mg tablet, Take 20 mg by mouth once daily., Disp: , Rfl:   •  triamcinolone (KENALOG) 0.1 % cream, Apply topically 2 (two) times a day. (Patient not taking: Reported on 6/14/2021 ), Disp: 30 g, Rfl: 1    Objective     Vitals:    06/17/21 1258   BP: 140/70   BP Location: Left upper arm   Patient Position: Sitting   Pulse: (!) 57   Resp: 16   Temp: 36.9 °C (98.4 °F)   TempSrc: Oral   SpO2: 96%   Weight: 72.6 kg (160 lb)     Physical Exam  Vitals reviewed.   Constitutional:       General: She is not in acute distress.     Appearance: She is well-developed.   HENT:      Head: Normocephalic and atraumatic.   Eyes:      Conjunctiva/sclera: Conjunctivae normal.   Cardiovascular:      Rate and Rhythm: Normal rate and regular rhythm.   Pulmonary:      Effort: Pulmonary effort is normal. No respiratory distress.      Breath sounds: Normal breath sounds. No wheezing or rales.   Abdominal:      General: There is no distension.      Palpations: Abdomen is soft.      Tenderness: There is no abdominal tenderness. There is no guarding.   Musculoskeletal:         General: No swelling.   Skin:     Comments: Purpuric-like rash bilateral lower extremities   Neurological:      Mental Status: She is alert. Mental status is at baseline.   Psychiatric:         Mood and Affect: Mood normal.         Behavior: Behavior normal.           Assessment/Plan   Fever of unknown origin  Resolved.  Unclear etiology  Labs from hospital pending  Hep a slightly reactive  · Recheck labs today     Hematuria  · Recheck urinalysis today     Hypokalemia  Low potassium noted in the hospital  Eating better now.  On HCTZ.  · Recheck labs today     Essential hypertension  She recently spoke to her cardiologist and losartan was increased to twice a day and metoprolol dose decreased  BP okay  · Continue losartan, HCTZ,  metoprolol    Rash  Improving      Orders Placed This Encounter   Procedures   • Urine culture   • Hepatitis A antibody, IgM   • Hepatitis A Antibody, Total   • Comprehensive metabolic panel   • CBC and Differential   • Urinalysis with Reflex Culture (ED and Outpatient only)   • UA Reflex to Culture (Macroscopic)   • UA Microscopic     New Medications Ordered This Visit   Medications   • metoprolol tartrate (LOPRESSOR) 25 mg tablet     Sig: Take 0.5 tablets (12.5 mg total) by mouth 2 (two) times a day.       Return for Next scheduled follow-up.     SANDI BLACKBURN MD

## 2021-06-17 NOTE — PATIENT INSTRUCTIONS
Fever, unknown origin  Recheck labs today     Hematuria  Check Urinalysis today     Hypokalemia  Low potassium  Recheck labs today       Orders Placed This Encounter   Procedures   • Hepatitis A antibody, IgM   • Hepatitis A Antibody, Total   • Comprehensive metabolic panel   • CBC and Differential   • Urinalysis with Reflex Culture (ED and Outpatient only)   • UA Reflex to Culture (Macroscopic)     New Medications Ordered This Visit   Medications   • metoprolol tartrate (LOPRESSOR) 25 mg tablet     Sig: Take 0.5 tablets (12.5 mg total) by mouth 2 (two) times a day.       Return for Next scheduled follow-up.     SANDI BLACKBURN MD

## 2021-06-18 PROBLEM — R21 RASH: Status: RESOLVED | Noted: 2021-06-11 | Resolved: 2021-06-18

## 2021-06-18 LAB — HAV IGM SER QL: REACTIVE

## 2021-06-18 ASSESSMENT — ENCOUNTER SYMPTOMS
CHILLS: 0
ABDOMINAL PAIN: 0
FEVER: 0

## 2021-06-18 NOTE — ASSESSMENT & PLAN NOTE
She recently spoke to her cardiologist and losartan was increased to twice a day and metoprolol dose decreased  BP okay  · Continue losartan, HCTZ, metoprolol

## 2021-06-19 LAB — BACTERIA UR CULT: NORMAL

## 2021-06-21 LAB — HAV AB SER QL IA: REACTIVE

## 2021-06-24 LAB
ANA PAT SER IF-IMP: ABNORMAL
ANA TITR SER HEP2 SUBST: ABNORMAL {TITER}

## 2021-06-25 ENCOUNTER — PATIENT OUTREACH (OUTPATIENT)
Dept: CASE MANAGEMENT | Facility: CLINIC | Age: 86
End: 2021-06-25

## 2021-06-25 NOTE — PROGRESS NOTES
TCM f/u call. Patient reported that she is feeling better. She has her appetite back and the fever is down and her rash is gone. She needs to make  appointment. She is concerned about her  getting back to baseline from the rehab.  No further CM needs idetified

## 2021-06-29 ENCOUNTER — OFFICE VISIT (OUTPATIENT)
Dept: INTERNAL MEDICINE | Facility: CLINIC | Age: 86
End: 2021-06-29
Payer: MEDICARE

## 2021-06-29 VITALS — HEART RATE: 70 BPM | DIASTOLIC BLOOD PRESSURE: 70 MMHG | SYSTOLIC BLOOD PRESSURE: 145 MMHG | RESPIRATION RATE: 18 BRPM

## 2021-06-29 DIAGNOSIS — R31.9 HEMATURIA, UNSPECIFIED TYPE: ICD-10-CM

## 2021-06-29 DIAGNOSIS — I10 ESSENTIAL HYPERTENSION: Primary | ICD-10-CM

## 2021-06-29 DIAGNOSIS — B15.9 VIRAL HEPATITIS A WITHOUT HEPATIC COMA: ICD-10-CM

## 2021-06-29 LAB
BILIRUB UR QL STRIP.AUTO: NEGATIVE MG/DL
CLARITY UR REFRACT.AUTO: CLEAR
COLOR UR AUTO: YELLOW
GLUCOSE UR STRIP.AUTO-MCNC: NEGATIVE MG/DL
HGB UR QL STRIP.AUTO: NEGATIVE
KETONES UR STRIP.AUTO-MCNC: NEGATIVE MG/DL
LEUKOCYTE ESTERASE UR QL STRIP.AUTO: ABNORMAL
NITRITE UR QL STRIP.AUTO: NEGATIVE
PH UR STRIP.AUTO: 6.5 [PH]
PROT UR QL STRIP.AUTO: NEGATIVE
SP GR UR REFRACT.AUTO: 1.01
UROBILINOGEN UR STRIP-ACNC: 0.2 EU/DL

## 2021-06-29 PROCEDURE — 81003 URINALYSIS AUTO W/O SCOPE: CPT | Performed by: INTERNAL MEDICINE

## 2021-06-29 PROCEDURE — 99214 OFFICE O/P EST MOD 30 MIN: CPT | Performed by: INTERNAL MEDICINE

## 2021-06-29 PROCEDURE — G8754 DIAS BP LESS 90: HCPCS | Performed by: INTERNAL MEDICINE

## 2021-06-29 PROCEDURE — 87086 URINE CULTURE/COLONY COUNT: CPT | Performed by: INTERNAL MEDICINE

## 2021-06-29 PROCEDURE — G8753 SYS BP > OR = 140: HCPCS | Performed by: INTERNAL MEDICINE

## 2021-06-29 NOTE — ASSESSMENT & PLAN NOTE
Improved.  Recent UA only showed trace blood  · Recheck urine  · If still with blood, needs to follow-up with urology

## 2021-06-29 NOTE — PATIENT INSTRUCTIONS
Essential hypertension  Stable  Continue losartan, HCTZ, metoprolol    Hematuria  Recheck urine      Return in about 4 months (around 10/29/2021).     SANDI BLACKBURN MD

## 2021-06-29 NOTE — PROGRESS NOTES
Chief Complaint   Patient presents with   • Follow-up       Subjective      Patient ID: Latrell Ojeda is a 85 y.o. female.    HPI     Hematuria: None seen in urine by patient.  Recent UA still with trace blood    Hepatitis A: Antibodies recently turned positive.  She denies any GI symptoms.  Feels well now.    Rash: On bilateral legs.  Mostly has resolved.  Some abnormal rheumatological labs.    HTN: Stable    The following have been reviewed and updated as appropriate in this visit:  Allergies  Meds  Problems        Review of Systems   Constitutional: Negative for chills and fever.   Cardiovascular: Negative for chest pain.   Gastrointestinal: Negative for abdominal pain.         Current Outpatient Medications:   •  amiodarone (PACERONE) 200 mg tablet, Take 200 mg by mouth daily.  , Disp: , Rfl:   •  calcium carbonate-vitamin D3 (CALCIUM WITH VITAMIN D) 600 mg(1,500mg) -400 unit per tablet, Take 2 tablets by mouth daily., Disp: , Rfl:   •  cholecalciferol, vitamin D3, 1,000 unit (25 mcg) tablet, Take 1,000 Units by mouth daily., Disp: , Rfl:   •  coenzyme Q10 (COQ10) 10 mg capsule, Take 100 mg by mouth daily.  , Disp: , Rfl:   •  hydrochlorothiazide (HYDRODIURIL) 25 mg tablet, , Disp: , Rfl:   •  losartan (COZAAR) 25 mg tablet, 25 mg 2 (two) times a day.  , Disp: , Rfl:   •  lutein 6 mg capsule, Take 6 mg by mouth., Disp: , Rfl:   •  metoprolol tartrate (LOPRESSOR) 25 mg tablet, Take 0.5 tablets (12.5 mg total) by mouth 2 (two) times a day., Disp: , Rfl:   •  omeprazole (PriLOSEC) 20 mg capsule, Take 20 mg by mouth every other day. , Disp: , Rfl:   •  red yeast rice 600 mg capsule, Take 1 capsule by mouth daily., Disp: , Rfl:   •  triamcinolone (KENALOG) 0.1 % cream, Apply topically 2 (two) times a day. (Patient not taking: Reported on 6/14/2021 ), Disp: 30 g, Rfl: 1  •  XARELTO 20 mg tablet, Take 20 mg by mouth once daily., Disp: , Rfl:     Objective     Vitals:    06/29/21 1340   BP: (!) 145/70   Pulse:  70   Resp: 18     Physical Exam  Vitals reviewed.   Constitutional:       General: She is not in acute distress.     Appearance: She is well-developed.   HENT:      Head: Normocephalic and atraumatic.   Eyes:      Conjunctiva/sclera: Conjunctivae normal.   Cardiovascular:      Rate and Rhythm: Normal rate and regular rhythm.   Pulmonary:      Effort: Pulmonary effort is normal. No respiratory distress.      Breath sounds: Normal breath sounds. No wheezing or rales.   Abdominal:      General: There is no distension.      Palpations: Abdomen is soft.      Tenderness: There is no abdominal tenderness. There is no guarding.   Musculoskeletal:         General: No swelling.   Neurological:      Mental Status: She is alert. Mental status is at baseline.   Psychiatric:         Mood and Affect: Mood normal.         Behavior: Behavior normal.           Assessment/Plan   Essential hypertension  Stable  · Continue losartan, HCTZ, metoprolol    Hematuria  Improved.  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  Overall clinically has been improving    Rash  Improved  Recent rheumatological labs abnormal  · Follow-up with rheumatology as scheduled      Orders Placed This Encounter   Procedures   • Urine culture   • Urinalysis with Reflex Culture (ED and Outpatient only)   • UA Reflex to Culture (Macroscopic)   • UA Microscopic     No orders of the defined types were placed in this encounter.      Return in about 4 months (around 10/29/2021).     SANDI BLACKBURN MD

## 2021-06-30 PROBLEM — B15.9 VIRAL HEPATITIS A WITHOUT HEPATIC COMA: Status: ACTIVE | Noted: 2021-06-30

## 2021-06-30 PROBLEM — R50.9 FEVER: Status: RESOLVED | Noted: 2021-06-12 | Resolved: 2021-06-30

## 2021-06-30 LAB
BACTERIA URNS QL MICRO: NORMAL /HPF
HYALINE CASTS #/AREA URNS LPF: NORMAL /LPF
RBC #/AREA URNS HPF: NORMAL /HPF
SQUAMOUS URNS QL MICRO: NORMAL /HPF
WBC #/AREA URNS HPF: NORMAL /HPF

## 2021-06-30 ASSESSMENT — ENCOUNTER SYMPTOMS
CHILLS: 0
FEVER: 0
ABDOMINAL PAIN: 0

## 2021-06-30 NOTE — ASSESSMENT & PLAN NOTE
Antibodies recently turned positive  She denies any current symptoms.  Overall clinically has been improving

## 2021-07-01 LAB
BACTERIA UR CULT: ABNORMAL
BACTERIA UR CULT: ABNORMAL

## 2021-07-10 ENCOUNTER — APPOINTMENT (EMERGENCY)
Dept: RADIOLOGY | Facility: HOSPITAL | Age: 86
End: 2021-07-10
Payer: MEDICARE

## 2021-07-10 ENCOUNTER — HOSPITAL ENCOUNTER (EMERGENCY)
Facility: HOSPITAL | Age: 86
Discharge: HOME | End: 2021-07-10
Attending: EMERGENCY MEDICINE | Admitting: EMERGENCY MEDICINE
Payer: MEDICARE

## 2021-07-10 VITALS
HEART RATE: 55 BPM | WEIGHT: 160 LBS | RESPIRATION RATE: 16 BRPM | TEMPERATURE: 97.6 F | HEIGHT: 65 IN | BODY MASS INDEX: 26.66 KG/M2 | OXYGEN SATURATION: 95 % | DIASTOLIC BLOOD PRESSURE: 60 MMHG | SYSTOLIC BLOOD PRESSURE: 126 MMHG

## 2021-07-10 DIAGNOSIS — K27.9 PEPTIC ULCER: ICD-10-CM

## 2021-07-10 DIAGNOSIS — R10.13 EPIGASTRIC ABDOMINAL PAIN: Primary | ICD-10-CM

## 2021-07-10 LAB
ALBUMIN SERPL-MCNC: 4.2 G/DL (ref 3.4–5)
ALP SERPL-CCNC: 59 IU/L (ref 35–126)
ALT SERPL-CCNC: 55 IU/L (ref 11–54)
ANION GAP SERPL CALC-SCNC: 12 MEQ/L (ref 3–15)
AST SERPL-CCNC: 32 IU/L (ref 15–41)
BASOPHILS # BLD: 0.04 K/UL (ref 0.01–0.1)
BASOPHILS NFR BLD: 0.7 %
BILIRUB SERPL-MCNC: 2.5 MG/DL (ref 0.3–1.2)
BUN SERPL-MCNC: 28 MG/DL (ref 8–20)
CALCIUM SERPL-MCNC: 9.2 MG/DL (ref 8.9–10.3)
CHLORIDE SERPL-SCNC: 100 MEQ/L (ref 98–109)
CO2 SERPL-SCNC: 26 MEQ/L (ref 22–32)
CREAT SERPL-MCNC: 1.2 MG/DL (ref 0.6–1.1)
DIFFERENTIAL METHOD BLD: ABNORMAL
EOSINOPHIL # BLD: 0.04 K/UL (ref 0.04–0.36)
EOSINOPHIL NFR BLD: 0.7 %
ERYTHROCYTE [DISTWIDTH] IN BLOOD BY AUTOMATED COUNT: 13.4 % (ref 11.7–14.4)
GFR SERPL CREATININE-BSD FRML MDRD: 42.8 ML/MIN/1.73M*2
GLUCOSE SERPL-MCNC: 151 MG/DL (ref 70–99)
HCT VFR BLDCO AUTO: 42 % (ref 35–45)
HGB BLD-MCNC: 14.3 G/DL (ref 11.8–15.7)
IMM GRANULOCYTES # BLD AUTO: 0.02 K/UL (ref 0–0.08)
IMM GRANULOCYTES NFR BLD AUTO: 0.4 %
LIPASE SERPL-CCNC: 24 U/L (ref 20–51)
LYMPHOCYTES # BLD: 1 K/UL (ref 1.2–3.5)
LYMPHOCYTES NFR BLD: 18 %
MCH RBC QN AUTO: 29.9 PG (ref 28–33.2)
MCHC RBC AUTO-ENTMCNC: 34 G/DL (ref 32.2–35.5)
MCV RBC AUTO: 87.7 FL (ref 83–98)
MONOCYTES # BLD: 0.44 K/UL (ref 0.28–0.8)
MONOCYTES NFR BLD: 7.9 %
NEUTROPHILS # BLD: 4.02 K/UL (ref 1.7–7)
NEUTS SEG NFR BLD: 72.3 %
NRBC BLD-RTO: 0 %
PDW BLD AUTO: 11.8 FL (ref 9.4–12.3)
PLATELET # BLD AUTO: 158 K/UL (ref 150–369)
POTASSIUM SERPL-SCNC: 3.7 MEQ/L (ref 3.6–5.1)
PROT SERPL-MCNC: 7.4 G/DL (ref 6–8.2)
RBC # BLD AUTO: 4.79 M/UL (ref 3.93–5.22)
SODIUM SERPL-SCNC: 138 MEQ/L (ref 136–144)
TROPONIN I SERPL-MCNC: <0.02 NG/ML
WBC # BLD AUTO: 5.56 K/UL (ref 3.8–10.5)

## 2021-07-10 PROCEDURE — 63700000 HC SELF-ADMINISTRABLE DRUG: Performed by: NURSE PRACTITIONER

## 2021-07-10 PROCEDURE — 93005 ELECTROCARDIOGRAM TRACING: CPT | Performed by: NURSE PRACTITIONER

## 2021-07-10 PROCEDURE — 36415 COLL VENOUS BLD VENIPUNCTURE: CPT | Performed by: EMERGENCY MEDICINE

## 2021-07-10 PROCEDURE — 80053 COMPREHEN METABOLIC PANEL: CPT | Performed by: NURSE PRACTITIONER

## 2021-07-10 PROCEDURE — 83690 ASSAY OF LIPASE: CPT | Performed by: NURSE PRACTITIONER

## 2021-07-10 PROCEDURE — 25000000 HC PHARMACY GENERAL: Performed by: NURSE PRACTITIONER

## 2021-07-10 PROCEDURE — 84484 ASSAY OF TROPONIN QUANT: CPT | Performed by: NURSE PRACTITIONER

## 2021-07-10 PROCEDURE — 76705 ECHO EXAM OF ABDOMEN: CPT

## 2021-07-10 PROCEDURE — 96374 THER/PROPH/DIAG INJ IV PUSH: CPT

## 2021-07-10 PROCEDURE — 99284 EMERGENCY DEPT VISIT MOD MDM: CPT | Mod: 25

## 2021-07-10 PROCEDURE — 3E033GC INTRODUCTION OF OTHER THERAPEUTIC SUBSTANCE INTO PERIPHERAL VEIN, PERCUTANEOUS APPROACH: ICD-10-PCS | Performed by: EMERGENCY MEDICINE

## 2021-07-10 PROCEDURE — 85025 COMPLETE CBC W/AUTO DIFF WBC: CPT | Performed by: NURSE PRACTITIONER

## 2021-07-10 RX ORDER — ALUMINUM HYDROXIDE, MAGNESIUM HYDROXIDE, AND SIMETHICONE 1200; 120; 1200 MG/30ML; MG/30ML; MG/30ML
30 SUSPENSION ORAL ONCE
Status: COMPLETED | OUTPATIENT
Start: 2021-07-10 | End: 2021-07-10

## 2021-07-10 RX ORDER — CLONIDINE HYDROCHLORIDE 0.1 MG/1
0.1 TABLET ORAL ONCE
Status: COMPLETED | OUTPATIENT
Start: 2021-07-10 | End: 2021-07-10

## 2021-07-10 RX ORDER — LIDOCAINE HYDROCHLORIDE 20 MG/ML
10 SOLUTION OROPHARYNGEAL ONCE
Status: COMPLETED | OUTPATIENT
Start: 2021-07-10 | End: 2021-07-10

## 2021-07-10 RX ORDER — FAMOTIDINE 10 MG/ML
20 INJECTION INTRAVENOUS ONCE
Status: COMPLETED | OUTPATIENT
Start: 2021-07-10 | End: 2021-07-10

## 2021-07-10 RX ORDER — PANTOPRAZOLE SODIUM 20 MG/1
20 TABLET, DELAYED RELEASE ORAL
Qty: 14 TABLET | Refills: 0 | Status: SHIPPED | OUTPATIENT
Start: 2021-07-10 | End: 2021-07-22 | Stop reason: SDUPTHER

## 2021-07-10 RX ADMIN — CLONIDINE HYDROCHLORIDE 0.1 MG: 0.1 TABLET ORAL at 11:32

## 2021-07-10 RX ADMIN — FAMOTIDINE 20 MG: 10 INJECTION INTRAVENOUS at 09:12

## 2021-07-10 RX ADMIN — ALUMINA, MAGNESIA, AND SIMETHICONE ORAL SUSPENSION REGULAR STRENGTH 30 ML: 1200; 1200; 120 SUSPENSION ORAL at 09:10

## 2021-07-10 RX ADMIN — LIDOCAINE HYDROCHLORIDE 10 ML: 20 SOLUTION ORAL; TOPICAL at 09:10

## 2021-07-10 ASSESSMENT — ENCOUNTER SYMPTOMS
SHORTNESS OF BREATH: 0
DIARRHEA: 0
ABDOMINAL PAIN: 1
DYSURIA: 0
NAUSEA: 0
COUGH: 0
MYALGIAS: 0
HEADACHES: 0
VOMITING: 0
FEVER: 0
DIZZINESS: 0

## 2021-07-10 NOTE — ED PROVIDER NOTES
Emergency Medicine Note  HPI   HISTORY OF PRESENT ILLNESS     85-year-old female with past medical history hypertension and breast cancer presents to the emergency department with reported epigastric abdominal pain onset this morning around 4 AM.  She notes pain has been constant since onset and is progressively worsening.  The pain does not radiate and she cannot describe any exacerbating factors.  She states that she had similar pain approximately 2 days ago, again early in the morning.  She denies associated fevers, chest pain, shortness of breath, back pain, cough, nausea, vomiting, diarrhea, dysuria.  She denies history of gallstones.      Chest Pain  Associated symptoms: abdominal pain    Associated symptoms: no cough, no dizziness, no fever, no headache, no nausea, no shortness of breath and no vomiting          Patient History   PAST HISTORY     Reviewed from Nursing Triage:      Past Medical History:   Diagnosis Date   • Breast cancer (CMS/HCC)    • High blood pressure    • UTI (urinary tract infection)        Past Surgical History:   Procedure Laterality Date   • HYSTERECTOMY  1985   • LAPAROSCOPIC LIVER CYST FENESTRATION  1995   • MASTECTOMY  2005       Family History   Problem Relation Age of Onset   • Diabetes Biological Father    • Diabetes Biological Brother    • Multiple myeloma Biological Son        Social History     Tobacco Use   • Smoking status: Never Smoker   • Smokeless tobacco: Never Used   Vaping Use   • Vaping Use: Never used   Substance Use Topics   • Alcohol use: Yes   • Drug use: Never         Review of Systems   REVIEW OF SYSTEMS     Review of Systems   Constitutional: Negative for fever.   Respiratory: Negative for cough and shortness of breath.    Cardiovascular: Negative for chest pain.   Gastrointestinal: Positive for abdominal pain. Negative for diarrhea, nausea and vomiting.   Genitourinary: Negative for dysuria.   Musculoskeletal: Negative for myalgias.   Skin: Negative for rash.    Neurological: Negative for dizziness and headaches.         VITALS     ED Vitals    Date/Time Temp Pulse Resp BP SpO2 [facility]   07/10/21 1251 -- -- 16 126/60 95 % ESH   07/10/21 1236 36.4 °C (97.6 °F) 55 16 150/65 95 % CMU   07/10/21 1235 -- -- -- 150/65 -- ESH   07/10/21 1132 -- 64 -- 207/91 -- CEI   07/10/21 1119 -- 58 15 216/92 96 % CEI   07/10/21 1008 -- 66 20 207/83 96 % CEI   07/10/21 0858 36.4 °C (97.6 °F) 81 18 233/83 97 % HC        Pulse Ox %: 97 % (07/10/21 0911)  Pulse Ox Interpretation: Normal (07/10/21 0911)  Heart Rate: 81 (07/10/21 0911)  Rhythm Strip Interpretation: Normal Sinus Rhythm (07/10/21 0911)     Physical Exam   PHYSICAL EXAM     Physical Exam  Vitals and nursing note reviewed.   Constitutional:       Appearance: She is well-developed.   HENT:      Head: Atraumatic.   Eyes:      General: No scleral icterus.  Cardiovascular:      Rate and Rhythm: Normal rate and regular rhythm.   Pulmonary:      Effort: Pulmonary effort is normal.      Breath sounds: Normal breath sounds.   Abdominal:      General: Bowel sounds are normal.      Palpations: Abdomen is soft.      Tenderness: There is abdominal tenderness in the right upper quadrant and epigastric area.   Musculoskeletal:      Right lower leg: No edema.      Left lower leg: No edema.   Skin:     General: Skin is warm and dry.      Capillary Refill: Capillary refill takes less than 2 seconds.   Neurological:      General: No focal deficit present.      Mental Status: She is alert and oriented to person, place, and time.   Psychiatric:         Mood and Affect: Mood normal.         Behavior: Behavior normal.           PROCEDURES     Procedures     DATA     Results     Procedure Component Value Units Date/Time    Troponin I [[id number]]  (Normal) Collected: 07/10/21 0916    Specimen: Blood, Venous Updated: 07/10/21 0954     Troponin I <0.02 ng/mL     Comprehensive metabolic panel [[id number]]  (Abnormal) Collected: 07/10/21 0916    Specimen: Blood,  Venous Updated: 07/10/21 0952     Sodium 138 mEQ/L      Potassium 3.7 mEQ/L      Comment: Results obtained on plasma. Plasma Potassium values may be up to 0.4 mEQ/L less than serum values. The differences may be greater for patients with high platelet or white cell counts.        Chloride 100 mEQ/L      CO2 26 mEQ/L      BUN 28 mg/dL      Creatinine 1.2 mg/dL      Glucose 151 mg/dL      Calcium 9.2 mg/dL      AST (SGOT) 32 IU/L      ALT (SGPT) 55 IU/L      Alkaline Phosphatase 59 IU/L      Total Protein 7.4 g/dL      Comment: Test performed on plasma which typically contains approximately 0.4 g/dL more protein than serum.        Albumin 4.2 g/dL      Bilirubin, Total 2.5 mg/dL      eGFR 42.8 mL/min/1.73m*2      Anion Gap 12 mEQ/L     Lipase [[id number]]  (Normal) Collected: 07/10/21 0916    Specimen: Blood, Venous Updated: 07/10/21 0952     Lipase 24 U/L     CBC and differential [[id number]]  (Abnormal) Collected: 07/10/21 0916    Specimen: Blood, Venous Updated: 07/10/21 0928     WBC 5.56 K/uL      RBC 4.79 M/uL      Hemoglobin 14.3 g/dL      Hematocrit 42.0 %      MCV 87.7 fL      MCH 29.9 pg      MCHC 34.0 g/dL      RDW 13.4 %      Platelets 158 K/uL      MPV 11.8 fL      Differential Type Auto     nRBC 0.0 %      Immature Granulocytes 0.4 %      Neutrophils 72.3 %      Lymphocytes 18.0 %      Monocytes 7.9 %      Eosinophils 0.7 %      Basophils 0.7 %      Immature Granulocytes, Absolute 0.02 K/uL      Neutrophils, Absolute 4.02 K/uL      Lymphocytes, Absolute 1.00 K/uL      Monocytes, Absolute 0.44 K/uL      Eosinophils, Absolute 0.04 K/uL      Basophils, Absolute 0.04 K/uL     RAINBOW GOLD [[id number]] Collected: 07/10/21 0916    Specimen: Blood, Venous Updated: 07/10/21 0923    [place] Draw Panel [[id number]] Collected: 07/10/21 0916    Specimen: Blood, Venous Updated: 07/10/21 0923    Narrative:      The following orders were created for panel order [place] Draw Panel.  Procedure                                Abnormality         Status                     ---------                               -----------         ------                     RAINBOW RED[[id number]]                                      In process                 YESSENIA GOLD[[id number]]                                     In process                   Please view results for these tests on the individual orders.    RAINBOW RED [[id number]] Collected: 07/10/21 0916    Specimen: Blood, Venous Updated: 07/10/21 0923          Imaging Results          ULTRASOUND ABDOMEN LIMITED (Final result)  Result time 07/10/21 11:09:59    Final result                 Impression:    IMPRESSION:  Nonspecific area in the dome of the liver probably scarring has been described  on 6/10/2021 CT.  Please refer to the prior CT report.  This is not accurately  characterized on this ultrasound.  Additional liver cysts.  No specific acute  abnormality on this study             Narrative:    CLINICAL HISTORY: RUQ abd pain   .    COMMENT:    Comparison: 6/10/2021    Technique: Right upper quadrant sonography was performed.    Findings:  Liver: Liver measures 14.05 cm normal in size.  There is non specific echogenic  heterogeneous area with probable calcification near the dome of the liver right  hepatic lobe measuring 2.3 x 1.3 cm.  There is a cluster of cysts versus a  complex septated cyst in the left hepatic lobe measuring 1.9 cm. No mass within  the visualized portions of the liver.  An additional lower 8 mm cyst in the right hepatic lobe.  Bile ducts: No intrahepatic or extrahepatic biliary ductal dilatation.  Common  bile duct measures 0.4 cm.    Gallbladder:  No calculi or intraluminal abnormality. Normal wall thickness.  No  pericholecystic fluid. No sonographic Beaulieu's sign.    Pancreas:    Right kidney: Measures 10.5 x 4.3 x 4.9 cm. No hydronephrosis.  Mild cortical  thinning of the right kidney.    Ascites: None.                                ECG 12 lead    (Results Pending)        Scoring tools                                 ED Course & MDM   MDM / ED COURSE and CLINICAL IMPRESSIONS     MDM  Number of Diagnoses or Management Options     Amount and/or Complexity of Data Reviewed  Clinical lab tests: reviewed  Tests in the radiology section of CPT®: reviewed  Discuss the patient with other providers: yes    Patient Progress  Patient progress: stable      ED Course as of Jul 10 1350   Sat Jul 10, 2021   0907 Impression: RUQ/Epigastric abd pain  Plan: Labs, lipase, trop, US abd, GI cocktail/IV pepcid, EKG, observe    [EP]   0911 Bilirubin, Total(!): 2.5 [EP]   1216 IMPRESSION:  Nonspecific area in the dome of the liver probably scarring has been described  on 6/10/2021 CT.  Please refer to the prior CT report.  This is not accurately  characterized on this ultrasound.  Additional liver cysts.  No specific acute  abnormality on this study    Discussed with patient and family ultrasound results, they verbalized patient with history of liver cysts with evacuation   ULTRASOUND ABDOMEN LIMITED [EP]   1217 Patient resting comfortably in room, reports that she did have improvement in pain initially after GI cocktail  Blood pressure remains elevated after patient took her morning blood pressure medications.  0.1 mg clonidine ordered, will continue to monitor    [EP]   1253 BP improved, now 126/60  Patient well appearing in ED, afebrile and non-toxic, ambulating around room  D/w her close f/u with GI for continued evaluation of epigastric pain, will start on PPI and return precautions  D/w Dr Jose, he evaluated patient and agrees with plan    [EP]      ED Course User Index  [EP] Pallante, Elizabeth P, CRNP         Clinical Impressions as of Jul 10 1350   Epigastric abdominal pain   Peptic ulcer            Pallante, Elizabeth P, CRNP  07/10/21 1350

## 2021-07-10 NOTE — ED ATTESTATION NOTE
Procedures  Physical Exam  Review of Systems    7/10/964428:20 AM  I have personally seen and examined the patient. I have reviewed and agree with the PA/NP/Resident's assessment and ED plan of care. My examination, assessment and plan of care of Latrell Ojeda is as follows:    Patient is a 85-year-old female who presents with epigastric discomfort, patient reports that she woke early this morning with discomfort, patient reports having similar episode several days ago which resolved, patient denies any known history of coronary artery disease, does have atrial fibrillation and is on anticoagulants for that, no history of stomach problems or gallbladder problems or pancreas problems that the patient is aware, patient has had a hysterectomy in the past and had a laparoscopic procedure for a liver cyst    Exam:   Vital signs have been reviewed, pulse ox is 97% on room air, normal  Heart: RRR, normal S1/S2  Lungs: CTA bilaterally  Abdo: soft, non-tender    Plan: Patient is an 85-year-old female who presents with epigastric discomfort, checking labs are reviewed, reevaluate afterwards          Godshall, Duane K, MD  07/10/21 6407

## 2021-07-10 NOTE — DISCHARGE INSTRUCTIONS
Stop the Pepcid and start the Protonix.  Take the Protonix as prescribed in the morning before breakfast.    Avoid spicy/greasy/acidic food.    If you develop worsening or severe pain, difficulty breathing, fevers (>100.4), vomiting or any other new or concerning symptoms return to the emergency department.

## 2021-07-11 LAB
ATRIAL RATE: 80
P AXIS: 58
PR INTERVAL: 286
QRS DURATION: 72
QT INTERVAL: 378
QTC CALCULATION(BAZETT): 435
R AXIS: 52
T WAVE AXIS: 58
VENTRICULAR RATE: 80

## 2021-07-13 ENCOUNTER — TELEPHONE (OUTPATIENT)
Dept: INTERNAL MEDICINE | Facility: CLINIC | Age: 86
End: 2021-07-13

## 2021-07-21 ENCOUNTER — TELEPHONE (OUTPATIENT)
Dept: INTERNAL MEDICINE | Facility: CLINIC | Age: 86
End: 2021-07-21

## 2021-07-21 NOTE — TELEPHONE ENCOUNTER
[place] like to know if she could continue taking the pantoprazole. She is still having stomach pains but does feel like it helped a little bit. She wanted to know if you would send over the increased dose to try that out.     [place] she would like it to go to the [facility] up there, I already have that on the file.     Please advise.     [phone]

## 2021-07-22 RX ORDER — PANTOPRAZOLE SODIUM 40 MG/1
40 TABLET, DELAYED RELEASE ORAL
Qty: 90 TABLET | Refills: 0 | Status: SHIPPED | OUTPATIENT
Start: 2021-07-22 | End: 2021-09-10 | Stop reason: SDUPTHER

## 2021-08-11 ENCOUNTER — OFFICE VISIT (OUTPATIENT)
Dept: INTERNAL MEDICINE | Facility: CLINIC | Age: 86
End: 2021-08-11
Payer: MEDICARE

## 2021-08-11 VITALS
SYSTOLIC BLOOD PRESSURE: 120 MMHG | DIASTOLIC BLOOD PRESSURE: 58 MMHG | BODY MASS INDEX: 26.66 KG/M2 | RESPIRATION RATE: 16 BRPM | WEIGHT: 160.2 LBS | TEMPERATURE: 98.2 F

## 2021-08-11 DIAGNOSIS — M54.6 ACUTE RIGHT-SIDED THORACIC BACK PAIN: Primary | ICD-10-CM

## 2021-08-11 DIAGNOSIS — L65.9 HAIR LOSS: ICD-10-CM

## 2021-08-11 DIAGNOSIS — M25.571 RIGHT ANKLE PAIN, UNSPECIFIED CHRONICITY: ICD-10-CM

## 2021-08-11 PROCEDURE — 99214 OFFICE O/P EST MOD 30 MIN: CPT | Performed by: NURSE PRACTITIONER

## 2021-08-11 ASSESSMENT — ENCOUNTER SYMPTOMS
CHILLS: 0
SHORTNESS OF BREATH: 0
BACK PAIN: 1
FEVER: 0

## 2021-08-11 NOTE — ASSESSMENT & PLAN NOTE
Start Tylenol ES 1-2 tabs at bedtime.  Can also try Aspercreme 2-3 times day     Call the office if symptoms worsen or fail to improve.

## 2021-08-11 NOTE — PROGRESS NOTES
Chief Complaint   Patient presents with   • Other      right side pain x 2 month and getting worse, mainly at night . alopecia , strained right  ankle        Subjective      Patient ID: Latrell Ojeda is a 86 y.o. female.    Seen today for right back and side pain for about 3-4 months and only at night or first thing in the morning.  When twisting and turning in bed at night is when she experiences this pain.  Usually not bothered with pain during the day.         The following have been reviewed and updated as appropriate in this visit:  Allergies  Meds  Problems         Past Medical History: She  has a past medical history of Breast cancer (CMS/HCC), High blood pressure, and UTI (urinary tract infection).  Past Surgical History: She  has a past surgical history that includes Hysterectomy (1985); Mastectomy (2005); and Laparoscopic liver cyst fenestration (1995).  Family Medical Historyfamily history includes Diabetes in her biological brother and biological father; Multiple myeloma in her biological son.   Medication:   Current Outpatient Medications:   •  amiodarone (PACERONE) 200 mg tablet, Take 200 mg by mouth daily.  , Disp: , Rfl:   •  calcium carbonate-vitamin D3 (CALCIUM WITH VITAMIN D) 600 mg(1,500mg) -400 unit per tablet, Take 2 tablets by mouth daily., Disp: , Rfl:   •  cholecalciferol, vitamin D3, 1,000 unit (25 mcg) tablet, Take 1,000 Units by mouth daily., Disp: , Rfl:   •  coenzyme Q10 (COQ10) 10 mg capsule, Take 100 mg by mouth daily.  , Disp: , Rfl:   •  hydrochlorothiazide (HYDRODIURIL) 25 mg tablet, , Disp: , Rfl:   •  losartan (COZAAR) 25 mg tablet, 25 mg 2 (two) times a day.  , Disp: , Rfl:   •  lutein 6 mg capsule, Take 6 mg by mouth., Disp: , Rfl:   •  metoprolol tartrate (LOPRESSOR) 25 mg tablet, Take 0.5 tablets (12.5 mg total) by mouth 2 (two) times a day., Disp: , Rfl:   •  pantoprazole (PROTONIX) 40 mg EC tablet, Take 1 tablet (40 mg total) by mouth daily before breakfast., Disp:  90 tablet, Rfl: 0  •  red yeast rice 600 mg capsule, Take 1 capsule by mouth daily., Disp: , Rfl:   •  XARELTO 20 mg tablet, Take 20 mg by mouth once daily., Disp: , Rfl:   •  triamcinolone (KENALOG) 0.1 % cream, Apply topically 2 (two) times a day. (Patient not taking: Reported on 6/14/2021 ), Disp: 30 g, Rfl: 1  Allergies: She is allergic to clindamycin, codeine, and oxycodone-acetaminophen.  Social History: She  reports that she has never smoked. She has never used smokeless tobacco. She reports current alcohol use. She reports that she does not use drugs.    Review of Systems:  Review of Systems   Constitutional: Negative for chills and fever.   Respiratory: Negative for shortness of breath.    Cardiovascular: Negative for chest pain.   Musculoskeletal: Positive for back pain.       Vitals:    08/11/21 1003   BP: (!) 120/58   BP Location: Left upper arm   Patient Position: Sitting   Resp: 16   Temp: 36.8 °C (98.2 °F)   TempSrc: Oral   Weight: 72.7 kg (160 lb 3.2 oz)     Physical Exam  Vitals and nursing note reviewed.   Constitutional:       Appearance: Normal appearance.   HENT:      Head: Normocephalic and atraumatic.   Eyes:      General: No scleral icterus.  Cardiovascular:      Rate and Rhythm: Normal rate and regular rhythm.   Pulmonary:      Effort: Pulmonary effort is normal.      Breath sounds: Normal breath sounds.   Abdominal:      General: Bowel sounds are normal.      Palpations: Abdomen is soft.   Musculoskeletal:      Cervical back: Neck supple.      Thoracic back: Tenderness present. No swelling, edema, spasms or bony tenderness. Normal range of motion.        Back:         Legs:    Skin:     General: Skin is warm and dry.      Capillary Refill: Capillary refill takes less than 2 seconds.      Findings: No rash.   Neurological:      Mental Status: She is alert.         Assessment/Plan   Acute right-sided thoracic back pain  Start Tylenol ES 1-2 tabs at bedtime.  Can also try Aspercreme 2-3 times  day     Call the office if symptoms worsen or fail to improve.    Hair loss  Can try Rogaine, but it would be a continued maintenance. Unsure if she would want to do this.    Advised to f/u with dermatology for other options.  Verbalized understanding.    Right ankle pain  Sprained ankle about 3 months ago.  Still with some pain in achilles tendon.  Advised stretching and strengthening exercises.    Tylenol for back should also help.  Call the office if symptoms do not improve or worsen.      No orders of the defined types were placed in this encounter.    No orders of the defined types were placed in this encounter.      Return if symptoms worsen or fail to improve, for Next scheduled follow-up.  HAMZAH Brady

## 2021-08-11 NOTE — PATIENT INSTRUCTIONS
Start Tylenol ES 1-2 tabs at bedtime.  Can also try Aspercreme 2-3 times day     Call the office if symptoms worsen or fail to improve.  Start ankle exercises to strengthen ligaments.  Can do 10 minutes of ice and 10 minutes of heat.

## 2021-08-11 NOTE — ASSESSMENT & PLAN NOTE
Sprained ankle about 3 months ago.  Still with some pain in achilles tendon.  Advised stretching and strengthening exercises.    Tylenol for back should also help.  Call the office if symptoms do not improve or worsen.

## 2021-08-11 NOTE — ASSESSMENT & PLAN NOTE
Can try Rogaine, but it would be a continued maintenance. Unsure if she would want to do this.    Advised to f/u with dermatology for other options.  Verbalized understanding.

## 2021-09-11 RX ORDER — PANTOPRAZOLE SODIUM 40 MG/1
40 TABLET, DELAYED RELEASE ORAL
Qty: 90 TABLET | Refills: 0 | Status: SHIPPED | OUTPATIENT
Start: 2021-09-11 | End: 2021-12-20 | Stop reason: SDUPTHER

## 2021-10-06 ENCOUNTER — CLINICAL SUPPORT (OUTPATIENT)
Dept: INTERNAL MEDICINE | Facility: CLINIC | Age: 86
End: 2021-10-06
Payer: MEDICARE

## 2021-10-06 DIAGNOSIS — R35.0 URINE FREQUENCY: Primary | ICD-10-CM

## 2021-10-06 LAB
BACTERIA URNS QL MICRO: 2 /HPF
BILIRUB UR QL STRIP.AUTO: NEGATIVE MG/DL
CLARITY UR REFRACT.AUTO: ABNORMAL
COLOR UR AUTO: YELLOW
GLUCOSE UR STRIP.AUTO-MCNC: NEGATIVE MG/DL
HGB UR QL STRIP.AUTO: 1
HYALINE CASTS #/AREA URNS LPF: ABNORMAL /LPF
KETONES UR STRIP.AUTO-MCNC: NEGATIVE MG/DL
LEUKOCYTE ESTERASE UR QL STRIP.AUTO: 3
NITRITE UR QL STRIP.AUTO: POSITIVE
PH UR STRIP.AUTO: 6.5 [PH]
PROT UR QL STRIP.AUTO: NEGATIVE
RBC #/AREA URNS HPF: ABNORMAL /HPF
SP GR UR REFRACT.AUTO: 1.02
SQUAMOUS URNS QL MICRO: ABNORMAL /HPF
UROBILINOGEN UR STRIP-ACNC: 1 EU/DL
WBC #/AREA URNS HPF: ABNORMAL /HPF

## 2021-10-06 PROCEDURE — 81003 URINALYSIS AUTO W/O SCOPE: CPT | Mod: QW | Performed by: INTERNAL MEDICINE

## 2021-10-06 PROCEDURE — 87186 SC STD MICRODIL/AGAR DIL: CPT | Performed by: INTERNAL MEDICINE

## 2021-10-06 PROCEDURE — 87077 CULTURE AEROBIC IDENTIFY: CPT | Performed by: INTERNAL MEDICINE

## 2021-10-06 PROCEDURE — 81003 URINALYSIS AUTO W/O SCOPE: CPT | Performed by: INTERNAL MEDICINE

## 2021-10-07 ENCOUNTER — TELEPHONE (OUTPATIENT)
Dept: INTERNAL MEDICINE | Facility: CLINIC | Age: 86
End: 2021-10-07

## 2021-10-07 PROBLEM — R74.01 ELEVATED LIVER TRANSAMINASE LEVEL: Status: ACTIVE | Noted: 2021-10-07

## 2021-10-07 RX ORDER — CEPHALEXIN 500 MG/1
500 CAPSULE ORAL 2 TIMES DAILY
Qty: 14 CAPSULE | Refills: 0 | Status: SHIPPED | OUTPATIENT
Start: 2021-10-07 | End: 2021-10-26

## 2021-10-07 RX ORDER — OMEPRAZOLE 20 MG/1
CAPSULE, DELAYED RELEASE ORAL
COMMUNITY
Start: 2021-09-15 | End: 2021-12-21

## 2021-10-07 NOTE — TELEPHONE ENCOUNTER
Please see above message. Pt called stating that she woke up this morning to use the restroom and found that she had a lot of discomfort urinating and there was a lot of blood in her urine. I did explain that her culture results would not come back for another couple days but she did not want to wait longer.     Please advise.     [phone]

## 2021-10-07 NOTE — TELEPHONE ENCOUNTER
Patient took a urine test yesterday and today the toilet is full of blood after urination. She is on blood thinners. Please call    [phone]

## 2021-10-08 LAB
BACTERIA UR CULT: ABNORMAL
BACTERIA UR CULT: ABNORMAL

## 2021-10-15 ENCOUNTER — TELEPHONE (OUTPATIENT)
Dept: INTERNAL MEDICINE | Facility: CLINIC | Age: 86
End: 2021-10-15

## 2021-10-15 RX ORDER — CEPHALEXIN 500 MG/1
500 CAPSULE ORAL 2 TIMES DAILY
Qty: 4 CAPSULE | Refills: 0 | Status: SHIPPED | OUTPATIENT
Start: 2021-10-15 | End: 2021-10-26

## 2021-10-15 NOTE — TELEPHONE ENCOUNTER
Called office, on keflex for UTI. Still with symptoms today. Extended Keflex for two more days for total of 7 days of therapy.

## 2021-10-15 NOTE — TELEPHONE ENCOUNTER
I can send for another 2 days if she wants to  at pharmacy. Let me know what she says. Is it burning? Or frequency?

## 2021-10-15 NOTE — TELEPHONE ENCOUNTER
Pt stated  that she had been taking an ABX , for the last 5 days for an UTI. But still is having sxs, still uncomfortable . Please advise. Thank you

## 2021-10-18 ENCOUNTER — OFFICE VISIT (OUTPATIENT)
Dept: INTERNAL MEDICINE | Facility: CLINIC | Age: 86
End: 2021-10-18
Payer: MEDICARE

## 2021-10-18 VITALS
SYSTOLIC BLOOD PRESSURE: 118 MMHG | WEIGHT: 156 LBS | HEART RATE: 78 BPM | DIASTOLIC BLOOD PRESSURE: 60 MMHG | TEMPERATURE: 98 F | BODY MASS INDEX: 25.96 KG/M2 | OXYGEN SATURATION: 98 % | RESPIRATION RATE: 16 BRPM

## 2021-10-18 DIAGNOSIS — R30.0 BURNING WITH URINATION: Primary | ICD-10-CM

## 2021-10-18 DIAGNOSIS — R31.9 HEMATURIA, UNSPECIFIED TYPE: ICD-10-CM

## 2021-10-18 LAB
BACTERIA #/AREA URNS HPF: ABNORMAL /HPF
BILIRUB UR QL STRIP.AUTO: ABNORMAL MG/DL
CLARITY UR REFRACT.AUTO: ABNORMAL
COLOR UR AUTO: ABNORMAL
GLUCOSE UR STRIP.AUTO-MCNC: ABNORMAL MG/DL
HGB UR QL STRIP.AUTO: 3
HYALINE CASTS #/AREA URNS LPF: ABNORMAL /LPF
KETONES UR STRIP.AUTO-MCNC: ABNORMAL MG/DL
LEUKOCYTE ESTERASE UR QL STRIP.AUTO: 3
NITRITE UR QL STRIP.AUTO: ABNORMAL
PH UR STRIP.AUTO: ABNORMAL [PH]
PROT UR QL STRIP.AUTO: ABNORMAL
RBC #/AREA URNS HPF: ABNORMAL /HPF
SP GR UR REFRACT.AUTO: 1.02
SQUAMOUS #/AREA URNS HPF: ABNORMAL /HPF
UROBILINOGEN UR STRIP-ACNC: ABNORMAL EU/DL
WBC #/AREA URNS HPF: ABNORMAL /HPF

## 2021-10-18 PROCEDURE — 87086 URINE CULTURE/COLONY COUNT: CPT | Performed by: NURSE PRACTITIONER

## 2021-10-18 PROCEDURE — 81003 URINALYSIS AUTO W/O SCOPE: CPT | Performed by: NURSE PRACTITIONER

## 2021-10-18 PROCEDURE — 99214 OFFICE O/P EST MOD 30 MIN: CPT | Performed by: NURSE PRACTITIONER

## 2021-10-18 RX ORDER — NITROFURANTOIN 25; 75 MG/1; MG/1
100 CAPSULE ORAL 2 TIMES DAILY
COMMUNITY
End: 2021-10-26

## 2021-10-18 ASSESSMENT — ENCOUNTER SYMPTOMS
FEVER: 0
SHORTNESS OF BREATH: 0
VOMITING: 0
CHILLS: 1
NAUSEA: 1

## 2021-10-18 NOTE — PROGRESS NOTES
Chief Complaint   Patient presents with   • Other     follow up of UTI , painful and hematuria         Subjective      Patient ID: Latrell Ojeda is a 86 y.o. female.    Seen today for urinary symptoms  Still with burning sensation.   Completed Keflex 5 day course which was extended to 7 days.    She was still having blood in her urine yesterday.  Called the on call last night.  She takes Xarelto. Advised to stop for a day. Had some left over Macrobid from a previous infection, she started taking that.  Taking Azo which is helping.             The following have been reviewed and updated as appropriate in this visit:  Allergies  Meds  Problems         Past Medical History: She  has a past medical history of Breast cancer (CMS/HCC), High blood pressure, and UTI (urinary tract infection).  Past Surgical History: She  has a past surgical history that includes Hysterectomy (1985); Mastectomy (2005); and Laparoscopic liver cyst fenestration (1995).  Family Medical Historyfamily history includes Diabetes in her biological brother and biological father; Multiple myeloma in her biological son.   Medication:   Current Outpatient Medications:   •  amiodarone (PACERONE) 200 mg tablet, Take 200 mg by mouth daily.  , Disp: , Rfl:   •  calcium carbonate-vitamin D3 (CALCIUM WITH VITAMIN D) 600 mg(1,500mg) -400 unit per tablet, Take 2 tablets by mouth daily., Disp: , Rfl:   •  cholecalciferol, vitamin D3, 1,000 unit (25 mcg) tablet, Take 1,000 Units by mouth daily., Disp: , Rfl:   •  coenzyme Q10 (COQ10) 10 mg capsule, Take 100 mg by mouth daily.  , Disp: , Rfl:   •  hydrochlorothiazide (HYDRODIURIL) 25 mg tablet, , Disp: , Rfl:   •  losartan (COZAAR) 25 mg tablet, 25 mg 2 (two) times a day.  , Disp: , Rfl:   •  lutein 6 mg capsule, Take 6 mg by mouth., Disp: , Rfl:   •  metoprolol tartrate (LOPRESSOR) 25 mg tablet, Take 0.5 tablets (12.5 mg total) by mouth 2 (two) times a day., Disp: , Rfl:   •  nitrofurantoin,  macrocrystal-monohydrate, 100 mg capsule, Take 100 mg by mouth 2 (two) times a day., Disp: , Rfl:   •  omeprazole (PriLOSEC) 20 mg capsule, , Disp: , Rfl:   •  pantoprazole (PROTONIX) 40 mg EC tablet, Take 1 tablet (40 mg total) by mouth daily before breakfast., Disp: 90 tablet, Rfl: 0  •  phenazopyridine HCl (AZO ORAL), Take by mouth., Disp: , Rfl:   •  red yeast rice 600 mg capsule, Take 1 capsule by mouth daily., Disp: , Rfl:   •  XARELTO 20 mg tablet, Take 20 mg by mouth once daily., Disp: , Rfl:   •  triamcinolone (KENALOG) 0.1 % cream, Apply topically 2 (two) times a day. (Patient not taking: Reported on 6/14/2021 ), Disp: 30 g, Rfl: 1  Allergies: She is allergic to clindamycin, codeine, oxycodone-acetaminophen, and macrobid [nitrofurantoin monohyd/m-cryst].  Social History: She  reports that she has never smoked. She has never used smokeless tobacco. She reports current alcohol use. She reports that she does not use drugs.    Review of Systems:  Review of Systems   Constitutional: Positive for chills. Negative for fever.   Respiratory: Negative for shortness of breath.    Cardiovascular: Negative for chest pain.   Gastrointestinal: Positive for nausea. Negative for vomiting.       Vitals:    10/18/21 1146   BP: 118/60   BP Location: Left upper arm   Patient Position: Sitting   Pulse: 78   Resp: 16   Temp: 36.7 °C (98 °F)   TempSrc: Oral   SpO2: 98%   Weight: 70.8 kg (156 lb)     Physical Exam  Vitals and nursing note reviewed.   Constitutional:       Appearance: Normal appearance.   HENT:      Head: Normocephalic and atraumatic.   Eyes:      General: No scleral icterus.  Cardiovascular:      Rate and Rhythm: Normal rate and regular rhythm.   Pulmonary:      Effort: Pulmonary effort is normal.      Breath sounds: Normal breath sounds.   Abdominal:      General: Bowel sounds are normal.      Palpations: Abdomen is soft.      Tenderness: There is no abdominal tenderness. There is no right CVA tenderness, left  CVA tenderness or guarding.   Musculoskeletal:      Cervical back: Neck supple.   Skin:     General: Skin is warm and dry.      Capillary Refill: Capillary refill takes less than 2 seconds.   Neurological:      General: No focal deficit present.      Mental Status: She is alert.         Assessment/Plan   Burning with urination  Taking Azo which is helping the burning  Did not take Xarelto yesterday due to blood in urine.  Advised to monitor urine today.    Sample provided today.  Script for Urology today.      Orders Placed This Encounter   Procedures   • Urinalysis with Reflex Culture   • Ambulatory referral to Urology     New Medications Ordered This Visit   Medications   • phenazopyridine HCl (AZO ORAL)     Sig: Take by mouth.   • nitrofurantoin, macrocrystal-monohydrate, 100 mg capsule     Sig: Take 100 mg by mouth 2 (two) times a day.       No follow-ups on file.  HAMZAH Brady

## 2021-10-18 NOTE — ASSESSMENT & PLAN NOTE
Taking Azo which is helping the burning  Did not take Xarelto yesterday due to blood in urine.  Advised to monitor urine today.    Sample provided today.  Script for Urology today.

## 2021-10-19 ENCOUNTER — TELEPHONE (OUTPATIENT)
Dept: INTERNAL MEDICINE | Facility: CLINIC | Age: 86
End: 2021-10-19

## 2021-10-19 LAB — BACTERIA UR CULT: NORMAL

## 2021-10-26 ENCOUNTER — OFFICE VISIT (OUTPATIENT)
Dept: INTERNAL MEDICINE | Facility: CLINIC | Age: 86
End: 2021-10-26
Payer: MEDICARE

## 2021-10-26 VITALS
RESPIRATION RATE: 16 BRPM | SYSTOLIC BLOOD PRESSURE: 152 MMHG | OXYGEN SATURATION: 98 % | DIASTOLIC BLOOD PRESSURE: 62 MMHG | WEIGHT: 152 LBS | BODY MASS INDEX: 25.29 KG/M2 | HEART RATE: 60 BPM | TEMPERATURE: 98.6 F

## 2021-10-26 DIAGNOSIS — M54.6 ACUTE RIGHT-SIDED THORACIC BACK PAIN: ICD-10-CM

## 2021-10-26 DIAGNOSIS — I10 ESSENTIAL HYPERTENSION: ICD-10-CM

## 2021-10-26 DIAGNOSIS — R53.83 OTHER FATIGUE: Primary | ICD-10-CM

## 2021-10-26 LAB
ALBUMIN SERPL-MCNC: 3.8 G/DL (ref 3.4–5)
ALP SERPL-CCNC: 64 IU/L (ref 35–126)
ALT SERPL-CCNC: 55 IU/L (ref 11–54)
ANION GAP SERPL CALC-SCNC: 14 MEQ/L (ref 3–15)
AST SERPL-CCNC: 35 IU/L (ref 15–41)
BASOPHILS # BLD: 0.04 K/UL (ref 0.01–0.1)
BASOPHILS NFR BLD: 0.5 %
BILIRUB SERPL-MCNC: 1.9 MG/DL (ref 0.3–1.2)
BUN SERPL-MCNC: 19 MG/DL (ref 8–20)
CALCIUM SERPL-MCNC: 9.2 MG/DL (ref 8.9–10.3)
CHLORIDE SERPL-SCNC: 95 MEQ/L (ref 98–109)
CO2 SERPL-SCNC: 22 MEQ/L (ref 22–32)
CREAT SERPL-MCNC: 1.4 MG/DL (ref 0.6–1.1)
DIFFERENTIAL METHOD BLD: ABNORMAL
EOSINOPHIL # BLD: 0.02 K/UL (ref 0.04–0.36)
EOSINOPHIL NFR BLD: 0.3 %
ERYTHROCYTE [DISTWIDTH] IN BLOOD BY AUTOMATED COUNT: 13.5 % (ref 11.7–14.4)
GFR SERPL CREATININE-BSD FRML MDRD: 35.7 ML/MIN/1.73M*2
GLUCOSE SERPL-MCNC: 117 MG/DL (ref 70–99)
HCT VFR BLDCO AUTO: 37.9 % (ref 35–45)
HGB BLD-MCNC: 12.3 G/DL (ref 11.8–15.7)
IMM GRANULOCYTES # BLD AUTO: 0.03 K/UL (ref 0–0.08)
IMM GRANULOCYTES NFR BLD AUTO: 0.4 %
LYMPHOCYTES # BLD: 0.82 K/UL (ref 1.2–3.5)
LYMPHOCYTES NFR BLD: 10.4 %
MCH RBC QN AUTO: 28.3 PG (ref 28–33.2)
MCHC RBC AUTO-ENTMCNC: 32.5 G/DL (ref 32.2–35.5)
MCV RBC AUTO: 87.1 FL (ref 83–98)
MONOCYTES # BLD: 0.47 K/UL (ref 0.28–0.8)
MONOCYTES NFR BLD: 5.9 %
NEUTROPHILS # BLD: 6.52 K/UL (ref 1.7–7)
NEUTS SEG NFR BLD: 82.5 %
NRBC BLD-RTO: 0 %
PDW BLD AUTO: 12.6 FL (ref 9.4–12.3)
PLATELET # BLD AUTO: 207 K/UL (ref 150–369)
POTASSIUM SERPL-SCNC: 4.6 MEQ/L (ref 3.6–5.1)
PROT SERPL-MCNC: 7 G/DL (ref 6–8.2)
RBC # BLD AUTO: 4.35 M/UL (ref 3.93–5.22)
SODIUM SERPL-SCNC: 131 MEQ/L (ref 136–144)
TSH SERPL DL<=0.05 MIU/L-ACNC: 3.99 MIU/L (ref 0.34–5.6)
WBC # BLD AUTO: 7.9 K/UL (ref 3.8–10.5)

## 2021-10-26 PROCEDURE — 99214 OFFICE O/P EST MOD 30 MIN: CPT | Performed by: INTERNAL MEDICINE

## 2021-10-26 PROCEDURE — 85025 COMPLETE CBC W/AUTO DIFF WBC: CPT | Performed by: INTERNAL MEDICINE

## 2021-10-26 PROCEDURE — 84443 ASSAY THYROID STIM HORMONE: CPT | Performed by: INTERNAL MEDICINE

## 2021-10-26 PROCEDURE — 80053 COMPREHEN METABOLIC PANEL: CPT | Performed by: INTERNAL MEDICINE

## 2021-10-26 ASSESSMENT — ENCOUNTER SYMPTOMS
FEVER: 0
CHILLS: 0

## 2021-10-26 NOTE — PROGRESS NOTES
Chief Complaint   Patient presents with   • Follow-up       Subjective      Patient ID: Latrell Ojeda is a 86 y.o. female.    HPI    Here with daughter and  today  Fatigue: Started last week.  Recently received her booster this week  Poor appetite.  Saw urology last week and started on Bactrim for possible UTI.  Urine culture results not available currently  Having some right mid back pain without radiation to legs   Right-sided abdominal discomfort    HTN: BP labile at home.   175/69  132/56  110/53        The following have been reviewed and updated as appropriate in this visit:  Allergies  Meds  Problems        Review of Systems   Constitutional: Negative for chills and fever.   Cardiovascular: Negative for chest pain.         Current Outpatient Medications:   •  amiodarone (PACERONE) 200 mg tablet, Take 200 mg by mouth daily.  , Disp: , Rfl:   •  calcium carbonate-vitamin D3 (CALCIUM WITH VITAMIN D) 600 mg(1,500mg) -400 unit per tablet, Take 2 tablets by mouth daily., Disp: , Rfl:   •  cholecalciferol, vitamin D3, 1,000 unit (25 mcg) tablet, Take 1,000 Units by mouth daily., Disp: , Rfl:   •  coenzyme Q10 (COQ10) 10 mg capsule, Take 100 mg by mouth daily.  , Disp: , Rfl:   •  hydrochlorothiazide (HYDRODIURIL) 25 mg tablet, , Disp: , Rfl:   •  losartan (COZAAR) 25 mg tablet, 25 mg 2 (two) times a day.  , Disp: , Rfl:   •  lutein 6 mg capsule, Take 6 mg by mouth., Disp: , Rfl:   •  metoprolol tartrate (LOPRESSOR) 25 mg tablet, Take 0.5 tablets (12.5 mg total) by mouth 2 (two) times a day., Disp: , Rfl:   •  omeprazole (PriLOSEC) 20 mg capsule, , Disp: , Rfl:   •  pantoprazole (PROTONIX) 40 mg EC tablet, Take 1 tablet (40 mg total) by mouth daily before breakfast., Disp: 90 tablet, Rfl: 0  •  phenazopyridine HCl (AZO ORAL), Take by mouth., Disp: , Rfl:   •  red yeast rice 600 mg capsule, Take 1 capsule by mouth daily., Disp: , Rfl:   •  XARELTO 20 mg tablet, Take 20 mg by mouth once daily., Disp: ,  Rfl:   •  triamcinolone (KENALOG) 0.1 % cream, Apply topically 2 (two) times a day. (Patient not taking: Reported on 6/14/2021 ), Disp: 30 g, Rfl: 1    Objective     Vitals:    10/26/21 1408   BP: (!) 152/62   BP Location: Left upper arm   Patient Position: Sitting   Pulse: 60   Resp: 16   Temp: 37 °C (98.6 °F)   TempSrc: Oral   SpO2: 98%   Weight: 68.9 kg (152 lb)     Physical Exam      Assessment/Plan   Other fatigue  Check labs today     Essential hypertension  BP labile  Monitor for now  continue same medications.  (HCTZ, losartan, metoprolol)      Acute right-sided thoracic back pain  Likely musculoskeletal  Continue Tylenol      Orders Placed This Encounter   Procedures   • CBC and Differential   • Comprehensive metabolic panel   • TSH w reflex FT4     No orders of the defined types were placed in this encounter.      Return in about 4 months (around 2/26/2022).     SANDI BLACKBURN MD

## 2021-10-26 NOTE — PATIENT INSTRUCTIONS
Other fatigue  Check labs today     Essential hypertension  Monitor for now  continue same medications.       Return in about 4 months (around 2/26/2022).     SANDI BLACKBURN MD

## 2021-10-27 ENCOUNTER — TELEPHONE (OUTPATIENT)
Dept: INTERNAL MEDICINE | Facility: CLINIC | Age: 86
End: 2021-10-27

## 2021-10-27 DIAGNOSIS — E87.1 HYPONATREMIA: Primary | ICD-10-CM

## 2021-10-27 NOTE — TELEPHONE ENCOUNTER
Spoke to pt over the phone.   Slightly low Na and slightly increased Cr  Advised to stop bactrim  -recheck labs on Friday.   -advised to call urology to see if they want her to switch to different abx for UTI

## 2021-10-27 NOTE — TELEPHONE ENCOUNTER
----- Message from Yair Sharpe MD sent at 10/27/2021  3:13 PM EDT -----  Regarding: FW: Still feeling weak    ----- Message -----  From: Latrell Ojeda  Sent: 10/27/2021   3:12 PM EDT  To: [facility] Clinical Support P  Subject: Still feeling weak                               Hi Dr Miller  I still feel a fatigue even in doing simple  tasks in the apartment.  I wonder if the blood test  results bring any explanation.  Thank you  Latrell Morrow  [phone]

## 2021-10-29 ENCOUNTER — CLINICAL SUPPORT (OUTPATIENT)
Dept: INTERNAL MEDICINE | Facility: CLINIC | Age: 86
End: 2021-10-29
Payer: MEDICARE

## 2021-10-29 ENCOUNTER — LAB REQUISITION (OUTPATIENT)
Dept: LAB | Facility: HOSPITAL | Age: 86
End: 2021-10-29
Attending: INTERNAL MEDICINE
Payer: MEDICARE

## 2021-10-29 ENCOUNTER — OFFICE VISIT (OUTPATIENT)
Dept: INTERNAL MEDICINE | Facility: CLINIC | Age: 86
End: 2021-10-29
Payer: MEDICARE

## 2021-10-29 VITALS
WEIGHT: 151 LBS | BODY MASS INDEX: 25.13 KG/M2 | HEART RATE: 58 BPM | DIASTOLIC BLOOD PRESSURE: 64 MMHG | SYSTOLIC BLOOD PRESSURE: 152 MMHG | RESPIRATION RATE: 16 BRPM | TEMPERATURE: 98.5 F | OXYGEN SATURATION: 96 %

## 2021-10-29 DIAGNOSIS — T50.B95A FATIGUE AFTER COVID-19 VACCINATION: Primary | ICD-10-CM

## 2021-10-29 DIAGNOSIS — R53.83 FATIGUE AFTER COVID-19 VACCINATION: Primary | ICD-10-CM

## 2021-10-29 DIAGNOSIS — E87.1 HYPONATREMIA: ICD-10-CM

## 2021-10-29 DIAGNOSIS — E87.1 HYPO-OSMOLALITY AND HYPONATREMIA: ICD-10-CM

## 2021-10-29 LAB
ALBUMIN SERPL-MCNC: 3.5 G/DL (ref 3.4–5)
ALP SERPL-CCNC: 60 IU/L (ref 35–126)
ALT SERPL-CCNC: 67 IU/L (ref 11–54)
ANION GAP SERPL CALC-SCNC: 11 MEQ/L (ref 3–15)
AST SERPL-CCNC: 38 IU/L (ref 15–41)
BILIRUB SERPL-MCNC: 1.9 MG/DL (ref 0.3–1.2)
BUN SERPL-MCNC: 24 MG/DL (ref 8–20)
CALCIUM SERPL-MCNC: 9.3 MG/DL (ref 8.9–10.3)
CHLORIDE SERPL-SCNC: 94 MEQ/L (ref 98–109)
CO2 SERPL-SCNC: 27 MEQ/L (ref 22–32)
CREAT SERPL-MCNC: 1.8 MG/DL (ref 0.6–1.1)
GFR SERPL CREATININE-BSD FRML MDRD: 26.7 ML/MIN/1.73M*2
GLUCOSE SERPL-MCNC: 120 MG/DL (ref 70–99)
POTASSIUM SERPL-SCNC: 4.7 MEQ/L (ref 3.6–5.1)
PROT SERPL-MCNC: 6.4 G/DL (ref 6–8.2)
SODIUM SERPL-SCNC: 132 MEQ/L (ref 136–144)

## 2021-10-29 PROCEDURE — 36415 COLL VENOUS BLD VENIPUNCTURE: CPT | Performed by: INTERNAL MEDICINE

## 2021-10-29 PROCEDURE — 82040 ASSAY OF SERUM ALBUMIN: CPT | Performed by: INTERNAL MEDICINE

## 2021-10-29 PROCEDURE — 99214 OFFICE O/P EST MOD 30 MIN: CPT | Performed by: NURSE PRACTITIONER

## 2021-10-29 PROCEDURE — 99999 PR OFFICE/OUTPT VISIT,PROCEDURE ONLY: CPT | Performed by: NURSE PRACTITIONER

## 2021-10-29 ASSESSMENT — ENCOUNTER SYMPTOMS
FEVER: 0
CHILLS: 0
FATIGUE: 1
SHORTNESS OF BREATH: 1

## 2021-10-29 NOTE — ASSESSMENT & PLAN NOTE
Will swab for covid.  Recently treated for UTI and had labs which showed low sodium.    Had labs drawn earlier today.  Monitor.

## 2021-10-29 NOTE — PROGRESS NOTES
"Chief Complaint   Patient presents with   • Fatigue       Subjective      Patient ID: Latrell Ojeda is a 86 y.o. female.    Feeling fatigue.  Her neck \"feels fatigued\" She denies neck pain when asked if it's pain. She is unable to describe it without associating it with generalized fatigue she has been having.    Had Booster on Monday. Having SOB and fatigue with exertion.   Poor appetite  She has generalized back pain  Recent lab work shows that her sodium is slightly low.    Denies f/c, n/v/d, CP, no loss of smell/taste.        The following have been reviewed and updated as appropriate in this visit:  Problems         Past Medical History: She  has a past medical history of Breast cancer (CMS/HCC), High blood pressure, and UTI (urinary tract infection).  Past Surgical History: She  has a past surgical history that includes Hysterectomy (1985); Mastectomy (2005); and Laparoscopic liver cyst fenestration (1995).  Family Medical Historyfamily history includes Diabetes in her biological brother and biological father; Multiple myeloma in her biological son.   Medication:   Current Outpatient Medications:   •  amiodarone (PACERONE) 200 mg tablet, Take 200 mg by mouth daily.  , Disp: , Rfl:   •  calcium carbonate-vitamin D3 (CALCIUM WITH VITAMIN D) 600 mg(1,500mg) -400 unit per tablet, Take 2 tablets by mouth daily., Disp: , Rfl:   •  cholecalciferol, vitamin D3, 1,000 unit (25 mcg) tablet, Take 1,000 Units by mouth daily., Disp: , Rfl:   •  coenzyme Q10 (COQ10) 10 mg capsule, Take 100 mg by mouth daily.  , Disp: , Rfl:   •  hydrochlorothiazide (HYDRODIURIL) 25 mg tablet, , Disp: , Rfl:   •  losartan (COZAAR) 25 mg tablet, 25 mg 2 (two) times a day.  , Disp: , Rfl:   •  lutein 6 mg capsule, Take 6 mg by mouth., Disp: , Rfl:   •  metoprolol tartrate (LOPRESSOR) 25 mg tablet, Take 0.5 tablets (12.5 mg total) by mouth 2 (two) times a day., Disp: , Rfl:   •  omeprazole (PriLOSEC) 20 mg capsule, , Disp: , Rfl:   •  " pantoprazole (PROTONIX) 40 mg EC tablet, Take 1 tablet (40 mg total) by mouth daily before breakfast., Disp: 90 tablet, Rfl: 0  •  phenazopyridine HCl (AZO ORAL), Take by mouth., Disp: , Rfl:   •  red yeast rice 600 mg capsule, Take 1 capsule by mouth daily., Disp: , Rfl:   •  XARELTO 20 mg tablet, Take 20 mg by mouth once daily., Disp: , Rfl:   •  triamcinolone (KENALOG) 0.1 % cream, Apply topically 2 (two) times a day. (Patient not taking: Reported on 6/14/2021 ), Disp: 30 g, Rfl: 1  Allergies: She is allergic to clindamycin, codeine, oxycodone-acetaminophen, and macrobid [nitrofurantoin monohyd/m-cryst].  Social History: She  reports that she has never smoked. She has never used smokeless tobacco. She reports current alcohol use. She reports that she does not use drugs.    Review of Systems:  Review of Systems   Constitutional: Positive for fatigue. Negative for chills and fever.   Respiratory: Positive for shortness of breath.    Cardiovascular: Negative for chest pain.       Vitals:    10/29/21 1355 10/29/21 1418   BP: (!) 170/66 (!) 152/64   BP Location: Left upper arm Left upper arm   Patient Position: Sitting Sitting   Pulse: (!) 58    Resp: 16    Temp: 36.9 °C (98.5 °F)    TempSrc: Oral    SpO2: 96%    Weight: 68.5 kg (151 lb)      Physical Exam  Vitals and nursing note reviewed.   HENT:      Head: Normocephalic and atraumatic.   Eyes:      General: No scleral icterus.  Cardiovascular:      Rate and Rhythm: Regular rhythm.   Pulmonary:      Effort: Pulmonary effort is normal.      Breath sounds: Normal breath sounds.   Musculoskeletal:      Cervical back: Neck supple.      Right lower leg: No edema.      Left lower leg: No edema.   Skin:     Capillary Refill: Capillary refill takes less than 2 seconds.   Neurological:      General: No focal deficit present.      Mental Status: She is alert.         Assessment/Plan   Fatigue after COVID-19 vaccination  Will swab for covid.  Recently treated for UTI and had  labs which showed low sodium.    Had labs drawn earlier today.  Monitor.      Orders Placed This Encounter   Procedures   • COVID-19 [facility] (SWAB)     No orders of the defined types were placed in this encounter.      No follow-ups on file.  HAMZAH Brady

## 2021-10-30 ENCOUNTER — TELEPHONE (OUTPATIENT)
Dept: INTERNAL MEDICINE | Facility: CLINIC | Age: 86
End: 2021-10-30

## 2021-10-30 DIAGNOSIS — N17.9 AKI (ACUTE KIDNEY INJURY) (CMS/HCC): Primary | ICD-10-CM

## 2021-10-30 LAB — SARS-COV-2 RNA RESP QL NAA+PROBE: NOT DETECTED

## 2021-10-30 NOTE — TELEPHONE ENCOUNTER
Spoke to pt re labs.   Clinically she feels better    -advised to stop HCTZ  -get Renal US  -repeat labs on Monday  -can inc metoprolol to 50mg bid if bp high

## 2021-11-02 ENCOUNTER — CLINICAL SUPPORT (OUTPATIENT)
Dept: INTERNAL MEDICINE | Facility: CLINIC | Age: 86
End: 2021-11-02
Payer: MEDICARE

## 2021-11-02 DIAGNOSIS — N17.9 AKI (ACUTE KIDNEY INJURY) (CMS/HCC): ICD-10-CM

## 2021-11-02 LAB
ANION GAP SERPL CALC-SCNC: 12 MEQ/L (ref 3–15)
BASOPHILS # BLD: 0.06 K/UL (ref 0.01–0.1)
BASOPHILS NFR BLD: 0.9 %
BUN SERPL-MCNC: 22 MG/DL (ref 8–20)
CALCIUM SERPL-MCNC: 9.1 MG/DL (ref 8.9–10.3)
CHLORIDE SERPL-SCNC: 100 MEQ/L (ref 98–109)
CO2 SERPL-SCNC: 25 MEQ/L (ref 22–32)
CREAT SERPL-MCNC: 1.2 MG/DL (ref 0.6–1.1)
DIFFERENTIAL METHOD BLD: ABNORMAL
EOSINOPHIL # BLD: 0.07 K/UL (ref 0.04–0.36)
EOSINOPHIL NFR BLD: 1 %
ERYTHROCYTE [DISTWIDTH] IN BLOOD BY AUTOMATED COUNT: 13.9 % (ref 11.7–14.4)
GFR SERPL CREATININE-BSD FRML MDRD: 42.7 ML/MIN/1.73M*2
GLUCOSE SERPL-MCNC: 128 MG/DL (ref 70–99)
HCT VFR BLDCO AUTO: 38.4 % (ref 35–45)
HGB BLD-MCNC: 12.1 G/DL (ref 11.8–15.7)
IMM GRANULOCYTES # BLD AUTO: 0.03 K/UL (ref 0–0.08)
IMM GRANULOCYTES NFR BLD AUTO: 0.4 %
LYMPHOCYTES # BLD: 1.12 K/UL (ref 1.2–3.5)
LYMPHOCYTES NFR BLD: 16 %
MCH RBC QN AUTO: 28.5 PG (ref 28–33.2)
MCHC RBC AUTO-ENTMCNC: 31.5 G/DL (ref 32.2–35.5)
MCV RBC AUTO: 90.4 FL (ref 83–98)
MONOCYTES # BLD: 0.55 K/UL (ref 0.28–0.8)
MONOCYTES NFR BLD: 7.8 %
NEUTROPHILS # BLD: 5.19 K/UL (ref 1.7–7)
NEUTS SEG NFR BLD: 73.9 %
NRBC BLD-RTO: 0 %
PDW BLD AUTO: 12.5 FL (ref 9.4–12.3)
PLATELET # BLD AUTO: 193 K/UL (ref 150–369)
POTASSIUM SERPL-SCNC: 4.5 MEQ/L (ref 3.6–5.1)
RBC # BLD AUTO: 4.25 M/UL (ref 3.93–5.22)
SODIUM SERPL-SCNC: 137 MEQ/L (ref 136–144)
WBC # BLD AUTO: 7.02 K/UL (ref 3.8–10.5)

## 2021-11-02 PROCEDURE — 36415 COLL VENOUS BLD VENIPUNCTURE: CPT | Performed by: INTERNAL MEDICINE

## 2021-11-02 PROCEDURE — 80048 BASIC METABOLIC PNL TOTAL CA: CPT | Performed by: INTERNAL MEDICINE

## 2021-11-02 PROCEDURE — 36415 COLL VENOUS BLD VENIPUNCTURE: CPT

## 2021-11-02 PROCEDURE — 85025 COMPLETE CBC W/AUTO DIFF WBC: CPT | Performed by: INTERNAL MEDICINE

## 2021-11-10 ENCOUNTER — DOCUMENTATION (OUTPATIENT)
Dept: INTERNAL MEDICINE | Facility: CLINIC | Age: 86
End: 2021-11-10

## 2021-11-10 PROBLEM — N18.32 CHRONIC KIDNEY DISEASE, STAGE 3B (CMS/HCC): Status: ACTIVE | Noted: 2021-03-12

## 2021-11-10 NOTE — PROGRESS NOTES
Coding Clarification (ML) - [facility]  Note       DATE: 11/10/2021    RE: Latrell Ojeda  : 1935      Under the direction of SANDI BLACKBURN MD, the following adjustments were made to this patients Problem List:        Specified Code: N18.32 Code Description:  Chronic kidney disease, stage 3b   Clarification Type EMR System Encounter Type Date of Service Provider   Code Specificity Epic Lab Notes 07/10/2021 Godshall, Duane K, MD   Rationale: Suspected CKD3b per recent GFRs (07/10/2021: 42.8, 2021: 47.3, 2021: 42.8).  Higher specificity for N18.31 from Problem List is available to be N18.32       Original Code: N18.31

## 2021-11-15 ENCOUNTER — HOSPITAL ENCOUNTER (OUTPATIENT)
Dept: RADIOLOGY | Facility: HOSPITAL | Age: 86
Discharge: HOME | End: 2021-11-15
Attending: INTERNAL MEDICINE
Payer: MEDICARE

## 2021-11-15 DIAGNOSIS — N17.9 AKI (ACUTE KIDNEY INJURY) (CMS/HCC): ICD-10-CM

## 2021-11-15 PROCEDURE — 76770 US EXAM ABDO BACK WALL COMP: CPT

## 2021-12-02 NOTE — ASSESSMENT & PLAN NOTE
Noa Wray (: 1949) is a 71 y.o. female, patient, here for evaluation of the following chief complaint(s):  Knee Pain (left)       HPI:    She was last seen for her left knee pain on 2021. The patient states that her pain level is the same as it was at her last visit. She rates the severity of her left knee pain as a 5 out of 10. She describes her pain as aching and intermittent. Her left knee pain does make it difficult for her to go to sleep and does wake her up from sleep. The patient has been taking Advil for her discomfort as needed. The patient did have an MRI performed on her left knee prior to her last visit which was again reviewed. Left knee MRI results:  Evidence of a heterogeneous medial meniscal flap tear and mild extrusion. Moderate-severe chondral loss from the patella and medial femoral condyle. Small joint effusion. 5 mm anterior loose body is lateral to midline. Intact cruciates and collaterals. Not on File    No current outpatient medications on file. No current facility-administered medications for this visit. No past medical history on file. No past surgical history on file. No family history on file.      Social History     Socioeconomic History    Marital status:      Spouse name: Not on file    Number of children: Not on file    Years of education: Not on file    Highest education level: Not on file   Occupational History    Not on file   Tobacco Use    Smoking status: Not on file    Smokeless tobacco: Not on file   Substance and Sexual Activity    Alcohol use: Not on file    Drug use: Not on file    Sexual activity: Not on file   Other Topics Concern    Not on file   Social History Narrative    Not on file     Social Determinants of Health     Financial Resource Strain:     Difficulty of Paying Living Expenses: Not on file   Food Insecurity:     Worried About Running Out of Food in the Last Year: Not on file    Arnold zhu Present for several months in left nostril only  Runny nose possibly due to allergies.   -start flonase and monitor.    Food in the Last Year: Not on file   Transportation Needs:     Lack of Transportation (Medical): Not on file    Lack of Transportation (Non-Medical): Not on file   Physical Activity:     Days of Exercise per Week: Not on file    Minutes of Exercise per Session: Not on file   Stress:     Feeling of Stress : Not on file   Social Connections:     Frequency of Communication with Friends and Family: Not on file    Frequency of Social Gatherings with Friends and Family: Not on file    Attends Church Services: Not on file    Active Member of [address] [place] [address] [employer] or Organizations: Not on file    Attends Club or Organization Meetings: Not on file    Marital Status: Not on file   Intimate Partner Violence:     Fear of Current or Ex-Partner: Not on file    Emotionally Abused: Not on file    Physically Abused: Not on file    Sexually Abused: Not on file   Housing Stability:     Unable to Pay for Housing in the Last Year: Not on file    Number of [place] in the Last Year: Not on file    Unstable Housing in the Last Year: Not on file       Review of Systems   All other systems reviewed and are negative. Vitals:  Ht 5' 2\" (1.575 m)   Wt 162 lb (73.5 kg)   BMI 29.63 kg/m²    Body mass index is 29.63 kg/m². Ortho Exam     Upon physical examination, the patient is well developed, well nourished, alert and oriented times three, with normal mood and affect and walks with a slightly antalgic gait because of her left knee pain.     Left knee, the patient is tender to palpation along the medial joint line, and has an effusion. She is also tender to palpation along the medial and lateral facets of the patella. The patient have discomfort with Hazel's maneuvers, and the knee is stable. They have limited range of motion. They have 5/5 strength, and are neurovascularly intact distally. There is no erythema, warmth or skin lesions present. ASSESSMENT/PLAN:      1. Chronic pain of left knee  2.  Primary localized osteoarthritis of left knee  -     hyaluronate sodium, stabilized (DUROLANE) 60 mg/3 mL intra articualr injection 60 mg; 60 mg, Intra artICUlar, ONCE, 1 dose, On Fri 12/3/21 at 1000  3. Patella, chondromalacia, left  -     hyaluronate sodium, stabilized (DUROLANE) 60 mg/3 mL intra articualr injection 60 mg; 60 mg, Intra artICUlar, ONCE, 1 dose, On Fri 12/3/21 at 1000  4. Chondromalacia of left knee  5. Loose body of left knee       Below is the assessment and plan developed based on review of pertinent history, physical exam, labs, studies, and medications. We discussed the patient's ongoing left knee pain, medial meniscus tear, and chondromalacia. The possible treatment options were discussed with the patient and we elected to inject her left knee with viscosupplementation today to try and alleviate some of her discomfort. The risks and benefits of the injection were discussed in detail with the patient and under sterile prep the patient's left knee was injected with 1 vial of Durolane. She tolerated the injection well. I did encourage her to continue to ice and elevate when possible, modify her activity level based on her left knee pain, and use anti-inflammatory medication when necessary. The patient will also work on range of motion, strengthening, and stretching exercises with an at-home exercise program as pain tolerates. She is to avoid any deep knee bend activities against resistance, squatting, kneeling, stairs, lunging, cutting, twisting, change of direction, and high impact loading activities. If she receives good pain relief from this injection we will repeat another injection at least 6 months from now. In the interim, I will see her back on an as-needed basis for left knee pain. Return if symptoms worsen or fail to improve. An electronic signature was used to authenticate this note.   -- Quiana Saunders MD

## 2021-12-21 RX ORDER — PANTOPRAZOLE SODIUM 40 MG/1
40 TABLET, DELAYED RELEASE ORAL
Qty: 90 TABLET | Refills: 3 | Status: SHIPPED | OUTPATIENT
Start: 2021-12-21 | End: 2022-02-22

## 2021-12-30 ENCOUNTER — TELEPHONE (OUTPATIENT)
Dept: INTERNAL MEDICINE | Facility: CLINIC | Age: 86
End: 2021-12-30
Payer: MEDICARE

## 2021-12-30 DIAGNOSIS — R26.2 AMBULATORY DYSFUNCTION: Primary | ICD-10-CM

## 2021-12-30 NOTE — TELEPHONE ENCOUNTER
----- Message from Yair Sharpe MD sent at 12/29/2021 10:45 AM EST -----  Regarding: FW: Justin needs a new scrip    ----- Message -----  From: Latrell Ojeda  Sent: 12/28/2021  10:29 PM EST  To: [facility] Clinical Support P  Subject: Justin needs a new scrip                            Hi Dr Miller,  My  needs a new scrip for the PT sessions at  [facility] .  The sessions there really help, and he is benefiting from them.  Thank you  Latrell Ojeda  (for Tj Ojeda)

## 2022-01-20 ENCOUNTER — CLINICAL SUPPORT (OUTPATIENT)
Dept: INTERNAL MEDICINE | Facility: CLINIC | Age: 87
End: 2022-01-20
Payer: MEDICARE

## 2022-01-20 DIAGNOSIS — R35.0 URINE FREQUENCY: Primary | ICD-10-CM

## 2022-01-20 LAB
BACTERIA URNS QL MICRO: 1 /HPF
BILIRUB UR QL STRIP.AUTO: NEGATIVE MG/DL
CLARITY UR REFRACT.AUTO: ABNORMAL
COLOR UR AUTO: YELLOW
GLUCOSE UR STRIP.AUTO-MCNC: NEGATIVE MG/DL
HGB UR QL STRIP.AUTO: 2
HYALINE CASTS #/AREA URNS LPF: ABNORMAL /LPF
KETONES UR STRIP.AUTO-MCNC: NEGATIVE MG/DL
LEUKOCYTE ESTERASE UR QL STRIP.AUTO: 3
NITRITE UR QL STRIP.AUTO: POSITIVE
PH UR STRIP.AUTO: 6 [PH]
PROT UR QL STRIP.AUTO: NEGATIVE
RBC #/AREA URNS HPF: ABNORMAL /HPF
SP GR UR REFRACT.AUTO: 1.02
SQUAMOUS URNS QL MICRO: 1 /HPF
UROBILINOGEN UR STRIP-ACNC: 0.2 EU/DL
WBC #/AREA URNS HPF: ABNORMAL /HPF

## 2022-01-20 PROCEDURE — 36415 COLL VENOUS BLD VENIPUNCTURE: CPT | Performed by: INTERNAL MEDICINE

## 2022-01-20 PROCEDURE — 81003 URINALYSIS AUTO W/O SCOPE: CPT | Performed by: INTERNAL MEDICINE

## 2022-01-20 PROCEDURE — 87077 CULTURE AEROBIC IDENTIFY: CPT | Performed by: INTERNAL MEDICINE

## 2022-01-21 ENCOUNTER — TELEPHONE (OUTPATIENT)
Dept: INTERNAL MEDICINE | Facility: CLINIC | Age: 87
End: 2022-01-21
Payer: MEDICARE

## 2022-01-21 RX ORDER — CEPHALEXIN 500 MG/1
500 CAPSULE ORAL 2 TIMES DAILY
Qty: 14 CAPSULE | Refills: 0 | Status: SHIPPED | OUTPATIENT
Start: 2022-01-21 | End: 2022-02-22

## 2022-01-22 NOTE — TELEPHONE ENCOUNTER
----- Message from Yair Sharpe MD sent at 1/21/2022  6:51 AM EST -----  Regarding: FW: test results    ----- Message -----  From: Latrell Ojeda  Sent: 1/21/2022  12:10 AM EST  To: [facility] [place] Clinical Support P  Subject: test results                                     Hi Dr. Miller,  I took a urine test today and the results are on the Portal.  I took it because I felt some discomfort and wanted to make sure I did not have a beginning of a UTI.  Please let me know how should I proceed since I cannot interpret the results.  Thank you,  Latrell

## 2022-01-23 LAB
BACTERIA UR CULT: ABNORMAL
BACTERIA UR CULT: ABNORMAL

## 2022-01-31 ENCOUNTER — TRANSCRIBE ORDERS (OUTPATIENT)
Dept: SCHEDULING | Age: 87
End: 2022-01-31

## 2022-01-31 DIAGNOSIS — Z20.822 CONTACT WITH AND (SUSPECTED) EXPOSURE TO COVID-19: Primary | ICD-10-CM

## 2022-01-31 DIAGNOSIS — Z11.59 ENCOUNTER FOR SCREENING FOR OTHER VIRAL DISEASES: ICD-10-CM

## 2022-02-07 ENCOUNTER — APPOINTMENT (OUTPATIENT)
Dept: LAB | Age: 87
End: 2022-02-07
Attending: INTERNAL MEDICINE
Payer: MEDICARE

## 2022-02-07 DIAGNOSIS — Z20.822 CONTACT WITH AND (SUSPECTED) EXPOSURE TO COVID-19: ICD-10-CM

## 2022-02-07 DIAGNOSIS — Z11.59 ENCOUNTER FOR SCREENING FOR OTHER VIRAL DISEASES: ICD-10-CM

## 2022-02-07 LAB — SARS-COV-2 RNA RESP QL NAA+PROBE: NEGATIVE

## 2022-02-07 PROCEDURE — U0003 INFECTIOUS AGENT DETECTION BY NUCLEIC ACID (DNA OR RNA); SEVERE ACUTE RESPIRATORY SYNDROME CORONAVIRUS 2 (SARS-COV-2) (CORONAVIRUS DISEASE [COVID-19]), AMPLIFIED PROBE TECHNIQUE, MAKING USE OF HIGH THROUGHPUT TECHNOLOGIES AS DESCRIBED BY CMS-2020-01-R: HCPCS

## 2022-02-07 PROCEDURE — C9803 HOPD COVID-19 SPEC COLLECT: HCPCS

## 2022-02-08 ENCOUNTER — ANESTHESIA EVENT (OUTPATIENT)
Dept: ENDOSCOPY | Facility: HOSPITAL | Age: 87
End: 2022-02-08
Payer: MEDICARE

## 2022-02-08 NOTE — ANESTHESIA PREPROCEDURE EVALUATION
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       PMH:   Past Medical History:   Diagnosis Date   • Breast cancer (CMS/HCC)    • High blood pressure    • UTI (urinary tract infection)         ROS/Med Hx:  Anesthesia History: neg  Pulmonary: neg  Cardiovascular:  HTN  Afib, last dose of xarelto 3 days ago  GI/Hepatic:  NPO > 8 hours  Epigastric pain  Renal disease:  CKD  Neuro/Psych: neg  Heme: neg  Musculoskeletal: neg  Endo/Other: neg    PSH:   Past Surgical History:   Procedure Laterality Date   • HYSTERECTOMY  1985   • LAPAROSCOPIC LIVER CYST FENESTRATION  1995   • MASTECTOMY  2005       Allergies:   Allergies   Allergen Reactions   • Clindamycin    • Codeine    • Oxycodone-Acetaminophen    • Macrobid [Nitrofurantoin Monohyd/M-Cryst] Rash       No current facility-administered medications on file prior to encounter.     Current Outpatient Medications on File Prior to Encounter   Medication Sig   • amiodarone (PACERONE) 200 mg tablet Take 200 mg by mouth daily.     • calcium carbonate-vitamin D3 (CALCIUM WITH VITAMIN D) 600 mg(1,500mg) -400 unit per tablet Take 2 tablets by mouth daily.   • cholecalciferol, vitamin D3, 1,000 unit (25 mcg) tablet Take 1,000 Units by mouth daily.   • coenzyme Q10 (COQ10) 10 mg capsule Take 100 mg by mouth daily.     • hydrochlorothiazide (HYDRODIURIL) 25 mg tablet    • losartan (COZAAR) 25 mg tablet 25 mg 2 (two) times a day.     • lutein 6 mg capsule Take 6 mg by mouth.   • metoprolol tartrate (LOPRESSOR) 25 mg tablet Take 0.5 tablets (12.5 mg total) by mouth 2 (two) times a day.   • phenazopyridine HCl (AZO ORAL) Take by mouth.   • red yeast rice 600 mg capsule Take 1 capsule by  mouth daily.   • triamcinolone (KENALOG) 0.1 % cream Apply topically 2 (two) times a day. (Patient not taking: Reported on 6/14/2021 )   • XARELTO 20 mg tablet Take 20 mg by mouth once daily.       CBC Results       11/02/21 10/26/21 07/10/21     2257 1453 0916    WBC 7.02 7.90 5.56    RBC 4.25 4.35 4.79    HGB 12.1 12.3 14.3    HCT 38.4 37.9 42.0    MCV 90.4 87.1 87.7    MCH 28.5 28.3 29.9    MCHC 31.5 32.5 34.0     207 158        BMP Results       11/02/21 10/29/21 10/26/21     2257 1500 1453     132 131    K 4.5 4.7 4.6    Cl 100 94 95    CO2 25 27 22    Glucose 128 120 117    BUN 22 24 19    Creatinine 1.2 1.8 1.4    Calcium 9.1 9.3 9.2    Anion Gap 12 11 14    EGFR 42.7 26.7 35.7                  Physical Exam    Airway   Mallampati: I   TM distance: >3 FB   Neck ROM: full  Cardiovascular - normal   Rhythm: regular   Rate: normalPulmonary - normal   clear to auscultation  Dental - normal        Anesthesia Plan    Plan: MAC   ASA 3  Anesthetic plan and risks discussed with: patient  Comments:    Plan: Discussed GA-LMA vs ETT if further airway intervention is needed.

## 2022-02-09 ENCOUNTER — HOSPITAL ENCOUNTER (OUTPATIENT)
Facility: HOSPITAL | Age: 87
Discharge: HOME | End: 2022-02-09
Attending: INTERNAL MEDICINE | Admitting: INTERNAL MEDICINE
Payer: MEDICARE

## 2022-02-09 ENCOUNTER — ANESTHESIA (OUTPATIENT)
Dept: ENDOSCOPY | Facility: HOSPITAL | Age: 87
End: 2022-02-09
Payer: MEDICARE

## 2022-02-09 VITALS
WEIGHT: 155 LBS | TEMPERATURE: 97.5 F | BODY MASS INDEX: 25.83 KG/M2 | HEART RATE: 65 BPM | HEIGHT: 65 IN | OXYGEN SATURATION: 97 % | RESPIRATION RATE: 16 BRPM | SYSTOLIC BLOOD PRESSURE: 167 MMHG | DIASTOLIC BLOOD PRESSURE: 74 MMHG

## 2022-02-09 DIAGNOSIS — R10.13 EPIGASTRIC PAIN: ICD-10-CM

## 2022-02-09 PROCEDURE — 75000060 HC EGD BIOPSY: Performed by: INTERNAL MEDICINE

## 2022-02-09 PROCEDURE — 37000002 HC ANESTHESIA MAC: Performed by: INTERNAL MEDICINE

## 2022-02-09 PROCEDURE — 71000011 HC PACU PHASE 1 EA ADDL MIN: Performed by: INTERNAL MEDICINE

## 2022-02-09 PROCEDURE — 71000001 HC PACU PHASE 1 INITIAL 30MIN: Performed by: INTERNAL MEDICINE

## 2022-02-09 PROCEDURE — 88342 IMHCHEM/IMCYTCHM 1ST ANTB: CPT | Performed by: INTERNAL MEDICINE

## 2022-02-09 PROCEDURE — 25000000 HC PHARMACY GENERAL: Performed by: STUDENT IN AN ORGANIZED HEALTH CARE EDUCATION/TRAINING PROGRAM

## 2022-02-09 PROCEDURE — 88305 TISSUE EXAM BY PATHOLOGIST: CPT | Performed by: INTERNAL MEDICINE

## 2022-02-09 PROCEDURE — 0DB78ZX EXCISION OF STOMACH, PYLORUS, VIA NATURAL OR ARTIFICIAL OPENING ENDOSCOPIC, DIAGNOSTIC: ICD-10-PCS | Performed by: INTERNAL MEDICINE

## 2022-02-09 PROCEDURE — 25800000 HC PHARMACY IV SOLUTIONS: Performed by: INTERNAL MEDICINE

## 2022-02-09 PROCEDURE — 63600000 HC DRUGS/DETAIL CODE: Mod: JW | Performed by: STUDENT IN AN ORGANIZED HEALTH CARE EDUCATION/TRAINING PROGRAM

## 2022-02-09 RX ORDER — LIDOCAINE HYDROCHLORIDE 10 MG/ML
INJECTION, SOLUTION EPIDURAL; INFILTRATION; INTRACAUDAL; PERINEURAL AS NEEDED
Status: DISCONTINUED | OUTPATIENT
Start: 2022-02-09 | End: 2022-02-09 | Stop reason: SURG

## 2022-02-09 RX ORDER — PROPOFOL 200MG/20ML
SYRINGE (ML) INTRAVENOUS AS NEEDED
Status: DISCONTINUED | OUTPATIENT
Start: 2022-02-09 | End: 2022-02-09 | Stop reason: SURG

## 2022-02-09 RX ORDER — SODIUM CHLORIDE 9 MG/ML
INJECTION, SOLUTION INTRAVENOUS CONTINUOUS
Status: DISCONTINUED | OUTPATIENT
Start: 2022-02-09 | End: 2022-02-09 | Stop reason: HOSPADM

## 2022-02-09 RX ADMIN — SODIUM CHLORIDE: 9 INJECTION, SOLUTION INTRAVENOUS at 10:27

## 2022-02-09 RX ADMIN — PROPOFOL 50 MG: 10 INJECTION, EMULSION INTRAVENOUS at 11:07

## 2022-02-09 RX ADMIN — PROPOFOL 20 MG: 10 INJECTION, EMULSION INTRAVENOUS at 11:08

## 2022-02-09 RX ADMIN — LIDOCAINE HYDROCHLORIDE 5 ML: 10 INJECTION, SOLUTION EPIDURAL; INFILTRATION; INTRACAUDAL; PERINEURAL at 11:06

## 2022-02-09 NOTE — ANESTHESIA POSTPROCEDURE EVALUATION
Patient: Latrell Ojeda    Procedure Summary     Date: 02/09/22 Room / Location:  GI 1 / [facility]    Anesthesia Start: 1101 Anesthesia Stop: 1114    Procedure: EGD (N/A ) Diagnosis:       Epigastric pain      (Epigastric Pain)    Providers: Tiffany Mcclain DO Responsible Provider: Nila Gupta MD    Anesthesia Type: MAC ASA Status: 3          Anesthesia Type: MAC  PACU Vitals    No data found in the last 10 encounters.           Anesthesia Post Evaluation    Pain management: adequate  Patient participation: complete - patient participated  Level of consciousness: awake and alert  Cardiovascular status: acceptable  Airway Patency: adequate  Respiratory status: acceptable  Hydration status: acceptable  Anesthetic complications: no

## 2022-02-09 NOTE — H&P
GI Consult Note          Subjective   Latrell Ojeda is a 86 y.o. female who was admitted for Epigastric pain [R10.13].         Past Medical History:   Diagnosis Date   • Breast cancer (CMS/HCC)     bilaterally one year apart with NO node removal and with reconstruction   • Colon polyp    • Epigastric abdominal pain     06/2021 cardiac ruled out with relief from GI cocktail.  Had been onn  daily   • High blood pressure    • PAF (paroxysmal atrial fibrillation) (CMS/HCC)    • Renal insufficiency     change of cardiac meds and diurectic improved labs BUN eGFR   • UTI (urinary tract infection)      Past Surgical History:   Procedure Laterality Date   • CATARACT EXTRACTION, BILATERAL     • COLONOSCOPY     • ESOPHAGOSCOPY / EGD     • HYSTERECTOMY  1985   • LAPAROSCOPIC LIVER CYST FENESTRATION  1995    liquid liver [facility] and aspir[facility] at [facility]   • MASTECTOMY  2005     Allergies   Allergen Reactions   • Clindamycin    • Codeine GI intolerance   • Macrobid [Nitrofurantoin Monohyd/M-Cryst] Rash   • Oxycodone-Acetaminophen GI intolerance       Home Medications:  •  amiodarone, Take 200 mg by mouth daily.    •  calcium carbonate-vitamin D3, Take 1 tablet by mouth 2 (two) times a day.    •  cholecalciferol (vitamin D3), Take 1,000 Units by mouth daily.  •  coenzyme Q10, Take 10 mg by mouth daily.    •  losartan, 50 mg 2 (two) times a day.    •  lutein, Take 6 mg by mouth daily.    •  pantoprazole, Take 1 tablet (40 mg total) by mouth daily before breakfast. (Patient taking differently: Take 40 mg by mouth daily.  )  •  phenazopyridine HCl (AZO ORAL), Take 1 caplet by mouth daily.    •  red yeast rice, Take 1 capsule by mouth 2 (two) times a day.    •  triamcinolone, Apply topically 2 (two) times a day. (Patient not taking: Reported on 6/14/2021 )  •  XARELTO, Take 20 mg by mouth every evening.          Physical Exam    Physical Exam  Visit Vitals  BP (!) 197/86   Pulse 69   Temp 36.6 °C (97.9 °F) (Temporal)  "  Resp 20   Ht 1.651 m (5' 5\")   Wt 70.3 kg (155 lb)   SpO2 98%   BMI 25.79 kg/m²       General appearance: no distress  Head: normocephalic  Lungs: clear to auscultation anteriorly   Heart: regular rate   Abdomen: soft, non-tender; bowel sounds normal; no masses, no organomegaly  Neurologic: awake and alert and oriented        Assessment     1. Epigastric pain- proceed w/ EGD today.           Tiffany Mcclain DO  2/9/2022       "

## 2022-02-09 NOTE — NURSING NOTE
"1205.  Daughter at bedside.  Made her aware of since the change of her cardiac med metoprolol and HCTZ, BP elevated and noticeable dependent edema to knees.  Lungs clear.  To follow up with cardiology with clinical findings.  Pt concerned with \"uvula\" and firmness to lateral neck region.  No palpable masses but definite firmness bilaterally without any subcutaneous emphysema noted.  Denies dysphagia.  Pt to have PCP evaluate her neck region.  "

## 2022-02-09 NOTE — OP NOTE
_______________________________________________________________________________  Patient Name: Latrell Ojeda      Procedure Date: 2/9/2022 10:59 AM  MRN: [id number]                     Account Number: [id number]  YOB: 1935              Age: 86  Gender: Female                        Note Status: Finalized  Attending MD: MAYLIN SUAREZ  _______________________________________________________________________________  Procedure:             Upper GI endoscopy  Indications:           Epigastric abdominal pain  Providers:             MAYLIN HERRERA (Doctor)  Referring MD:          SANDI BLACKBURN MD  Requesting Provider:  Medicines:             See the Anesthesia note for documentation of the  administered medications  Complications:         No immediate complications.  _______________________________________________________________________________  Procedure:             After obtaining informed consent, the endoscope was  passed under direct vision. Throughout the procedure,  the patient's blood pressure, pulse, and oxygen  saturations were monitored continuously. The Endoscope  was introduced through the mouth, and advanced to the  second part of duodenum. The upper GI endoscopy was  accomplished without difficulty. The patient tolerated  the procedure well.  Estimated Blood Loss:  Estimated blood loss: none.  Findings:  The examined esophagus was normal.  The Z-line was regular and was found 40 cm from the incisors.  The entire examined stomach was normal.  The cardia and gastric fundus were normal on retroflexion.  Biopsies were taken with a cold forceps in the gastric antrum for  histology.  The duodenal bulb and second portion of the duodenum were normal.  Impression:            - Normal esophagus.  - Z-line regular, 40 cm from the incisors.  - Normal stomach.  - Normal duodenal bulb and second portion of the  duodenum.  - Biopsies were taken with a cold forceps  for  histology.  Recommendation:        - Discharge patient to home.  - Resume previous diet.  - Continue present medications.  - Await pathology results.  - Return to my office at appointment to be scheduled.  Procedure Code(s):     --- Professional ---  16116, Esophagogastroduodenoscopy, flexible,  transoral; with biopsy, single or multiple  Diagnosis Code(s):     --- Professional ---  R10.13, Epigastric pain  CPT copyright 2020 [employer]. All rights reserved.  The codes documented in this report are preliminary and upon  review may  be revised to meet current compliance requirements.  _____________________  STEPHANY AMATO, DO~LM  2/9/2022 11:15:46 AM  This report has been signed electronically.  Number of Addenda: 0  Note Initiated On: 2/9/2022 10:59 AM

## 2022-02-09 NOTE — OR SURGEON
Pre-Procedure patient identification:  I am the primary operating surgeon/proceduralist and I have identified the patient on 02/09/22 at 10:55 AM Tiffany Mcclain DO  Phone Number: [phone]

## 2022-02-09 NOTE — ANESTHESIOLOGIST PRE-PROCEDURE ATTESTATION
Pre-Procedure Patient Identification:  I am the Primary Anesthesiologist and have identified the patient on 02/09/22 at 10:57 AM.   I have confirmed the procedure(s) will be performed by the following surgeon/proceduralist Tiffany Mcclain DO.

## 2022-02-11 LAB
CASE RPRT: NORMAL
CLINICAL INFO: NORMAL
PATH REPORT.ADDENDUM SPEC: NORMAL
PATH REPORT.FINAL DX SPEC: NORMAL
PATH REPORT.GROSS SPEC: NORMAL

## 2022-02-18 RX ORDER — AMLODIPINE BESYLATE 2.5 MG/1
2.5 TABLET ORAL
COMMUNITY
Start: 2022-02-02 | End: 2022-02-22

## 2022-02-22 ENCOUNTER — OFFICE VISIT (OUTPATIENT)
Dept: INTERNAL MEDICINE | Facility: CLINIC | Age: 87
End: 2022-02-22
Payer: MEDICARE

## 2022-02-22 VITALS
HEART RATE: 59 BPM | RESPIRATION RATE: 16 BRPM | DIASTOLIC BLOOD PRESSURE: 66 MMHG | WEIGHT: 157.6 LBS | TEMPERATURE: 98.5 F | SYSTOLIC BLOOD PRESSURE: 124 MMHG | BODY MASS INDEX: 26.23 KG/M2 | OXYGEN SATURATION: 95 %

## 2022-02-22 DIAGNOSIS — R53.83 OTHER FATIGUE: Primary | ICD-10-CM

## 2022-02-22 DIAGNOSIS — I48.91 ATRIAL FIBRILLATION, UNSPECIFIED TYPE (CMS/HCC): ICD-10-CM

## 2022-02-22 DIAGNOSIS — E55.9 VITAMIN D DEFICIENCY: ICD-10-CM

## 2022-02-22 DIAGNOSIS — N18.31 CHRONIC KIDNEY DISEASE, STAGE 3A (CMS/HCC): ICD-10-CM

## 2022-02-22 DIAGNOSIS — R73.03 PREDIABETES: ICD-10-CM

## 2022-02-22 DIAGNOSIS — R60.0 LEG EDEMA: ICD-10-CM

## 2022-02-22 DIAGNOSIS — K21.9 GASTROESOPHAGEAL REFLUX DISEASE WITHOUT ESOPHAGITIS: ICD-10-CM

## 2022-02-22 PROCEDURE — 99214 OFFICE O/P EST MOD 30 MIN: CPT | Performed by: INTERNAL MEDICINE

## 2022-02-22 RX ORDER — HYDROCHLOROTHIAZIDE 25 MG/1
25 TABLET ORAL EVERY MORNING
COMMUNITY

## 2022-02-22 RX ORDER — PANTOPRAZOLE SODIUM 40 MG/1
40 TABLET, DELAYED RELEASE ORAL
Qty: 90 TABLET | Refills: 3
Start: 2022-02-22 | End: 2022-04-18

## 2022-02-22 RX ORDER — OMEPRAZOLE 20 MG/1
20 CAPSULE, DELAYED RELEASE ORAL DAILY
COMMUNITY
End: 2022-02-22

## 2022-02-22 NOTE — PROGRESS NOTES
Chief Complaint   Patient presents with   • Other     follow up       Subjective      Patient ID: Latrell Ojeda is a 86 y.o. female.    HPI     HTN: Medications adjusted by cardiology.  Restarted HCTZ.  Off metoprolol now due to possible fatigue  Losartan was increased.     Fatigue: Intermittent after eating.  Has been going on for the past 2 to 3 weeks    Leg swelling: Denies any pain      The following have been reviewed and updated as appropriate in this visit:         Review of Systems   Constitutional: Negative for chills and fever.   Cardiovascular: Negative for chest pain.   Gastrointestinal: Negative for abdominal pain.         Current Outpatient Medications:   •  amiodarone (PACERONE) 200 mg tablet, Take 200 mg by mouth daily.  , Disp: , Rfl:   •  calcium carbonate-vitamin D3 (CALCIUM WITH VITAMIN D) 600 mg(1,500mg) -400 unit per tablet, Take 1 tablet by mouth 2 (two) times a day.  , Disp: , Rfl:   •  cholecalciferol, vitamin D3, 1,000 unit (25 mcg) tablet, Take 1,000 Units by mouth daily., Disp: , Rfl:   •  coenzyme Q10 (COQ10) 10 mg capsule, Take 10 mg by mouth daily.  , Disp: , Rfl:   •  hydrochlorothiazide (HYDRODIURIL) 25 mg tablet, Take 25 mg by mouth daily., Disp: , Rfl:   •  losartan (COZAAR) 50 mg tablet, 50 mg 2 (two) times a day.  , Disp: , Rfl:   •  lutein 6 mg capsule, Take 6 mg by mouth daily.  , Disp: , Rfl:   •  phenazopyridine HCl (AZO ORAL), Take 1 caplet by mouth daily.  , Disp: , Rfl:   •  XARELTO 20 mg tablet, Take 20 mg by mouth every evening.  , Disp: , Rfl:   •  pantoprazole (PROTONIX) 40 mg EC tablet, Take 1 tablet (40 mg total) by mouth daily before breakfast., Disp: 90 tablet, Rfl: 3  •  red yeast rice 600 mg capsule, Take 1 capsule by mouth 2 (two) times a day.  , Disp: , Rfl:     Objective     Vitals:    02/22/22 1342   BP: 124/66   BP Location: Left upper arm   Patient Position: Sitting   Pulse: (!) 59   Resp: 16   Temp: 36.9 °C (98.5 °F)   TempSrc: Oral   SpO2: 95%    Weight: 71.5 kg (157 lb 9.6 oz)     Physical Exam  Vitals reviewed.   Constitutional:       General: She is not in acute distress.     Appearance: She is well-developed.   HENT:      Head: Normocephalic and atraumatic.   Eyes:      Conjunctiva/sclera: Conjunctivae normal.   Cardiovascular:      Rate and Rhythm: Normal rate and regular rhythm.   Pulmonary:      Effort: Pulmonary effort is normal. No respiratory distress.      Breath sounds: Normal breath sounds. No wheezing or rales.   Abdominal:      General: There is no distension.      Palpations: Abdomen is soft.      Tenderness: There is no abdominal tenderness. There is no guarding.   Musculoskeletal:      Comments: Edema bilateral lower extremities   Neurological:      Mental Status: She is alert. Mental status is at baseline.   Psychiatric:         Mood and Affect: Mood normal.         Behavior: Behavior normal.           Assessment/Plan   Other fatigue  Check labs      Leg edema  Check ultrasound      Orders Placed This Encounter   Procedures   • Comprehensive metabolic panel   • CBC and Differential   • TSH w reflex FT4   • Vitamin D 25 hydroxy   • Hemoglobin A1c   • Lipid panel     New Medications Ordered This Visit   Medications   • hydrochlorothiazide (HYDRODIURIL) 25 mg tablet     Sig: Take 25 mg by mouth daily.   • pantoprazole (PROTONIX) 40 mg EC tablet     Sig: Take 1 tablet (40 mg total) by mouth daily before breakfast.     Dispense:  90 tablet     Refill:  3       Return in about 4 months (around 6/22/2022).     SANDI BLACKBRUN MD

## 2022-02-22 NOTE — PATIENT INSTRUCTIONS
Other fatigue  Check labs      Leg edema  Check ultrasound      Return in about 4 months (around 6/22/2022).

## 2022-02-23 ASSESSMENT — ENCOUNTER SYMPTOMS
ABDOMINAL PAIN: 0
FEVER: 0
CHILLS: 0

## 2022-03-01 ENCOUNTER — APPOINTMENT (OUTPATIENT)
Dept: LAB | Age: 87
End: 2022-03-01
Attending: INTERNAL MEDICINE
Payer: MEDICARE

## 2022-03-01 DIAGNOSIS — R73.03 PREDIABETES: ICD-10-CM

## 2022-03-01 DIAGNOSIS — I48.91 ATRIAL FIBRILLATION, UNSPECIFIED TYPE (CMS/HCC): ICD-10-CM

## 2022-03-01 DIAGNOSIS — E55.9 VITAMIN D DEFICIENCY: ICD-10-CM

## 2022-03-01 LAB
25(OH)D3 SERPL-MCNC: 44 NG/ML (ref 30–100)
ALBUMIN SERPL-MCNC: 3.8 G/DL (ref 3.4–5)
ALP SERPL-CCNC: 66 IU/L (ref 35–126)
ALT SERPL-CCNC: 64 IU/L (ref 11–54)
ANION GAP SERPL CALC-SCNC: 13 MEQ/L (ref 3–15)
AST SERPL-CCNC: 37 IU/L (ref 15–41)
BASOPHILS # BLD: 0.05 K/UL (ref 0.01–0.1)
BASOPHILS NFR BLD: 0.8 %
BILIRUB SERPL-MCNC: 1.5 MG/DL (ref 0.3–1.2)
BUN SERPL-MCNC: 24 MG/DL (ref 8–20)
CALCIUM SERPL-MCNC: 9.2 MG/DL (ref 8.9–10.3)
CHLORIDE SERPL-SCNC: 96 MEQ/L (ref 98–109)
CHOLEST SERPL-MCNC: 203 MG/DL
CO2 SERPL-SCNC: 27 MEQ/L (ref 22–32)
CREAT SERPL-MCNC: 1.2 MG/DL (ref 0.6–1.1)
DIFFERENTIAL METHOD BLD: ABNORMAL
EOSINOPHIL # BLD: 0.07 K/UL (ref 0.04–0.36)
EOSINOPHIL NFR BLD: 1.1 %
ERYTHROCYTE [DISTWIDTH] IN BLOOD BY AUTOMATED COUNT: 14.5 % (ref 11.7–14.4)
EST. AVERAGE GLUCOSE BLD GHB EST-MCNC: 134 MG/DL
GFR SERPL CREATININE-BSD FRML MDRD: 42.7 ML/MIN/1.73M*2
GLUCOSE SERPL-MCNC: 107 MG/DL (ref 70–99)
HBA1C MFR BLD HPLC: 6.3 %
HCT VFR BLDCO AUTO: 39.4 % (ref 35–45)
HDLC SERPL-MCNC: 87 MG/DL
HDLC SERPL: 2.3 {RATIO}
HGB BLD-MCNC: 12.5 G/DL (ref 11.8–15.7)
IMM GRANULOCYTES # BLD AUTO: 0.01 K/UL (ref 0–0.08)
IMM GRANULOCYTES NFR BLD AUTO: 0.2 %
LDLC SERPL CALC-MCNC: 98 MG/DL
LYMPHOCYTES # BLD: 0.91 K/UL (ref 1.2–3.5)
LYMPHOCYTES NFR BLD: 14.8 %
MCH RBC QN AUTO: 27.6 PG (ref 28–33.2)
MCHC RBC AUTO-ENTMCNC: 31.7 G/DL (ref 32.2–35.5)
MCV RBC AUTO: 87 FL (ref 83–98)
MONOCYTES # BLD: 0.51 K/UL (ref 0.28–0.8)
MONOCYTES NFR BLD: 8.3 %
NEUTROPHILS # BLD: 4.59 K/UL (ref 1.7–7)
NEUTS SEG NFR BLD: 74.8 %
NONHDLC SERPL-MCNC: 116 MG/DL
NRBC BLD-RTO: 0 %
PDW BLD AUTO: 13.4 FL (ref 9.4–12.3)
PLATELET # BLD AUTO: 153 K/UL (ref 150–369)
POTASSIUM SERPL-SCNC: 4.1 MEQ/L (ref 3.6–5.1)
PROT SERPL-MCNC: 6.6 G/DL (ref 6–8.2)
RBC # BLD AUTO: 4.53 M/UL (ref 3.93–5.22)
SODIUM SERPL-SCNC: 136 MEQ/L (ref 136–144)
TRIGL SERPL-MCNC: 89 MG/DL (ref 30–149)
TSH SERPL DL<=0.05 MIU/L-ACNC: 4.56 MIU/L (ref 0.34–5.6)
WBC # BLD AUTO: 6.14 K/UL (ref 3.8–10.5)

## 2022-03-01 PROCEDURE — 80053 COMPREHEN METABOLIC PANEL: CPT

## 2022-03-01 PROCEDURE — 83036 HEMOGLOBIN GLYCOSYLATED A1C: CPT

## 2022-03-01 PROCEDURE — 80061 LIPID PANEL: CPT | Mod: GZ

## 2022-03-01 PROCEDURE — 36415 COLL VENOUS BLD VENIPUNCTURE: CPT

## 2022-03-01 PROCEDURE — 84443 ASSAY THYROID STIM HORMONE: CPT

## 2022-03-01 PROCEDURE — 85025 COMPLETE CBC W/AUTO DIFF WBC: CPT

## 2022-03-01 PROCEDURE — 82306 VITAMIN D 25 HYDROXY: CPT

## 2022-03-12 ENCOUNTER — TELEPHONE (OUTPATIENT)
Dept: INTERNAL MEDICINE | Facility: CLINIC | Age: 87
End: 2022-03-12
Payer: MEDICARE

## 2022-03-12 RX ORDER — CEPHALEXIN 500 MG/1
500 CAPSULE ORAL 2 TIMES DAILY
Qty: 14 CAPSULE | Refills: 0 | Status: SHIPPED | OUTPATIENT
Start: 2022-03-12 | End: 2022-03-17

## 2022-03-14 ENCOUNTER — CLINICAL SUPPORT (OUTPATIENT)
Dept: INTERNAL MEDICINE | Facility: CLINIC | Age: 87
End: 2022-03-14
Payer: MEDICARE

## 2022-03-14 DIAGNOSIS — R30.0 DYSURIA: ICD-10-CM

## 2022-03-14 DIAGNOSIS — R30.0 DYSURIA: Primary | ICD-10-CM

## 2022-03-14 LAB
BACTERIA #/AREA URNS HPF: ABNORMAL /HPF
BACTERIA, POC: ABNORMAL
BILIRUB UR QL STRIP.AUTO: NEGATIVE MG/DL
BILIRUBIN, POC: ABNORMAL
BLOOD URINE, POC: PRESENT
CLARITY UR REFRACT.AUTO: ABNORMAL
CLARITY, POC: ABNORMAL
COLOR UR AUTO: ABNORMAL
COLOR, POC: ABNORMAL
EXPIRATION DATE: ABNORMAL
GLUCOSE UR STRIP.AUTO-MCNC: NEGATIVE MG/DL
GLUCOSE URINE, POC: NEGATIVE
HGB UR QL STRIP.AUTO: 3
HYALINE CASTS #/AREA URNS LPF: ABNORMAL /LPF
KETONES UR STRIP.AUTO-MCNC: NEGATIVE MG/DL
KETONES, POC: POSITIVE
LEUKOCYTE EST, POC: ABNORMAL
LEUKOCYTE ESTERASE UR QL STRIP.AUTO: 3
Lab: ABNORMAL
MUCOUS THREADS URNS QL MICRO: ABNORMAL /LPF
NITRITE UR QL STRIP.AUTO: NEGATIVE
NITRITE, POC: ABNORMAL
PH UR STRIP.AUTO: 6.5 [PH]
PH, POC: 6.5
POCT MANUFACTURER: ABNORMAL
PROT UR QL STRIP.AUTO: 2
PROTEIN, POC: ABNORMAL
RBC #/AREA URNS HPF: ABNORMAL /HPF
SP GR UR REFRACT.AUTO: 1.02
SPECIFIC GRAVITY, POC: 1.02
SQUAMOUS #/AREA URNS HPF: ABNORMAL /HPF
UROBILINOGEN UR STRIP-ACNC: 0.2 EU/DL
UROBILINOGEN, POC: 2
WBC #/AREA URNS HPF: ABNORMAL /HPF

## 2022-03-14 PROCEDURE — 87186 SC STD MICRODIL/AGAR DIL: CPT | Performed by: INTERNAL MEDICINE

## 2022-03-14 PROCEDURE — 81002 URINALYSIS NONAUTO W/O SCOPE: CPT | Performed by: INTERNAL MEDICINE

## 2022-03-14 PROCEDURE — 81001 URINALYSIS AUTO W/SCOPE: CPT | Performed by: INTERNAL MEDICINE

## 2022-03-17 ENCOUNTER — TELEPHONE (OUTPATIENT)
Dept: INTERNAL MEDICINE | Facility: CLINIC | Age: 87
End: 2022-03-17
Payer: MEDICARE

## 2022-03-17 LAB
BACTERIA UR CULT: ABNORMAL
BACTERIA UR CULT: ABNORMAL

## 2022-03-17 RX ORDER — AMOXICILLIN 500 MG/1
500 TABLET, FILM COATED ORAL 3 TIMES DAILY
Qty: 21 TABLET | Refills: 0 | Status: SHIPPED | OUTPATIENT
Start: 2022-03-17 | End: 2022-03-24

## 2022-03-17 NOTE — TELEPHONE ENCOUNTER
----- Message from Yair Sharpe MD sent at 3/16/2022 11:02 AM EDT -----  Regarding: FW: Persistent fatigue  ----- Message -----  From: Latrell Ojeda  Sent: 3/15/2022  10:21 PM EDT  To: [facility] Support [facility]  Subject: Persistent fatigue                               What else could it be, then?  Latrell

## 2022-03-28 ENCOUNTER — CLINICAL SUPPORT (OUTPATIENT)
Dept: INTERNAL MEDICINE | Facility: CLINIC | Age: 87
End: 2022-03-28
Payer: MEDICARE

## 2022-03-28 ENCOUNTER — TELEPHONE (OUTPATIENT)
Dept: INTERNAL MEDICINE | Facility: CLINIC | Age: 87
End: 2022-03-28

## 2022-03-28 DIAGNOSIS — R31.9 HEMATURIA, UNSPECIFIED TYPE: Primary | ICD-10-CM

## 2022-03-28 LAB
BACTERIA URNS QL MICRO: ABNORMAL /HPF
BACTERIA, POC: ABNORMAL
BILIRUB UR QL STRIP.AUTO: NEGATIVE MG/DL
BILIRUBIN, POC: NEGATIVE
BLOOD URINE, POC: POSITIVE
CLARITY UR REFRACT.AUTO: ABNORMAL
CLARITY, POC: CLEAR
COLOR UR AUTO: ABNORMAL
COLOR, POC: ABNORMAL
EXPIRATION DATE: ABNORMAL
GLUCOSE UR STRIP.AUTO-MCNC: NEGATIVE MG/DL
GLUCOSE URINE, POC: NEGATIVE
HGB UR QL STRIP.AUTO: 3
HYALINE CASTS #/AREA URNS LPF: ABNORMAL /LPF
KETONES UR STRIP.AUTO-MCNC: NEGATIVE MG/DL
KETONES, POC: NEGATIVE
LEUKOCYTE EST, POC: ABNORMAL
LEUKOCYTE ESTERASE UR QL STRIP.AUTO: 3
Lab: ABNORMAL
NITRITE UR QL STRIP.AUTO: NEGATIVE
NITRITE, POC: NEGATIVE
PH UR STRIP.AUTO: 6.5 [PH]
PH, POC: 6
POCT MANUFACTURER: ABNORMAL
PROT UR QL STRIP.AUTO: 1
PROTEIN, POC: ABNORMAL
RBC #/AREA URNS HPF: ABNORMAL /HPF
SP GR UR REFRACT.AUTO: 1.01
SPECIFIC GRAVITY, POC: 1.01
SQUAMOUS URNS QL MICRO: ABNORMAL /HPF
UROBILINOGEN UR STRIP-ACNC: 0.2 EU/DL
UROBILINOGEN, POC: 0.2
WBC #/AREA URNS HPF: ABNORMAL /HPF

## 2022-03-28 PROCEDURE — 81002 URINALYSIS NONAUTO W/O SCOPE: CPT | Performed by: INTERNAL MEDICINE

## 2022-03-28 PROCEDURE — 87186 SC STD MICRODIL/AGAR DIL: CPT | Performed by: INTERNAL MEDICINE

## 2022-03-28 PROCEDURE — 81003 URINALYSIS AUTO W/O SCOPE: CPT | Performed by: INTERNAL MEDICINE

## 2022-03-28 PROCEDURE — 87086 URINE CULTURE/COLONY COUNT: CPT | Performed by: INTERNAL MEDICINE

## 2022-03-28 RX ORDER — AMOXICILLIN 500 MG/1
500 TABLET, FILM COATED ORAL 3 TIMES DAILY
Qty: 30 TABLET | Refills: 0 | Status: SHIPPED | OUTPATIENT
Start: 2022-03-28 | End: 2022-04-07

## 2022-03-28 NOTE — TELEPHONE ENCOUNTER
Spoke to pt.   Symptoms improving on left over amox  She is symptomatic  Sent in additional amox  Await ucx  Advised to f/u with urology again

## 2022-03-28 NOTE — TELEPHONE ENCOUNTER
Patient stated  that she was on an ABX , 2 weeks ago. Only took five days ,. Over the weekend . She had another UTi, called the on call  Doctor and was told to finish off the ABX,  but to call the office for additional instructions.   She will be bringing down another sample but would like another ABX today before the pharmacy closes.  Please advise and or call [phone].    Thank you

## 2022-03-30 LAB
BACTERIA UR CULT: ABNORMAL
BACTERIA UR CULT: ABNORMAL

## 2022-03-31 ENCOUNTER — TELEPHONE (OUTPATIENT)
Dept: INTERNAL MEDICINE | Facility: CLINIC | Age: 87
End: 2022-03-31
Payer: MEDICARE

## 2022-04-10 ENCOUNTER — HOSPITAL ENCOUNTER (EMERGENCY)
Facility: HOSPITAL | Age: 87
Discharge: HOME | End: 2022-04-10
Attending: EMERGENCY MEDICINE
Payer: MEDICARE

## 2022-04-10 ENCOUNTER — APPOINTMENT (EMERGENCY)
Dept: RADIOLOGY | Facility: HOSPITAL | Age: 87
End: 2022-04-10
Attending: EMERGENCY MEDICINE
Payer: MEDICARE

## 2022-04-10 VITALS
OXYGEN SATURATION: 98 % | BODY MASS INDEX: 25.33 KG/M2 | WEIGHT: 152 LBS | TEMPERATURE: 98.2 F | HEART RATE: 70 BPM | RESPIRATION RATE: 18 BRPM | SYSTOLIC BLOOD PRESSURE: 148 MMHG | HEIGHT: 65 IN | DIASTOLIC BLOOD PRESSURE: 68 MMHG

## 2022-04-10 DIAGNOSIS — N30.90 CYSTITIS: Primary | ICD-10-CM

## 2022-04-10 LAB
ALBUMIN SERPL-MCNC: 3.9 G/DL (ref 3.4–5)
ALP SERPL-CCNC: 62 IU/L (ref 35–126)
ALT SERPL-CCNC: 71 IU/L (ref 11–54)
ANION GAP SERPL CALC-SCNC: 10 MEQ/L (ref 3–15)
AST SERPL-CCNC: 47 IU/L (ref 15–41)
BACTERIA URNS QL MICRO: ABNORMAL /HPF
BASOPHILS # BLD: 0.03 K/UL (ref 0.01–0.1)
BASOPHILS NFR BLD: 0.2 %
BILIRUB SERPL-MCNC: 2.8 MG/DL (ref 0.3–1.2)
BILIRUB UR QL STRIP.AUTO: NEGATIVE MG/DL
BUN SERPL-MCNC: 21 MG/DL (ref 8–20)
CALCIUM SERPL-MCNC: 9 MG/DL (ref 8.9–10.3)
CHLORIDE SERPL-SCNC: 96 MEQ/L (ref 98–109)
CLARITY UR REFRACT.AUTO: CLEAR
CO2 SERPL-SCNC: 25 MEQ/L (ref 22–32)
COLOR UR AUTO: YELLOW
CREAT SERPL-MCNC: 1 MG/DL (ref 0.6–1.1)
DIFFERENTIAL METHOD BLD: ABNORMAL
EOSINOPHIL # BLD: 0 K/UL (ref 0.04–0.36)
EOSINOPHIL NFR BLD: 0 %
ERYTHROCYTE [DISTWIDTH] IN BLOOD BY AUTOMATED COUNT: 14.6 % (ref 11.7–14.4)
GFR SERPL CREATININE-BSD FRML MDRD: 52.7 ML/MIN/1.73M*2
GLUCOSE SERPL-MCNC: 151 MG/DL (ref 70–99)
GLUCOSE UR STRIP.AUTO-MCNC: NEGATIVE MG/DL
HCT VFR BLDCO AUTO: 39.9 % (ref 35–45)
HGB BLD-MCNC: 13 G/DL (ref 11.8–15.7)
HGB UR QL STRIP.AUTO: 1
HYALINE CASTS #/AREA URNS LPF: ABNORMAL /LPF
IMM GRANULOCYTES # BLD AUTO: 0.09 K/UL (ref 0–0.08)
IMM GRANULOCYTES NFR BLD AUTO: 0.7 %
KETONES UR STRIP.AUTO-MCNC: 1 MG/DL
LACTATE SERPL-SCNC: 1.5 MMOL/L (ref 0.4–2)
LEUKOCYTE ESTERASE UR QL STRIP.AUTO: ABNORMAL
LIPASE SERPL-CCNC: 26 U/L (ref 20–51)
LYMPHOCYTES # BLD: 0.17 K/UL (ref 1.2–3.5)
LYMPHOCYTES NFR BLD: 1.3 %
MCH RBC QN AUTO: 27.3 PG (ref 28–33.2)
MCHC RBC AUTO-ENTMCNC: 32.6 G/DL (ref 32.2–35.5)
MCV RBC AUTO: 83.6 FL (ref 83–98)
MONOCYTES # BLD: 0.34 K/UL (ref 0.28–0.8)
MONOCYTES NFR BLD: 2.6 %
MUCOUS THREADS URNS QL MICRO: ABNORMAL /LPF
NEUTROPHILS # BLD: 12.41 K/UL (ref 1.7–7)
NEUTS SEG NFR BLD: 95.2 %
NITRITE UR QL STRIP.AUTO: NEGATIVE
NRBC BLD-RTO: 0 %
PDW BLD AUTO: 11.7 FL (ref 9.4–12.3)
PH UR STRIP.AUTO: 7 [PH]
PLATELET # BLD AUTO: 172 K/UL (ref 150–369)
POTASSIUM SERPL-SCNC: 3.8 MEQ/L (ref 3.6–5.1)
PROT SERPL-MCNC: 7.6 G/DL (ref 6–8.2)
PROT UR QL STRIP.AUTO: ABNORMAL
RBC # BLD AUTO: 4.77 M/UL (ref 3.93–5.22)
RBC #/AREA URNS HPF: ABNORMAL /HPF
SODIUM SERPL-SCNC: 131 MEQ/L (ref 136–144)
SP GR UR REFRACT.AUTO: 1.02
SQUAMOUS URNS QL MICRO: ABNORMAL /HPF
UROBILINOGEN UR STRIP-ACNC: 0.2 EU/DL
WBC # BLD AUTO: 13.04 K/UL (ref 3.8–10.5)
WBC #/AREA URNS HPF: ABNORMAL /HPF

## 2022-04-10 PROCEDURE — 81003 URINALYSIS AUTO W/O SCOPE: CPT | Performed by: EMERGENCY MEDICINE

## 2022-04-10 PROCEDURE — 93005 ELECTROCARDIOGRAM TRACING: CPT

## 2022-04-10 PROCEDURE — 96360 HYDRATION IV INFUSION INIT: CPT | Mod: XU

## 2022-04-10 PROCEDURE — 25800000 HC PHARMACY IV SOLUTIONS: Performed by: NURSE PRACTITIONER

## 2022-04-10 PROCEDURE — 87040 BLOOD CULTURE FOR BACTERIA: CPT | Mod: 91 | Performed by: EMERGENCY MEDICINE

## 2022-04-10 PROCEDURE — 63600105 HC IODINE BASED CONTRAST: Performed by: NURSE PRACTITIONER

## 2022-04-10 PROCEDURE — 74177 CT ABD & PELVIS W/CONTRAST: CPT | Mod: ME

## 2022-04-10 PROCEDURE — 63700000 HC SELF-ADMINISTRABLE DRUG: Performed by: NURSE PRACTITIONER

## 2022-04-10 PROCEDURE — 83605 ASSAY OF LACTIC ACID: CPT | Performed by: NURSE PRACTITIONER

## 2022-04-10 PROCEDURE — 80053 COMPREHEN METABOLIC PANEL: CPT | Performed by: EMERGENCY MEDICINE

## 2022-04-10 PROCEDURE — 36415 COLL VENOUS BLD VENIPUNCTURE: CPT | Performed by: NURSE PRACTITIONER

## 2022-04-10 PROCEDURE — 3E0337Z INTRODUCTION OF ELECTROLYTIC AND WATER BALANCE SUBSTANCE INTO PERIPHERAL VEIN, PERCUTANEOUS APPROACH: ICD-10-PCS | Performed by: EMERGENCY MEDICINE

## 2022-04-10 PROCEDURE — 83690 ASSAY OF LIPASE: CPT | Performed by: NURSE PRACTITIONER

## 2022-04-10 PROCEDURE — 93005 ELECTROCARDIOGRAM TRACING: CPT | Performed by: EMERGENCY MEDICINE

## 2022-04-10 PROCEDURE — 99284 EMERGENCY DEPT VISIT MOD MDM: CPT | Mod: 25

## 2022-04-10 PROCEDURE — 85025 COMPLETE CBC W/AUTO DIFF WBC: CPT | Performed by: EMERGENCY MEDICINE

## 2022-04-10 RX ORDER — CEFUROXIME AXETIL 500 MG/1
500 TABLET ORAL 2 TIMES DAILY
Qty: 14 TABLET | Refills: 0 | Status: SHIPPED | OUTPATIENT
Start: 2022-04-10 | End: 2022-04-17

## 2022-04-10 RX ORDER — CEFUROXIME AXETIL 250 MG/1
500 TABLET ORAL ONCE
Status: COMPLETED | OUTPATIENT
Start: 2022-04-10 | End: 2022-04-10

## 2022-04-10 RX ADMIN — CEFUROXIME AXETIL 500 MG: 250 TABLET, FILM COATED ORAL at 15:57

## 2022-04-10 RX ADMIN — IOHEXOL 100 ML: 350 INJECTION, SOLUTION INTRAVENOUS at 14:56

## 2022-04-10 RX ADMIN — SODIUM CHLORIDE 500 ML: 9 INJECTION, SOLUTION INTRAVENOUS at 13:29

## 2022-04-10 ASSESSMENT — ENCOUNTER SYMPTOMS
FREQUENCY: 1
VOMITING: 1
CONFUSION: 0
HEADACHES: 0
ABDOMINAL PAIN: 1
DYSURIA: 1
SHORTNESS OF BREATH: 0
CHILLS: 1
COUGH: 0
NAUSEA: 1
HEMATURIA: 1
RHINORRHEA: 0
CONSTIPATION: 0
SORE THROAT: 0
DIARRHEA: 0

## 2022-04-10 NOTE — ED ATTESTATION NOTE
Procedures  Physical Exam  Review of Systems    4/10/67413:31 PM  I have personally seen and examined the patient. I have reviewed and agree with the PA/NP/Resident's assessment and ED plan of care. My examination, assessment and plan of care of Latrell Ojeda is as follows:    Patient is a 86-year-old female who presents with UTI complaints, patient reports she has been getting recurrent UTIs for the last 6 months, this morning she is feeling more generally weak and has had some vomiting, no fever in the emergency department, patient reports being 99.9 at home, unclear why the patient keeps getting urinary tract infections    Exam:   Vital signs have been reviewed, pulse ox is 95% on room air, normal  Heart: RRR, normal S1/S2  Lungs: CTA bilaterally  Abdo: soft, non-tender    Plan: Patient is a 86-year-old female who presents with recurrent UTIs, checking labs and imaging, reevaluate afterwards          Godshall, Duane K, MD  04/10/22 6997

## 2022-04-10 NOTE — ED PROVIDER NOTES
HPI     86-year-old female presents to the emergency department with her daughter from home for UTI-like symptoms starting yesterday morning.  Admits to chills and a T-max of 99.9 Fahrenheit.  She admits to nausea and dry heaving yesterday.  She called her urologist Dr. Bond who prescribed Macrobid and she has had 2 doses thus far.  She admits to bloody urine yesterday and feeling discomfort as well as frequency and urgency.  She does take Xarelto for paroxysmal atrial fibrillation.  Recently finished a 10-day course of amoxicillin with an unremarkable urine analysis 3 days ago.     Past Medical History:   Diagnosis Date   • Breast cancer (CMS/HCC)     bilaterally one year apart with NO node removal and with reconstruction   • Colon polyp    • Epigastric abdominal pain     06/2021 cardiac ruled out with relief from GI cocktail.  Had been onn  daily   • High blood pressure    • PAF (paroxysmal atrial fibrillation) (CMS/HCC)    • Renal insufficiency     change of cardiac meds and diurectic improved labs BUN eGFR   • UTI (urinary tract infection)        Past Surgical History:   Procedure Laterality Date   • CATARACT EXTRACTION, BILATERAL     • COLONOSCOPY     • ESOPHAGOSCOPY / EGD     • HYSTERECTOMY  1985   • LAPAROSCOPIC LIVER CYST FENESTRATION  1995    liquid liver cyst and aspirated at [facility]   • MASTECTOMY  2005       Allergies   Allergen Reactions   • Clindamycin    • Codeine GI intolerance   • Macrobid [Nitrofurantoin Monohyd/M-Cryst] Rash   • Oxycodone-Acetaminophen GI intolerance       Social History     Tobacco Use   Smoking Status Never Smoker   Smokeless Tobacco Never Used       Social History     Substance and Sexual Activity   Alcohol Use Yes    Comment: very social       Social History     Substance and Sexual Activity   Drug Use Never       No LMP recorded. Patient has had a hysterectomy.    Review of Systems   Constitutional: Positive for chills.   HENT: Negative for congestion,  "rhinorrhea and sore throat.    Respiratory: Negative for cough and shortness of breath.    Cardiovascular: Negative for chest pain.   Gastrointestinal: Positive for abdominal pain, nausea and vomiting. Negative for constipation and diarrhea.   Genitourinary: Positive for dysuria, frequency, hematuria and urgency.   Musculoskeletal: Negative for gait problem.   Skin: Negative for pallor.   Neurological: Negative for headaches.   Psychiatric/Behavioral: Negative for confusion.       Vitals:    04/10/22 1252 04/10/22 1408 04/10/22 1517   BP: (!) 172/74 (!) 144/64 (!) 148/68   BP Location:  Right upper arm Right upper arm   Patient Position:   Lying   Pulse: 75 72 70   Resp: 16 17 18   Temp: 36.8 °C (98.2 °F)     TempSrc: Temporal     SpO2: 95% 95% 98%   Weight: 68.9 kg (152 lb)     Height: 1.651 m (5' 5\")         Physical Exam  Vitals and nursing note reviewed.   Constitutional:       General: She is not in acute distress.     Appearance: She is not toxic-appearing.   HENT:      Head: Normocephalic and atraumatic.   Eyes:      Conjunctiva/sclera: Conjunctivae normal.   Cardiovascular:      Rate and Rhythm: Normal rate and regular rhythm.   Pulmonary:      Effort: Pulmonary effort is normal.      Breath sounds: Normal breath sounds.   Abdominal:      General: Abdomen is flat. Bowel sounds are normal. There is no distension.      Tenderness: There is abdominal tenderness ( suprapubic and RLQ).   Musculoskeletal:      Cervical back: Neck supple.   Skin:     General: Skin is warm and dry.      Capillary Refill: Capillary refill takes less than 2 seconds.      Coloration: Skin is not pale.   Neurological:      General: No focal deficit present.      Mental Status: She is alert and oriented to person, place, and time.      GCS: GCS eye subscore is 4. GCS verbal subscore is 5. GCS motor subscore is 6.      Gait: Gait is intact.   Psychiatric:         Mood and Affect: Mood normal.         Behavior: Behavior normal.         " Thought Content: Thought content normal.         Judgment: Judgment normal.         Procedures    ED Course as of 04/10/22 1540   Sun Apr 10, 2022   1321 Impression: weak. Hematuria. On thinners. Hx of recurrent UTIs. Lower abd pain.     Plan: CBC, CMP, lipase, lactate, blood cx, abd/pelvis CT.  [AO]   1328 Lactate: 1.5 [AO]   1328 eGFR(!): 52.7 [AO]   1340 Creatinine: 1.0 [AO]   1340 BUN(!): 21 [AO]   1341 LFTs have been slightly elevated in the past. [AO]   1529 Abd/pelvis CT:   IMPRESSION:  No acute abnormality in the abdomen/pelvis.  Additional findings as detailed above. [AO]   1531 Sodium(!): 131  Given normal saline in the ER. [AO]   1539 Discussed all results with patient and family.  Prefer to have Macrobid change as they believe that this is the reason she is vomiting.  We will change to Ceftin twice a day and we will treat for a longer course due to recent UTI prior.  She will follow-up with her urologist this week as well as her PCP. [AO]      ED Course User Index  [AO] Kenyatta Griggs CRNP         Clinical Impressions as of 04/10/22 1540   Cystitis       Final diagnoses:   [N30.90] Cystitis       Labs Reviewed   CBC AND DIFF - Abnormal       Result Value    WBC 13.04 (*)     RBC 4.77      Hemoglobin 13.0      Hematocrit 39.9      MCV 83.6      MCH 27.3 (*)     MCHC 32.6      RDW 14.6 (*)     Platelets 172      MPV 11.7      Differential Type Auto      nRBC 0.0      Immature Granulocytes 0.7      Neutrophils 95.2      Lymphocytes 1.3      Monocytes 2.6      Eosinophils 0.0      Basophils 0.2      Immature Granulocytes, Absolute 0.09 (*)     Neutrophils, Absolute 12.41 (*)     Lymphocytes, Absolute 0.17 (*)     Monocytes, Absolute 0.34      Eosinophils, Absolute 0.00 (*)     Basophils, Absolute 0.03     COMPREHENSIVE METABOLIC PANEL - Abnormal    Sodium 131 (*)     Potassium 3.8      Chloride 96 (*)     CO2 25      BUN 21 (*)     Creatinine 1.0      Glucose 151 (*)     Calcium 9.0      AST (SGOT) 47 (*)      ALT (SGPT) 71 (*)     Alkaline Phosphatase 62      Total Protein 7.6      Albumin 3.9      Bilirubin, Total 2.8 (*)     eGFR 52.7 (*)     Anion Gap 10     UA REFLEX CULTURE (MACROSCOPIC) - Abnormal    Color, Urine Yellow      Clarity, Urine Clear      Specific Gravity, Urine 1.017      pH, Urine 7.0      Leukocyte Esterase Trace (*)     Nitrite, Urine Negative      Protein, Urine Trace (*)     Glucose, Urine Negative      Ketones, Urine +1 (*)     Urobilinogen, Urine 0.2      Bilirubin, Urine Negative      Blood, Urine +1 (*)    UA MICROSCOPIC (UCU) - Abnormal    RBC, Urine 5 TO 9 (*)     WBC, Urine 4 TO 10 (*)     Squamous Epithelial Rare (*)     Hyaline Cast 3 TO 9 (*)     Bacteria, Urine Rare (*)     MUCUSUA Rare (*)    LACTATE, VENOUS - Normal    Lactate 1.5     LIPASE - Normal    Lipase 26     BLOOD CULTURE   BLOOD CULTURE   URINALYSIS REFLEX CULTURE (ED AND OUTPATIENT ONLY)    Narrative:     The following orders were created for panel order Urinalysis with Reflex Culture.  Procedure                               Abnormality         Status                     ---------                               -----------         ------                     UA Reflex to Culture (Ma...[[id number]]  Abnormal            Final result               UA Microscopic[[id number]]               Abnormal            Final result                 Please view results for these tests on the individual orders.   RAINBOW DRAW PANEL    Narrative:     The following orders were created for panel order [place] Draw Panel.  Procedure                               Abnormality         Status                     ---------                               -----------         ------                     RAINBOW RED[[id number]]                                      In process                 RAINBOW LT BLUE[[id number]]                                  In process                 RAINBOW GOLD[[id number]]                                     In process                    Please view results for these tests on the individual orders.   RAINBOW RED   RAINBOW LT BLUE   RAINBOW GOLD       CT ABDOMEN PELVIS WITH IV CONTRAST   Final Result   IMPRESSION:   No acute abnormality in the abdomen/pelvis.   Additional findings as detailed above.      ECG 12 lead   ED Interpretation   Interpreted with Dr. Jose.   Rhythm: Normal Sinus.  Rate: 74 bpm.  P wave interval: normal interval.  QRS: normal QRS.  QTc: normal.  ST Segments: no obvious ST elevation.    Rhythm Strip: NSR.    O2 sat: normal. 95% on RA.              Kenyatta Griggs CRNP  04/10/22 1531       Kenyatta Griggs CRNP  04/10/22 1540

## 2022-04-10 NOTE — DISCHARGE INSTRUCTIONS
Stop the Macrobid.  Start the Ceftin twice a day.  Follow-up with your family provider.  I would like you to see your urologist this week.  Stay hydrated drink plenty of fluids.  Tylenol for any discomfort.    Return to the emergency department for any new or worsening symptoms like bloody bowel movements, diarrhea, or vomit; persistent vomiting or diarrhea, pain that is severe or changing and lasts more than an hour or comes and goes for more than 24 hours; if you cannot eat or drink for hours; have a fever higher than 102°F (39°C); pain when urinating, inability to urinate within 6 hours; difficulty urinating including urgency and pain; inability to have a bowel movement over several days with abdominal pain and inability to pass gas; behavior changes including lethargy or decreased responsiveness; or if you lose a lot of weight without trying to, or lose interest in food.

## 2022-04-11 ENCOUNTER — TELEPHONE (OUTPATIENT)
Dept: INTERNAL MEDICINE | Facility: CLINIC | Age: 87
End: 2022-04-11
Payer: MEDICARE

## 2022-04-11 LAB
ATRIAL RATE: 74
P AXIS: 80
PR INTERVAL: 166
QRS DURATION: 80
QT INTERVAL: 420
QTC CALCULATION(BAZETT): 466
R AXIS: 47
T WAVE AXIS: 55
VENTRICULAR RATE: 74

## 2022-04-15 LAB
BACTERIA BLD CULT: NORMAL
BACTERIA BLD CULT: NORMAL

## 2022-04-18 ENCOUNTER — OFFICE VISIT (OUTPATIENT)
Dept: INTERNAL MEDICINE | Facility: CLINIC | Age: 87
End: 2022-04-18
Payer: MEDICARE

## 2022-04-18 VITALS
OXYGEN SATURATION: 98 % | BODY MASS INDEX: 26.13 KG/M2 | DIASTOLIC BLOOD PRESSURE: 60 MMHG | TEMPERATURE: 97.6 F | WEIGHT: 157 LBS | RESPIRATION RATE: 16 BRPM | SYSTOLIC BLOOD PRESSURE: 122 MMHG | HEART RATE: 58 BPM

## 2022-04-18 DIAGNOSIS — K21.9 GASTROESOPHAGEAL REFLUX DISEASE WITHOUT ESOPHAGITIS: ICD-10-CM

## 2022-04-18 DIAGNOSIS — N39.0 FREQUENT UTI: Primary | ICD-10-CM

## 2022-04-18 PROBLEM — R30.0 BURNING WITH URINATION: Status: RESOLVED | Noted: 2021-10-18 | Resolved: 2022-04-18

## 2022-04-18 PROBLEM — T50.B95A FATIGUE AFTER COVID-19 VACCINATION: Status: RESOLVED | Noted: 2021-10-26 | Resolved: 2022-04-18

## 2022-04-18 PROBLEM — R53.83 FATIGUE AFTER COVID-19 VACCINATION: Status: RESOLVED | Noted: 2021-10-26 | Resolved: 2022-04-18

## 2022-04-18 PROBLEM — R74.01 ELEVATED LIVER TRANSAMINASE LEVEL: Status: RESOLVED | Noted: 2021-10-07 | Resolved: 2022-04-18

## 2022-04-18 PROCEDURE — 99214 OFFICE O/P EST MOD 30 MIN: CPT | Performed by: INTERNAL MEDICINE

## 2022-04-18 RX ORDER — PANTOPRAZOLE SODIUM 40 MG/1
40 TABLET, DELAYED RELEASE ORAL DAILY PRN
Start: 2022-04-18 | End: 2022-06-14

## 2022-04-18 ASSESSMENT — ENCOUNTER SYMPTOMS
ABDOMINAL PAIN: 0
CHILLS: 0
FEVER: 0

## 2022-04-18 NOTE — PROGRESS NOTES
Chief Complaint   Patient presents with   • Other     Hosp follow up, Shivani Babb      Patient ID: Latrell Ojeda is a 86 y.o. female.    HPI     UTI: Was started on Macrobid by urology but started to have nausea.  Went to ER.  Stop Macrobid and nausea has improved  CT in the hospital did not show any acute abnormality  Was switched to Ceftin.  Dysuria has improved  Going for cystoscopy tomorrow with urology    GERD: Interested in stopping medications.  Currently taking every other day and symptoms controlled.      The following have been reviewed and updated as appropriate in this visit:   Allergies  Meds  Problems          Review of Systems   Constitutional: Negative for chills and fever.   Cardiovascular: Negative for chest pain.   Gastrointestinal: Negative for abdominal pain.         Current Outpatient Medications:   •  amiodarone (PACERONE) 200 mg tablet, Take 200 mg by mouth daily.  , Disp: , Rfl:   •  calcium carbonate-vitamin D3 600 mg-10 mcg (400 unit) per tablet, Take 1 tablet by mouth 2 (two) times a day.  , Disp: , Rfl:   •  cholecalciferol, vitamin D3, 1,000 unit (25 mcg) tablet, Take 1,000 Units by mouth daily., Disp: , Rfl:   •  coenzyme Q10 (COQ10) 10 mg capsule, Take 10 mg by mouth daily.  , Disp: , Rfl:   •  hydrochlorothiazide (HYDRODIURIL) 25 mg tablet, Take 25 mg by mouth daily., Disp: , Rfl:   •  losartan (COZAAR) 50 mg tablet, 50 mg 2 (two) times a day.  , Disp: , Rfl:   •  lutein 6 mg capsule, Take 6 mg by mouth daily.  , Disp: , Rfl:   •  pantoprazole (PROTONIX) 40 mg EC tablet, Take 1 tablet (40 mg total) by mouth daily before breakfast., Disp: 90 tablet, Rfl: 3  •  red yeast rice 600 mg capsule, Take 1 capsule by mouth 2 (two) times a day.  , Disp: , Rfl:   •  XARELTO 20 mg tablet, Take 20 mg by mouth every evening.  , Disp: , Rfl:   •  phenazopyridine HCl (AZO ORAL), Take 1 caplet by mouth daily.  , Disp: , Rfl:     Objective     Vitals:    04/18/22 1042   BP: 122/60    BP Location: Left upper arm   Patient Position: Sitting   Pulse: (!) 58   Resp: 16   Temp: 36.4 °C (97.6 °F)   TempSrc: Oral   SpO2: 98%   Weight: 71.2 kg (157 lb)     Physical Exam  Vitals reviewed.   Constitutional:       General: She is not in acute distress.     Appearance: She is well-developed.   HENT:      Head: Normocephalic and atraumatic.   Eyes:      Conjunctiva/sclera: Conjunctivae normal.   Cardiovascular:      Rate and Rhythm: Normal rate and regular rhythm.   Pulmonary:      Effort: Pulmonary effort is normal. No respiratory distress.      Breath sounds: Normal breath sounds. No wheezing or rales.   Abdominal:      General: There is no distension.      Palpations: Abdomen is soft.      Tenderness: There is no abdominal tenderness. There is no guarding.   Neurological:      Mental Status: She is alert. Mental status is at baseline.   Psychiatric:         Mood and Affect: Mood normal.         Behavior: Behavior normal.           Assessment/Plan   Gastroesophageal reflux disease without esophagitis  · Continue every other day for 2 weeks and then take daily AS NEEDED    Frequent UTI  Follow up with urology.  Going for cystoscopy tomorrow      No orders of the defined types were placed in this encounter.    No orders of the defined types were placed in this encounter.      Return if symptoms worsen or fail to improve.     SANDI BLACKBURN MD

## 2022-04-18 NOTE — PATIENT INSTRUCTIONS
Gastroesophageal reflux disease without esophagitis  Continue every other day for 2 weeks and then take daily AS NEEDED    Frequent UTI  Follow up with urology     Return if symptoms worsen or fail to improve.

## 2022-05-04 ENCOUNTER — CLINICAL SUPPORT (OUTPATIENT)
Dept: INTERNAL MEDICINE | Facility: CLINIC | Age: 87
End: 2022-05-04
Payer: MEDICARE

## 2022-05-04 DIAGNOSIS — Z78.9 DISCOMFORT: ICD-10-CM

## 2022-05-04 DIAGNOSIS — R31.9 HEMATURIA, UNSPECIFIED TYPE: ICD-10-CM

## 2022-05-04 DIAGNOSIS — R20.8 BURNING SENSATION: Primary | ICD-10-CM

## 2022-05-04 LAB
BACTERIA, POC: ABNORMAL
BILIRUBIN, POC: NEGATIVE
BLOOD URINE, POC: PRESENT
CLARITY, POC: ABNORMAL
COLOR, POC: ABNORMAL
EXPIRATION DATE: ABNORMAL
GLUCOSE URINE, POC: NEGATIVE
KETONES, POC: NEGATIVE
LEUKOCYTE EST, POC: ABNORMAL
Lab: ABNORMAL
NITRITE, POC: NEGATIVE
PH, POC: 7
POCT MANUFACTURER: ABNORMAL
PROTEIN, POC: NEGATIVE
SPECIFIC GRAVITY, POC: 1.02
UROBILINOGEN, POC: 0.2

## 2022-05-04 PROCEDURE — 87077 CULTURE AEROBIC IDENTIFY: CPT | Performed by: INTERNAL MEDICINE

## 2022-05-04 PROCEDURE — 81002 URINALYSIS NONAUTO W/O SCOPE: CPT | Mod: XU | Performed by: INTERNAL MEDICINE

## 2022-05-04 PROCEDURE — 81003 URINALYSIS AUTO W/O SCOPE: CPT | Mod: QW | Performed by: INTERNAL MEDICINE

## 2022-05-04 PROCEDURE — 81001 URINALYSIS AUTO W/SCOPE: CPT | Performed by: INTERNAL MEDICINE

## 2022-05-05 LAB
BACTERIA URNS QL MICRO: ABNORMAL /HPF
BILIRUB UR QL STRIP.AUTO: NEGATIVE MG/DL
CLARITY UR REFRACT.AUTO: ABNORMAL
COLOR UR AUTO: ABNORMAL
GLUCOSE UR STRIP.AUTO-MCNC: NEGATIVE MG/DL
HGB UR QL STRIP.AUTO: 3
HYALINE CASTS #/AREA URNS LPF: ABNORMAL /LPF
KETONES UR STRIP.AUTO-MCNC: NEGATIVE MG/DL
LEUKOCYTE ESTERASE UR QL STRIP.AUTO: 3
NITRITE UR QL STRIP.AUTO: NEGATIVE
PH UR STRIP.AUTO: 6.5 [PH]
PROT UR QL STRIP.AUTO: 1
RBC #/AREA URNS HPF: ABNORMAL /HPF
SP GR UR REFRACT.AUTO: 1.01
SQUAMOUS URNS QL MICRO: ABNORMAL /HPF
UROBILINOGEN UR STRIP-ACNC: 0.2 EU/DL
WBC #/AREA URNS HPF: ABNORMAL /HPF

## 2022-05-07 LAB
BACTERIA UR CULT: ABNORMAL
BACTERIA UR CULT: ABNORMAL

## 2022-06-14 ENCOUNTER — OFFICE VISIT (OUTPATIENT)
Dept: INTERNAL MEDICINE | Facility: CLINIC | Age: 87
End: 2022-06-14
Payer: MEDICARE

## 2022-06-14 VITALS
SYSTOLIC BLOOD PRESSURE: 134 MMHG | HEART RATE: 55 BPM | WEIGHT: 157 LBS | OXYGEN SATURATION: 95 % | HEIGHT: 65 IN | BODY MASS INDEX: 26.16 KG/M2 | TEMPERATURE: 98.1 F | DIASTOLIC BLOOD PRESSURE: 70 MMHG

## 2022-06-14 DIAGNOSIS — D72.829 LEUKOCYTOSIS, UNSPECIFIED TYPE: ICD-10-CM

## 2022-06-14 DIAGNOSIS — I10 ESSENTIAL HYPERTENSION: ICD-10-CM

## 2022-06-14 DIAGNOSIS — E87.1 HYPONATREMIA: ICD-10-CM

## 2022-06-14 DIAGNOSIS — N39.0 FREQUENT UTI: ICD-10-CM

## 2022-06-14 DIAGNOSIS — Z00.00 MEDICARE ANNUAL WELLNESS VISIT, SUBSEQUENT: Primary | ICD-10-CM

## 2022-06-14 DIAGNOSIS — K21.9 GASTROESOPHAGEAL REFLUX DISEASE WITHOUT ESOPHAGITIS: ICD-10-CM

## 2022-06-14 DIAGNOSIS — Z78.0 POST-MENOPAUSAL: ICD-10-CM

## 2022-06-14 PROBLEM — M54.6 ACUTE RIGHT-SIDED THORACIC BACK PAIN: Status: RESOLVED | Noted: 2021-08-11 | Resolved: 2022-06-14

## 2022-06-14 PROCEDURE — 99214 OFFICE O/P EST MOD 30 MIN: CPT | Mod: 25 | Performed by: INTERNAL MEDICINE

## 2022-06-14 PROCEDURE — G0439 PPPS, SUBSEQ VISIT: HCPCS | Performed by: INTERNAL MEDICINE

## 2022-06-14 RX ORDER — PANTOPRAZOLE SODIUM 40 MG/1
40 TABLET, DELAYED RELEASE ORAL EVERY OTHER DAY
Start: 2022-06-14 | End: 2022-07-25 | Stop reason: SDUPTHER

## 2022-06-14 RX ORDER — METHENAMINE HIPPURATE 1000 MG/1
1 TABLET ORAL 2 TIMES DAILY WITH MEALS
COMMUNITY
End: 2022-11-29

## 2022-06-14 ASSESSMENT — PATIENT HEALTH QUESTIONNAIRE - PHQ9: SUM OF ALL RESPONSES TO PHQ9 QUESTIONS 1 & 2: 0

## 2022-06-14 ASSESSMENT — MINI COG
COMPLETED: YES
TOTAL SCORE: 5

## 2022-06-14 NOTE — PROGRESS NOTES
Subjective     Latrell Ojeda is a 86 y.o. female who presents for a subsequent annual wellness visit.     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  Had been onn  daily   • High blood pressure    • PAF (paroxysmal atrial fibrillation) (CMS/HCC)    • Renal insufficiency     change of cardiac meds and diurectic improved labs BUN eGFR   • UTI (urinary tract infection)      Past Surgical History:   Procedure Laterality Date   • CATARACT EXTRACTION, BILATERAL     • COLONOSCOPY     • ESOPHAGOSCOPY / EGD     • HYSTERECTOMY  1985   • LAPAROSCOPIC LIVER CYST FENESTRATION  1995    liquid liver cyst and aspirated at [facility]   • MASTECTOMY  2005     Allergies   Allergen Reactions   • Clindamycin    • Codeine GI intolerance   • Macrobid [Nitrofurantoin Monohyd/M-Cryst] Rash   • Oxycodone-Acetaminophen GI intolerance     Current Outpatient Medications   Medication Sig Dispense Refill   • amiodarone (PACERONE) 200 mg tablet Take 200 mg by mouth daily.       • calcium carbonate-vitamin D3 600 mg-10 mcg (400 unit) per tablet Take 1 tablet by  "mouth 2 (two) times a day.       • cholecalciferol, vitamin D3, 1,000 unit (25 mcg) tablet Take 1,000 Units by mouth daily.     • coenzyme Q10 (COQ10) 10 mg capsule Take 10 mg by mouth daily.       • hydrochlorothiazide (HYDRODIURIL) 25 mg tablet Take 25 mg by mouth daily.     • losartan (COZAAR) 50 mg tablet 50 mg as needed.     • lutein 6 mg capsule Take 6 mg by mouth daily.       • methenamine (HIPREX) 1 gram tablet Take 1 g by mouth 2 (two) times a day with meals. Patient taking 1/2 tab ,twice a day     • pantoprazole (PROTONIX) 40 mg EC tablet Take 1 tablet (40 mg total) by mouth every other day.     • red yeast rice 600 mg capsule Take 1 capsule by mouth 2 (two) times a day.       • XARELTO 20 mg tablet Take 20 mg by mouth every evening.         No current facility-administered medications for this visit.     Social History     Tobacco Use   • Smoking status: Never Smoker   • Smokeless tobacco: Never Used   Vaping Use   • Vaping Use: Never used   Substance Use Topics   • Alcohol use: Yes     Comment: very social   • Drug use: Never     Family History   Problem Relation Age of Onset   • Diabetes Biological Father    • Diabetes Biological Brother    • Multiple myeloma Biological Son        Objective   Vitals  Vitals:    06/14/22 1438   BP: 134/70   BP Location: Left upper arm   Patient Position: Sitting   Pulse: (!) 55   Temp: 36.7 °C (98.1 °F)   TempSrc: Oral   SpO2: 95%   Weight: 71.2 kg (157 lb)   Height: 1.651 m (5' 5\")     Body mass index is 26.13 kg/m².    Advanced Care Plan  Does patient have advance directive?: Yes                                     PHQ  Will the patient answer the depression questions?: Yes   Little interest or pleasure in doing things: Not at all   Feeling down, depressed, or hopeless: Not at all   Depression Risk: 0                                             Mini Cog  Completed: Yes  Score: 5  Result: Negative      Get Up and Go  Result: Pass    STEADI Falls Risk  One or more falls " in the last year: No           Has trouble stepping up onto a curb: No   Advised to use a cane or walker to get around safely: No   Often has to rush to the toilet: Yes   Feels unsteady when walking: No   Has lost some feeling in feet: No   Often feels sad or depressed: No   Steadies self on furniture while walking at home: No   Takes medication that makes him/her feel lightheaded or more tired than usual: No   Worried about falling: No   Takes medicine to sleep or improve mood: No   Needs to push with hands when rising from a chair: No   Falls screen completed: Yes     Hearing and Vision Screening  No exam data present  See HRA for relevant hearing screening response.    Assessment/Plan   Diagnoses and all orders for this visit:    Medicare annual wellness visit, subsequent (Primary)  Assessment & Plan:  Get DEXA scan in 11/2022        Gastroesophageal reflux disease without esophagitis  Assessment & Plan:  Improved  · Continue Protonix every other day       Leukocytosis, unspecified type  Assessment & Plan:  Recheck labs    Orders:  -     CBC and Differential; Future    Hyponatremia  Assessment & Plan:  Recheck labs    Orders:  -     Basic metabolic panel; Future    Post-menopausal  -     DEXA BONE DENSITY; Future    Essential hypertension  Assessment & Plan:  BP was running low and losartan DC'd  Stable without losartan  Continue HCTZ, metoprolol    Orders:  -     Basic metabolic panel; Future    Frequent UTI  Assessment & Plan:  Saw urology.  Cystoscopy normal  On Hiprex now      Other orders  -     pantoprazole (PROTONIX) 40 mg EC tablet; Take 1 tablet (40 mg total) by mouth every other day.      See Patient Instructions (the written plan) which was given to the patient for PPPS and health risk factors with interventions.

## 2022-06-14 NOTE — PROGRESS NOTES
Chief Complaint   Patient presents with   • Medicare Subsequent Annual Wellness Visit       Subjective      Patient ID: Latrell Ojeda is a 86 y.o. female.    HPI     GERD: Has improved with Protonix    HTN: Was running low.  Stopped losartan and stable.  Not taking it as needed but has not needed it    Leukocytosis: Noted on last labs but had UTI at that time    Hyponatremia: Noted on recent labs    Frequent UTI: Saw urology.  Now on Hip-Pedro.  Doing okay currently  The following have been reviewed and updated as appropriate in this visit:   Allergies  Meds  Problems          Review of Systems   Constitutional: Negative for chills and fever.   Cardiovascular: Negative for chest pain.   Gastrointestinal: Negative for abdominal pain.         Current Outpatient Medications:   •  amiodarone (PACERONE) 200 mg tablet, Take 200 mg by mouth daily.  , Disp: , Rfl:   •  calcium carbonate-vitamin D3 600 mg-10 mcg (400 unit) per tablet, Take 1 tablet by mouth 2 (two) times a day.  , Disp: , Rfl:   •  cholecalciferol, vitamin D3, 1,000 unit (25 mcg) tablet, Take 1,000 Units by mouth daily., Disp: , Rfl:   •  coenzyme Q10 (COQ10) 10 mg capsule, Take 10 mg by mouth daily.  , Disp: , Rfl:   •  hydrochlorothiazide (HYDRODIURIL) 25 mg tablet, Take 25 mg by mouth daily., Disp: , Rfl:   •  losartan (COZAAR) 50 mg tablet, 50 mg as needed., Disp: , Rfl:   •  lutein 6 mg capsule, Take 6 mg by mouth daily.  , Disp: , Rfl:   •  methenamine (HIPREX) 1 gram tablet, Take 1 g by mouth 2 (two) times a day with meals. Patient taking 1/2 tab ,twice a day, Disp: , Rfl:   •  pantoprazole (PROTONIX) 40 mg EC tablet, Take 1 tablet (40 mg total) by mouth every other day., Disp: , Rfl:   •  red yeast rice 600 mg capsule, Take 1 capsule by mouth 2 (two) times a day.  , Disp: , Rfl:   •  XARELTO 20 mg tablet, Take 20 mg by mouth every evening.  , Disp: , Rfl:     Objective     Vitals:    06/14/22 1438   BP: 134/70   BP Location: Left upper arm  "  Patient Position: Sitting   Pulse: (!) 55   Temp: 36.7 °C (98.1 °F)   TempSrc: Oral   SpO2: 95%   Weight: 71.2 kg (157 lb)   Height: 1.651 m (5' 5\")     Physical Exam  Vitals reviewed.   Constitutional:       General: She is not in acute distress.     Appearance: She is well-developed.   HENT:      Head: Normocephalic and atraumatic.   Eyes:      Conjunctiva/sclera: Conjunctivae normal.   Cardiovascular:      Rate and Rhythm: Normal rate and regular rhythm.   Pulmonary:      Effort: Pulmonary effort is normal. No respiratory distress.      Breath sounds: Normal breath sounds. No wheezing or rales.   Abdominal:      General: There is no distension.      Palpations: Abdomen is soft.      Tenderness: There is no abdominal tenderness. There is no guarding.   Neurological:      Mental Status: She is alert. Mental status is at baseline.   Psychiatric:         Mood and Affect: Mood normal.         Behavior: Behavior normal.           Assessment/Plan   Gastroesophageal reflux disease without esophagitis  Improved  · Continue Protonix every other day     Medicare annual wellness visit, subsequent  Get DEXA scan in 11/2022      Leukocytosis  Recheck labs    Hyponatremia  Recheck labs    Essential hypertension  BP was running low and losartan DC'd  Stable without losartan  Continue HCTZ, metoprolol    Frequent UTI  Saw urology.  Cystoscopy normal  On Hiprex now      Orders Placed This Encounter   Procedures   • DEXA BONE DENSITY   • CBC and Differential   • Basic metabolic panel     New Medications Ordered This Visit   Medications   • methenamine (HIPREX) 1 gram tablet     Sig: Take 1 g by mouth 2 (two) times a day with meals. Patient taking 1/2 tab ,twice a day   • pantoprazole (PROTONIX) 40 mg EC tablet     Sig: Take 1 tablet (40 mg total) by mouth every other day.       Return in about 4 months (around 10/14/2022).     SANDI BLACKBURN MD  "

## 2022-06-14 NOTE — PATIENT INSTRUCTIONS
Gastroesophageal reflux disease without esophagitis  Continue Protonix every other day     Medicare annual wellness visit, subsequent  Get DEXA scan in 11/2022      Leukocytosis  Recheck labs    Hyponatremia  Recheck labs    Return in about 4 months (around 10/14/2022).                          Your Personalized Prevention Plan Services (PPPS)    Preventive Services Checklist (Assumes Average Risk Unless Otherwise Noted):    Health Maintenance Topics with due status: Overdue       Topic Date Due    Medicare Annual Wellness Visit 12/14/2021    Annual Falls Risk Screening Never done     Health Maintenance Topics with due status: Not Due       Topic Last Completion Date    DTaP, Tdap, and Td Vaccines 04/03/2013    DEXA Scan 11/03/2020     Health Maintenance Topics with due status: Completed       Topic Last Completion Date    Pneumococcal (65 years and older) 02/05/2015    Zoster Vaccine 12/01/2019    Hepatitis C Screening 06/13/2021    Influenza Vaccine 10/01/2021    COVID-19 Vaccine 04/25/2022     Health Maintenance Topics with due status: Aged Out       Topic Date Due    Meningococcal ACWY Aged Out    HIB Vaccines Aged Out    IPV Vaccines Aged Out    HPV Vaccines Aged Out       You May Be Eligible for These Additional Preventive Services   (Assumes Average Risk Unless Otherwise Noted)  Diabetes Screening Any 1 risk factor: hypertension, dyslipidemia, obesity, high glucose; or Any 2 risk factors: >=66yo, overweight, family history diabetes (covered every 6 months)   Hepatitis C Screening Any 1 risk factor: 1) blood transfusion before 1992,   2) current or past injection drug use (annually for high risk; if born between 3889-6852, see above for status).   Vaccine: Hepatitis B As necessary if at-risk: hemophilia, ESRD, diabetes, living with individual infected with hep B, healthcare worker with frequent contact with blood/bodily fluids (series covered once)   Sexually Transmitted Diseases (STDs) As necessary  chlamydia, gonorrhea, syphilis, hepatitis B (covered annually)  HIV if any 1 risk factor present: 1) <16yo or >64yo and at increased risk or 2) 15-64yo and ask for it (covered annually)   Lung Cancer Screening Low dose chest CT if all three risk factors: 1) 50-78yo, 2) smoker or quit within last 15y, 3) >=20 pack years (covered annually).  No results found for this or any previous visit.       Cholesterol Screening Both risk factors: 1) >=19yo and 2)  increased risk coronary artery disease (covered every 5 years).     Breast Cancer Screening Covered once 35-40yo, annually >=41yo (if >=49yo, see above for status).         Health Risk Factors with Personalized Education:  ----------------------------------------------------------------------------------------------------------------------  Controlling Your Blood Pressure  Maintain a normal weight (body mass index between 18.5 and 24.9).  Eat more fruit, vegetables and low-fat dairy.  Eat less saturated fat and total fat.  Lower your sodium (salt) intake.  Try to stay under 1500 mg per day, but if you cannot get your intake to be that low, at least lower it by 1000 mg.  Stay active.  Try to get at least 90 to 150 minutes of exercise per week.  Try brisk walking, swimming, bicycling or dancing.  Limit alcohol intake.  When you do consume alcohol, drink no more than 1 drink per day.  If you have been prescribed medication, take it regularly and exactly as prescribed.  Let your PCP know if you have any problems or questions about your medication.  Check your blood pressure at home or at the store.  Write down your readings and share them with your PCP  ----------------------------------------------------------------------------------------------------------------------  Controlling Your Glucose (Sugar) Levels  Stress can raise your blood sugar.  Learn what causes your stress.  Learn to lower your stress by spending time with family and friends, exercising, deep breathing,  trying meditation or yoga, enjoying hobbies and getting enough rest.  Let your PCP know if you’re having problems limiting your stress.  Maintain a healthy weight by balancing your diet and exercise.  Choose foods that are lower in calories, saturated fat, trans fat, sugar and salt.  Eat foods with more fiber, like whole grain cereals, bread, crackers, rice or pasta.  Choose to eat fruit, vegetables, and low-fat or fat-free/skim dairy.  Reduce the portion size of your meals.  Make half of your meal vegetables and fruit, and divide the other half between lean protein and whole grains.  Drink water instead of juice and regular soda.  Spend at least some time being active on most days of the week.  Work to increase your muscle strength at least twice per week.  Try things like light weights, stretch bands, yoga, heavy gardening (digging, planting with tools) or push-ups  ----------------------------------------------------------------------------------------------------------------------  Controlling Your Cholesterol  Reduce the amount of saturated and trans fat in your diet.  Limit intake of red meat.  Consume only low-fat or non-fat/skim dairy.  Limit fried food.  Cook with vegetable oils.  Reduce your intake of sugary foods, sugary drinks and alcohol.  Eat a diet high in fruit, vegetables and whole grains.  Get protein from fish, poultry and a small portion of nuts.  Stay active.  Try to get at least 90 to 150 minutes of exercise per week.  Try brisk walking, swimming, bicycling or dancing.  Maintain a healthy weight by balancing your diet and exercise.  If you have been prescribed medication, take it regularly and exactly as prescribed. Let your PCP know if you have any problems or questions about your medication.  It’s important to know your cholesterol numbers.  When recommended by your PCP, get the cholesterol blood  test.  ----------------------------------------------------------------------------------------------------------------------  Maintaining Strong Bones  Try to get at least 90 to 150 minutes of weight-bearing exercise per week.  Ensure intake of at least 1200mg of calcium per day.  Eat foods high in calcium like milk and other dairy, green vegetables, fruit, canned fish with soft and edible bones, nuts, calcium-set tofu.  Some foods are calcium-fortified, like bread, cereal, fruit juices and mineral water.  Help your body make vitamin D by getting 10-15 minutes per day of sunlight.    Ensure intake of at least 600IU of vitamin D per day.  Eat foods high in vitamin D like oily fish (salmon, sardines, mackerel) and eggs.  Some foods are fortified with vitamin D, like dairy and cereals.  Avoid high amounts of caffeine and salt, since they can cause the body to loose calcium.  Limit alcohol intake, since it is associated with weaker bones and is associated with falls and fractures.  Limit intake of fizzy drinks.  ----------------------------------------------------------------------------------------------------------------------  Reducing Your Risk of Falls  Tell your PCP if any of your medications make you feel tired, dizzy, lightheaded or off-balance.  Maintain coordination, flexibility and balance by ensuring regular physical activity.  Limit alcohol intake to 1 drink per day.  Consider avoiding all alcohol intake.  Ensure good vision.  Visit an ophthalmologist or optometrist regularly for vision screening or to make sure your glasses / contact lens prescription is correct.  If you need glasses or contacts, wear them.  When you get new glasses or contacts, take time to get used to them.  Do not wear sunglasses or tinted lenses when indoors.  Ensure good hearing.  Have your hearing checked if you are having trouble hearing, or family and friends think you cannot hear them.  If you need a hearing aid, be sure it fits  well and wear it.  Get enough rest.  Ensure about 7-9 hours of sleep every day.  Get up slowly from your bed or chairs.  Do not start walking until you are sure you feel steady.  Wear non-skid, rubber-soled, low-heeled shoes.  Do not walk in socks, or in shoes and slippers with smooth soles.  If your PCP or therapist recommends using a cane or walker, use it regularly.  Make your home safer.  Increase lighting throughout the house, especially at the top and bottom of stairs.  Ensure lighting is easily turned on when getting up in the middle of the night.  Make sure there are two secure rails on all stairs.  Install grab bars in the bathtub / shower and near the toilet.  Consider using a shower chair and / or a hand-held shower.  Spread sand or salt on icy surfaces.  Beware of wet surfaces, which can be icy.  Tell your PCP if you have fallen.

## 2022-06-15 PROBLEM — R10.10 PAIN OF UPPER ABDOMEN: Status: RESOLVED | Noted: 2021-06-12 | Resolved: 2022-06-15

## 2022-06-15 PROBLEM — R31.9 HEMATURIA: Status: RESOLVED | Noted: 2021-06-17 | Resolved: 2022-06-15

## 2022-06-15 PROBLEM — M25.571 RIGHT ANKLE PAIN: Status: RESOLVED | Noted: 2021-08-11 | Resolved: 2022-06-15

## 2022-06-15 ASSESSMENT — ENCOUNTER SYMPTOMS
CHILLS: 0
ABDOMINAL PAIN: 0
FEVER: 0

## 2022-07-22 ENCOUNTER — APPOINTMENT (OUTPATIENT)
Dept: LAB | Age: 87
End: 2022-07-22
Attending: INTERNAL MEDICINE
Payer: MEDICARE

## 2022-07-22 DIAGNOSIS — I10 ESSENTIAL HYPERTENSION: ICD-10-CM

## 2022-07-22 DIAGNOSIS — E87.1 HYPONATREMIA: ICD-10-CM

## 2022-07-22 DIAGNOSIS — D72.829 LEUKOCYTOSIS, UNSPECIFIED TYPE: ICD-10-CM

## 2022-07-22 LAB
ANION GAP SERPL CALC-SCNC: 5 MEQ/L (ref 3–15)
BASOPHILS # BLD: 0.04 K/UL (ref 0.01–0.1)
BASOPHILS NFR BLD: 0.7 %
BUN SERPL-MCNC: 21 MG/DL (ref 8–20)
CALCIUM SERPL-MCNC: 8.7 MG/DL (ref 8.9–10.3)
CHLORIDE SERPL-SCNC: 98 MEQ/L (ref 98–109)
CO2 SERPL-SCNC: 30 MEQ/L (ref 22–32)
CREAT SERPL-MCNC: 1.1 MG/DL (ref 0.6–1.1)
DIFFERENTIAL METHOD BLD: ABNORMAL
EOSINOPHIL # BLD: 0.07 K/UL (ref 0.04–0.36)
EOSINOPHIL NFR BLD: 1.2 %
ERYTHROCYTE [DISTWIDTH] IN BLOOD BY AUTOMATED COUNT: 14.6 % (ref 11.7–14.4)
GFR SERPL CREATININE-BSD FRML MDRD: 47.2 ML/MIN/1.73M*2
GLUCOSE SERPL-MCNC: 111 MG/DL (ref 70–99)
HCT VFR BLDCO AUTO: 39.7 % (ref 35–45)
HGB BLD-MCNC: 12.5 G/DL (ref 11.8–15.7)
IMM GRANULOCYTES # BLD AUTO: 0.01 K/UL (ref 0–0.08)
IMM GRANULOCYTES NFR BLD AUTO: 0.2 %
LYMPHOCYTES # BLD: 0.96 K/UL (ref 1.2–3.5)
LYMPHOCYTES NFR BLD: 16.3 %
MCH RBC QN AUTO: 26.9 PG (ref 28–33.2)
MCHC RBC AUTO-ENTMCNC: 31.5 G/DL (ref 32.2–35.5)
MCV RBC AUTO: 85.4 FL (ref 83–98)
MONOCYTES # BLD: 0.44 K/UL (ref 0.28–0.8)
MONOCYTES NFR BLD: 7.5 %
NEUTROPHILS # BLD: 4.37 K/UL (ref 1.7–7)
NEUTS SEG NFR BLD: 74.1 %
NRBC BLD-RTO: 0 %
PDW BLD AUTO: 12.9 FL (ref 9.4–12.3)
PLATELET # BLD AUTO: 152 K/UL (ref 150–369)
POTASSIUM SERPL-SCNC: 4.2 MEQ/L (ref 3.6–5.1)
RBC # BLD AUTO: 4.65 M/UL (ref 3.93–5.22)
SODIUM SERPL-SCNC: 133 MEQ/L (ref 136–144)
WBC # BLD AUTO: 5.89 K/UL (ref 3.8–10.5)

## 2022-07-22 PROCEDURE — 80048 BASIC METABOLIC PNL TOTAL CA: CPT

## 2022-07-22 PROCEDURE — 85025 COMPLETE CBC W/AUTO DIFF WBC: CPT

## 2022-07-22 PROCEDURE — 36415 COLL VENOUS BLD VENIPUNCTURE: CPT

## 2022-07-25 ENCOUNTER — TELEPHONE (OUTPATIENT)
Dept: INTERNAL MEDICINE | Facility: CLINIC | Age: 87
End: 2022-07-25
Payer: MEDICARE

## 2022-07-25 RX ORDER — PANTOPRAZOLE SODIUM 40 MG/1
40 TABLET, DELAYED RELEASE ORAL EVERY OTHER DAY
Qty: 45 TABLET | Refills: 3 | Status: SHIPPED | OUTPATIENT
Start: 2022-07-25 | End: 2023-07-13

## 2022-07-25 NOTE — TELEPHONE ENCOUNTER
----- Message from Latrell Ojeda sent at 7/24/2022 10:31 PM EDT -----  Regarding: renew prescription  Hi Dr. Miller,  1. Please renew my prescription to Pantoprazole through [facility].  2. I took the blood test you have prescribed on Friday at another main line facility and not at [facility].  Thank you   Latrell

## 2022-07-26 ENCOUNTER — TELEPHONE (OUTPATIENT)
Dept: INTERNAL MEDICINE | Facility: CLINIC | Age: 87
End: 2022-07-26
Payer: MEDICARE

## 2022-07-26 NOTE — TELEPHONE ENCOUNTER
Patient that she tested positive for Covid this am.   She stated sxs are sore throat stuffy nose and slightly sluggish.   She will take OTC med for the sxs and stay quarantined for the 5 days,  And also  do contact tracing.

## 2022-10-20 ENCOUNTER — OFFICE VISIT (OUTPATIENT)
Dept: INTERNAL MEDICINE | Facility: CLINIC | Age: 87
End: 2022-10-20
Payer: MEDICARE

## 2022-10-20 VITALS
TEMPERATURE: 97.7 F | DIASTOLIC BLOOD PRESSURE: 58 MMHG | SYSTOLIC BLOOD PRESSURE: 128 MMHG | OXYGEN SATURATION: 97 % | BODY MASS INDEX: 28.72 KG/M2 | HEART RATE: 54 BPM | WEIGHT: 172.6 LBS

## 2022-10-20 DIAGNOSIS — J34.89 RHINORRHEA: ICD-10-CM

## 2022-10-20 DIAGNOSIS — Z11.59 ENCOUNTER FOR SCREENING FOR OTHER VIRAL DISEASES: ICD-10-CM

## 2022-10-20 DIAGNOSIS — I10 ESSENTIAL HYPERTENSION: Primary | ICD-10-CM

## 2022-10-20 DIAGNOSIS — R79.89 LFTS ABNORMAL: ICD-10-CM

## 2022-10-20 DIAGNOSIS — N18.32 CHRONIC KIDNEY DISEASE, STAGE 3B (CMS/HCC): ICD-10-CM

## 2022-10-20 DIAGNOSIS — N39.0 FREQUENT UTI: ICD-10-CM

## 2022-10-20 DIAGNOSIS — E87.1 HYPONATREMIA: ICD-10-CM

## 2022-10-20 DIAGNOSIS — R73.03 PREDIABETES: ICD-10-CM

## 2022-10-20 PROCEDURE — 90694 VACC AIIV4 NO PRSRV 0.5ML IM: CPT | Performed by: INTERNAL MEDICINE

## 2022-10-20 PROCEDURE — 99214 OFFICE O/P EST MOD 30 MIN: CPT | Mod: 25 | Performed by: INTERNAL MEDICINE

## 2022-10-20 PROCEDURE — G0008 ADMIN INFLUENZA VIRUS VAC: HCPCS | Performed by: INTERNAL MEDICINE

## 2022-10-20 RX ORDER — FLUTICASONE PROPIONATE 50 MCG
2 SPRAY, SUSPENSION (ML) NASAL DAILY
Qty: 16 G | Refills: 5 | Status: SHIPPED | OUTPATIENT
Start: 2022-10-20 | End: 2023-04-18 | Stop reason: ENTERED-IN-ERROR

## 2022-10-20 NOTE — PROGRESS NOTES
Chief Complaint   Patient presents with    Follow-up       Subjective      Patient ID: Latrell Ojeda is a 87 y.o. female.    HPI     HTN: Stable.  Continues off losartan    Abnormal LFTs: Noted to have abnormal LFTs on previous labs    Frequent UTIs: Seeing urology.  Currently on Hip-Pedro    Objective     Vitals:    10/20/22 1553   BP: (!) 128/58   BP Location: Left upper arm   Patient Position: Sitting   Pulse: (!) 54   Temp: 36.5 °C (97.7 °F)   TempSrc: Oral   SpO2: 97%   Weight: 78.3 kg (172 lb 9.6 oz)       The following have been reviewed and updated as appropriate in this visit:   Allergies  Meds  Problems        Review of Systems   Constitutional: Negative for chills and fever.   Cardiovascular: Negative for chest pain.   Gastrointestinal: Negative for abdominal pain.         Current Outpatient Medications:     amiodarone (PACERONE) 200 mg tablet, Take 200 mg by mouth daily.  , Disp: , Rfl:     calcium carbonate-vitamin D3 600 mg-10 mcg (400 unit) per tablet, Take 1 tablet by mouth 2 (two) times a day.  , Disp: , Rfl:     cholecalciferol, vitamin D3, 1,000 unit (25 mcg) tablet, Take 1,000 Units by mouth daily., Disp: , Rfl:     coenzyme Q10 (COQ10) 10 mg capsule, Take 10 mg by mouth daily.  , Disp: , Rfl:     fluticasone propionate (FLONASE) 50 mcg/actuation nasal spray, Administer 2 sprays into each nostril daily., Disp: 16 g, Rfl: 5    hydrochlorothiazide (HYDRODIURIL) 25 mg tablet, Take 25 mg by mouth daily., Disp: , Rfl:     lutein 6 mg capsule, Take 6 mg by mouth daily.  , Disp: , Rfl:     pantoprazole (PROTONIX) 40 mg EC tablet, Take 1 tablet (40 mg total) by mouth every other day., Disp: 45 tablet, Rfl: 3    red yeast rice 600 mg capsule, Take 1 capsule by mouth 2 (two) times a day.  , Disp: , Rfl:     XARELTO 20 mg tablet, Take 20 mg by mouth every evening.  , Disp: , Rfl:     methenamine (HIPREX) 1 gram tablet, Take 1 g by mouth 2 (two) times a day with meals. Patient taking 1/2  tab ,twice a day, Disp: , Rfl:     Physical Exam  Vitals reviewed.   Constitutional:       General: She is not in acute distress.     Appearance: She is well-developed.   HENT:      Head: Normocephalic and atraumatic.   Eyes:      Conjunctiva/sclera: Conjunctivae normal.   Cardiovascular:      Rate and Rhythm: Normal rate and regular rhythm.   Pulmonary:      Effort: Pulmonary effort is normal. No respiratory distress.      Breath sounds: Normal breath sounds. No wheezing or rales.   Abdominal:      General: There is no distension.      Palpations: Abdomen is soft.      Tenderness: There is no abdominal tenderness. There is no guarding.   Neurological:      Mental Status: She is alert. Mental status is at baseline.   Psychiatric:         Mood and Affect: Mood normal.         Behavior: Behavior normal.         Assessment/Plan   Essential hypertension  · Continue HCTZ, metoprolol    Frequent UTI  Currently on Hiprex  · Continue to follow up with urology    Hyponatremia  Mild.   Currently on HCTZ  · Continue same medication for now and check labs    LFTs abnormal  Asymptomatic  Currently on amiodarone  · Check labs    Rhinorrhea  Retry flonase    Chronic kidney disease, stage 3b (CMS/HCC)  Baseline CR 1.1  · Check labs      Orders Placed This Encounter   Procedures    Influenza vaccine Quad Adjuvanted 65 and Older (FluAd Quad)    CBC and Differential    Comprehensive metabolic panel    Hemoglobin A1c    Hepatitis C antibody    Ferritin    Iron and TIBC    TSH w reflex FT4    Hepatitis B core antibody, total    Hepatitis B surface antibody    Hepatitis B surface antigen    Hepatitis B core antibody, IgM     New Medications Ordered This Visit   Medications    fluticasone propionate (FLONASE) 50 mcg/actuation nasal spray     Sig: Administer 2 sprays into each nostril daily.     Dispense:  16 g     Refill:  5       Return in about 4 months (around 2/20/2023).     SANDI BLACKBURN MD

## 2022-10-20 NOTE — PATIENT INSTRUCTIONS
Essential hypertension  Continue HCTZ, metoprolol    Frequent UTI  Continue to follow up with urology    Hyponatremia  Mild.   Continue same medication for now    LFTs abnormal  Check labs    Rhinorrhea  Retry flonase    Return in about 4 months (around 2/20/2023).

## 2022-10-21 PROBLEM — L24.9 IRRITANT CONTACT DERMATITIS: Status: RESOLVED | Noted: 2021-05-07 | Resolved: 2022-10-21

## 2022-10-21 PROBLEM — R50.2 DRUG-INDUCED FEVER: Chronic | Status: RESOLVED | Noted: 2021-06-11 | Resolved: 2022-10-21

## 2022-10-21 PROBLEM — D72.829 LEUKOCYTOSIS: Status: RESOLVED | Noted: 2021-06-11 | Resolved: 2022-10-21

## 2022-10-21 ASSESSMENT — ENCOUNTER SYMPTOMS
FEVER: 0
CHILLS: 0
ABDOMINAL PAIN: 0

## 2022-11-02 ENCOUNTER — CLINICAL SUPPORT (OUTPATIENT)
Dept: INTERNAL MEDICINE | Facility: CLINIC | Age: 87
End: 2022-11-02
Payer: MEDICARE

## 2022-11-02 DIAGNOSIS — I10 ESSENTIAL HYPERTENSION: ICD-10-CM

## 2022-11-02 DIAGNOSIS — R79.89 LFTS ABNORMAL: ICD-10-CM

## 2022-11-02 DIAGNOSIS — R73.03 PREDIABETES: ICD-10-CM

## 2022-11-02 DIAGNOSIS — E87.1 HYPONATREMIA: ICD-10-CM

## 2022-11-02 DIAGNOSIS — Z11.59 ENCOUNTER FOR SCREENING FOR OTHER VIRAL DISEASES: ICD-10-CM

## 2022-11-02 LAB
ALBUMIN SERPL-MCNC: 3.5 G/DL (ref 3.4–5)
ALP SERPL-CCNC: 67 IU/L (ref 35–126)
ALT SERPL-CCNC: 67 IU/L (ref 11–54)
ANION GAP SERPL CALC-SCNC: 12 MEQ/L (ref 3–15)
AST SERPL-CCNC: 48 IU/L (ref 15–41)
BASOPHILS # BLD: 0.05 K/UL (ref 0.01–0.1)
BASOPHILS NFR BLD: 0.9 %
BILIRUB SERPL-MCNC: 1.7 MG/DL (ref 0.3–1.2)
BUN SERPL-MCNC: 20 MG/DL (ref 8–20)
CALCIUM SERPL-MCNC: 9.2 MG/DL (ref 8.9–10.3)
CHLORIDE SERPL-SCNC: 97 MEQ/L (ref 98–109)
CO2 SERPL-SCNC: 26 MEQ/L (ref 22–32)
CREAT SERPL-MCNC: 1.2 MG/DL (ref 0.6–1.1)
DIFFERENTIAL METHOD BLD: ABNORMAL
EOSINOPHIL # BLD: 0.05 K/UL (ref 0.04–0.36)
EOSINOPHIL NFR BLD: 0.9 %
ERYTHROCYTE [DISTWIDTH] IN BLOOD BY AUTOMATED COUNT: 15.2 % (ref 11.7–14.4)
EST. AVERAGE GLUCOSE BLD GHB EST-MCNC: 128 MG/DL
FERRITIN SERPL-MCNC: 20 NG/ML (ref 11–250)
GFR SERPL CREATININE-BSD FRML MDRD: 42.6 ML/MIN/1.73M*2
GLUCOSE SERPL-MCNC: 169 MG/DL (ref 70–99)
HBA1C MFR BLD HPLC: 6.1 %
HCT VFR BLDCO AUTO: 39.5 % (ref 35–45)
HGB BLD-MCNC: 12.8 G/DL (ref 11.8–15.7)
IMM GRANULOCYTES # BLD AUTO: 0.01 K/UL (ref 0–0.08)
IMM GRANULOCYTES NFR BLD AUTO: 0.2 %
IRON SATN MFR SERPL: 13 % (ref 15–45)
IRON SERPL-MCNC: 65 UG/DL (ref 35–150)
LYMPHOCYTES # BLD: 0.91 K/UL (ref 1.2–3.5)
LYMPHOCYTES NFR BLD: 15.9 %
MCH RBC QN AUTO: 28.1 PG (ref 28–33.2)
MCHC RBC AUTO-ENTMCNC: 32.4 G/DL (ref 32.2–35.5)
MCV RBC AUTO: 86.6 FL (ref 83–98)
MONOCYTES # BLD: 0.39 K/UL (ref 0.28–0.8)
MONOCYTES NFR BLD: 6.8 %
NEUTROPHILS # BLD: 4.31 K/UL (ref 1.7–7)
NEUTS SEG NFR BLD: 75.3 %
NRBC BLD-RTO: 0 %
PDW BLD AUTO: 13.6 FL (ref 9.4–12.3)
PLATELET # BLD AUTO: 165 K/UL (ref 150–369)
POTASSIUM SERPL-SCNC: 4.3 MEQ/L (ref 3.6–5.1)
PROT SERPL-MCNC: 6.3 G/DL (ref 6–8.2)
RBC # BLD AUTO: 4.56 M/UL (ref 3.93–5.22)
SODIUM SERPL-SCNC: 135 MEQ/L (ref 136–144)
TIBC SERPL-MCNC: 493 UG/DL (ref 270–460)
TSH SERPL DL<=0.05 MIU/L-ACNC: 4.61 MIU/L (ref 0.34–5.6)
UIBC SERPL-MCNC: 428 UG/DL (ref 180–360)
WBC # BLD AUTO: 5.72 K/UL (ref 3.8–10.5)

## 2022-11-02 PROCEDURE — 83036 HEMOGLOBIN GLYCOSYLATED A1C: CPT | Performed by: INTERNAL MEDICINE

## 2022-11-02 PROCEDURE — 86803 HEPATITIS C AB TEST: CPT | Performed by: INTERNAL MEDICINE

## 2022-11-02 PROCEDURE — 85025 COMPLETE CBC W/AUTO DIFF WBC: CPT | Performed by: INTERNAL MEDICINE

## 2022-11-02 PROCEDURE — 86705 HEP B CORE ANTIBODY IGM: CPT | Performed by: INTERNAL MEDICINE

## 2022-11-02 PROCEDURE — 86704 HEP B CORE ANTIBODY TOTAL: CPT | Performed by: INTERNAL MEDICINE

## 2022-11-02 PROCEDURE — 82728 ASSAY OF FERRITIN: CPT | Performed by: INTERNAL MEDICINE

## 2022-11-02 PROCEDURE — 84443 ASSAY THYROID STIM HORMONE: CPT | Performed by: INTERNAL MEDICINE

## 2022-11-02 PROCEDURE — 80053 COMPREHEN METABOLIC PANEL: CPT | Performed by: INTERNAL MEDICINE

## 2022-11-02 PROCEDURE — 87340 HEPATITIS B SURFACE AG IA: CPT | Performed by: INTERNAL MEDICINE

## 2022-11-02 PROCEDURE — 83540 ASSAY OF IRON: CPT | Performed by: INTERNAL MEDICINE

## 2022-11-02 PROCEDURE — 86706 HEP B SURFACE ANTIBODY: CPT | Performed by: INTERNAL MEDICINE

## 2022-11-02 PROCEDURE — 36415 COLL VENOUS BLD VENIPUNCTURE: CPT | Performed by: INTERNAL MEDICINE

## 2022-11-03 ENCOUNTER — TELEPHONE (OUTPATIENT)
Dept: INTERNAL MEDICINE | Facility: CLINIC | Age: 87
End: 2022-11-03
Payer: MEDICARE

## 2022-11-03 DIAGNOSIS — R79.89 LFTS ABNORMAL: Primary | ICD-10-CM

## 2022-11-03 LAB
HBV CORE AB SER QL: NONREACTIVE
HBV CORE IGM SER QL: NONREACTIVE
HBV SURFACE AB SER QL: NONREACTIVE
HBV SURFACE AG SER QL: NONREACTIVE
HCV AB SER QL: NONREACTIVE

## 2022-11-22 ENCOUNTER — TRANSCRIBE ORDERS (OUTPATIENT)
Dept: RADIOLOGY | Age: 87
End: 2022-11-22

## 2022-11-22 ENCOUNTER — HOSPITAL ENCOUNTER (OUTPATIENT)
Dept: RADIOLOGY | Age: 87
Discharge: HOME | End: 2022-11-22
Attending: INTERNAL MEDICINE
Payer: MEDICARE

## 2022-11-22 DIAGNOSIS — R06.02 SHORTNESS OF BREATH: Primary | ICD-10-CM

## 2022-11-22 DIAGNOSIS — R06.02 SHORTNESS OF BREATH: ICD-10-CM

## 2022-11-22 PROCEDURE — 71046 X-RAY EXAM CHEST 2 VIEWS: CPT

## 2022-11-29 ENCOUNTER — OFFICE VISIT (OUTPATIENT)
Dept: CARDIOLOGY | Facility: CLINIC | Age: 87
End: 2022-11-29
Payer: MEDICARE

## 2022-11-29 VITALS
BODY MASS INDEX: 26.16 KG/M2 | RESPIRATION RATE: 18 BRPM | SYSTOLIC BLOOD PRESSURE: 160 MMHG | WEIGHT: 157 LBS | HEART RATE: 68 BPM | OXYGEN SATURATION: 99 % | DIASTOLIC BLOOD PRESSURE: 70 MMHG | HEIGHT: 65 IN

## 2022-11-29 DIAGNOSIS — R60.0 LEG EDEMA: ICD-10-CM

## 2022-11-29 DIAGNOSIS — I10 ESSENTIAL HYPERTENSION: ICD-10-CM

## 2022-11-29 DIAGNOSIS — N18.32 CHRONIC KIDNEY DISEASE, STAGE 3B (CMS/HCC): Primary | ICD-10-CM

## 2022-11-29 PROCEDURE — 93000 ELECTROCARDIOGRAM COMPLETE: CPT | Performed by: INTERNAL MEDICINE

## 2022-11-29 PROCEDURE — 99213 OFFICE O/P EST LOW 20 MIN: CPT | Performed by: INTERNAL MEDICINE

## 2022-11-29 ASSESSMENT — CHADS2 SCORE
CHF: NO
AGE: 75+ (+2 PT.)
CHADS2 SCORE: 4
VASCULAR DISEASE: NO
SEX: FEMALE (+1PT.)
HYPERTENSION: YES (+1 PT.)
PRIOR STROKE OR TIA OR THROMBOEMBOLISM: NO
DIABETES: NO

## 2022-11-29 NOTE — PROGRESS NOTES
December 3, 2022      Patient: Latrell Ojeda   YOB: 1935   Date of Visit 11/29/2022   [facility]# [id number]   MRN# [id number]       REPORT TYPE: Office Letter    DATE OF SERVICE: 11/29/2022    SANDI BLACKBURN MD    Dear Sandi,    I met a very nice patient of yours named Latrell Ojeda in our cardiology outpatient offices today.  She was here for another opinion regarding management of her atrial fibrillation.  The details of her case are well summarized in your correspondence as well as in notes from Dr. Borges.  She has about a 2-year history of atrial fibrillation that was not responsive to the first attempts to control it with medication and she ended up on AMIODARONE 200 mg per day.  That medication has done a very good job suppressing her arrhythmia and her symptoms.  She has basically had no symptomatic recurrences of atrial fibrillation over the last several months.  Her other medicines have included HYDROCHLOROTHIAZIDE, PANTOPRAZOLE, and she is systemically anticoagulated with RIVAROXABAN 20 mg with dinner.    The current difficulty has arisen because on recent blood tests, she has had a mild elevation of her hepatic transaminases.  She has chronically elevated bilirubin.  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  Her alkaline phosphatase has never been elevated and her AST and ALT have become elevated with amiodarone, but never more than 2 times the upper limit of normal. The question is whether she could continue to use AMIODARONE; the medication has been temporarily suspended.  Dr. Borges had proposed an ablation procedure or some other intervention to control her atrial fibrillation if and when it recurs.    Ms. Ojeda, as I said, has been in good health.  From what I learned today, she has a history of hypertension, breast cancer, GERD, and she has had a hysterectomy.  She also has mild hyperlipidemia and she has had some  carotid plaque identified by carotid ultrasound, but she has not had stenosis requiring intervention.  She has a family history of CV disease in one brother who had a myocardial infarction, but she does not have a family history of atrial fibrillation.  She does not smoke or drink large amounts of caffeine or alcohol and had no other self-imposed risk factors.  Her general review of systems as well as the cardiovascular survey were otherwise negative.  She had been feeling palpitations as the cardinal symptom of her atrial fibrillation, but it also made her very upset and nervous, and she thought that she was probably dyspneic when she had her arrhythmia, but she has not had syncope or presyncope or any other malignant manifestations of the disease.  She told us about ALLERGIES TO A FEW MEDICATIONS INCLUDING CLINDAMYCIN, MACROBID, CODEINE, AND OXYCODONE.    On physical examination here, blood pressure was 150/70 with a heart rate of 68 beats per minute, her rhythm was regular without ectopic beats.  Her respiratory rate was 18 and she weighed 157 pounds.  Her lung fields were clear.  She did not have jugular venous distention or carotid bruits.  The cardiac examination was unremarkable.  She had normal heart tones and I did not hear any murmurs or gallops.  She had no abdominal findings including  hepatosplenomegaly or ascites, and she did not have any peripheral edema.  Her pulses were palpable. Her electrocardiogram showed sinus rhythm and it was a normal tracing including normal ECG intervals.  The QT interval corrected for heart rate on today's tracing was approximately 475 milliseconds with some broadening of the T-wave and notching compatible with persistent AMIODARONE effect.    I had a long conversation with Ms. Ojeda and her daughter.  I went back and looked very carefully at her liver enzymes and I did not see anything that looked malignant.  Transaminasemia is a usual complication of AMIODARONE use  and does not necessarily indicate hepatotoxicity, especially since her bilirubin has not risen and in fact was lower than it had been pre-dosing.  I suspect that she should be able to take AMIODARONE over the long-term, but in an abundance of caution, we made a decision today to use 100 mg per day in order to lessen the possibility of any toxicity.  So far, she has done very well with the drug with a negative chest x-ray just last week and normal thyroid tests, I suspect that this may be a very good drug for her over the long-term, but perhaps at a lower dose.    We decided that Ms. Ojeda will have another set of liver tests carried out in about 6 weeks and I will see her in my office in about 8 weeks for a reevaluation.  At that time, we will decide what to do next, and whether we should continue with the 100 mg dose.  The other issue will be her RIVAROXABAN dose.  Her creatinine clearance is a little less than 50 mL per minute and with her age, she might be a candidate for the use of a 15 mg dose.  This is also an issue since there may be an interaction between RIVAROXABAN and AMIODARONE.  We will address that issue when I see her in the office in 2 months and perhaps do a dose reduction at that time.  So far, she has tolerated the 20 mg dose well.    Ms. Ojeda and her daughter were highly satisfied with our formulation today.  They understand the rationale for our caution in this regard, but at the same time, she has had such a good response to AMIODARONE and has had a return of her quality of life, and so trying to maintain the medication seems like a reasonable strategy.  We are of course available to answer your questions over the next several weeks as we proceed.  Once everything is settled, we would like to get her back to full-time care with Dr. Borges.    I spent approximately 45 minutes with Ms. Ojeda's case this afternoon taking a history,  performing a physical examination, interpreting  her electrocardiogram, going through her medication list, reviewing her previous diagnostic and laboratory studies, discussing the situation in great depth with Ms. Ojeda and her family, answering their questions, making arrangements for a follow-up and more lab tests, and documenting all of the above.    It was a pleasure to meet Ms. Ojeda today.  It turns out, we have some mutual friends and acquaintances and also have a vacation home in the same area in the [facility].  I will update you at the time of her next visit, but please call if we can be of any other help. Thank you for your consideration.    Best regards,      Piyush Lynne MD    CC:Pepe Borges MD    DD: 11/29/2022 13:53  DT: 11/29/2022 14:47  Voice ID: [id number]/Report ID: [id number]  odilon

## 2023-01-30 ENCOUNTER — APPOINTMENT (OUTPATIENT)
Dept: LAB | Age: 88
End: 2023-01-30
Attending: INTERNAL MEDICINE
Payer: MEDICARE

## 2023-01-30 ENCOUNTER — TRANSCRIBE ORDERS (OUTPATIENT)
Dept: LAB | Age: 88
End: 2023-01-30

## 2023-01-30 DIAGNOSIS — I10 ESSENTIAL HYPERTENSION: ICD-10-CM

## 2023-01-30 DIAGNOSIS — N18.32 CHRONIC KIDNEY DISEASE, STAGE 3B (CMS/HCC): ICD-10-CM

## 2023-01-30 DIAGNOSIS — R06.02 SHORTNESS OF BREATH: Primary | ICD-10-CM

## 2023-01-30 DIAGNOSIS — R06.02 SHORTNESS OF BREATH: ICD-10-CM

## 2023-01-30 LAB
ALBUMIN SERPL-MCNC: 3.8 G/DL (ref 3.4–5)
ALBUMIN SERPL-MCNC: 3.8 G/DL (ref 3.4–5)
ALP SERPL-CCNC: 69 IU/L (ref 35–126)
ALP SERPL-CCNC: 69 IU/L (ref 35–126)
ALT SERPL-CCNC: 58 IU/L (ref 11–54)
ALT SERPL-CCNC: 58 IU/L (ref 11–54)
ANION GAP SERPL CALC-SCNC: 11 MEQ/L (ref 3–15)
AST SERPL-CCNC: 41 IU/L (ref 15–41)
AST SERPL-CCNC: 41 IU/L (ref 15–41)
BILIRUB DIRECT SERPL-MCNC: 0.3 MG/DL
BILIRUB SERPL-MCNC: 2 MG/DL (ref 0.3–1.2)
BILIRUB SERPL-MCNC: 2 MG/DL (ref 0.3–1.2)
BUN SERPL-MCNC: 28 MG/DL (ref 8–20)
CALCIUM SERPL-MCNC: 9.2 MG/DL (ref 8.9–10.3)
CHLORIDE SERPL-SCNC: 98 MEQ/L (ref 98–109)
CO2 SERPL-SCNC: 28 MEQ/L (ref 22–32)
CREAT SERPL-MCNC: 1.2 MG/DL (ref 0.6–1.1)
GFR SERPL CREATININE-BSD FRML MDRD: 42.6 ML/MIN/1.73M*2
GLUCOSE SERPL-MCNC: 153 MG/DL (ref 70–99)
POTASSIUM SERPL-SCNC: 4 MEQ/L (ref 3.6–5.1)
PROT SERPL-MCNC: 6.4 G/DL (ref 6–8.2)
PROT SERPL-MCNC: 6.4 G/DL (ref 6–8.2)
SODIUM SERPL-SCNC: 137 MEQ/L (ref 136–144)

## 2023-01-30 PROCEDURE — 82248 BILIRUBIN DIRECT: CPT

## 2023-01-30 PROCEDURE — 36415 COLL VENOUS BLD VENIPUNCTURE: CPT

## 2023-01-31 ENCOUNTER — TRANSCRIBE ORDERS (OUTPATIENT)
Dept: SCHEDULING | Age: 88
End: 2023-01-31

## 2023-01-31 DIAGNOSIS — R74.01 ELEVATION OF LEVELS OF LIVER TRANSAMINASE LEVELS: ICD-10-CM

## 2023-01-31 DIAGNOSIS — R10.13 EPIGASTRIC PAIN: Primary | ICD-10-CM

## 2023-02-07 ENCOUNTER — OFFICE VISIT (OUTPATIENT)
Dept: CARDIOLOGY | Facility: CLINIC | Age: 88
End: 2023-02-07
Payer: MEDICARE

## 2023-02-07 VITALS
RESPIRATION RATE: 16 BRPM | SYSTOLIC BLOOD PRESSURE: 124 MMHG | WEIGHT: 157 LBS | HEART RATE: 64 BPM | BODY MASS INDEX: 26.16 KG/M2 | OXYGEN SATURATION: 98 % | DIASTOLIC BLOOD PRESSURE: 78 MMHG | HEIGHT: 65 IN

## 2023-02-07 DIAGNOSIS — R00.2 PALPITATION: ICD-10-CM

## 2023-02-07 DIAGNOSIS — I10 ESSENTIAL HYPERTENSION: Primary | ICD-10-CM

## 2023-02-07 DIAGNOSIS — I48.0 PAROXYSMAL ATRIAL FIBRILLATION (CMS/HCC): ICD-10-CM

## 2023-02-07 DIAGNOSIS — R79.89 LFTS ABNORMAL: ICD-10-CM

## 2023-02-07 PROCEDURE — 99213 OFFICE O/P EST LOW 20 MIN: CPT | Performed by: INTERNAL MEDICINE

## 2023-02-07 PROCEDURE — 93000 ELECTROCARDIOGRAM COMPLETE: CPT | Performed by: INTERNAL MEDICINE

## 2023-02-07 NOTE — PROGRESS NOTES
February 9, 2023      Patient: Latrell Ojeda   YOB: 1935   Date of Visit 2/7/2023   [facility]# [id number]   MRN# [id number]       REPORT TYPE: Office Letter    DATE OF SERVICE: 02/07/2023    SANDI BLACKBURN MD    Dear Dr. Blackburn:    Latrell Ojeda was back in our cardiology outpatient offices today for a follow-up appointment.  As you will recall, we saw her in November and opined at that time that we did not think that AMIODARONE was causing hepatic toxicity.  She did have an elevation in transaminases that is not an uncommon occurrence with AMIODARONE dosing.  Nevertheless, we reduced the dose to 100 mg a day and she continued her other drugs that include VITAMINS, HYDROCHLOROTHIAZIDE, PANTOPRAZOLE and RIVAROXABAN for stroke prophylaxis.  She denied any other cardiovascular symptoms.  She occasionally has some light palpitations at nighttime, but they are fleeting and they do not lead to the same symptoms of arrhythmia that she had before the AMIODARONE was started.  I also noted today that her eGFR has fallen to less than 50 mL per minute.  Given her age, I would recommend a dose reduction of RIVAROXABAN to 15 mg to be used with her evening meal.  We went over the usual aspects of AMIODARONE surveillance including liver and thyroid tests every 6 months, a chest x-ray yearly and periodic eye examinations, again on an annual basis.  These components are necessary for AMIODARONE surveillance in addition to observing for any symptoms such as inordinate dyspnea.  She has had mild fatigue, but she has not been exercising on a regular basis and I reinforced the idea that regular exercise is probably a good idea.    On physical examination, blood pressure was 124/78 with a heart rate of 70 beats per minute, and her rhythm was regular without ectopic beats.  Respiratory rate was 18 and she weighed 157 pounds.  Her cardiopulmonary examination was unremarkable.  She had normal heart tones and no murmurs or  gallops on auscultation and her lung fields were clear.  The remainder of her general physical examination was noncontributory. Electrocardiogram showed sinus rhythm and it was a normal tracing.  QT interval was not prolonged, but she did have T-wave notching compatible with AMIODARONE effect.    Ms. Ojeda is stable from our perspective and so I did not change her medicines further.  She will require continued surveillance of her liver tests and I understand that she has been seeing a consultant for that purpose.  I was encouraged to see today that her transaminases if anything, had fallen since the last measurement in November, although her bilirubin remains out of normal range with a normal alkaline phosphatase.  The only change in her medicines as I said was reduction in her RIVAROXABAN dose to 15 mg with meals.  I recommended that Ms. Ojeda continue her follow-up care with you and with Dr. Borges.  There is no need for her to return to our offices unless there are specific issues or concerns with regard to her arrhythmia management.    I spent approximately 30 minutes with Ms. Ojeda's case today taking a history, performing a physical examination, interpreting her electrocardiogram, going through her medication list, addressing her recent liver panel and other laboratory studies, making some minor changes in her dosing regimen for RIVAROXABAN, answering a number of questions, suggesting follow=up and documenting all of the above.    It was my pleasure to meet Ms. Ojeda.  I suspect she will continue to do well, but please call if you have any questions or concerns or if we can be of any other assistance in her care. Thank you for your consideration.    Best regards,      Piyush Lynne MD    CC:CHRISTOPHER BORGES MD    DD: 02/07/2023 11:43  DT: 02/07/2023 12:18  Voice ID: [id number]/Report ID: [id number]  am

## 2023-02-16 ENCOUNTER — APPOINTMENT (OUTPATIENT)
Dept: LAB | Age: 88
End: 2023-02-16
Attending: INTERNAL MEDICINE
Payer: MEDICARE

## 2023-02-16 ENCOUNTER — TRANSCRIBE ORDERS (OUTPATIENT)
Dept: SCHEDULING | Age: 88
End: 2023-02-16

## 2023-02-16 ENCOUNTER — HOSPITAL ENCOUNTER (OUTPATIENT)
Dept: RADIOLOGY | Age: 88
Discharge: HOME | End: 2023-02-16
Attending: INTERNAL MEDICINE
Payer: MEDICARE

## 2023-02-16 DIAGNOSIS — R74.01 ELEVATION OF LEVELS OF LIVER TRANSAMINASE LEVELS: ICD-10-CM

## 2023-02-16 DIAGNOSIS — I48.91 UNSPECIFIED ATRIAL FIBRILLATION (CMS/HCC): ICD-10-CM

## 2023-02-16 DIAGNOSIS — R10.13 EPIGASTRIC PAIN: Primary | ICD-10-CM

## 2023-02-16 DIAGNOSIS — R10.13 EPIGASTRIC PAIN: ICD-10-CM

## 2023-02-16 LAB
CHOLEST SERPL-MCNC: 193 MG/DL
CK SERPL-CCNC: 56 U/L (ref 15–200)
EST. AVERAGE GLUCOSE BLD GHB EST-MCNC: 128 MG/DL
HBA1C MFR BLD HPLC: 6.1 %
PHOSPHATE SERPL-MCNC: 4.3 MG/DL (ref 2.4–4.7)

## 2023-02-16 PROCEDURE — 83036 HEMOGLOBIN GLYCOSYLATED A1C: CPT | Mod: GA

## 2023-02-16 PROCEDURE — 76705 ECHO EXAM OF ABDOMEN: CPT

## 2023-02-16 PROCEDURE — 82390 ASSAY OF CERULOPLASMIN: CPT

## 2023-02-16 PROCEDURE — 86039 ANTINUCLEAR ANTIBODIES (ANA): CPT

## 2023-02-16 PROCEDURE — 86381 MITOCHONDRIAL ANTIBODY EACH: CPT

## 2023-02-16 PROCEDURE — 86015 ACTIN ANTIBODY EACH: CPT

## 2023-02-16 PROCEDURE — 84100 ASSAY OF PHOSPHORUS: CPT

## 2023-02-16 PROCEDURE — 36415 COLL VENOUS BLD VENIPUNCTURE: CPT

## 2023-02-16 PROCEDURE — 86364 TISS TRNSGLTMNASE EA IG CLAS: CPT

## 2023-02-16 PROCEDURE — 82784 ASSAY IGA/IGD/IGG/IGM EACH: CPT

## 2023-02-16 PROCEDURE — 86235 NUCLEAR ANTIGEN ANTIBODY: CPT | Mod: 59

## 2023-02-16 PROCEDURE — 86038 ANTINUCLEAR ANTIBODIES: CPT

## 2023-02-16 PROCEDURE — 82465 ASSAY BLD/SERUM CHOLESTEROL: CPT

## 2023-02-16 PROCEDURE — 82550 ASSAY OF CK (CPK): CPT

## 2023-02-17 LAB
CERULOPLASMIN SERPL-MCNC: 21.3 MG/DL (ref 20–60)
SMOOTH MUSCLE AB SER QL IF: NEGATIVE

## 2023-02-20 ENCOUNTER — OFFICE VISIT (OUTPATIENT)
Dept: INTERNAL MEDICINE | Facility: CLINIC | Age: 88
End: 2023-02-20
Payer: MEDICARE

## 2023-02-20 VITALS
HEART RATE: 58 BPM | OXYGEN SATURATION: 96 % | SYSTOLIC BLOOD PRESSURE: 128 MMHG | DIASTOLIC BLOOD PRESSURE: 56 MMHG | TEMPERATURE: 98.2 F | RESPIRATION RATE: 18 BRPM | BODY MASS INDEX: 26.36 KG/M2 | WEIGHT: 158.4 LBS

## 2023-02-20 DIAGNOSIS — N18.32 CHRONIC KIDNEY DISEASE, STAGE 3B (CMS/HCC): ICD-10-CM

## 2023-02-20 DIAGNOSIS — R79.89 LFTS ABNORMAL: Primary | ICD-10-CM

## 2023-02-20 DIAGNOSIS — I48.0 PAROXYSMAL ATRIAL FIBRILLATION (CMS/HCC): ICD-10-CM

## 2023-02-20 DIAGNOSIS — M54.2 NECK PAIN: ICD-10-CM

## 2023-02-20 PROBLEM — M54.9 BACK PAIN: Status: ACTIVE | Noted: 2023-02-20

## 2023-02-20 LAB
ANA SER QL IA: POSITIVE
CENTROMERE B AB SER-ACNC: 13 AU/ML
DSDNA IGG SER-ACNC: 17 IU/ML
ENA JO1 AB SER-ACNC: 6 AU/ML
ENA RNP AB SER-ACNC: 73 AU/ML
ENA SCL70 AB SER-ACNC: 144 AU/ML
ENA SM AB SER-ACNC: 9 AU/ML
ENA SS-A IGG SER-ACNC: 7 AU/ML
ENA SS-B IGG SER-ACNC: 5 AU/ML
HISTONE IGG SER-ACNC: 23 AU/ML
MITOCHONDRIA AB SER QL IF: NEGATIVE

## 2023-02-20 PROCEDURE — 99214 OFFICE O/P EST MOD 30 MIN: CPT | Performed by: INTERNAL MEDICINE

## 2023-02-20 RX ORDER — AMIODARONE HYDROCHLORIDE 100 MG/1
50 TABLET ORAL NIGHTLY
COMMUNITY
End: 2023-08-16

## 2023-02-20 NOTE — PROGRESS NOTES
Chief Complaint   Patient presents with   • 4 month follow up      Patient reports some fatigue that comes and goes. Patient also reports having blood work done with GI doctor and an US of liver.        Subjective      Patient ID: Latrell Ojeda is a 87 y.o. female.    HPI     A-fib: Saw cardiology.  Amiodarone decreased to half tab.  Feels okay    Elevated LFTs: Saw GI last month again.  Additional labs, ultrasound ordered    Neck pain: Occurred over the last 3 to 4 days.  No radiation.  No numbness.    CKD: Stable        Objective     Vitals:    02/20/23 1515   BP: (!) 128/56   BP Location: Left upper arm   Patient Position: Sitting   Pulse: (!) 58   Resp: 18   Temp: 36.8 °C (98.2 °F)   TempSrc: Oral   SpO2: 96%   Weight: 71.8 kg (158 lb 6.4 oz)       The following have been reviewed and updated as appropriate in this visit:   Allergies  Meds  Problems        Review of Systems   Constitutional: Negative for chills and fever.   Cardiovascular: Negative for chest pain.   Gastrointestinal: Negative for abdominal pain.         Current Outpatient Medications:   •  amiodarone (PACERONE) 100 mg tablet, Take 100 mg by mouth daily., Disp: , Rfl:   •  calcium carbonate-vitamin D3 600 mg-10 mcg (400 unit) per tablet, Take 1 tablet by mouth 2 (two) times a day.  , Disp: , Rfl:   •  cholecalciferol, vitamin D3, 1,000 unit (25 mcg) tablet, Take 1,000 Units by mouth daily., Disp: , Rfl:   •  coenzyme Q10 (COQ10) 10 mg capsule, Take 10 mg by mouth daily.  , Disp: , Rfl:   •  fluticasone propionate (FLONASE) 50 mcg/actuation nasal spray, Administer 2 sprays into each nostril daily., Disp: 16 g, Rfl: 5  •  hydrochlorothiazide (HYDRODIURIL) 25 mg tablet, Take 25 mg by mouth daily., Disp: , Rfl:   •  lutein 6 mg capsule, Take 6 mg by mouth daily.  , Disp: , Rfl:   •  pantoprazole (PROTONIX) 40 mg EC tablet, Take 1 tablet (40 mg total) by mouth every other day., Disp: 45 tablet, Rfl: 3  •  red yeast rice 600 mg capsule, Take 1  capsule by mouth 2 (two) times a day.  , Disp: , Rfl:   •  rivaroxaban (XARELTO) 15 mg tablet, Take 1 tablet (15 mg total) by mouth daily with dinner., Disp: 90 tablet, Rfl: 1    Physical Exam  Vitals reviewed.   Constitutional:       General: She is not in acute distress.     Appearance: She is well-developed.   HENT:      Head: Normocephalic and atraumatic.   Eyes:      Conjunctiva/sclera: Conjunctivae normal.   Cardiovascular:      Rate and Rhythm: Normal rate and regular rhythm.   Pulmonary:      Effort: Pulmonary effort is normal. No respiratory distress.      Breath sounds: Normal breath sounds. No wheezing or rales.   Abdominal:      General: There is no distension.      Palpations: Abdomen is soft.      Tenderness: There is no abdominal tenderness. There is no guarding.   Neurological:      Mental Status: She is alert. Mental status is at baseline.   Psychiatric:         Mood and Affect: Mood normal.         Behavior: Behavior normal.         Assessment/Plan   LFTs abnormal  On reduced dose of amiodarone  Recent labs with improved LFTs  Additional labs, ultrasound ordered by GI  · Continue to follow with gastroenterology     Neck pain  Musculoskeletal  · Start physical therapy   · Call back if not improving  · Can try head pad or icy hot (menthol) patch     A-fib (CMS/HCC)  Saw cardiology, EP  On reduced dose of amiodarone  · Continue amiodarone, Xarelto    Chronic kidney disease, stage 3b (CMS/HCC)  Stable      Orders Placed This Encounter   Procedures   • Ambulatory referral to Physical Therapy     New Medications Ordered This Visit   Medications   • amiodarone (PACERONE) 100 mg tablet     Sig: Take 100 mg by mouth daily.       Return in about 4 months (around 6/20/2023).     SANDI BLACKBURN MD

## 2023-02-20 NOTE — PATIENT INSTRUCTIONS
LFTs abnormal  Continue to follow with gastroenterology     Neck pain  Start physical therapy   Call back if not improving  Can try head pad or icy hot (menthol) patch     Return in about 4 months (around 6/20/2023).

## 2023-02-20 NOTE — ASSESSMENT & PLAN NOTE
Musculoskeletal  · Start physical therapy   · Call back if not improving  · Can try head pad or icy hot (menthol) patch

## 2023-02-20 NOTE — ASSESSMENT & PLAN NOTE
On reduced dose of amiodarone  Recent labs with improved LFTs  Additional labs, ultrasound ordered by GI  · Continue to follow with gastroenterology

## 2023-02-21 PROBLEM — R53.83 OTHER FATIGUE: Status: RESOLVED | Noted: 2022-02-22 | Resolved: 2023-02-21

## 2023-02-21 PROBLEM — L65.9 HAIR LOSS: Status: RESOLVED | Noted: 2021-08-11 | Resolved: 2023-02-21

## 2023-02-21 PROBLEM — R50.9 FEVER OF UNKNOWN ORIGIN: Status: RESOLVED | Noted: 2021-06-12 | Resolved: 2023-02-21

## 2023-02-21 PROBLEM — R60.0 LEG EDEMA: Status: RESOLVED | Noted: 2022-02-22 | Resolved: 2023-02-21

## 2023-02-21 LAB
ANA PAT SER IF-IMP: ABNORMAL
ANA TITR SER HEP2 SUBST: ABNORMAL {TITER}

## 2023-02-21 ASSESSMENT — ENCOUNTER SYMPTOMS
FEVER: 0
CHILLS: 0
ABDOMINAL PAIN: 0

## 2023-02-23 LAB — TTG IGA SER IA-ACNC: 0.4 ELIA U/ML

## 2023-02-24 LAB — IGA SERPL-MCNC: 326 MG/DL (ref 84.5–499)

## 2023-02-27 ENCOUNTER — TELEPHONE (OUTPATIENT)
Dept: INTERNAL MEDICINE | Facility: CLINIC | Age: 88
End: 2023-02-27
Payer: MEDICARE

## 2023-02-27 NOTE — TELEPHONE ENCOUNTER
Patient called and wants to discuss a new situation that developed on her breast at your earliest convenience. She is quite concerned.    Please call on her cell phone.    Thank you

## 2023-02-27 NOTE — TELEPHONE ENCOUNTER
Spoke to pt. Gave [facility] info again. Advised to f/u in office here if unable to get appt soon with [facility]

## 2023-03-01 ENCOUNTER — TELEPHONE (OUTPATIENT)
Dept: SCHEDULING | Facility: CLINIC | Age: 88
End: 2023-03-01
Payer: MEDICARE

## 2023-03-01 NOTE — TELEPHONE ENCOUNTER
Sruthi -  called requests medical record to be re faxed, Sruthi states the pt is in with the doctor now    Sruthi can be reached at [phone]

## 2023-03-01 NOTE — TELEPHONE ENCOUNTER
Medical Records Request     Name of caller: Sruthi    Relationship to patient: office staff    Who’s requesting copy of medical records? Pepe Borges MD    Records being requested:  ECG with tracing     Fax number: [phone]    Best contact number: [phone]

## 2023-03-08 ENCOUNTER — TRANSCRIBE ORDERS (OUTPATIENT)
Dept: SCHEDULING | Age: 88
End: 2023-03-08

## 2023-03-08 DIAGNOSIS — N60.21 FIBROADENOSIS OF RIGHT BREAST: Primary | ICD-10-CM

## 2023-03-08 DIAGNOSIS — Z85.3 PERSONAL HISTORY OF MALIGNANT NEOPLASM OF BREAST: ICD-10-CM

## 2023-03-08 DIAGNOSIS — Z90.13 ACQUIRED ABSENCE OF BILATERAL BREASTS AND NIPPLES: ICD-10-CM

## 2023-03-20 ENCOUNTER — PATIENT OUTREACH (OUTPATIENT)
Dept: RADIOLOGY | Facility: HOSPITAL | Age: 88
End: 2023-03-20
Payer: MEDICARE

## 2023-03-20 ENCOUNTER — HOSPITAL ENCOUNTER (OUTPATIENT)
Dept: RADIOLOGY | Facility: HOSPITAL | Age: 88
Discharge: HOME | End: 2023-03-20
Payer: MEDICARE

## 2023-03-20 DIAGNOSIS — Z90.13 ACQUIRED ABSENCE OF BILATERAL BREASTS AND NIPPLES: ICD-10-CM

## 2023-03-20 DIAGNOSIS — Z85.3 PERSONAL HISTORY OF MALIGNANT NEOPLASM OF BREAST: ICD-10-CM

## 2023-03-20 DIAGNOSIS — N60.21 FIBROADENOSIS OF RIGHT BREAST: ICD-10-CM

## 2023-03-20 DIAGNOSIS — R92.8 ABNORMAL MAMMOGRAM: Primary | ICD-10-CM

## 2023-03-20 PROCEDURE — 76642 ULTRASOUND BREAST LIMITED: CPT | Mod: RT

## 2023-03-28 ENCOUNTER — OFFICE VISIT (OUTPATIENT)
Dept: SURGERY | Facility: CLINIC | Age: 88
End: 2023-03-28
Payer: MEDICARE

## 2023-03-28 VITALS
HEART RATE: 60 BPM | HEIGHT: 65 IN | TEMPERATURE: 97.8 F | DIASTOLIC BLOOD PRESSURE: 66 MMHG | WEIGHT: 155.8 LBS | SYSTOLIC BLOOD PRESSURE: 146 MMHG | BODY MASS INDEX: 25.96 KG/M2 | OXYGEN SATURATION: 94 %

## 2023-03-28 DIAGNOSIS — N63.11 LUMP IN UPPER OUTER QUADRANT OF RIGHT BREAST: Primary | ICD-10-CM

## 2023-03-28 DIAGNOSIS — Z85.3 PERSONAL HISTORY OF MALIGNANT NEOPLASM OF BREAST: ICD-10-CM

## 2023-03-28 PROCEDURE — 99213 OFFICE O/P EST LOW 20 MIN: CPT | Performed by: NURSE PRACTITIONER

## 2023-03-28 RX ORDER — DIMETHICONE 13 MG/ML
LOTION TOPICAL DAILY
COMMUNITY

## 2023-03-28 RX ORDER — LOSARTAN POTASSIUM 50 MG/1
50 TABLET ORAL AS NEEDED
COMMUNITY
End: 2023-03-28 | Stop reason: SDUPTHER

## 2023-03-28 RX ORDER — CHOLECALCIFEROL (VITAMIN D3) 125 MCG
CAPSULE ORAL DAILY
COMMUNITY
End: 2024-07-08

## 2023-03-28 RX ORDER — ASCORBIC ACID 500 MG
500 TABLET ORAL DAILY
COMMUNITY
End: 2023-08-16

## 2023-03-28 ASSESSMENT — ENCOUNTER SYMPTOMS
NECK PAIN: 1
PALPITATIONS: 1

## 2023-03-28 NOTE — PROGRESS NOTES
[facility]  HAMZAH Smith  [address], [address]  [place] [address]  Phone: [phone]  Fax: [phone]      Patient ID: Latrell Ojeda                              : 1935    Visit Date: 3/28/2023  Referring Provider: Leonila Ford*   PCP: Aris Miller MD  GYN: No care team member to display    Chief Complaint: Abnormal Breast Imaging      Latrell Ojeda is a 87 y.o.  female who presents for evaluation of abnormal breast imaging and palpable right breast mass.  She reports that she first noticed this new lump about 3 weeks ago.  She denies any changes to this area since she first noticed.  She reached out to her primary care physician who ordered imaging.  She completed a right breast ultrasound on 2023 which revealed a 0.9 x 1.0 x 0.4 cm slightly irregular hypoechoic solid-appearing mass at the 9 o'clock position 4 cm from the nipple for which biopsy is recommended.  She denies palpating any other masses/lumps, skin changes, or tenderness.    She has a personal history of bilateral breast cancer.  She reports that she had 2 lumpectomies in her left breast and just prior to radiation a third area was found at which time she decided to proceed with left mastectomy with reconstruction.  This was in .  She reports in  she was recommended to proceed with a right lumpectomy and ultimately decided to have a right mastectomy with reconstruction.  She reports that she has a saline implants and has not had any issues with her implants.  She reports she took tamoxifen for a total of 5 years after her last mastectomy and reconstruction.  She does not believe that she had genetic testing.     Review of Systems   Cardiovascular: Positive for palpitations.   Musculoskeletal: Positive for neck pain.   All other systems reviewed and are negative.         Breast Health:  Age at menarche: 16  Age at first live birth: 29   .   Age at menopause:  "51  Breastfeeding? no  HRT: yes, for about 5 years status post hysterectomy  Fertility Tx: no  OCP: no     Allergies: Clindamycin, Codeine, Macrobid [nitrofurantoin monohyd/m-cryst], and Oxycodone-acetaminophen    Current Medications: has a current medication list which includes the following prescription(s): amiodarone, ascorbic acid, biotin, calcium carbonate-vitamin d3, cholecalciferol (vitamin d3), coenzyme q10, cranberry extract, hydrochlorothiazide, losartan potassium, lutein, pantoprazole, red yeast rice, rivaroxaban, and fluticasone propionate.    Past Medical History:  has a past medical history of Breast cancer (CMS/HCC), Colon polyp, Epigastric abdominal pain, High blood pressure, PAF (paroxysmal atrial fibrillation) (CMS/HCC), Renal insufficiency, and UTI (urinary tract infection).    Past Surgical History:  has a past surgical history that includes Hysterectomy (1985); Mastectomy (2005); Laparoscopic liver cyst fenestration (1995); Colonoscopy; Esophagoscopy / EGD; Cataract extraction, bilateral; and Breast biopsy.    Social History:   Social History     Tobacco Use   • Smoking status: Never   • Smokeless tobacco: Never   Vaping Use   • Vaping status: Never Used   Substance Use Topics   • Alcohol use: Yes     Comment: very social   • Drug use: Never       Family History: family history includes Diabetes in her biological brother and biological father; Multiple myeloma in her biological son.        Physical Exam:    Vitals:   Visit Vitals  BP (!) 146/66   Pulse 60   Temp 36.6 °C (97.8 °F)   Ht 1.651 m (5' 5\")   Wt 70.7 kg (155 lb 12.8 oz)   SpO2 94%   BMI 25.93 kg/m²     Body mass index is 25.93 kg/m².    Physical Exam  Constitutional:       Appearance: Normal appearance.   HENT:      Head: Normocephalic.   Cardiovascular:      Rate and Rhythm: Normal rate and regular rhythm.   Pulmonary:      Effort: Pulmonary effort is normal.      Breath sounds: Normal breath sounds.   Chest:   Breasts:     Right: " Mass present. No skin change or tenderness.      Left: No mass, skin change or tenderness.          Comments: Bilateral nipples surgically absent  Abdominal:      Palpations: Abdomen is soft.   Musculoskeletal:         General: Normal range of motion.      Cervical back: Normal range of motion.   Lymphadenopathy:      Upper Body:      Right upper body: No supraclavicular or axillary adenopathy.      Left upper body: No supraclavicular or axillary adenopathy.   Skin:     General: Skin is warm.   Neurological:      General: No focal deficit present.      Mental Status: She is alert and oriented to person, place, and time.   Psychiatric:         Attention and Perception: Attention normal.         Mood and Affect: Mood and affect normal.         Speech: Speech normal.         Behavior: Behavior normal. Behavior is cooperative.       Physical Examination performed in the supine and sitting position  BREAST SIZE:medium   SYMMETRY yes       SKIN - Skin color, texture, turgor normal. No rashes or lesions Skin is without tethering, dimpling, retraction or increased vascularity  AREOLA - Surgically absent status post bilateral mastectomies    NIPPLES -  Surgically absent status post bilateral mastectomies  LYMPH NODES: infra, supra, cervical, parasternal and axillary nodes normal bilaterally  BREAST TISSUE: Right breast Replaced with implant with small nodule in the upper outer quadrant as noted above with discrete lesions. Left Breast  Replaced with implant without discrete lesions.     Breast Imaging: I have personally reviewed the reports and images as follows.  March 20, 2023 right breast ultrasound, BI-RADS 4.  1.0 cm solid-appearing mass at the 9 o'clock position of the reconstructed right breast corresponding to the new palpable lump.  Ultrasound-guided biopsy recommended.    Impression:  No problem-specific Assessment & Plan notes found for this encounter.  Right breast mass  Personal history of bilateral breast  cancer    Recommendation and Plan:   Latrell is a 87-year-old female who presents for evaluation of abnormal imaging and new palpable right breast mass. The films were reviewed personally in the office, and the options for management were discussed with the patient. These options include continued sonographic surveillance, fine needle aspiration, u/s guided vacuum assisted core biopsy or excisional biopsy. An u/s guided vacuum assisted core biopsy was recommended and the patient was comfortable with this option. The procedure, risks and benefits were discussed. Risks including bleeding, infection, hematoma, miss biopsy, bruising, discomfort, and numbness were discussed. We reviewed that on a rare occasion operative intervention is needed for persistent bleeding from the biopsy site. The patient was made aware of the marker that would be placed to lalito the site of the biopsy. The marker is made of titanium or titanium and bovine collagen. The patient is aware that this marker is used in the event that further surgical excision is recommended based on biopsy results. Potential indications for further surgical excision include cancer, precancerous lesions, indeterminate lesions and discordant biopsy results. All of her questions and concerns were addressed.  She is scheduled for ultrasound-guided core biopsy on March 30.      All of the questions were answered.  The patient is in agreement with the treatment plan.      No follow-ups on file.        3/28/2023   1:49 PM      HAMZAH Smith

## 2023-03-30 ENCOUNTER — HOSPITAL ENCOUNTER (OUTPATIENT)
Dept: RADIOLOGY | Facility: HOSPITAL | Age: 88
Discharge: HOME | End: 2023-03-30
Attending: NURSE PRACTITIONER
Payer: MEDICARE

## 2023-03-30 DIAGNOSIS — R92.8 ABNORMAL MAMMOGRAM: ICD-10-CM

## 2023-03-30 DIAGNOSIS — Z85.3 PERSONAL HISTORY OF MALIGNANT NEOPLASM OF BREAST: ICD-10-CM

## 2023-03-30 DIAGNOSIS — N63.11 LUMP IN UPPER OUTER QUADRANT OF RIGHT BREAST: ICD-10-CM

## 2023-03-30 PROCEDURE — 88361 TUMOR IMMUNOHISTOCHEM/COMPUT: CPT | Performed by: NURSE PRACTITIONER

## 2023-03-30 PROCEDURE — 27200000 US GUIDED BREAST BIOPSY RIGHT

## 2023-03-30 PROCEDURE — 36100345 US GUIDED BREAST BIOPSY RIGHT

## 2023-03-30 PROCEDURE — BH40ZZZ ULTRASONOGRAPHY OF RIGHT BREAST: ICD-10-PCS | Performed by: RADIOLOGY

## 2023-03-30 PROCEDURE — 0HBT3ZX EXCISION OF RIGHT BREAST, PERCUTANEOUS APPROACH, DIAGNOSTIC: ICD-10-PCS | Performed by: RADIOLOGY

## 2023-03-31 DIAGNOSIS — N63.11 LUMP IN UPPER OUTER QUADRANT OF RIGHT BREAST: Primary | ICD-10-CM

## 2023-03-31 DIAGNOSIS — C50.911 INVASIVE DUCTAL CARCINOMA OF BREAST, FEMALE, RIGHT (CMS/HCC): ICD-10-CM

## 2023-03-31 DIAGNOSIS — Z85.3 PERSONAL HISTORY OF MALIGNANT NEOPLASM OF BREAST: ICD-10-CM

## 2023-04-01 NOTE — POST-PROCEDURE NOTE
Immediate Breast Interventional Postprocedure Note    Latrell Ojeda     Attending: Dayana Ye MD    Assistant: None      Diagnosis: Breast Abnormality    Description of procedure: Imaging Guided Percutaneous Breast Biopsy     Laterality: Right    Anesthesia:  Local (Lidocaine)    Findings: Breast Abnormality    Complications: None    Estimated Blood Loss: Less than 100 ml    Specimens: Breast Tissue      3/31/2023 8:17 PM

## 2023-04-03 ENCOUNTER — TELEPHONE (OUTPATIENT)
Dept: SURGERY | Facility: CLINIC | Age: 88
End: 2023-04-03
Payer: MEDICARE

## 2023-04-03 LAB
CASE RPRT: NORMAL
CLINICAL INFO: NORMAL
PATH REPORT.ADDENDUM SPEC: NORMAL
PATH REPORT.FINAL DX SPEC: NORMAL
PATH REPORT.FINAL DX SPEC: NORMAL
PATH REPORT.GROSS SPEC: NORMAL

## 2023-04-06 ENCOUNTER — PATIENT OUTREACH (OUTPATIENT)
Dept: RADIOLOGY | Facility: HOSPITAL | Age: 88
End: 2023-04-06
Payer: MEDICARE

## 2023-04-10 ENCOUNTER — APPOINTMENT (OUTPATIENT)
Dept: LAB | Facility: HOSPITAL | Age: 88
End: 2023-04-10
Attending: NURSE PRACTITIONER
Payer: MEDICARE

## 2023-04-10 ENCOUNTER — CONSULT (OUTPATIENT)
Dept: SURGERY | Facility: CLINIC | Age: 88
End: 2023-04-10
Payer: MEDICARE

## 2023-04-10 VITALS
HEIGHT: 65 IN | SYSTOLIC BLOOD PRESSURE: 150 MMHG | TEMPERATURE: 97.4 F | DIASTOLIC BLOOD PRESSURE: 82 MMHG | BODY MASS INDEX: 25.86 KG/M2 | HEART RATE: 64 BPM | OXYGEN SATURATION: 97 % | WEIGHT: 155.2 LBS

## 2023-04-10 DIAGNOSIS — Z01.818 PRE-OP TESTING: Primary | ICD-10-CM

## 2023-04-10 DIAGNOSIS — C79.89 CHEST WALL RECURRENCE OF BREAST CANCER, RIGHT (CMS/HCC): Primary | ICD-10-CM

## 2023-04-10 DIAGNOSIS — Z01.818 PRE-OP TESTING: ICD-10-CM

## 2023-04-10 DIAGNOSIS — Z85.3 PERSONAL HISTORY OF MALIGNANT NEOPLASM OF BREAST: ICD-10-CM

## 2023-04-10 DIAGNOSIS — C50.911 CHEST WALL RECURRENCE OF BREAST CANCER, RIGHT (CMS/HCC): Primary | ICD-10-CM

## 2023-04-10 LAB
ALBUMIN SERPL-MCNC: 3.6 G/DL (ref 3.4–5)
ALP SERPL-CCNC: 67 IU/L (ref 35–126)
ALT SERPL-CCNC: 67 IU/L (ref 11–54)
ANION GAP SERPL CALC-SCNC: 10 MEQ/L (ref 3–15)
AST SERPL-CCNC: 61 IU/L (ref 15–41)
BASOPHILS # BLD: 0.05 K/UL (ref 0.01–0.1)
BASOPHILS NFR BLD: 0.8 %
BILIRUB SERPL-MCNC: 1.7 MG/DL (ref 0.3–1.2)
BUN SERPL-MCNC: 21 MG/DL (ref 8–20)
CALCIUM SERPL-MCNC: 9.1 MG/DL (ref 8.9–10.3)
CHLORIDE SERPL-SCNC: 100 MEQ/L (ref 98–109)
CO2 SERPL-SCNC: 29 MEQ/L (ref 22–32)
CREAT SERPL-MCNC: 1.2 MG/DL (ref 0.6–1.1)
DIFFERENTIAL METHOD BLD: ABNORMAL
EOSINOPHIL # BLD: 0.06 K/UL (ref 0.04–0.36)
EOSINOPHIL NFR BLD: 0.9 %
ERYTHROCYTE [DISTWIDTH] IN BLOOD BY AUTOMATED COUNT: 14.8 % (ref 11.7–14.4)
GFR SERPL CREATININE-BSD FRML MDRD: 42.6 ML/MIN/1.73M*2
GLUCOSE SERPL-MCNC: 81 MG/DL (ref 70–99)
HCT VFR BLDCO AUTO: 41.3 % (ref 35–45)
HGB BLD-MCNC: 13.5 G/DL (ref 11.8–15.7)
IMM GRANULOCYTES # BLD AUTO: 0.02 K/UL (ref 0–0.08)
IMM GRANULOCYTES NFR BLD AUTO: 0.3 %
LYMPHOCYTES # BLD: 0.98 K/UL (ref 1.2–3.5)
LYMPHOCYTES NFR BLD: 15 %
MCH RBC QN AUTO: 29.3 PG (ref 28–33.2)
MCHC RBC AUTO-ENTMCNC: 32.7 G/DL (ref 32.2–35.5)
MCV RBC AUTO: 89.6 FL (ref 83–98)
MONOCYTES # BLD: 0.6 K/UL (ref 0.28–0.8)
MONOCYTES NFR BLD: 9.2 %
NEUTROPHILS # BLD: 4.84 K/UL (ref 1.7–7)
NEUTS SEG NFR BLD: 73.8 %
NRBC BLD-RTO: 0 %
PDW BLD AUTO: 13.3 FL (ref 9.4–12.3)
PLATELET # BLD AUTO: 180 K/UL (ref 150–369)
POTASSIUM SERPL-SCNC: 4.1 MEQ/L (ref 3.6–5.1)
PROT SERPL-MCNC: 6.5 G/DL (ref 6–8.2)
RBC # BLD AUTO: 4.61 M/UL (ref 3.93–5.22)
SODIUM SERPL-SCNC: 139 MEQ/L (ref 136–144)
WBC # BLD AUTO: 6.55 K/UL (ref 3.8–10.5)

## 2023-04-10 PROCEDURE — 99215 OFFICE O/P EST HI 40 MIN: CPT | Performed by: SURGERY

## 2023-04-10 PROCEDURE — 36415 COLL VENOUS BLD VENIPUNCTURE: CPT

## 2023-04-10 PROCEDURE — 85025 COMPLETE CBC W/AUTO DIFF WBC: CPT

## 2023-04-10 PROCEDURE — 82310 ASSAY OF CALCIUM: CPT

## 2023-04-10 NOTE — PROGRESS NOTES
NEW PATIENT BREAST SURGICAL CONSULTATION    History of Present Illness      Latrell Ojeda is a 87 y.o. female with a personal history of bilateral breast cancer who presents for discussion of recently diagnosed right chest wall recurrence.  She had 2 lumpectomies in her left breast in 2006 and then ultimately proceeded with a mastectomy of the left breast with reconstruction when a third mass was found prior to radiation.  In 2009 she had right breast cancer and also had a mastectomy with reconstruction.  She has bilateral saline implants in place.  She took tamoxifen for 5 years after her last mastectomy with reconstruction.  She noted a new lump on the right reconstructed breast in March, and on March 20, 2023 she had a ultrasound which showed a 10 mm irregular hypoechoic solid mass at 9:00 4 cm from the nipple for which biopsy was recommended.  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  She reports she has not had genetic testing done previously.  Her [facility] care was at [facility].    Otherwise, the patient has no breast-specific or systemic complaints.    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  Had  been onn  daily   • High blood pressure    • PAF (paroxysmal atrial fibrillation) (CMS/HCC)    • Renal insufficiency     change of cardiac meds and diurectic improved labs BUN eGFR   • UTI (urinary tract infection)      Past Surgical History:  Past Surgical History:   Procedure Laterality Date   • BREAST BIOPSY      2 LEFT, 1 RIGHT   • CATARACT EXTRACTION, BILATERAL     • COLONOSCOPY     • ESOPHAGOSCOPY / EGD     • HYSTERECTOMY  1985   • LAPAROSCOPIC LIVER CYST FENESTRATION  1995    liquid liver cyst and aspirated at [facility]   • MASTECTOMY  2005    left 2006, right 2009     Social History:  Social History     Tobacco Use   • Smoking status: Never   • Smokeless tobacco: Never   Vaping Use   • Vaping status: Never Used   Substance Use Topics   • Alcohol use: Yes     Comment: very social   • Drug use: Never     Family History:  Family History   Problem Relation Age of Onset   • Diabetes Biological Father    • Diabetes Biological Brother    • Multiple myeloma Biological Son      Allergies:  Allergies   Allergen Reactions   • Clindamycin    • Codeine GI intolerance   • Macrobid [Nitrofurantoin Monohyd/M-Cryst] Rash   • Oxycodone-Acetaminophen GI intolerance     Medications:    Current Outpatient Medications:   •  amiodarone (PACERONE) 100 mg tablet, Take 100 mg by mouth daily., Disp: , Rfl:   •  ascorbic acid (VITAMIN C) 500 mg tablet, Take 500 mg by mouth daily., Disp: , Rfl:   •  biotin 5,000 mcg tablet,disintegrating, Take by mouth daily., Disp: , Rfl:   •  calcium carbonate-vitamin D3 600 mg-10 mcg (400 unit) per tablet, Take 1 tablet by mouth 2 (two) times a day.  , Disp: , Rfl:   •  cholecalciferol, vitamin D3, 1,000 unit (25 mcg) tablet, Take 1,000 Units by mouth daily., Disp: , Rfl:   •  coenzyme Q10 (COQ10) 10 mg capsule, Take 10 mg by mouth daily.  , Disp: , Rfl:   •  cranberry extract 425 mg capsule, Take by mouth daily., Disp: , Rfl:   •  hydrochlorothiazide (HYDRODIURIL) 25 mg tablet, Take 25 mg by  mouth daily., Disp: , Rfl:   •  LOSARTAN POTASSIUM, BULK, MISC, Take 50 mg by mouth as needed., Disp: , Rfl:   •  lutein 6 mg capsule, Take 6 mg by mouth daily.  , Disp: , Rfl:   •  pantoprazole (PROTONIX) 40 mg EC tablet, Take 1 tablet (40 mg total) by mouth every other day., Disp: 45 tablet, Rfl: 3  •  red yeast rice 600 mg capsule, Take 1 capsule by mouth 2 (two) times a day.  , Disp: , Rfl:   •  rivaroxaban (XARELTO) 15 mg tablet, Take 1 tablet (15 mg total) by mouth daily with dinner., Disp: 90 tablet, Rfl: 1  •  fluticasone propionate (FLONASE) 50 mcg/actuation nasal spray, Administer 2 sprays into each nostril daily. (Patient not taking: Reported on 3/28/2023), Disp: 16 g, Rfl: 5    Obstetric and Gynecologic History  Age at menarche: 16  Age at first live birth: 29   .   Age at menopause: 51  Breastfeeding? no  HRT: yes, for about 5 years after hysterectomy  Fertility Tx: no  OCP: no       Review of Systems:  Review of Systems   Cardiovascular: Positive for palpitations.   Musculoskeletal: Positive for neck pain.   All other systems reviewed and are negative.    Physical Examination:  Vitals:    04/10/23 1144   BP: (!) 150/82   Pulse: 64   Temp: 36.3 °C (97.4 °F)   SpO2: 97%     Physical Exam  Constitutional:       Appearance: Normal appearance.   HENT:      Head: Normocephalic and atraumatic.   Cardiovascular:      Rate and Rhythm: Normal rate and regular rhythm.      Heart sounds: Normal heart sounds.   Pulmonary:      Effort: Pulmonary effort is normal.      Breath sounds: Normal breath sounds.   Chest:      Comments: Status post bilateral mastectomy with implant reconstruction.  There is some ecchymosis on the right breast from recent biopsy, otherwise no skin changes noted.  On the right reconstructed breast at about 9:00 there is a 1 cm mass present which is firm and fixed to the surrounding tissue.  No additional masses noted on either reconstructed breast.  No axillary or supraclavicular  adenopathy present  Abdominal:      General: Abdomen is flat.      Palpations: Abdomen is soft.   Musculoskeletal:         General: Normal range of motion.   Lymphadenopathy:      Cervical: No cervical adenopathy.   Skin:     General: Skin is warm and dry.   Neurological:      General: No focal deficit present.      Mental Status: She is alert and oriented to person, place, and time.   Psychiatric:         Mood and Affect: Mood normal.         Behavior: Behavior normal.           Imaging Review:  IMAGING: I have personally reviewed the studies and their interpretation, which we rely upon.     Mammography Last 18 Months:      Outside Imaging Review:  No results found for this or any previous visit.    All Other Imaging:  ULTRASOUND GUIDED BREAST BIOPSY OR ASPIRATION RIGHT    Addendum Date: 3/31/2023 Addendum:   A.  Right breast mass, 9:00, biopsy (coil clip):             Invasive ductal carcinoma.              - Histologic grade 2 (of 3).              - Size: at least 8mm in the core. These malignant pathology results are concordant with the imaging findings. Follow-up breast surgical consultation recommended    Result Date: 3/31/2023  Narrative: CLINICAL HISTORY: The patient presents for ultrasound guided core needle biopsy of a right breast mass. The patient's prior imaging was reviewed. COMMENT: The procedure was explained to the patient, including benefits and alternatives. The risks, including but not limited to infection and bleeding, were reviewed, and the patient agreed to undergo the procedure, signing the consent form. The patient also consented to clip placement. Medication reconciliation and universal time out protocol, including confirmation of patient identity and laterality, were performed prior to beginning the procedure. Targeted ultrasound of right redemonstrates at 9:00 and 4 cm posterior to nipple a hypoechoic mass in the subcutaneous soft tissues, overlying the right breast implant, measuring 1.4  x 0.6 x 1.5 cm. This mass was targeted sonographically and an appropriate skin site was marked. The site was prepped and anesthetized with 1% lidocaine. Deep local anesthesia about the biopsy site was administered using using 1% lidocaine. A small incision was made in the breast through which a 14 gauge spring loaded core needle was placed into the targeted area under real-time ultrasound guidance, and multiple samples were obtained.  The target could be seen being sampled in real time. A coil shaped clip was placed at the site of the core biopsy. Hemostasis was achieved with manual compression. The final debriefing was performed.  The mammotomy incision was closed with Steri-Strips. The patient did not experience any immediate complications. Estimated blood loss is less than 100 cc. The patient was given complete instructions including post procedure care of the biopsy site, in written and verbal form and was to be seen in follow-up by Rebekah Jackson NP.  The material was sent to pathology for further evaluation. The report will be addended pending final pathology results.     Impression: IMPRESSION: Ultrasound guided core needle biopsy of right 9:00 mass with coil clip placement was performed successfully as described above.     ULTRASOUND BREAST LIMITED RIGHT    Addendum Date: 3/21/2023 Addendum:   Yimi NICHOLE Reading Room Coordinator [facility] spoke to Lisa in Dr.ROHINI RICHARD PATEL [facility] office and she confirmed receipt of the faxed report at 11:59 am on 3/21/23.    Result Date: 3/21/2023  Narrative: CLINICAL HISTORY: History of bilateral mastectomies for bilateral breast cancer with bilateral implant reconstruction. New palpable lump in the reconstructed right breast. COMMENT: Targeted ultrasound examination of the right breast was performed in the area of palpable concern as indicated by the patient. 9:00 position, 4 cm expected location of the nipple: 0.9 x 1.0 x 0.4 cm slightly irregular hypoechoic  solid-appearing mass for which biopsy is recommended. Imaging in the right axilla demonstrates no suspicious right axillary lymph nodes. --    Impression:  1.0 cm solid appearing mass at the 9:00 position of the reconstructed right breast corresponding to the new palpable lump. Ultrasound-guided biopsy recommended. This was discussed with the patient and the recommendation was given in writing. The patient was referred to our nurse coordinator who will assist  the patient in arranging the necessary followup consultations. FINAL ASSESSMENT BI-RAD CATEGORY 4 - SUSPICIOUS ABNORMALITY - BIOPSY SHOULD BE (B RIGHT)         PATHOLOGY:    I have personally reviewed the pathology report.    Pathology Tissue Exam: [id number]  Order: [id number]   Collected 3/30/2023 11:09      Status: Edited Result - FINAL      Visible to patient: Yes (not seen)      Dx: Abnormal mammogram     Specimen Information: Breast, Right; Tissue    1 Result Note      Component    Addendum   COMPUTER ASSISTED IMMUNOHISTOCHEMICAL ANALYSIS FOR ESTROGEN, PROGESTERONE, HER2 RECEPTORS AND Ki-67 IN INFILTRATING BREAST CARCINOMA        Immunohistochemical stains are performed on block A1     ESTROGEN RECEPTOR (CLONE SP1):     Positive. Nuclear staining is seen in 98% of infiltrating tumor cells                 Intensity of nuclear staining:  Strong                 PROGESTERONE RECEPTOR (CLONE IE2):        Positive. Nuclear staining is seen in 87% of infiltrating tumor cells                 Intensity of nuclear staining:  Moderate     HER2 (CLONE 4B5)     NEGATIVE (0)     Ki-67: 52 %     Less than 10%: Favorable.  10-20%: Borderline.  20% and higher: unfavorable.        COMMENT FOR ER/PgR, HER2 AND Ki-67:  Immunohistochemical stains are performed on formalin fixed, paraffin embedded tissue. Tissue is fixed in 10% neutral buffered formalin not less than 6 hours and not more than 72 hours.  Positive and negative controls are adequate. Internal control is:  positive  Cold Ischemia time is 23 seconds  Specimen is adequate for testing.  Interpretation of immunostains is performed in conjunction with the [employer] scanning system and [employer] software.     DEFINITION OF POSITIVE RESULTS FOR ER AND PgR:  Nuclear staining with any intensity, involving equal or greater than 1% of infiltrating tumor cells, is considered positive.     DEFINITION OF POSITIVE AND NEGATIVE RESULTS FOR HER2:     Staining Pattern Score HER2   Interpretation      No Membrane staining    0    Negative   Faint, partial staining of the membrane, >10%of invasive tumor cells  1+    Negative   Weak complete staining of the membrane, >10%of invasive tumor cells  Circumferential membrane IHC staining that is intense  but within =/<10% of tumor cells can be considered 2+ equivocal 2+          Indeterminate   Intense complete staining of the membrane, >10%of invasive tumor cells 3+    Positive      Product name: PATHWAY - TM HER2 (Clone 4B5); : [employer] FDA approved No: [id number]; Approval date: 11/28/2000     Reference:  Sarkis Elkins, Eduardo ACOSTA, et al : ASCO/CAP HER2 testing in breast cancer update.  Arch Pathol Lab Med 6205709;(9): doi:10.5858/arpa.9562-0404-SR  Dami Parra, Yessy TEE, et al: ASCO/CAP guideline recommendations for Immunohistochemical testing of estrogen and progesterone receptors in breast cancer. J Clin Oncol 28:2784-09624, 2010.  Nilam BINGHAM, Sarkis HENDERSON, Yessy TEE, et al. Estrogen and progesterone receptor testing in breast cancer: American Society of Clinical Oncology/College of American Pathologists guideline update Arch Pathol Lab Med (2020) 144 (5): 545-563.     The immunohistochemical test for Ki-67, estrogen and progesterone receptors used in this lab was developed and its performance characteristics determined by [employer].  It has not been cleared or approved by the U.S. Food and Drug Administration.  The  "FDA has determined that such clearance or approval is not necessary.  This test is used for clinical purposes. Immunohistochemical stains were performed at [facility], [address] [place]. Jaswinder Waggoner MD, Medical Director.      Addendum electronically signed by Lew Flores MD on 4/3/2023 at 1610   Clinical Information Right breast 9:00 mass coil clip    Final Diagnosis   A.  Right breast mass, 9:00, biopsy (coil clip):               Invasive ductal carcinoma.               - Histologic grade 2 (of 3).               - Size: at least 8mm in the core.              **ATTENTION PATIENTS**  **The findings in this report have been made available for review potentially before your provider has had a chance to review and discuss the results with you.  Please allow time for your provider to review your results.  If you have any questions or concerns about these results, please contact the healthcare provider who ordered the test.**      Electronically signed by Lew Flores MD on 3/31/2023 at 0956   Comment    The availability of this report in EPIC was communicated to Dr. Matthews and HAMZAH Smith on 3/31/2023 via EPIC secure chat.   Gross Description    The specimen is received in formalin labeled with the patient's name and \"right breast 9:00 mass coil clip, breast, right\".  It consists of a 0.4 cc aggregate of tan-red mottled to yellow cylindrical fibrofatty tissue fragments.  The specimen is submitted in toto in a mesh bag in cassette A1.  Cold Ischemia time: 23s        TATIANA Torre (ASCP)cm            Outside Slide Review:  No results found for this or any previous visit.    Outside Cytology Review:  No results found for this or any previous visit.    ----------------------------------------------    IMPRESSION:   Invasive ductal carcinoma, grade 2, ER positive, TN positive, HER2 negative, right breast, 1 cm in size.    RECOMMENDATIONS:     Latrell had been " contacted regarding her results by my nurse practitioner and I met with her and her daughter to discuss these results in detail. The pathology report was discussed with her in detail. The tumor is a(n) intermediate grade invasive ductal cancer.  The tumor was ER +, ME +, HER2 -.  This is considered a chest wall recurrence after previous treatment for breast cancer in 2009 with a mastectomy.  She is aware that the treatment of breast cancer can include multiple modalities including surgery, medical oncology and radiation oncology.    The surgical options were discussed with her in detail.  Because she has already had a mastectomy, and this represents a chest wall recurrence, surgical treatment will involve wide local resection of the tumor with clear surgical margins.  She is having an MRI done this week, which will help assess for extent of disease.  If the tumor is truly 1 cm in size, then I do not think she will need any revision of her implant at the time of surgery.  However, if the tumor is more extensive that it is possible her implant will need to be removed to help obtain clear surgical margins.  We reviewed that with wide excision, I will not know the status of her margins until the final pathology returns, and that there is a chance of needing a second surgery for additional margins.  She is already had lymph node sampling in 2009, and as her axillary ultrasound was negative, I did not recommend repeat lymph node sampling.  We reviewed that after wide local excision of the recurrence, chemotherapy may be necessary, but radiation would definitely be recommended to reduce the risk of future recurrences.  We reviewed briefly the side effects of radiation, and I have recommended she meet with radiation oncology after surgery to discuss this in more detail.    I reviewed with her that her tumor markers as noted above. She is aware of the possible need for chemotherapy which will is based on her tumor stage and  tumor biology. I also discussed the use of antiestrogen therapy to reduce the risk of recurrence. I have recommended she meet with a medical oncologist before surgery for discussion of chemotherapy and antiestrogen medication.  I have also ordered a PET/CT to evaluate for any evidence of distant metastatic disease.        She did meet NCCN guidelines for genetic testing and was referred to the genetic .      All of her questions and concerns were addressed.   The patient participated in shared decision making.  The patient is in agreement with the treatment plan.    Assuming her PET/CT is negative, she would like to proceed with surgery as soon as possible.  She will also need cardiac clearance.  She knows to call with any questions or concerns.      I spent 55 minutes on this date of service performing the following activities: obtaining history, performing examination, entering orders, documenting, preparing for visit, obtaining / reviewing records, providing counseling and education, independently reviewing study/studies, communicating results and coordinating care.             Peggy Matthews MD FACS

## 2023-04-11 ENCOUNTER — PREP FOR CASE (OUTPATIENT)
Dept: SURGERY | Facility: CLINIC | Age: 88
End: 2023-04-11
Payer: MEDICARE

## 2023-04-11 ENCOUNTER — TELEPHONE (OUTPATIENT)
Dept: GENETICS | Facility: HOSPITAL | Age: 88
End: 2023-04-11
Payer: MEDICARE

## 2023-04-11 PROBLEM — C79.89: Status: ACTIVE | Noted: 2023-04-11

## 2023-04-11 PROBLEM — C50.911: Status: ACTIVE | Noted: 2023-04-11

## 2023-04-11 RX ORDER — SODIUM CHLORIDE 9 MG/ML
INJECTION, SOLUTION INTRAVENOUS CONTINUOUS
Status: CANCELLED | OUTPATIENT
Start: 2023-04-11 | End: 2023-04-12

## 2023-04-11 RX ORDER — ACETAMINOPHEN 325 MG/1
975 TABLET ORAL
Status: CANCELLED | OUTPATIENT
Start: 2023-04-21

## 2023-04-11 ASSESSMENT — ENCOUNTER SYMPTOMS
NECK PAIN: 1
PALPITATIONS: 1

## 2023-04-11 NOTE — H&P (VIEW-ONLY)
NEW PATIENT BREAST SURGICAL CONSULTATION     History of Present Illness        Latrell Ojeda is a 87 y.o. female with a personal history of bilateral breast cancer who presents for discussion of recently diagnosed right chest wall recurrence.  She had 2 lumpectomies in her left breast in 2006 and then ultimately proceeded with a mastectomy of the left breast with reconstruction when a third mass was found prior to radiation.  In 2009 she had right breast cancer and also had a mastectomy with reconstruction.  She has bilateral saline implants in place.  She took tamoxifen for 5 years after her last mastectomy with reconstruction.  She noted a new lump on the right reconstructed breast in March, and on March 20, 2023 she had a ultrasound which showed a 10 mm irregular hypoechoic solid mass at 9:00 4 cm from the nipple for which biopsy was recommended.  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  She reports she has not had genetic testing done previously.  Her previous care was at [facility].     Otherwise, the patient has no breast-specific or systemic complaints.     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  Had been onn  daily   • High blood pressure     • PAF (paroxysmal atrial fibrillation) (CMS/HCC)     • Renal insufficiency       change of cardiac meds and diurectic improved labs BUN eGFR   • UTI (urinary tract infection)           Past Surgical History:  Surgical History         Past Surgical History:   Procedure Laterality Date   • BREAST BIOPSY         2 LEFT, 1 RIGHT   • CATARACT EXTRACTION, BILATERAL       • COLONOSCOPY       • ESOPHAGOSCOPY / EGD       • HYSTERECTOMY   1985   • LAPAROSCOPIC LIVER CYST FENESTRATION   1995     liquid liver cyst and aspirated at [facility]   • MASTECTOMY   2005     left 2006, right 2009         Social History:  Social History            Tobacco Use   • Smoking status: Never   • Smokeless tobacco: Never   Vaping Use   • Vaping status: Never Used   Substance Use Topics   • Alcohol use: Yes       Comment: very social   • Drug use: Never      Family History:        Family History   Problem Relation Age of Onset   • Diabetes Biological Father     • Diabetes Biological Brother     • Multiple myeloma Biological Son        Allergies:       Allergies   Allergen Reactions   • Clindamycin     • Codeine GI intolerance   • Macrobid [Nitrofurantoin Monohyd/M-Cryst] Rash   • Oxycodone-Acetaminophen GI intolerance      Medications:     Current Outpatient Medications:   •  amiodarone (PACERONE) 100 mg tablet, Take 100 mg by mouth daily., Disp: , Rfl:   •  ascorbic acid (VITAMIN C) 500 mg tablet, Take 500 mg by mouth daily., Disp: , Rfl:   •  biotin 5,000 mcg tablet,disintegrating, Take by mouth daily., Disp: , Rfl:   •  calcium carbonate-vitamin D3 600 mg-10 mcg (400 unit) per tablet, Take 1 tablet by mouth 2 (two) times a day.  , Disp: , Rfl:   •  cholecalciferol, vitamin D3, 1,000 unit (25 mcg) tablet, Take 1,000 Units by mouth daily., Disp: , Rfl:   •  coenzyme Q10 (COQ10) 10 mg capsule, Take 10 mg by mouth daily.  , Disp: , Rfl:   •  cranberry  extract 425 mg capsule, Take by mouth daily., Disp: , Rfl:   •  hydrochlorothiazide (HYDRODIURIL) 25 mg tablet, Take 25 mg by mouth daily., Disp: , Rfl:   •  LOSARTAN POTASSIUM, BULK, MISC, Take 50 mg by mouth as needed., Disp: , Rfl:   •  lutein 6 mg capsule, Take 6 mg by mouth daily.  , Disp: , Rfl:   •  pantoprazole (PROTONIX) 40 mg EC tablet, Take 1 tablet (40 mg total) by mouth every other day., Disp: 45 tablet, Rfl: 3  •  red yeast rice 600 mg capsule, Take 1 capsule by mouth 2 (two) times a day.  , Disp: , Rfl:   •  rivaroxaban (XARELTO) 15 mg tablet, Take 1 tablet (15 mg total) by mouth daily with dinner., Disp: 90 tablet, Rfl: 1  •  fluticasone propionate (FLONASE) 50 mcg/actuation nasal spray, Administer 2 sprays into each nostril daily. (Patient not taking: Reported on 3/28/2023), Disp: 16 g, Rfl: 5     Obstetric and Gynecologic History  Age at menarche: 16  Age at first live birth: 29   .   Age at menopause: 51  Breastfeeding? no  HRT: yes, for about 5 years after hysterectomy  Fertility Tx: no  OCP: no         Review of Systems:  Review of Systems   Cardiovascular: Positive for palpitations.   Musculoskeletal: Positive for neck pain.   All other systems reviewed and are negative.     Physical Examination:      Vitals:     04/10/23 1144   BP: (!) 150/82   Pulse: 64   Temp: 36.3 °C (97.4 °F)   SpO2: 97%      Physical Exam  Constitutional:       Appearance: Normal appearance.   HENT:      Head: Normocephalic and atraumatic.   Cardiovascular:      Rate and Rhythm: Normal rate and regular rhythm.      Heart sounds: Normal heart sounds.   Pulmonary:      Effort: Pulmonary effort is normal.      Breath sounds: Normal breath sounds.   Chest:      Comments: Status post bilateral mastectomy with implant reconstruction.  There is some ecchymosis on the right breast from recent biopsy, otherwise no skin changes noted.  On the right reconstructed breast at about 9:00 there is a 1 cm mass present which is firm  and fixed to the surrounding tissue.  No additional masses noted on either reconstructed breast.  No axillary or supraclavicular adenopathy present  Abdominal:      General: Abdomen is flat.      Palpations: Abdomen is soft.   Musculoskeletal:         General: Normal range of motion.   Lymphadenopathy:      Cervical: No cervical adenopathy.   Skin:     General: Skin is warm and dry.   Neurological:      General: No focal deficit present.      Mental Status: She is alert and oriented to person, place, and time.   Psychiatric:         Mood and Affect: Mood normal.         Behavior: Behavior normal.               Imaging Review:  IMAGING: I have personally reviewed the studies and their interpretation, which we rely upon.      Mammography Last 18 Months:        Outside Imaging Review:  No results found for this or any previous visit.     All Other Imaging:  ULTRASOUND GUIDED BREAST BIOPSY OR ASPIRATION RIGHT     Addendum Date: 3/31/2023 Addendum:   A.  Right breast mass, 9:00, biopsy (coil clip):             Invasive ductal carcinoma.              - Histologic grade 2 (of 3).              - Size: at least 8mm in the core. These malignant pathology results are concordant with the imaging findings. Follow-up breast surgical consultation recommended     Result Date: 3/31/2023  Narrative: CLINICAL HISTORY: The patient presents for ultrasound guided core needle biopsy of a right breast mass. The patient's prior imaging was reviewed. COMMENT: The procedure was explained to the patient, including benefits and alternatives. The risks, including but not limited to infection and bleeding, were reviewed, and the patient agreed to undergo the procedure, signing the consent form. The patient also consented to clip placement. Medication reconciliation and universal time out protocol, including confirmation of patient identity and laterality, were performed prior to beginning the procedure. Targeted ultrasound of right redemonstrates  at 9:00 and 4 cm posterior to nipple a hypoechoic mass in the subcutaneous soft tissues, overlying the right breast implant, measuring 1.4 x 0.6 x 1.5 cm. This mass was targeted sonographically and an appropriate skin site was marked. The site was prepped and anesthetized with 1% lidocaine. Deep local anesthesia about the biopsy site was administered using using 1% lidocaine. A small incision was made in the breast through which a 14 gauge spring loaded core needle was placed into the targeted area under real-time ultrasound guidance, and multiple samples were obtained.  The target could be seen being sampled in real time. A coil shaped clip was placed at the site of the core biopsy. Hemostasis was achieved with manual compression. The final debriefing was performed.  The mammotomy incision was closed with Steri-Strips. The patient did not experience any immediate complications. Estimated blood loss is less than 100 cc. The patient was given complete instructions including post procedure care of the biopsy site, in written and verbal form and was to be seen in follow-up by Rebekah Jackson NP.  The material was sent to pathology for further evaluation. The report will be addended pending final pathology results.      Impression: IMPRESSION: Ultrasound guided core needle biopsy of right 9:00 mass with coil clip placement was performed successfully as described above.      ULTRASOUND BREAST LIMITED RIGHT     Addendum Date: 3/21/2023 Addendum:   Yimi NICHOLE Reading Room Coordinator [facility] spoke to iLsa in Dr.ROHINI RICHARD PATEL [facility] office and she confirmed receipt of the faxed report at 11:59 am on 3/21/23.     Result Date: 3/21/2023  Narrative: CLINICAL HISTORY: History of bilateral mastectomies for bilateral breast cancer with bilateral implant reconstruction. New palpable lump in the reconstructed right breast. COMMENT: Targeted ultrasound examination of the right breast was performed in the area of palpable  concern as indicated by the patient. 9:00 position, 4 cm expected location of the nipple: 0.9 x 1.0 x 0.4 cm slightly irregular hypoechoic solid-appearing mass for which biopsy is recommended. Imaging in the right axilla demonstrates no suspicious right axillary lymph nodes. --     Impression:  1.0 cm solid appearing mass at the 9:00 position of the reconstructed right breast corresponding to the new palpable lump. Ultrasound-guided biopsy recommended. This was discussed with the patient and the recommendation was given in writing. The patient was referred to our nurse coordinator who will assist  the patient in arranging the necessary followup consultations. FINAL ASSESSMENT BI-RAD CATEGORY 4 - SUSPICIOUS ABNORMALITY - BIOPSY SHOULD BE (B RIGHT)            PATHOLOGY:    I have personally reviewed the pathology report.     Pathology Tissue Exam: [id number]  Order: [id number]   Collected 3/30/2023 11:09      Status: Edited Result - FINAL      Visible to patient: Yes (not seen)      Dx: Abnormal mammogram     Specimen Information: Breast, Right; Tissue    1 Result Note        Component     Addendum   COMPUTER ASSISTED IMMUNOHISTOCHEMICAL ANALYSIS FOR ESTROGEN, PROGESTERONE, HER2 RECEPTORS AND Ki-67 IN INFILTRATING BREAST CARCINOMA        Immunohistochemical stains are performed on block A1     ESTROGEN RECEPTOR (CLONE SP1):     Positive. Nuclear staining is seen in 98% of infiltrating tumor cells                 Intensity of nuclear staining:  Strong                 PROGESTERONE RECEPTOR (CLONE IE2):        Positive. Nuclear staining is seen in 87% of infiltrating tumor cells                 Intensity of nuclear staining:  Moderate     HER2 (CLONE 4B5)     NEGATIVE (0)     Ki-67: 52 %     Less than 10%: Favorable.  10-20%: Borderline.  20% and higher: unfavorable.        COMMENT FOR ER/PgR, HER2 AND Ki-67:  Immunohistochemical stains are performed on formalin fixed, paraffin embedded tissue. Tissue is fixed in 10%  neutral buffered formalin not less than 6 hours and not more than 72 hours.  Positive and negative controls are adequate. Internal control is: positive  Cold Ischemia time is 23 seconds  Specimen is adequate for testing.  Interpretation of immunostains is performed in conjunction with the [employer] scanning system and [employer] software.     DEFINITION OF POSITIVE RESULTS FOR ER AND PgR:  Nuclear staining with any intensity, involving equal or greater than 1% of infiltrating tumor cells, is considered positive.     DEFINITION OF POSITIVE AND NEGATIVE RESULTS FOR HER2:     Staining Pattern Score HER2   Interpretation      No Membrane staining    0    Negative   Faint, partial staining of the membrane, >10%of invasive tumor cells  1+    Negative   Weak complete staining of the membrane, >10%of invasive tumor cells  Circumferential membrane IHC staining that is intense  but within =/<10% of tumor cells can be considered 2+ equivocal 2+          Indeterminate   Intense complete staining of the membrane, >10%of invasive tumor cells 3+    Positive      Product name: PATHWAY - TM HER2 (Clone 4B5); : [employer] FDA approved No: [id number]; Approval date: 11/28/2000     Reference:  Sarkis Elkins, Eduardo ACOSTA, et al : ASCO/CAP HER2 testing in breast cancer update.  Arch Pathol Lab Med 4420186;(9): doi:10.5858/arpa.5414-8322-YO  Dami Parra, Yessy TEE, et al: ASCO/CAP guideline recommendations for Immunohistochemical testing of estrogen and progesterone receptors in breast cancer. J Clin Oncol 28:2784-22669, 2010.  Nilam BINGHAM, Sarkis HENDERSON, Yessy TEE, et al. Estrogen and progesterone receptor testing in breast cancer: American Society of Clinical Oncology/College of American Pathologists guideline update Arch Pathol Lab Med (2020) 144 (5): 545-563.     The immunohistochemical test for Ki-67, estrogen and progesterone receptors used in this lab was developed and its performance  "characteristics determined by [employer].  It has not been cleared or approved by the U.S. Food and Drug Administration.  The FDA has determined that such clearance or approval is not necessary.  This test is used for clinical purposes. Immunohistochemical stains were performed at [facility], [address] [place] [place], [place]. Jaswinder Waggoner MD, Medical Director.      Addendum electronically signed by Lew Flores MD on 4/3/2023 at 1610   Clinical Information Right breast 9:00 mass coil clip    Final Diagnosis   A.  Right breast mass, 9:00, biopsy (coil clip):               Invasive ductal carcinoma.               - Histologic grade 2 (of 3).               - Size: at least 8mm in the core.              **ATTENTION PATIENTS**  **The findings in this report have been made available for review potentially before your provider has had a chance to review and discuss the results with you.  Please allow time for your provider to review your results.  If you have any questions or concerns about these results, please contact the healthcare provider who ordered the test.**      Electronically signed by Lew Flores MD on 3/31/2023 at 0956   Comment     The availability of this report in EPIC was communicated to Dr. Matthews and HAMZAH Smith on 3/31/2023 via EPIC secure chat.   Gross Description     The specimen is received in formalin labeled with the patient's name and \"right breast 9:00 mass coil clip, breast, right\".  It consists of a 0.4 cc aggregate of tan-red mottled to yellow cylindrical fibrofatty tissue fragments.  The specimen is submitted in toto in a mesh bag in cassette A1.  Cold Ischemia time: 23s        TATIANA Torre (ASCP)cm               Outside Slide Review:  No results found for this or any previous visit.     Outside Cytology Review:  No results found for this or any previous visit.     ----------------------------------------------     IMPRESSION: "   Invasive ductal carcinoma, grade 2, ER positive, WV positive, HER2 negative, right breast, 1 cm in size.     RECOMMENDATIONS:     Latrell had been contacted regarding her results by my nurse practitioner and I met with her and her daughter to discuss these results in detail. The pathology report was discussed with her in detail. The tumor is a(n) intermediate grade invasive ductal cancer.  The tumor was ER +, WV +, HER2 -.  This is considered a chest wall recurrence after previous treatment for breast cancer in 2009 with a mastectomy.  She is aware that the treatment of breast cancer can include multiple modalities including surgery, medical oncology and radiation oncology.    The surgical options were discussed with her in detail.  Because she has already had a mastectomy, and this represents a chest wall recurrence, surgical treatment will involve wide local resection of the tumor with clear surgical margins.  She is having an MRI done this week, which will help assess for extent of disease.  If the tumor is truly 1 cm in size, then I do not think she will need any revision of her implant at the time of surgery.  However, if the tumor is more extensive that it is possible her implant will need to be removed to help obtain clear surgical margins.  We reviewed that with wide excision, I will not know the status of her margins until the final pathology returns, and that there is a chance of needing a second surgery for additional margins.  She is already had lymph node sampling in 2009, and as her axillary ultrasound was negative, I did not recommend repeat lymph node sampling.  We reviewed that after wide local excision of the recurrence, chemotherapy may be necessary, but radiation would definitely be recommended to reduce the risk of future recurrences.  We reviewed briefly the side effects of radiation, and I have recommended she meet with radiation oncology after surgery to discuss this in more detail.    I  reviewed with her that her tumor markers as noted above. She is aware of the possible need for chemotherapy which will is based on her tumor stage and tumor biology. I also discussed the use of antiestrogen therapy to reduce the risk of recurrence. I have recommended she meet with a medical oncologist before surgery for discussion of chemotherapy and antiestrogen medication.  I have also ordered a PET/CT to evaluate for any evidence of distant metastatic disease.          She did meet NCCN guidelines for genetic testing and was referred to the genetic .        All of her questions and concerns were addressed.   The patient participated in shared decision making.  The patient is in agreement with the treatment plan.    Assuming her PET/CT is negative, she would like to proceed with surgery as soon as possible.  She will also need cardiac clearance.  She knows to call with any questions or concerns.      I spent 55 minutes on this date of service performing the following activities: obtaining history, performing examination, entering orders, documenting, preparing for visit, obtaining / reviewing records, providing counseling and education, independently reviewing study/studies, communicating results and coordinating care.               Peggy Matthews MD FACS

## 2023-04-11 NOTE — TELEPHONE ENCOUNTER
I called Latrell Ojeda regarding a referral from Dr. Matthews for cancer genetic counseling. I gave an overview of our program and discussed appointment options. Latrell Ojeda requested 5/15 at [place] as she would have enough time to recover from her procedure. Latrell Ojeda will be seen at 11:00 and I directed her to register at outpatient registration.

## 2023-04-11 NOTE — H&P
NEW PATIENT BREAST SURGICAL CONSULTATION     History of Present Illness        Latrell Ojeda is a 87 y.o. female with a personal history of bilateral breast cancer who presents for discussion of recently diagnosed right chest wall recurrence.  She had 2 lumpectomies in her left breast in 2006 and then ultimately proceeded with a mastectomy of the left breast with reconstruction when a third mass was found prior to radiation.  In 2009 she had right breast cancer and also had a mastectomy with reconstruction.  She has bilateral saline implants in place.  She took tamoxifen for 5 years after her last mastectomy with reconstruction.  She noted a new lump on the right reconstructed breast in March, and on March 20, 2023 she had a ultrasound which showed a 10 mm irregular hypoechoic solid mass at 9:00 4 cm from the nipple for which biopsy was recommended.  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  She reports she has not had genetic testing done previously.  Her previous care was at [facility].     Otherwise, the patient has no breast-specific or systemic complaints.     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  Had been onn  daily   • High blood pressure     • PAF (paroxysmal atrial fibrillation) (CMS/HCC)     • Renal insufficiency       change of cardiac meds and diurectic improved labs BUN eGFR   • UTI (urinary tract infection)           Past Surgical History:  Surgical History         Past Surgical History:   Procedure Laterality Date   • BREAST BIOPSY         2 LEFT, 1 RIGHT   • CATARACT EXTRACTION, BILATERAL       • COLONOSCOPY       • ESOPHAGOSCOPY / EGD       • HYSTERECTOMY   1985   • LAPAROSCOPIC LIVER CYST FENESTRATION   1995     liquid liver cyst and aspirated at [facility]   • MASTECTOMY   2005     left 2006, right 2009         Social History:  Social History            Tobacco Use   • Smoking status: Never   • Smokeless tobacco: Never   Vaping Use   • Vaping status: Never Used   Substance Use Topics   • Alcohol use: Yes       Comment: very social   • Drug use: Never      Family History:        Family History   Problem Relation Age of Onset   • Diabetes Biological Father     • Diabetes Biological Brother     • Multiple myeloma Biological Son        Allergies:       Allergies   Allergen Reactions   • Clindamycin     • Codeine GI intolerance   • Macrobid [Nitrofurantoin Monohyd/M-Cryst] Rash   • Oxycodone-Acetaminophen GI intolerance      Medications:     Current Outpatient Medications:   •  amiodarone (PACERONE) 100 mg tablet, Take 100 mg by mouth daily., Disp: , Rfl:   •  ascorbic acid (VITAMIN C) 500 mg tablet, Take 500 mg by mouth daily., Disp: , Rfl:   •  biotin 5,000 mcg tablet,disintegrating, Take by mouth daily., Disp: , Rfl:   •  calcium carbonate-vitamin D3 600 mg-10 mcg (400 unit) per tablet, Take 1 tablet by mouth 2 (two) times a day.  , Disp: , Rfl:   •  cholecalciferol, vitamin D3, 1,000 unit (25 mcg) tablet, Take 1,000 Units by mouth daily., Disp: , Rfl:   •  coenzyme Q10 (COQ10) 10 mg capsule, Take 10 mg by mouth daily.  , Disp: , Rfl:   •  cranberry  extract 425 mg capsule, Take by mouth daily., Disp: , Rfl:   •  hydrochlorothiazide (HYDRODIURIL) 25 mg tablet, Take 25 mg by mouth daily., Disp: , Rfl:   •  LOSARTAN POTASSIUM, BULK, MISC, Take 50 mg by mouth as needed., Disp: , Rfl:   •  lutein 6 mg capsule, Take 6 mg by mouth daily.  , Disp: , Rfl:   •  pantoprazole (PROTONIX) 40 mg EC tablet, Take 1 tablet (40 mg total) by mouth every other day., Disp: 45 tablet, Rfl: 3  •  red yeast rice 600 mg capsule, Take 1 capsule by mouth 2 (two) times a day.  , Disp: , Rfl:   •  rivaroxaban (XARELTO) 15 mg tablet, Take 1 tablet (15 mg total) by mouth daily with dinner., Disp: 90 tablet, Rfl: 1  •  fluticasone propionate (FLONASE) 50 mcg/actuation nasal spray, Administer 2 sprays into each nostril daily. (Patient not taking: Reported on 3/28/2023), Disp: 16 g, Rfl: 5     Obstetric and Gynecologic History  Age at menarche: 16  Age at first live birth: 29   .   Age at menopause: 51  Breastfeeding? no  HRT: yes, for about 5 years after hysterectomy  Fertility Tx: no  OCP: no         Review of Systems:  Review of Systems   Cardiovascular: Positive for palpitations.   Musculoskeletal: Positive for neck pain.   All other systems reviewed and are negative.     Physical Examination:      Vitals:     04/10/23 1144   BP: (!) 150/82   Pulse: 64   Temp: 36.3 °C (97.4 °F)   SpO2: 97%      Physical Exam  Constitutional:       Appearance: Normal appearance.   HENT:      Head: Normocephalic and atraumatic.   Cardiovascular:      Rate and Rhythm: Normal rate and regular rhythm.      Heart sounds: Normal heart sounds.   Pulmonary:      Effort: Pulmonary effort is normal.      Breath sounds: Normal breath sounds.   Chest:      Comments: Status post bilateral mastectomy with implant reconstruction.  There is some ecchymosis on the right breast from recent biopsy, otherwise no skin changes noted.  On the right reconstructed breast at about 9:00 there is a 1 cm mass present which is firm  and fixed to the surrounding tissue.  No additional masses noted on either reconstructed breast.  No axillary or supraclavicular adenopathy present  Abdominal:      General: Abdomen is flat.      Palpations: Abdomen is soft.   Musculoskeletal:         General: Normal range of motion.   Lymphadenopathy:      Cervical: No cervical adenopathy.   Skin:     General: Skin is warm and dry.   Neurological:      General: No focal deficit present.      Mental Status: She is alert and oriented to person, place, and time.   Psychiatric:         Mood and Affect: Mood normal.         Behavior: Behavior normal.               Imaging Review:  IMAGING: I have personally reviewed the studies and their interpretation, which we rely upon.      Mammography Last 18 Months:        Outside Imaging Review:  No results found for this or any previous visit.     All Other Imaging:  ULTRASOUND GUIDED BREAST BIOPSY OR ASPIRATION RIGHT     Addendum Date: 3/31/2023 Addendum:   A.  Right breast mass, 9:00, biopsy (coil clip):             Invasive ductal carcinoma.              - Histologic grade 2 (of 3).              - Size: at least 8mm in the core. These malignant pathology results are concordant with the imaging findings. Follow-up breast surgical consultation recommended     Result Date: 3/31/2023  Narrative: CLINICAL HISTORY: The patient presents for ultrasound guided core needle biopsy of a right breast mass. The patient's prior imaging was reviewed. COMMENT: The procedure was explained to the patient, including benefits and alternatives. The risks, including but not limited to infection and bleeding, were reviewed, and the patient agreed to undergo the procedure, signing the consent form. The patient also consented to clip placement. Medication reconciliation and universal time out protocol, including confirmation of patient identity and laterality, were performed prior to beginning the procedure. Targeted ultrasound of right redemonstrates  at 9:00 and 4 cm posterior to nipple a hypoechoic mass in the subcutaneous soft tissues, overlying the right breast implant, measuring 1.4 x 0.6 x 1.5 cm. This mass was targeted sonographically and an appropriate skin site was marked. The site was prepped and anesthetized with 1% lidocaine. Deep local anesthesia about the biopsy site was administered using using 1% lidocaine. A small incision was made in the breast through which a 14 gauge spring loaded core needle was placed into the targeted area under real-time ultrasound guidance, and multiple samples were obtained.  The target could be seen being sampled in real time. A coil shaped clip was placed at the site of the core biopsy. Hemostasis was achieved with manual compression. The final debriefing was performed.  The mammotomy incision was closed with Steri-Strips. The patient did not experience any immediate complications. Estimated blood loss is less than 100 cc. The patient was given complete instructions including post procedure care of the biopsy site, in written and verbal form and was to be seen in follow-up by Rebekah Jackson NP.  The material was sent to pathology for further evaluation. The report will be addended pending final pathology results.      Impression: IMPRESSION: Ultrasound guided core needle biopsy of right 9:00 mass with coil clip placement was performed successfully as described above.      ULTRASOUND BREAST LIMITED RIGHT     Addendum Date: 3/21/2023 Addendum:   Yimi NICHOLE Reading Room Coordinator [facility] spoke to Lisa in Dr.ROHINI RICHARD PATEL [facility] office and she confirmed receipt of the faxed report at 11:59 am on 3/21/23.     Result Date: 3/21/2023  Narrative: CLINICAL HISTORY: History of bilateral mastectomies for bilateral breast cancer with bilateral implant reconstruction. New palpable lump in the reconstructed right breast. COMMENT: Targeted ultrasound examination of the right breast was performed in the area of palpable  concern as indicated by the patient. 9:00 position, 4 cm expected location of the nipple: 0.9 x 1.0 x 0.4 cm slightly irregular hypoechoic solid-appearing mass for which biopsy is recommended. Imaging in the right axilla demonstrates no suspicious right axillary lymph nodes. --     Impression:  1.0 cm solid appearing mass at the 9:00 position of the reconstructed right breast corresponding to the new palpable lump. Ultrasound-guided biopsy recommended. This was discussed with the patient and the recommendation was given in writing. The patient was referred to our nurse coordinator who will assist  the patient in arranging the necessary followup consultations. FINAL ASSESSMENT BI-RAD CATEGORY 4 - SUSPICIOUS ABNORMALITY - BIOPSY SHOULD BE (B RIGHT)            PATHOLOGY:    I have personally reviewed the pathology report.     Pathology Tissue Exam: [id number]  Order: [id number]   Collected 3/30/2023 11:09      Status: Edited Result - FINAL      Visible to patient: Yes (not seen)      Dx: Abnormal mammogram     Specimen Information: Breast, Right; Tissue    1 Result Note        Component     Addendum   COMPUTER ASSISTED IMMUNOHISTOCHEMICAL ANALYSIS FOR ESTROGEN, PROGESTERONE, HER2 RECEPTORS AND Ki-67 IN INFILTRATING BREAST CARCINOMA        Immunohistochemical stains are performed on block A1     ESTROGEN RECEPTOR (CLONE SP1):     Positive. Nuclear staining is seen in 98% of infiltrating tumor cells                 Intensity of nuclear staining:  Strong                 PROGESTERONE RECEPTOR (CLONE IE2):        Positive. Nuclear staining is seen in 87% of infiltrating tumor cells                 Intensity of nuclear staining:  Moderate     HER2 (CLONE 4B5)     NEGATIVE (0)     Ki-67: 52 %     Less than 10%: Favorable.  10-20%: Borderline.  20% and higher: unfavorable.        COMMENT FOR ER/PgR, HER2 AND Ki-67:  Immunohistochemical stains are performed on formalin fixed, paraffin embedded tissue. Tissue is fixed in 10%  neutral buffered formalin not less than 6 hours and not more than 72 hours.  Positive and negative controls are adequate. Internal control is: positive  Cold Ischemia time is 23 seconds  Specimen is adequate for testing.  Interpretation of immunostains is performed in conjunction with the [employer] scanning system and [employer] software.     DEFINITION OF POSITIVE RESULTS FOR ER AND PgR:  Nuclear staining with any intensity, involving equal or greater than 1% of infiltrating tumor cells, is considered positive.     DEFINITION OF POSITIVE AND NEGATIVE RESULTS FOR HER2:     Staining Pattern Score HER2   Interpretation      No Membrane staining    0    Negative   Faint, partial staining of the membrane, >10%of invasive tumor cells  1+    Negative   Weak complete staining of the membrane, >10%of invasive tumor cells  Circumferential membrane IHC staining that is intense  but within =/<10% of tumor cells can be considered 2+ equivocal 2+          Indeterminate   Intense complete staining of the membrane, >10%of invasive tumor cells 3+    Positive      Product name: PATHWAY - TM HER2 (Clone 4B5); : [employer] FDA approved No: [id number]; Approval date: 11/28/2000     Reference:  Sarkis Elkins, Eduardo ACOSTA, et al : ASCO/CAP HER2 testing in breast cancer update.  Arch Pathol Lab Med 5696953;(9): doi:10.5858/arpa.6595-3376-LZ  Dami Parra, Yessy TEE, et al: ASCO/CAP guideline recommendations for Immunohistochemical testing of estrogen and progesterone receptors in breast cancer. J Clin Oncol 28:2784-24404, 2010.  Nilam BINGHAM, Sarkis HENDERSON, Yessy TEE, et al. Estrogen and progesterone receptor testing in breast cancer: American Society of Clinical Oncology/College of American Pathologists guideline update Arch Pathol Lab Med (2020) 144 (5): 545-563.     The immunohistochemical test for Ki-67, estrogen and progesterone receptors used in this lab was developed and its performance  "characteristics determined by [facility].  It has not been cleared or approved by the U.S. Food and Drug Administration.  The FDA has determined that such clearance or approval is not necessary.  This test is used for clinical purposes. Immunohistochemical stains were performed at [facility], [address] [place], [place]. Jaswinder Waggoner MD, Medical Director.      Addendum electronically signed by Lew Flores MD on 4/3/2023 at 1610   Clinical Information Right breast 9:00 mass coil clip    Final Diagnosis   A.  Right breast mass, 9:00, biopsy (coil clip):               Invasive ductal carcinoma.               - Histologic grade 2 (of 3).               - Size: at least 8mm in the core.              **ATTENTION PATIENTS**  **The findings in this report have been made available for review potentially before your provider has had a chance to review and discuss the results with you.  Please allow time for your provider to review your results.  If you have any questions or concerns about these results, please contact the healthcare provider who ordered the test.**      Electronically signed by Lew Flores MD on 3/31/2023 at 0956   Comment     The availability of this report in EPIC was communicated to Dr. Matthews and HAMZAH Smith on 3/31/2023 via EPIC secure chat.   Gross Description     The specimen is received in formalin labeled with the patient's name and \"right breast 9:00 mass coil clip, breast, right\".  It consists of a 0.4 cc aggregate of tan-red mottled to yellow cylindrical fibrofatty tissue fragments.  The specimen is submitted in toto in a mesh bag in cassette A1.  Cold Ischemia time: 23s        TATIANA Torre (ASCP)cm               Outside Slide Review:  No results found for this or any previous visit.     Outside Cytology Review:  No results found for this or any previous visit.     ----------------------------------------------     IMPRESSION: "   Invasive ductal carcinoma, grade 2, ER positive, MA positive, HER2 negative, right breast, 1 cm in size.     RECOMMENDATIONS:     Latrell had been contacted regarding her results by my nurse practitioner and I met with her and her daughter to discuss these results in detail. The pathology report was discussed with her in detail. The tumor is a(n) intermediate grade invasive ductal cancer.  The tumor was ER +, MA +, HER2 -.  This is considered a chest wall recurrence after previous treatment for breast cancer in 2009 with a mastectomy.  She is aware that the treatment of breast cancer can include multiple modalities including surgery, medical oncology and radiation oncology.    The surgical options were discussed with her in detail.  Because she has already had a mastectomy, and this represents a chest wall recurrence, surgical treatment will involve wide local resection of the tumor with clear surgical margins.  She is having an MRI done this week, which will help assess for extent of disease.  If the tumor is truly 1 cm in size, then I do not think she will need any revision of her implant at the time of surgery.  However, if the tumor is more extensive that it is possible her implant will need to be removed to help obtain clear surgical margins.  We reviewed that with wide excision, I will not know the status of her margins until the final pathology returns, and that there is a chance of needing a second surgery for additional margins.  She is already had lymph node sampling in 2009, and as her axillary ultrasound was negative, I did not recommend repeat lymph node sampling.  We reviewed that after wide local excision of the recurrence, chemotherapy may be necessary, but radiation would definitely be recommended to reduce the risk of future recurrences.  We reviewed briefly the side effects of radiation, and I have recommended she meet with radiation oncology after surgery to discuss this in more detail.    I  reviewed with her that her tumor markers as noted above. She is aware of the possible need for chemotherapy which will is based on her tumor stage and tumor biology. I also discussed the use of antiestrogen therapy to reduce the risk of recurrence. I have recommended she meet with a medical oncologist before surgery for discussion of chemotherapy and antiestrogen medication.  I have also ordered a PET/CT to evaluate for any evidence of distant metastatic disease.          She did meet NCCN guidelines for genetic testing and was referred to the genetic .        All of her questions and concerns were addressed.   The patient participated in shared decision making.  The patient is in agreement with the treatment plan.    Assuming her PET/CT is negative, she would like to proceed with surgery as soon as possible.  She will also need cardiac clearance.  She knows to call with any questions or concerns.      I spent 55 minutes on this date of service performing the following activities: obtaining history, performing examination, entering orders, documenting, preparing for visit, obtaining / reviewing records, providing counseling and education, independently reviewing study/studies, communicating results and coordinating care.               Peggy Matthews MD FACS

## 2023-04-12 ENCOUNTER — TELEPHONE (OUTPATIENT)
Dept: SURGERY | Facility: CLINIC | Age: 88
End: 2023-04-12
Payer: MEDICARE

## 2023-04-12 NOTE — TELEPHONE ENCOUNTER
Per 's request to obtain 2009 R breast cancer records , I faxed medical request to [facility]'s HIM Dpt .    Fax # [phone]

## 2023-04-13 ENCOUNTER — HOSPITAL ENCOUNTER (OUTPATIENT)
Dept: RADIOLOGY | Facility: HOSPITAL | Age: 88
Discharge: HOME | End: 2023-04-13
Attending: NURSE PRACTITIONER
Payer: MEDICARE

## 2023-04-13 DIAGNOSIS — N63.11 LUMP IN UPPER OUTER QUADRANT OF RIGHT BREAST: ICD-10-CM

## 2023-04-13 DIAGNOSIS — C50.911 INVASIVE DUCTAL CARCINOMA OF BREAST, FEMALE, RIGHT (CMS/HCC): ICD-10-CM

## 2023-04-13 DIAGNOSIS — Z85.3 PERSONAL HISTORY OF MALIGNANT NEOPLASM OF BREAST: ICD-10-CM

## 2023-04-13 RX ORDER — GADOBUTROL 604.72 MG/ML
0.1 INJECTION INTRAVENOUS ONCE
Status: COMPLETED | OUTPATIENT
Start: 2023-04-13 | End: 2023-04-13

## 2023-04-13 RX ADMIN — GADOBUTROL 6.9 ML: 604.72 INJECTION INTRAVENOUS at 16:18

## 2023-04-14 ENCOUNTER — HOSPITAL ENCOUNTER (OUTPATIENT)
Dept: RADIOLOGY | Facility: CLINIC | Age: 88
Discharge: HOME | End: 2023-04-14
Attending: SURGERY
Payer: MEDICARE

## 2023-04-14 VITALS — HEIGHT: 65 IN | BODY MASS INDEX: 25.29 KG/M2

## 2023-04-14 DIAGNOSIS — C79.89 CHEST WALL RECURRENCE OF BREAST CANCER, RIGHT (CMS/HCC): ICD-10-CM

## 2023-04-14 DIAGNOSIS — C50.911 CHEST WALL RECURRENCE OF BREAST CANCER, RIGHT (CMS/HCC): ICD-10-CM

## 2023-04-14 RX ORDER — FLUDEOXYGLUCOSE F18 300 MCI/ML
9.02 INJECTION INTRAVENOUS ONCE
Status: COMPLETED | OUTPATIENT
Start: 2023-04-14 | End: 2023-04-14

## 2023-04-14 RX ADMIN — FLUDEOXYGLUCOSE F18 9.02 MILLICURIE: 300 INJECTION INTRAVENOUS at 11:49

## 2023-04-17 ENCOUNTER — APPOINTMENT (OUTPATIENT)
Dept: PREADMISSION TESTING | Facility: HOSPITAL | Age: 88
End: 2023-04-17
Payer: MEDICARE

## 2023-04-18 ENCOUNTER — PATIENT OUTREACH (OUTPATIENT)
Dept: RADIOLOGY | Facility: HOSPITAL | Age: 88
End: 2023-04-18
Payer: MEDICARE

## 2023-04-18 VITALS — WEIGHT: 152 LBS | BODY MASS INDEX: 25.33 KG/M2 | HEIGHT: 65 IN

## 2023-04-18 ASSESSMENT — PAIN SCALES - GENERAL: PAINLEVEL: 0-NO PAIN

## 2023-04-18 NOTE — PRE-PROCEDURE INSTRUCTIONS
1. Admissions will call you with your arrival time on 4/20/23 (day prior to surgery) between 2pm-4pm.  For questions about your arrival time, please call [phone].     2. On the day of your procedure please report to the [facility] in the [place]. Please arrive through the [place] [facility] Entrance.  If you are parking in the [facility]ing Garage, come to the ground floor of the garage and follow signs to the [facility].  After being screened, please report to either the Outpatient Registration for Pre-Admission Testing or to the Surgery Registration Desk.    3. Please follow the following fasting guidelines:     No solid food EIGHT HOURS prior to surgery.  Unlimited clear liquids, meaning water or PLAIN black coffee WITHOUT any milk, cream, sugar, or sweetener are permitted up to TWO HOURS prior to arrival at the hospital.    4. Please take ONLY the following medications with a sip of water on the morning of your procedure: (populate names and/or NONE)  Protonix    5. Other Instructions: You may brush your teeth the morning of the procedure. Rinse and spit, do not swallow.  Bring a list of your medications with dosages.  Use surgical wash as directed.     6. If you develop a cold, cough, fever, rash, or other symptom prior to the day of the procedure, please report it to your physician immediately.    7. If you need to cancel the procedure for any reason, please contact your physician.    8. Make arrangements to have safe transportation home accompanied by a responsible adult. If you have not arranged safe transportation home, your surgery will be cancelled. Safe transportation may include private vehicle, ride-share service, taxi and public transportation when accompanied by a responsible adult who will assist you home. A responsible adult is someone known to you and does not include the taxi, ride-share or public transit drive transporting you.    9. You may not take public transportation  unless accompanied by a responsible person.    10. You may not drive a car or operate complex or potentially dangerous machinery for 24 hours following anesthesia and/or sedation.    11.  If it is medically necessary for you to have a longer stay, you will be informed as soon as the decision is made.    12. Only bring essential items to the hospital.  Do not wear or bring anything of value to the hospital including jewelry of any kind, money, or wallet. Do not wear make-up or contact lenses.  DO NOT BRING MEDICATIONS FROM HOME unless instructed to do so. DO bring your hearing aids, glasses, and a case    13. No lotion, creams, powders, or oils on skin the morning of procedure     14. Dress in comfortable clothes.    15.  If instructed, please bring a copy of your Advanced Directive (Living Will/Durable Power of ) on the day of your procedure.     16. Ensuring your safety at all times is a very important part of our [facility] Culture of Safety. After having surgery and sedation, you are at risk for falling and balance issues. Although you may feel awake, the effects of the medication can last up to 24 hours after anesthesia. If you need to use the bathroom during your recovery period, nursing staff will escort you there and stay with you to ensure your safety.    17. Refrain from drinking alcohol and smoking cigarettes for 24 hours prior to surgery.    18. Shower with antibacterial soap (Dial) the night before and morning of your procedure.  If your procedure indicates the need for CHG antiseptic wash (Bactoshield or Hibiclens), please use this instead and follow instructions as discussed at the time of your Pre-Admission Testing phone interview or visit.    Above instructions reviewed with patient and patient acknowledges understanding.    Form explained by: Isamar Leon RN

## 2023-04-18 NOTE — RESULT ENCOUNTER NOTE
Telephone call to Latrell to review results of MRI and PET scan. All questions answered. She is aware of recommendation for follow up in 3 months for PET scan.

## 2023-04-21 ENCOUNTER — HOSPITAL ENCOUNTER (OUTPATIENT)
Dept: RADIOLOGY | Age: 88
Discharge: HOME | End: 2023-04-21
Attending: SURGERY
Payer: MEDICARE

## 2023-04-21 ENCOUNTER — ANESTHESIA (OUTPATIENT)
Dept: OPERATING ROOM | Facility: HOSPITAL | Age: 88
Setting detail: HOSPITAL OUTPATIENT SURGERY
End: 2023-04-21
Payer: MEDICARE

## 2023-04-21 ENCOUNTER — HOSPITAL ENCOUNTER (OUTPATIENT)
Facility: HOSPITAL | Age: 88
Setting detail: HOSPITAL OUTPATIENT SURGERY
Discharge: HOME | End: 2023-04-21
Attending: SURGERY | Admitting: SURGERY
Payer: MEDICARE

## 2023-04-21 VITALS
DIASTOLIC BLOOD PRESSURE: 71 MMHG | OXYGEN SATURATION: 96 % | HEART RATE: 58 BPM | RESPIRATION RATE: 17 BRPM | TEMPERATURE: 97.2 F | SYSTOLIC BLOOD PRESSURE: 159 MMHG

## 2023-04-21 DIAGNOSIS — C50.911 MALIGNANT NEOPLASM OF RIGHT FEMALE BREAST, UNSPECIFIED ESTROGEN RECEPTOR STATUS, UNSPECIFIED SITE OF BREAST (CMS/HCC): ICD-10-CM

## 2023-04-21 DIAGNOSIS — C50.919 BREAST CANCER IN FEMALE (CMS/HCC): ICD-10-CM

## 2023-04-21 PROCEDURE — 63700000 HC SELF-ADMINISTRABLE DRUG: Performed by: SURGERY

## 2023-04-21 PROCEDURE — 76098 X-RAY EXAM SURGICAL SPECIMEN: CPT | Mod: RT

## 2023-04-21 PROCEDURE — 25800000 HC PHARMACY IV SOLUTIONS: Performed by: SURGERY

## 2023-04-21 PROCEDURE — 37000001 HC ANESTHESIA GENERAL: Performed by: SURGERY

## 2023-04-21 PROCEDURE — 27200000 HC STERILE SUPPLY: Performed by: SURGERY

## 2023-04-21 PROCEDURE — 88305 TISSUE EXAM BY PATHOLOGIST: CPT | Performed by: SURGERY

## 2023-04-21 PROCEDURE — 36000012 HC OR LEVEL 2 EA ADDL MIN: Performed by: SURGERY

## 2023-04-21 PROCEDURE — BH40ZZZ ULTRASONOGRAPHY OF RIGHT BREAST: ICD-10-PCS | Performed by: SURGERY

## 2023-04-21 PROCEDURE — 36000002 HC OR LEVEL 2 INITIAL 30MIN: Performed by: SURGERY

## 2023-04-21 PROCEDURE — 0WB80ZZ EXCISION OF CHEST WALL, OPEN APPROACH: ICD-10-PCS | Performed by: SURGERY

## 2023-04-21 PROCEDURE — 71000001 HC PACU PHASE 1 INITIAL 30MIN: Performed by: SURGERY

## 2023-04-21 PROCEDURE — 71000002 HC PACU PHASE 2 INITIAL 30MIN: Performed by: SURGERY

## 2023-04-21 PROCEDURE — 76098 X-RAY EXAM SURGICAL SPECIMEN: CPT | Mod: LT

## 2023-04-21 PROCEDURE — 21557 RESECT NECK THORAX TUMOR<5CM: CPT | Performed by: SURGERY

## 2023-04-21 PROCEDURE — 25000000 HC PHARMACY GENERAL: Performed by: SURGERY

## 2023-04-21 PROCEDURE — 71000012 HC PACU PHASE 2 EA ADDL MIN: Performed by: SURGERY

## 2023-04-21 PROCEDURE — G0279 TOMOSYNTHESIS, MAMMO: HCPCS | Mod: RT

## 2023-04-21 PROCEDURE — 63600000 HC DRUGS/DETAIL CODE: Mod: JW | Performed by: NURSE ANESTHETIST, CERTIFIED REGISTERED

## 2023-04-21 PROCEDURE — 63600000 HC DRUGS/DETAIL CODE: Performed by: SURGERY

## 2023-04-21 PROCEDURE — 71000011 HC PACU PHASE 1 EA ADDL MIN: Performed by: SURGERY

## 2023-04-21 PROCEDURE — 25000000 HC PHARMACY GENERAL: Performed by: NURSE ANESTHETIST, CERTIFIED REGISTERED

## 2023-04-21 RX ORDER — ACETAMINOPHEN 325 MG/1
975 TABLET ORAL
Status: COMPLETED | OUTPATIENT
Start: 2023-04-21 | End: 2023-04-21

## 2023-04-21 RX ORDER — FENTANYL CITRATE 50 UG/ML
50 INJECTION, SOLUTION INTRAMUSCULAR; INTRAVENOUS EVERY 5 MIN PRN
Status: DISCONTINUED | OUTPATIENT
Start: 2023-04-21 | End: 2023-04-21 | Stop reason: HOSPADM

## 2023-04-21 RX ORDER — ONDANSETRON HYDROCHLORIDE 2 MG/ML
4 INJECTION, SOLUTION INTRAVENOUS
Status: DISCONTINUED | OUTPATIENT
Start: 2023-04-21 | End: 2023-04-21 | Stop reason: HOSPADM

## 2023-04-21 RX ORDER — LIDOCAINE HYDROCHLORIDE 10 MG/ML
INJECTION, SOLUTION INFILTRATION; PERINEURAL
Status: DISCONTINUED | OUTPATIENT
Start: 2023-04-21 | End: 2023-04-21 | Stop reason: HOSPADM

## 2023-04-21 RX ORDER — SODIUM CHLORIDE 9 MG/ML
INJECTION, SOLUTION INTRAVENOUS CONTINUOUS
Status: DISCONTINUED | OUTPATIENT
Start: 2023-04-21 | End: 2023-04-21 | Stop reason: HOSPADM

## 2023-04-21 RX ORDER — FENTANYL CITRATE 50 UG/ML
INJECTION, SOLUTION INTRAMUSCULAR; INTRAVENOUS AS NEEDED
Status: DISCONTINUED | OUTPATIENT
Start: 2023-04-21 | End: 2023-04-21 | Stop reason: SURG

## 2023-04-21 RX ORDER — EPHEDRINE SULFATE/0.9% NACL/PF 50 MG/5 ML
SYRINGE (ML) INTRAVENOUS AS NEEDED
Status: DISCONTINUED | OUTPATIENT
Start: 2023-04-21 | End: 2023-04-21 | Stop reason: SURG

## 2023-04-21 RX ORDER — ONDANSETRON HYDROCHLORIDE 2 MG/ML
INJECTION, SOLUTION INTRAVENOUS AS NEEDED
Status: DISCONTINUED | OUTPATIENT
Start: 2023-04-21 | End: 2023-04-21 | Stop reason: SURG

## 2023-04-21 RX ORDER — ACETAMINOPHEN 325 MG/1
TABLET ORAL
Status: DISCONTINUED
Start: 2023-04-21 | End: 2023-04-21 | Stop reason: HOSPADM

## 2023-04-21 RX ORDER — DEXAMETHASONE SODIUM PHOSPHATE 4 MG/ML
INJECTION, SOLUTION INTRA-ARTICULAR; INTRALESIONAL; INTRAMUSCULAR; INTRAVENOUS; SOFT TISSUE AS NEEDED
Status: DISCONTINUED | OUTPATIENT
Start: 2023-04-21 | End: 2023-04-21 | Stop reason: SURG

## 2023-04-21 RX ORDER — PROPOFOL 10 MG/ML
INJECTION, EMULSION INTRAVENOUS AS NEEDED
Status: DISCONTINUED | OUTPATIENT
Start: 2023-04-21 | End: 2023-04-21 | Stop reason: SURG

## 2023-04-21 RX ORDER — LIDOCAINE HYDROCHLORIDE 10 MG/ML
INJECTION, SOLUTION EPIDURAL; INFILTRATION; INTRACAUDAL; PERINEURAL AS NEEDED
Status: DISCONTINUED | OUTPATIENT
Start: 2023-04-21 | End: 2023-04-21 | Stop reason: SURG

## 2023-04-21 RX ORDER — BUPIVACAINE HYDROCHLORIDE 5 MG/ML
INJECTION, SOLUTION EPIDURAL; INTRACAUDAL
Status: DISCONTINUED | OUTPATIENT
Start: 2023-04-21 | End: 2023-04-21 | Stop reason: HOSPADM

## 2023-04-21 RX ORDER — MIDAZOLAM HYDROCHLORIDE 2 MG/2ML
INJECTION, SOLUTION INTRAMUSCULAR; INTRAVENOUS AS NEEDED
Status: DISCONTINUED | OUTPATIENT
Start: 2023-04-21 | End: 2023-04-21 | Stop reason: SURG

## 2023-04-21 RX ADMIN — FENTANYL CITRATE 25 MCG: 50 INJECTION INTRAMUSCULAR; INTRAVENOUS at 14:12

## 2023-04-21 RX ADMIN — DEXAMETHASONE SODIUM PHOSPHATE 4 MG: 4 INJECTION, SOLUTION INTRA-ARTICULAR; INTRALESIONAL; INTRAMUSCULAR; INTRAVENOUS; SOFT TISSUE at 14:07

## 2023-04-21 RX ADMIN — Medication 5 MG: at 14:16

## 2023-04-21 RX ADMIN — ONDANSETRON 4 MG: 2 INJECTION INTRAMUSCULAR; INTRAVENOUS at 14:47

## 2023-04-21 RX ADMIN — PROPOFOL 150 MG: 10 INJECTION, EMULSION INTRAVENOUS at 13:58

## 2023-04-21 RX ADMIN — SODIUM CHLORIDE: 0.9 INJECTION, SOLUTION INTRAVENOUS at 13:39

## 2023-04-21 RX ADMIN — ACETAMINOPHEN 975 MG: 325 TABLET ORAL at 13:12

## 2023-04-21 RX ADMIN — Medication 10 MG: at 14:40

## 2023-04-21 RX ADMIN — MIDAZOLAM HYDROCHLORIDE 1 MG: 1 INJECTION, SOLUTION INTRAMUSCULAR; INTRAVENOUS at 13:52

## 2023-04-21 RX ADMIN — LIDOCAINE HYDROCHLORIDE 8 ML: 10 INJECTION, SOLUTION EPIDURAL; INFILTRATION; INTRACAUDAL; PERINEURAL at 13:58

## 2023-04-21 RX ADMIN — CEFAZOLIN 2 G: 2 INJECTION, POWDER, FOR SOLUTION INTRAMUSCULAR; INTRAVENOUS at 13:45

## 2023-04-21 RX ADMIN — PROPOFOL 15 MCG/KG/MIN: 10 INJECTION, EMULSION INTRAVENOUS at 14:03

## 2023-04-21 ASSESSMENT — ENCOUNTER SYMPTOMS: DYSRHYTHMIAS: 1

## 2023-04-21 NOTE — ANESTHESIOLOGIST PRE-PROCEDURE ATTESTATION
Pre-Procedure Patient Identification:  I am the Primary Anesthesiologist and have identified the patient on 04/21/23 at 12:49 PM.   I have confirmed the procedure(s) will be performed by the following surgeon/proceduralist Peggy Matthews MD.

## 2023-04-21 NOTE — DISCHARGE INSTRUCTIONS
Discharge Instructions for Partial Mastectomy (Lumpectomy) or Breast Biopsy    You just had a procedure to remove a lump or a small piece of tissue from your breast. After surgery, be sure to have an adult drive you home.     What to expect  The following are common after a partial mastectomy (lumpectomy) or breast biopsy:   Bruising and mild swelling around the incision  Mild discomfort for a few days.  Feeling tired for a day or so.  Feeling anxious or down.  Some pain is to be expected and is normal. Take Tylenol and Ibuprofen as        directed on the bottle for 48 hours postoperatively.      Diet  What to eat and drink after a lumpectomy or breast biopsy:   Start with liquids and light, easy-to-digest foods, such as bananas and dry toast. As you feel up to it, slowly return to your normal diet.  There is no diet restriction.  Drink at least 6 to 8 glasses of water or other nonalcoholic fluids a day, unless directed otherwise.    Activity  What you can do after a lumpectomy or breast biopsy:   Please wear the provided pink surgical bra until your follow-up appointment, even when going to sleep.   After the procedure, take it easy for the rest of the day.  If you had general anesthesia, don't use machinery or power tools, drink alcohol, or make any major decisions for at least the first 24 hours.  Return to normal activities (including driving) in 24 hours.   No contact sports or strenuous activity; otherwise there is no restriction.  You may shower in 24 hours, starting tomorrow. No tub bath or swimming pools for 2 weeks.     Bandage and incision care   You may use a waterproof ice pack or bag of frozen peas in a thin cloth. Place this over the  incision for 20 minutes on, 20 minutes off, for 48 hours postoperatively for pain relief.  You may shower in 24 hours, starting tomorrow. No tub bath or swimming pools for 2 weeks.     Postoperative appointment  Please call [phone] to schedule a postoperative  follow-up appointment with Dr. Matthews for 2 weeks weeks after your surgery.       Please call the Office for questions or concerns.

## 2023-04-21 NOTE — OP NOTE
OPERATIVE NOTE    DATE: 23    PATIENT NAME: Latrell Ojeda  MRN: [id number]  AGE: 87 y.o.  : 1935    PREOPERATIVE DIAGNOSIS:  1.  Chest wall recurrence right breast    POSTOPERATIVE DIAGNOSIS:  1.  Same      PROCEDURE: WIDE LOCAL RIGHT BREAST EXCISIONAL BIOPSY      ESTIMATED BLOOD LOSS: 2 mL    Surgeon(s) and Role:     * Peggy Matthews MD - Primary    ASSISTANT: Dr. Marie Holliday    ANESTHESIA: General LMA      INDICATIONS FOR PROCEDURE:  Latrell is a 87-year-old female with a history of bilateral breast cancer who noticed a new mass in her reconstructed right breast.  Core biopsy was done which revealed an invasive carcinoma.  After discussion of her options, it was decided to proceed with a wide excision of the area.    DESCRIPTION OF THE PROCEDURE IN FULL: The patient was identified and marked in the preoperative area.  Informed consent was obtained.   She was taken to the operating room, and placed on the operating table in supine position.   General LMA anesthesia was administered.  The right breast was ultrasounded and the recurrence was identified and marked for excision.  The right breast was then prepped and draped in usual sterile fashion.  2 g of Ancef antibiotic was administered through the IV.  A timeout was performed and the patient and surgical site were identified.  Attention was then turned to the lumpectomy.  Skin over the recurrent tumor was anesthetized with a mixture of lidocaine and Marcaine and an elliptical incision was made in the skin overlying the area.  Dissection was carried in the subcutaneous tissue with electrocautery, and careful flaps were created around the area of planned excision directly under the skin.  The tissue was then excised to the pectoral fascia.  Directly under the recurrence, this appeared to be at least adherent to and possibly invading the pectoral muscle, therefore muscle was excised with the mass.  The tissue was marked with suture for  orientation, and specimen radiograph was done which showed the coil clip in the tissue.  This was sent to pathology.  The cavity was palpated and there was some questionable tissue at the posterior inferior medial margin (essentially the muscle at the inferior and medial margins of the previous excision).  This was grasped with an Allis clamp and reexcised with electrocautery.  This was marked with suture for orientation and sent to pathology.  The cavity was palpated, and no additional abnormal palpable tissue was identified.  The implant was not visualized as it was retropectoral.  Cavity was then irrigated with saline until clear, made hemostatic with electrocautery, and injected with lidocaine and Marcaine for local anesthetic.  Incision was then closed in layered fashion with a 3-0 Vicryl deep, 3-0 Vicryl deep dermal, and a running 4-0 Monocryl subcuticular stitch.  Dermabond was applied, followed by fluffs and a surgical bra.  The patient was woken and taken to recovery in stable condition.  There were no complications.  All sponge, needle, and instrument counts reported me as correct at the end of the case.      Electronically signed by Peggy Matthews MD April 21, 2023 3:05 PM    CC: Patient Care Team:  Aris Miller MD as PCP - General (Family Medicine)  Pepe Borges MD as Referring Physician  Leonila Ford MD as Obstetrician (Obstetrics and Gynecology)  Sarah Agrawal RN as Nurse Navigator  Ingrid Ambriz RN as Nurse Navigator  Sunita Shaw RN as Nurse Navigator  Fidelina Schultz MSW as  ()  Pat Sanders Catherine D, MD as Consulting Physician (Breast Surgery)

## 2023-04-21 NOTE — ANESTHESIA PROCEDURE NOTES
Airway  Urgency: elective    Start Time: 4/21/2023 2:00 PM  Airway not difficult    General Information and Staff    Patient location during procedure: OR  Anesthesiologist: Wong, Corinne K, MD  Resident/CRNA: Liliana Guerra CRNA  Performed: resident/CRNA   Performed by: Liliana Guerra CRNA  Authorized by: Wong, Corinne K, MD      Indications and Patient Condition  Indications for airway management: anesthesia  Sedation level: deep  Preoxygenated: yes  Mask difficulty assessment: 1 - vent by mask    Final Airway Details  Final airway type: supraglottic airway      Successful airway: Supraglottic airway: ambu air.  Size 4     Number of attempts at approach: 1

## 2023-04-21 NOTE — ANESTHESIA PREPROCEDURE EVALUATION
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       Anesthesia ROS/MED HX    Anesthesia History - neg  Pulmonary - neg  Neuro/Psych - neg  Cardiovascular   hypertension  Dysrhythmias and atrial fibrillation   Cardiac clearance reviewed and ECG reviewed   Normal ECG  Hematological - neg  GI/Hepatic   GERD    Control: well controlled  Musculoskeletal- neg  Renal Disease- neg  Endo/Other  History of cancer and breast cancer  Body Habitus: Normal       Past Surgical History:   Procedure Laterality Date   • BREAST BIOPSY      2 LEFT, 1 RIGHT   • CATARACT EXTRACTION, BILATERAL     • COLONOSCOPY     • ESOPHAGOSCOPY / EGD     • HYSTERECTOMY  1985   • LAPAROSCOPIC LIVER CYST FENESTRATION  1995    liquid liver cyst and aspir[facility] at [facility]   • MASTECTOMY  2005    left 2006, right 2009       Physical Exam    Airway   Mallampati: II   TM distance: >3 FB   Neck ROM: full  Cardiovascular - normal   Rhythm: regular   Rate: normalPulmonary - normal   clear to auscultation  Dental - normal        Anesthesia Plan    Plan: general    Technique: general LMA     Lines and Monitors: PIV   2 ASA  Anesthetic plan and risks discussed with: patient  Induction:    intravenous   Postop Plan:   Patient Disposition: phase II then home   Pain Management: IV analgesics

## 2023-04-21 NOTE — OR SURGEON
Pre-Procedure patient identification:  I am the primary operating surgeon/proceduralist and I have identified the patient and confirmed laterality is right on 04/21/23 at 1:41 PM Peggy Matthews MD  Phone Number: [phone]

## 2023-04-24 LAB
CASE RPRT: NORMAL
CLINICAL INFO: NORMAL
PATH REPORT.FINAL DX SPEC: NORMAL
PATH REPORT.FINAL DX SPEC: NORMAL
PATH REPORT.GROSS SPEC: NORMAL

## 2023-04-24 NOTE — ANESTHESIA POSTPROCEDURE EVALUATION
Patient: Latrell Ojeda    Procedure Summary     Date: 04/21/23 Room / Location: [facility] OR  / [facility] OR [facility]    Anesthesia Start: 1346 Anesthesia Stop: 1501    Procedure: WIDE LOCAL RIGHT BREAST EXCISIONAL BIOPSY (Right) Diagnosis:       Malignant neoplasm of right female breast, unspecified estrogen receptor status, unspecified site of breast (CMS/HCC)      (recurrence right breast cancer)    Surgeons: Peggy Matthews MD Responsible Provider: Wong, Corinne K, MD    Anesthesia Type: general ASA Status: 2          Anesthesia Type: general  PACU Vitals  4/21/2023 1457 - 4/21/2023 1557      4/21/2023  1500 4/21/2023  1515 4/21/2023  1530 4/21/2023  1553    BP: 141/66 146/59 153/71 165/73    Temp: 36.8 °C (98.2 °F) -- -- 36.4 °C (97.5 °F)    Pulse: 64 58 56 57    Resp: 14 19 20 19    SpO2: 97 % -- -- 95 %            Anesthesia Post Evaluation    Pain management: adequate  Patient location during evaluation: PACU  Patient participation: complete - patient participated  Level of consciousness: awake and alert  Cardiovascular status: acceptable  Airway Patency: adequate  Respiratory status: acceptable  Hydration status: acceptable  Anesthetic complications: no

## 2023-04-25 ENCOUNTER — PATIENT OUTREACH (OUTPATIENT)
Dept: RADIOLOGY | Facility: HOSPITAL | Age: 88
End: 2023-04-25
Payer: MEDICARE

## 2023-04-27 ENCOUNTER — TELEPHONE (OUTPATIENT)
Dept: SURGERY | Facility: CLINIC | Age: 88
End: 2023-04-27
Payer: MEDICARE

## 2023-04-27 NOTE — TELEPHONE ENCOUNTER
Spoke with patient regarding pathology results.  Muscle margins were positive, but I removed all gross disease.  Discussed case with Dr. Singh, we both felt that at 87 it would be reasonable to proceed with radiation and AI rather than additional muscle removal, which would necessitate removal of implant.  She was comfortable with this plan as well  Seeing medical oncology 5/2, will also need appointment to see radiation oncology.

## 2023-05-01 ENCOUNTER — APPOINTMENT (OUTPATIENT)
Dept: LAB | Age: 88
End: 2023-05-01
Attending: INTERNAL MEDICINE
Payer: MEDICARE

## 2023-05-01 ENCOUNTER — TRANSCRIBE ORDERS (OUTPATIENT)
Dept: LAB | Age: 88
End: 2023-05-01

## 2023-05-01 DIAGNOSIS — R79.89 OTHER SPECIFIED ABNORMAL FINDINGS OF BLOOD CHEMISTRY: ICD-10-CM

## 2023-05-01 DIAGNOSIS — R79.89 OTHER SPECIFIED ABNORMAL FINDINGS OF BLOOD CHEMISTRY: Primary | ICD-10-CM

## 2023-05-01 LAB
ALBUMIN SERPL-MCNC: 3.6 G/DL (ref 3.4–5)
ALP SERPL-CCNC: 77 IU/L (ref 35–126)
ALT SERPL-CCNC: 60 IU/L (ref 11–54)
ANION GAP SERPL CALC-SCNC: 9 MEQ/L (ref 3–15)
AST SERPL-CCNC: 32 IU/L (ref 15–41)
BILIRUB DIRECT SERPL-MCNC: 0.3 MG/DL
BILIRUB SERPL-MCNC: 1.8 MG/DL (ref 0.3–1.2)
BUN SERPL-MCNC: 21 MG/DL (ref 8–20)
CALCIUM SERPL-MCNC: 9.4 MG/DL (ref 8.9–10.3)
CHLORIDE SERPL-SCNC: 104 MEQ/L (ref 98–109)
CO2 SERPL-SCNC: 27 MEQ/L (ref 22–32)
CREAT SERPL-MCNC: 1.2 MG/DL (ref 0.6–1.1)
GFR SERPL CREATININE-BSD FRML MDRD: 42.6 ML/MIN/1.73M*2
GLUCOSE SERPL-MCNC: 69 MG/DL (ref 70–99)
POTASSIUM SERPL-SCNC: 4.2 MEQ/L (ref 3.6–5.1)
PROT SERPL-MCNC: 6.1 G/DL (ref 6–8.2)
SODIUM SERPL-SCNC: 140 MEQ/L (ref 136–144)

## 2023-05-01 PROCEDURE — 82784 ASSAY IGA/IGD/IGG/IGM EACH: CPT | Mod: 59

## 2023-05-01 PROCEDURE — 86376 MICROSOMAL ANTIBODY EACH: CPT

## 2023-05-01 PROCEDURE — 80053 COMPREHEN METABOLIC PANEL: CPT

## 2023-05-01 PROCEDURE — 36415 COLL VENOUS BLD VENIPUNCTURE: CPT

## 2023-05-01 PROCEDURE — 82248 BILIRUBIN DIRECT: CPT

## 2023-05-01 PROCEDURE — 82784 ASSAY IGA/IGD/IGG/IGM EACH: CPT

## 2023-05-01 NOTE — PROGRESS NOTES
Review of Systems   Constitutional: Negative.    HENT:  Negative.    Respiratory: Negative.    Gastrointestinal: Negative.    Genitourinary: Negative.     Musculoskeletal: Negative.    Skin: Negative.    Neurological: Negative.    Hematological: Negative.        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  ER + 95%, WI +  95% , HER2 negative, SN -, 4 LN negative    Patient took arimidex for 5 years    Patient felt lump in R breast?  R breast US done on 3/20/23 showed 1 cm solid appearing mass at the 9:00 position of reconstructed L breast corresponding to palpated lump    Patient underwent imaging guided percutaneous breast biopsy on 3/30/23.  Pathology showed ER + 98%, WI+ 87%, HER2- invasive ductal carcinoma, grade 2, Ki67 52%    MRI breasts done on 4/13/23, showed no other areas of suspicious enhancement    PET/CT done 4/14/23 showed R breast hypermetabolic activity, solid and groundglass lung nodules favored to be inflammatory/infectious, recommended 3 month F/U    On 4/21/23 patient underwent wide local R breast excisional biopsy.  Pathology showed invasive moderately differentiated ductal carcinoma present within subcutaneous tissue and skeletal muscle (1.2 cm).   Invasive carcinoma is present at the medial, lateral, inferior, and posterior margins.    Per Dr. Matthews's note all gross disease was removed, discussed case with Dr. Singh and they felt patient should proceed with radiation and an AI rather than addition muscle removal.    DEXA scan 11/3/20 WNL

## 2023-05-02 ENCOUNTER — OFFICE VISIT (OUTPATIENT)
Dept: HEMATOLOGY/ONCOLOGY | Facility: CLINIC | Age: 88
End: 2023-05-02
Attending: INTERNAL MEDICINE
Payer: MEDICARE

## 2023-05-02 VITALS
SYSTOLIC BLOOD PRESSURE: 147 MMHG | HEIGHT: 64 IN | TEMPERATURE: 97.7 F | DIASTOLIC BLOOD PRESSURE: 67 MMHG | HEART RATE: 64 BPM | WEIGHT: 152 LBS | BODY MASS INDEX: 25.95 KG/M2

## 2023-05-02 DIAGNOSIS — N28.9 RENAL INSUFFICIENCY: ICD-10-CM

## 2023-05-02 DIAGNOSIS — C79.89 CHEST WALL RECURRENCE OF BREAST CANCER, RIGHT (CMS/HCC): Primary | ICD-10-CM

## 2023-05-02 DIAGNOSIS — Z78.0 ASYMPTOMATIC MENOPAUSAL STATE: ICD-10-CM

## 2023-05-02 DIAGNOSIS — R91.8 LUNG NODULES: ICD-10-CM

## 2023-05-02 DIAGNOSIS — C50.911 CHEST WALL RECURRENCE OF BREAST CANCER, RIGHT (CMS/HCC): Primary | ICD-10-CM

## 2023-05-02 LAB
IGA SERPL-MCNC: 155 MG/DL (ref 84.5–499)
IGG SERPL-MCNC: 1279 MG/DL (ref 537–1535)
IGM SERPL-MCNC: 193 MG/DL (ref 35–242)

## 2023-05-02 PROCEDURE — 99204 OFFICE O/P NEW MOD 45 MIN: CPT | Performed by: INTERNAL MEDICINE

## 2023-05-02 RX ORDER — ANASTROZOLE 1 MG/1
1 TABLET ORAL DAILY
Qty: 90 TABLET | Refills: 3 | Status: SHIPPED | OUTPATIENT
Start: 2023-05-02 | End: 2024-03-13

## 2023-05-02 ASSESSMENT — ENCOUNTER SYMPTOMS
HEMATOLOGIC/LYMPHATIC NEGATIVE: 1
MUSCULOSKELETAL NEGATIVE: 1
GASTROINTESTINAL NEGATIVE: 1
CONSTITUTIONAL NEGATIVE: 1
NEUROLOGICAL NEGATIVE: 1
RESPIRATORY NEGATIVE: 1

## 2023-05-02 ASSESSMENT — PAIN SCALES - GENERAL: PAINLEVEL: 0-NO PAIN

## 2023-05-02 NOTE — ASSESSMENT & PLAN NOTE
Patient had incidental finding of groundglass lung nodules on PET/CT.  These are likely inflammatory in nature.  Recommend follow-up CT of the chest without contrast in 3 months.

## 2023-05-02 NOTE — ASSESSMENT & PLAN NOTE
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  She was treated with 5 years of Arimidex following her right mastectomy.  In April 2013 she was found to have a chest wall recurrence on the right.  Cancer was ER positive (98%, UT positive (87%), HER2 negative (0), Ki-67 51%.    I had a discussion with the patient and her daughter today.  We reviewed her pathology results.  This seems to be consistent with a late chest wall recurrence from her previous breast cancer diagnosed in 2009.  She has undergone local excision.  She will be meeting with radiation oncology next week to discuss local radiation therapy.    With regards to systemic therapy, given her age and comorbidities as well as the strongly estrogen and progesterone receptor positive tumor I do not think there would be significant benefit to chemotherapy.  I would recommend antiestrogen therapy.  She had previously taken Arimidex and tolerated well.  Prescription for Arimidex was sent to her pharmacy.  We also discussed the possibility of the addition of abemaciclib to her antiestrogen regimen for 2 years after completion of her radiation.  I will plan on seeing her back after she completes radiation to discuss this further.  I have also recommended an updated DEXA scan as her last DEXA scan was for approximately 3 years ago.  She was given a prescription for this testing today.  She was asked to call with questions or concerns prior to her next visit.

## 2023-05-02 NOTE — PROGRESS NOTES
Latrell Ojeda is a 87 y.o. female,   :  1935  REFERRING PHYSICIAN:   Peggy Matthews MD  [address]  [address]  [place] [id number]  PRIMARY CARE PROVIDER:  Aris Miller MD    Encounter Diagnoses   Name Primary?   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   • Frequent UTI   • Hyponatremia   • LFTs abnormal   • Neck pain   • Chest wall recurrence of breast cancer, right (CMS/HCC)   • Renal insufficiency   • Lung nodules      Cancer Staging   Chest wall recurrence of breast cancer, right (CMS/HCC)  Staging form: Breast, AJCC 8th Edition  - Clinical stage from 4/10/2023: Stage IA (rcT1b, cN0, cM0, G2, ER+, WA+, HER2-) - Signed by Peggy Matthews MD on 2023    Treatment Plans     No treatment plans exist        Oncology History   Chest wall recurrence of breast cancer, right (CMS/HCC)   4/10/2023 Cancer Staged    Staging form: Breast, AJCC 8th Edition  - Clinical stage from 4/10/2023: Stage IA (rcT1b, cN0, cM0, G2, ER+, WA+, HER2-)         History of Present Illness  HPI  Latrell Ojeda is seen today at the request of Peggy Matthews MD  [address]  [address]  [place] [id number] for   Encounter Diagnoses   Name Primary?   • Renal insufficiency    • Chest wall recurrence of breast cancer, right (CMS/HCC) Yes   • Asymptomatic menopausal state    • Lung nodules      Latrell Ojeda is a 87-year-old woman with a history of left breast DCIS in  for which she underwent left  "breast mastectomy.  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  She underwent right breast ectomy and reconstruction.  Following that surgery she took Arimidex for 5 years.  The patient self palpated a lump in her right breast reconstruction.  Ultrasound was performed on March 20 which showed a 1 cm solid-appearing mass at the 9 o'clock position of the reconstructed left breast.  Biopsy was performed on March 30 and pathology showed invasive ductal carcinoma grade 2 ER NJ positive and HER2 negative.  Breast MRI did not show any other suspicious areas of enhancement.  PET CT scan did not show any evidence of distant metastatic disease.  She underwent wide local excision of the right chest wall lesion on April 21.  Final pathology showed mild differentiated ductal carcinoma within subcutaneous tissue and skeletal muscle.  Patient reports she is recovering well from her surgery.  She will following up with Dr. Matthews next week as well as seeing Dr. Navarro from radiation oncology.    Review of Systems - Oncology  Review of Systems:  Nursing assessment reviewed. Pertinent positive and negative symptoms noted in HPI, all others negative.    Temp:  [36.5 °C (97.7 °F)] 36.5 °C (97.7 °F)  Heart Rate:  [64] 64  BP: (147)/(67) 147/67  Visit Vitals  BP (!) 147/67 (BP Location: Left upper arm, Patient Position: Sitting)   Pulse 64   Temp 36.5 °C (97.7 °F) (Oral)   Ht 1.626 m (5' 4\")   Wt 68.9 kg (152 lb)   BMI 26.09 kg/m²     Physical Exam  Constitutional:       General: She is not in acute distress.     Appearance: She is well-developed.   HENT:      Head: Normocephalic.   Eyes:      General: No scleral icterus.     Pupils: Pupils are equal, round, and reactive to light.   Cardiovascular:      Rate and Rhythm: Normal rate and regular rhythm.   Pulmonary:      Effort: Pulmonary effort is normal.      Breath sounds: Normal breath sounds.   Chest:      Comments: " Status post bilateral mastectomy and reconstruction.  Well-healing scar in the right breast.  Abdominal:      General: There is no distension.      Palpations: Abdomen is soft.      Tenderness: There is no abdominal tenderness.   Musculoskeletal:         General: No tenderness.      Cervical back: Neck supple.   Lymphadenopathy:      Cervical: No cervical adenopathy.   Skin:     Findings: No rash.   Neurological:      Mental Status: She is alert.         Past Medical History:   Diagnosis Date   • Abnormal ECG    • Breast cancer (CMS/HCC)     bilaterally one year apart with NO node removal and with reconstruction   • Colon polyp    • COVID-19     not hospitalized-had sore throat/cough   • COVID-19 vaccine series completed     booster x 1   • Epigastric abdominal pain     2021 cardiac ruled out with relief from GI cocktail.  Had been onn  daily   • High blood pressure    • PAF (paroxysmal atrial fibrillation) (CMS/HCC)     follows with Dr. Borges every 3 months   • Renal insufficiency     change of cardiac meds and diurectic improved labs BUN eGFR   • UTI (urinary tract infection)        Past Surgical History:   Procedure Laterality Date   • BREAST BIOPSY      2 LEFT, 1 RIGHT   • BREAST LUMPECTOMY  -   • CATARACT EXTRACTION, BILATERAL     • COLONOSCOPY     • ESOPHAGOSCOPY / EGD     • HYSTERECTOMY     • LAPAROSCOPIC LIVER CYST FENESTRATION      liquid liver cyst and aspirated at [facility]   • MASTECTOMY  2005    left 2006, right 2009       OB History    Para Term  AB Living   2 2           SAB IAB Ectopic Multiple Live Births                  # Outcome Date GA Lbr Rylan/2nd Weight Sex Delivery Anes PTL Lv   2 Para            1 Para              Gynecologic History:  Age at menarche: No age on file  Age at first live birth: 29   Age at menopause: No age on file    Social History     Tobacco Use   • Smoking status: Never   • Smokeless tobacco: Never   Vaping Use   • Vaping  status: Never Used   Substance Use Topics   • Alcohol use: Yes     Alcohol/week: 1.0 standard drink of alcohol     Types: 1 Glasses of wine per week     Comment: rarely   • Drug use: Never       Family History   Problem Relation Age of Onset   • Diabetes Biological Father    • Diabetes Biological Brother    • Multiple myeloma Biological Son    • Breast cancer Other         Sister's daughter         Allergies  Clindamycin, Codeine, Macrobid [nitrofurantoin monohyd/m-cryst], and Oxycodone-acetaminophen    Medications  Current Outpatient Medications   Medication Sig Dispense Refill   • amiodarone (PACERONE) 100 mg tablet Take 100 mg by mouth nightly.     • anastrozole (ARIMIDEX) 1 mg tablet Take  1 tablet (1 mg total) daily  Swallow whole with a drink of water. 90 tablet 3   • ascorbic acid (VITAMIN C) 500 mg tablet Take 500 mg by mouth daily.     • biotin 5,000 mcg tablet,disintegrating Take by mouth daily.     • cholecalciferol, vitamin D3, 1,000 unit (25 mcg) tablet Take 1,000 Units by mouth daily.     • coenzyme Q10 (COQ10) 10 mg capsule Take 10 mg by mouth daily.       • cranberry extract 425 mg capsule Take by mouth daily.     • hydrochlorothiazide (HYDRODIURIL) 25 mg tablet Take 25 mg by mouth every morning.     • lutein 6 mg capsule Take 6 mg by mouth daily.       • pantoprazole (PROTONIX) 40 mg EC tablet Take 1 tablet (40 mg total) by mouth every other day. (Patient taking differently: Take 40 mg by mouth every other day. Pt takes in morning) 45 tablet 3   • red yeast rice 600 mg capsule Take 1 capsule by mouth 2 (two) times a day.       • rivaroxaban (XARELTO) 15 mg tablet Take 1 tablet (15 mg total) by mouth daily with dinner. 90 tablet 1   • LOSARTAN POTASSIUM, BULK, MISC Take 50 mg by mouth as needed.       No current facility-administered medications for this visit.          Laboratory    Recent Results (from the past 672 hour(s))   Comprehensive metabolic panel    Collection Time: 04/10/23  1:56 PM    Result Value Ref Range    Sodium 139 136 - 144 mEQ/L    Potassium 4.1 3.6 - 5.1 mEQ/L    Chloride 100 98 - 109 mEQ/L    CO2 29 22 - 32 mEQ/L    BUN 21 (H) 8 - 20 mg/dL    Creatinine 1.2 (H) 0.6 - 1.1 mg/dL    Glucose 81 70 - 99 mg/dL    Calcium 9.1 8.9 - 10.3 mg/dL    AST (SGOT) 61 (H) 15 - 41 IU/L    ALT (SGPT) 67 (H) 11 - 54 IU/L    Alkaline Phosphatase 67 35 - 126 IU/L    Total Protein 6.5 6.0 - 8.2 g/dL    Albumin 3.6 3.4 - 5.0 g/dL    Bilirubin, Total 1.7 (H) 0.3 - 1.2 mg/dL    eGFR 42.6 (L) >=60.0 mL/min/1.73m*2    Anion Gap 10 3 - 15 mEQ/L   CBC and Differential    Collection Time: 04/10/23  1:56 PM   Result Value Ref Range    WBC 6.55 3.80 - 10.50 K/uL    RBC 4.61 3.93 - 5.22 M/uL    Hemoglobin 13.5 11.8 - 15.7 g/dL    Hematocrit 41.3 35.0 - 45.0 %    MCV 89.6 83.0 - 98.0 fL    MCH 29.3 28.0 - 33.2 pg    MCHC 32.7 32.2 - 35.5 g/dL    RDW 14.8 (H) 11.7 - 14.4 %    Platelets 180 150 - 369 K/uL    MPV 13.3 (H) 9.4 - 12.3 fL    Differential Type Auto     nRBC 0.0 <=0.0 %    Immature Granulocytes 0.3 %    Neutrophils 73.8 %    Lymphocytes 15.0 %    Monocytes 9.2 %    Eosinophils 0.9 %    Basophils 0.8 %    Immature Granulocytes, Absolute 0.02 0.00 - 0.08 K/uL    Neutrophils, Absolute 4.84 1.70 - 7.00 K/uL    Lymphocytes, Absolute 0.98 (L) 1.20 - 3.50 K/uL    Monocytes, Absolute 0.60 0.28 - 0.80 K/uL    Eosinophils, Absolute 0.06 0.04 - 0.36 K/uL    Basophils, Absolute 0.05 0.01 - 0.10 K/uL   Pathology Tissue Exam    Collection Time: 04/21/23  2:20 PM    Specimen: 1: Breast, Right; Tissue    2: Breast, Right; Tissue   Result Value Ref Range    Case Report       Surgical Pathology                                Case: [id number]                                  Authorizing Provider:  Peggy Matthews,   Collected:           04/21/2023 1420                                     MD                                                                           Ordering Location:     [facility]          Received:            04/21/2023 1505                                     Operating Room                                                               Pathologist:           Jaswinder Waggoner MD                                                          Specimens:   A) - Breast, Right, Right Breast Wide Local Excision - short stitch superior, long                  stitch lateral, chromic stitch posterior                                                            B) - Breast, Right, Right Breast Wide Local Excision Additional Posterior Inferior                  Medial Margin - stitch marks new margin                                                    Clinical Information Pre-op diagnosis:  recurrence right breast cancer     Final Diagnosis       A.  Skin, right breast, wide local excision:   Invasive moderately differentiated ductal carcinoma present within subcutaneous tissue and skeletal muscle (1.2 cm).   Invasive carcinoma is present at the medial, lateral, inferior, and posterior margins.   Superior margin negative for tumor.   Focal organizing biopsy site changes.   The pathological tumor stage is rpT1c (see comment).    B.  Soft tissue, right breast additional posterior inferior medial margin, excision:   Invasive moderately differentiated ductal carcinoma involving skeletal muscle.   Invasive carcinoma is present at the inked margin.        **ATTENTION PATIENTS**  **The findings in this report have been made available for review potentially before your provider has had a chance to review and discuss the results with you.  Please allow time for your provider to review your results.  If you have any questions or concerns about these results, please contact the healthcare provider who ordered the test.**        Comment       Breast prognostic markers were previously reported as ER 98%, PgR 87%, and HER2 negative.      Gross Description       A.  The specimen is received in formalin in a container labeled with the  "patient's name and \" right breast wide local excision-short stitch superior, long stitch lateral, chromic stitch posterior\".  It consists of an ellipse of pale tan-pink wrinkled grossly unremarkable skin measuring 1.8 cm superior to inferior, 0.8 cm lateral to medial, and excised to a depth of 1.8 cm.  One tip is marked with a short stitch designating superior and a perpendicular edge is marked with a long stitch designating lateral.  The resection margins are yellow to pink-brown lobulated rubbery and slightly indurated along the deepest surface.  This deep surface opposite the skin is marked with a chromic stitch designating posterior.  The lateral margins are inked green and the medial margins are inked black.  The specimen is serially sectioned from superior to inferior to reveal a pale tan-gray dense irregular possible mass measuring 1.2 x 0.9 x 0.5 cm.  It grossly abuts both the lateral-green and medial-black margins as well as the deep/posterior margin.  It is 0.6 cm from the deep aspect of the skin.  The lesional tissue is friable and it detaches from the remainder of the specimen during sectioning.  The remaining cut surfaces are yellow-pink and lobulated to pale tan-pink and rubbery along the skin.  No additional masses or lesions are grossly identified.  A coil-shaped biopsy clip and pale tan gelatinous material are received separately in the container.    The specimen is submitted entirely between sponges as follows:  A1-superior tip  A2 through A4-central cross-sections, sequentially from superior to inferior  A5-inferior tip.  Cold Ischemia time: 19 minutes 35 seconds  B.  The specimen is received in formalin in a container labeled with the patient's name and \" right breast wide local excision additional posterior inferior medial margin stitch marks new margin\".  It consists of an irregular yellow-pink to pink-brown rubbery dull cauterized fragment of tissue measuring 2.1 x 1.2 x 0.5 cm.  One edge is " centrally marked with a stitch designating the new margin and this edge is inked black.  The specimen is sectioned to reveal yellow-pink to pink-tan faintly lobulated rubbery cut surfaces.  No masses or lesions are grossly identified.  The specimen is submitted entirely and sequentially within tissue containment paper in cassettes B1 through B3.  Cold Ischemia time: 2 minutes 25 seconds    [place] (ASCP)cm     Bilirubin, direct    Collection Time: 05/01/23 12:54 PM   Result Value Ref Range    Bilirubin, Direct 0.3 <=0.4 mg/dL   IgG    Collection Time: 05/01/23 12:54 PM   Result Value Ref Range    Total IgG 1,279.0 537.0 - 1,535.0 mg/dL   IgM    Collection Time: 05/01/23 12:54 PM   Result Value Ref Range    IgM 193 35 - 242 mg/dL   IgA    Collection Time: 05/01/23 12:54 PM   Result Value Ref Range    IgA 155.0 84.5 - 499.0 mg/dL   Comprehensive metabolic panel    Collection Time: 05/01/23 12:54 PM   Result Value Ref Range    Sodium 140 136 - 144 mEQ/L    Potassium 4.2 3.6 - 5.1 mEQ/L    Chloride 104 98 - 109 mEQ/L    CO2 27 22 - 32 mEQ/L    BUN 21 (H) 8 - 20 mg/dL    Creatinine 1.2 (H) 0.6 - 1.1 mg/dL    Glucose 69 (L) 70 - 99 mg/dL    Calcium 9.4 8.9 - 10.3 mg/dL    AST (SGOT) 32 15 - 41 IU/L    ALT (SGPT) 60 (H) 11 - 54 IU/L    Alkaline Phosphatase 77 35 - 126 IU/L    Total Protein 6.1 6.0 - 8.2 g/dL    Albumin 3.6 3.4 - 5.0 g/dL    Bilirubin, Total 1.8 (H) 0.3 - 1.2 mg/dL    eGFR 42.6 (L) >=60.0 mL/min/1.73m*2    Anion Gap 9 3 - 15 mEQ/L         Pathology  Pathology reviewed as per [facility]    Radiology  I independently reviewed the images, tracings or specimen.   BI BREAST SPECIMEN RIGHT    Result Date: 4/24/2023  Narrative: CLINICAL HISTORY: Breast cancer Comparison is made to prior mammograms. COMMENT: A digital radiograph of the right breast was performed. There is a coil clip in the specimen. --    Impression:  Successful removal of the coil clip from the right breast.     CT PET SKULL BASE TO MID  THIGH    Result Date: 4/14/2023  Narrative: CLINICAL HISTORY: Bilateral breast cancer with recurrence to chest wall.  History of bilateral mastectomy with reconstruction, radiation and hormonal therapy. COMPARISON: 4/10/22 COMMENT:  Approximately 63 minutes following the IV administration of  9.02 mCi of F-18 FDG, PET/CT imaging was acquired from the skull base to mid thighs Unenhanced, lower dose CT images are acquired for purposes of  anatomic correlation and attenuation correction.  This does not represent a diagnostic CT exam.  The exam is reviewed using [employer]!sis software. Measured Blood Glucose:  109 mg/dl NECK:   No hypermetabolic adenopathy. LUNGS: Left upper lobe sub-solid nodule on axial image 51 with a max SUV of 2.2. Left upper lobe sub-solid nodule on axial image 61 with a max SUV of 2.6 Right upper lobe 0.6 cm nodule with low level metabolic activity, max SUV of 1.3, axial image 17 of the lung window MEDIASTINUM: Measured mediastinal blood pool activity demonstrates a max SUV of 2.5.  No hypermetabolic mediastinal or hilar adenopathy. AXILLA:   No focal hypermetabolic activity. LIVER AND SPLEEN: Measured background hepatic activity demonstrates a max SUV of 3.1.  No focal hypermetabolic masses. ADRENALS:  No hypermetabolic adrenal nodule. PANCREAS: No hypermetabolic pancreatic mass. KIDNEYS AND BLADDER: Expected excretion of radiotracer in the collecting systems. GI TRACT: There is physiologic uptake of radiotracer throughout the gastrointestinal tract, with no suspicious focal hypermetabolism. LYMPH NODES: No hypermetabolic lymph nodes below the diaphragm. REPRODUCTIVE ORGANS:  No focal suspicious hypermetabolism. BONES:  No focal suspicious hypermetabolism. OTHER:   Focus of asymmetric radiotracer activity overlying the midportion of the right breast implants, axial image 73, max SUV of 5.5l LOW DOSE CT FINDINGS: Atherosclerotic changes.   Calcified right lobe thyroid nodule.  Segmental  calcifications in the right lobe of the liver.  Scattered simple hepatic cysts, unchanged from prior.  Cystocele.     Impression: IMPRESSION: 1.  Focal hypermetabolic activity in the right breast corresponding to the region of enhancement noted on the prior breast MRI, consistent with breast cancer recurrence. 2.  Heterogeneous attenuation of the lung parenchyma with multiple superimposed solid and groundglass that exhibit low level metabolic activity.  These are favored to be inflammatory/infectious nodules.  However, short interval follow-up is recommended in 3 months to ensure stability or resolution. 3.  No other sites of abnormal metabolic activity     MRI BREAST BILATERAL WITH AND WITHOUT CONTRAST    Result Date: 4/14/2023  Narrative: CLINICAL HISTORY: 87-year-old woman with personal history of bilateral breast cancer status post bilateral mastectomies and implant reconstruction with recent diagnosis of recurrent right breast cancer. COMMENT: MRI of both breasts was performed on a 1.5 hermann magnet with scans obtained both prior to and following intravenous administration of 6.9 mL of Gadavist. Scans were obtained in both the axial and sagittal planes and with various T1 and T2-weighting. The post enhanced scans are displayed with and without subtraction and were obtained at least at three different time intervals. Review of kinetic enhancement curves and color coded CAD was performed during the evaluation of the study. COMPARISON: There are no available prior mammograms. Prior breast ultrasounds dated 3/20/2023 and 3/30/2023. COMMENT: There are post-surgical changes of bilateral mastectomy with implant reconstruction. There are bilateral retropectoral saline implants in place. There is a focal area of enhancement in the upper right breast measuring up to 1.5 x 0.4 x 1.5 cm with an adjacent susceptibility artifact with T2 hyperintensity which corresponds to the known Hydromark biopsy clip (axial location 32,  sagittal -106).  This corresponds to the biopsy-proven malignancy. There are no other areas of suspicious enhancement in the reconstructed right breast. There are no areas of suspicious enhancement in the reconstructed left breast. No suspicious axillary lymphadenopathy is seen.     Impression: IMPRESSION: There is a small focal area of enhancement at the site of recent biopsy-proven malignancy in the right breast. There are no other areas of suspicious enhancement bilaterally. FINAL ASSESSMENT  BIRADS CATEGORY 6 - KNOWN BIOPSY PROVEN MALIGNANCY       Assessment and Plan  Lung nodules  Patient had incidental finding of groundglass lung nodules on PET/CT.  These are likely inflammatory in nature.  Recommend follow-up CT of the chest without contrast in 3 months.    Chest wall recurrence of breast cancer, right (CMS/HCC)  Patient has a history of left DCIS in 2006 status post left mastectomy as well as a history of invasive ductal carcinoma of the right breast in 2009 status post right mastectomy.  She was treated with 5 years of Arimidex following her right mastectomy.  In April 2013 she was found to have a chest wall recurrence on the right.  Cancer was ER positive (98%, OK positive (87%), HER2 negative (0), Ki-67 51%.    I had a discussion with the patient and her daughter today.  We reviewed her pathology results.  This seems to be consistent with a late chest wall recurrence from her previous breast cancer diagnosed in 2009.  She has undergone local excision.  She will be meeting with radiation oncology next week to discuss local radiation therapy.    With regards to systemic therapy, given her age and comorbidities as well as the strongly estrogen and progesterone receptor positive tumor I do not think there would be significant benefit to chemotherapy.  I would recommend antiestrogen therapy.  She had previously taken Arimidex and tolerated well.  Prescription for Arimidex was sent to her pharmacy.  We also  discussed the possibility of the addition of abemaciclib to her antiestrogen regimen for 2 years after completion of her radiation.  I will plan on seeing her back after she completes radiation to discuss this further.  I have also recommended an updated DEXA scan as her last DEXA scan was for approximately 3 years ago.  She was given a prescription for this testing today.  She was asked to call with questions or concerns prior to her next visit.      Nubia Garcia MD

## 2023-05-07 LAB — LKM-1 IGG SER IA-ACNC: <=20 U

## 2023-05-08 ENCOUNTER — OFFICE VISIT (OUTPATIENT)
Dept: SURGERY | Facility: CLINIC | Age: 88
End: 2023-05-08
Payer: MEDICARE

## 2023-05-08 VITALS
SYSTOLIC BLOOD PRESSURE: 148 MMHG | WEIGHT: 156.6 LBS | DIASTOLIC BLOOD PRESSURE: 80 MMHG | HEART RATE: 66 BPM | HEIGHT: 64 IN | OXYGEN SATURATION: 96 % | TEMPERATURE: 97.9 F | BODY MASS INDEX: 26.73 KG/M2

## 2023-05-08 DIAGNOSIS — R91.8 LUNG NODULES: ICD-10-CM

## 2023-05-08 DIAGNOSIS — C79.89 CHEST WALL RECURRENCE OF BREAST CANCER, RIGHT (CMS/HCC): Primary | ICD-10-CM

## 2023-05-08 DIAGNOSIS — C50.911 CHEST WALL RECURRENCE OF BREAST CANCER, RIGHT (CMS/HCC): Primary | ICD-10-CM

## 2023-05-08 PROCEDURE — 99024 POSTOP FOLLOW-UP VISIT: CPT | Performed by: SURGERY

## 2023-05-08 NOTE — PROGRESS NOTES
"Patient ID: Latrell Ojeda                              : 1935    Visit Date: 2023  Referring Provider: No ref. provider found  PCP: Aris Miller MD  GYN: No care team member to display    Subjective   Chief Complaint: Post-op Visit    Latrell Ojeda is a 87 y.o. old female presenting today for first postoperative visit after wide local excision of right chest recurrence.  She reports she is healing well from the surgery.  She had no postoperative pain.  She met with medical oncology who recommended beginning Arimidex and gave her a prescription for that.  She will be seeing radiation oncology this week, her medical oncologist also discussed starting her on abemaciclib after radiation.  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  Tumor was invading the muscle and present at medial lateral inferior and posterior margins, including a reexcised inferior medial posterior margin which was also positive for cancer.        Breast History:    OB/GYN Status    Currently Pregnant:  N/A   Last Menstrual Period:  N/A   Menstrual Status:   Hysterectomy   LMP Comment:   N/A   Menarche Age:     Breastfeeding?  N/A   Lactation Comment:   N/A     OB History        2    Para   2    Term                AB        Living           SAB        IAB        Ectopic        Multiple        Live Births                     Family History: family history includes Breast cancer in an other family member; Diabetes in her biological brother and biological father; Multiple myeloma in her biological son.  Allergies: is allergic to clindamycin, codeine, macrobid [nitrofurantoin monohyd/m-cryst], and oxycodone-acetaminophen.    Objective   Vitals:   Visit Vitals  BP (!) 148/80   Pulse 66   Temp 36.6 °C (97.9 °F)   Ht 1.626 m (5' 4\")   Wt 71 kg (156 lb 9.6 oz)   SpO2 96%   BMI 26.88 kg/m²     Physical " Exam  Chest:      Comments: Right breast wide local excision incision healing well, no evidence of infection           Assessment/Plan   Problem List Items Addressed This Visit        Respiratory    Lung nodules    Relevant Orders    CT CHEST WITHOUT IV CONTRAST       Other    Chest wall recurrence of breast cancer, right (CMS/HCC) - Primary    Relevant Orders    CT CHEST WITHOUT IV CONTRAST     Latrell is healing nicely from her wide local excision of her right chest wall recurrence.  Final pathology revealed a 1.2 cm invasive ductal cancer invading her muscle.  Multiple muscle margins were positive.  I discussed the case with multiple colleagues regarding recommendations for further treatment, and all felt that additional surgery would not be recommended, especially given this would almost certainly require removal of her implant and wide removal of muscle with no definite idea of how far to resect.  She also is comfortable with not proceeding with further surgery, we did discuss the chance of recurrence, and she is going to be starting on medication as well as radiation.  I reviewed that we will follow her after radiation for any sign of palpable abnormality in the area.  She also had a PET/CT which showed some lung nodules for which short-term follow-up CAT scan was recommended.  I did give her an order for that and that will be due in July.  She will call with any concerns otherwise I will plan on having her follow-up with me in about 3 months.  Return in about 3 months (around 8/8/2023).       Peggy Matthews MD

## 2023-05-08 NOTE — LETTER
May 8, 2023     Aris Miller MD  [address] [facility] [address]  [facility] [address]    Patient: Latrell Ojeda  YOB: 1935  Date of Visit: 2023      Dear Dr. Miller:    Thank you for referring Latrell Ojeda to me for evaluation. Below are my notes for this consultation.    If you have questions, please do not hesitate to call me. I look forward to following your patient along with you.         Sincerely,        Peggy Matthews MD        CC: MD Leonila Stanley MD Carruthers, Catherine D, MD  2023  4:48 PM  Signed  Patient ID: Latrell Ojeda                              : 1935    Visit Date: 2023  Referring Provider: No ref. provider found  PCP: Aris Miller MD  GYN: No care team member to display    Subjective   Chief Complaint: Post-op Visit    Latrell Ojeda is a 87 y.o. old female presenting today for first postoperative visit after wide local excision of right chest recurrence.  She reports she is healing well from the surgery.  She had no postoperative pain.  She met with medical oncology who recommended beginning Arimidex and gave her a prescription for that.  She will be seeing radiation oncology this week, her medical oncologist also discussed starting her on abemaciclib after radiation.  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  Tumor was invading the muscle and present at medial lateral inferior and posterior margins, including a reexcised inferior medial posterior margin which was also positive for cancer.       Breast History:    OB/GYN Status    Currently Pregnant:  N/A   Last Menstrual Period:  N/A   Menstrual Status:   Hysterectomy   LMP Comment:   N/A   Menarche Age:     Breastfeeding?  N/A   Lactation Comment:   N/A     OB History        2    Para   2    Term                AB      "   Living           SAB        IAB        Ectopic        Multiple        Live Births                     Family History: family history includes Breast cancer in an other family member; Diabetes in her biological brother and biological father; Multiple myeloma in her biological son.  Allergies: is allergic to clindamycin, codeine, macrobid [nitrofurantoin monohyd/m-cryst], and oxycodone-acetaminophen.    Objective   Vitals:   Visit Vitals  BP (!) 148/80   Pulse 66   Temp 36.6 °C (97.9 °F)   Ht 1.626 m (5' 4\")   Wt 71 kg (156 lb 9.6 oz)   SpO2 96%   BMI 26.88 kg/m²     Physical Exam  Chest:      Comments: Right breast wide local excision incision healing well, no evidence of infection          Assessment/Plan   Problem List Items Addressed This Visit        Respiratory    Lung nodules    Relevant Orders    CT CHEST WITHOUT IV CONTRAST       Other    Chest wall recurrence of breast cancer, right (CMS/HCC) - Primary    Relevant Orders    CT CHEST WITHOUT IV CONTRAST     Latrell is healing nicely from her wide local excision of her right chest wall recurrence.  Final pathology revealed a 1.2 cm invasive ductal cancer invading her muscle.  Multiple muscle margins were positive.  I discussed the case with multiple colleagues regarding recommendations for further treatment, and all felt that additional surgery would not be recommended, especially given this would almost certainly require removal of her implant and wide removal of muscle with no definite idea of how far to resect.  She also is comfortable with not proceeding with further surgery, we did discuss the chance of recurrence, and she is going to be starting on medication as well as radiation.  I reviewed that we will follow her after radiation for any sign of palpable abnormality in the area.  She also had a PET/CT which showed some lung nodules for which short-term follow-up CAT scan was recommended.  I did give her an order for that and that will be due in " July.  She will call with any concerns otherwise I will plan on having her follow-up with me in about 3 months.  Return in about 3 months (around 8/8/2023).      Peggy Matthews MD

## 2023-05-09 ENCOUNTER — PATIENT OUTREACH (OUTPATIENT)
Dept: RADIOLOGY | Facility: HOSPITAL | Age: 88
End: 2023-05-09
Payer: MEDICARE

## 2023-05-10 ENCOUNTER — DOCUMENTATION (OUTPATIENT)
Dept: RADIATION ONCOLOGY | Facility: HOSPITAL | Age: 88
End: 2023-05-10
Payer: MEDICARE

## 2023-05-10 ENCOUNTER — HOSPITAL ENCOUNTER (OUTPATIENT)
Dept: RADIATION ONCOLOGY | Facility: HOSPITAL | Age: 88
Setting detail: RADIATION/ONCOLOGY SERIES
Discharge: HOME | End: 2023-05-10
Attending: RADIOLOGY
Payer: MEDICARE

## 2023-05-10 VITALS
BODY MASS INDEX: 26.81 KG/M2 | HEART RATE: 57 BPM | OXYGEN SATURATION: 97 % | SYSTOLIC BLOOD PRESSURE: 155 MMHG | WEIGHT: 156.2 LBS | DIASTOLIC BLOOD PRESSURE: 73 MMHG

## 2023-05-10 DIAGNOSIS — C50.911 CHEST WALL RECURRENCE OF BREAST CANCER, RIGHT (CMS/HCC): Primary | ICD-10-CM

## 2023-05-10 DIAGNOSIS — C79.89 CHEST WALL RECURRENCE OF BREAST CANCER, RIGHT (CMS/HCC): Primary | ICD-10-CM

## 2023-05-10 ASSESSMENT — ENCOUNTER SYMPTOMS
PSYCHIATRIC NEGATIVE: 1
HEMATOLOGIC/LYMPHATIC NEGATIVE: 1
NEUROLOGICAL NEGATIVE: 1
ALLERGIC/IMMUNOLOGIC NEGATIVE: 1
ENDOCRINE NEGATIVE: 1
GASTROINTESTINAL NEGATIVE: 1
RESPIRATORY NEGATIVE: 1
CONSTITUTIONAL NEGATIVE: 1
EYES NEGATIVE: 1
CARDIOVASCULAR NEGATIVE: 1
MUSCULOSKELETAL NEGATIVE: 1

## 2023-05-10 ASSESSMENT — PAIN SCALES - GENERAL: PAINLEVEL: 0-NO PAIN

## 2023-05-10 NOTE — PROGRESS NOTES
Patient ID:  Latrell Ojeda is a 87 y.o. female.  1935    Referring Physician:   Peggy Matthews MD  [facility] [place] [address]  [place] [id number]    Primary Care Provider:  Aris Miller MD      Cancer Staging   Chest wall recurrence of breast cancer, right (CMS/HCC)  Staging form: Breast, AJCC 8th Edition  - Clinical stage from 4/10/2023: Stage IA (rcT1b, cN0, cM0, G2, ER+, LA+, HER2-) - Signed by Peggy Matthews MD on 4/11/2023      Problem List:  Patient Active Problem List    Diagnosis Date Noted   • Renal insufficiency 05/02/2023   • Lung nodules 05/02/2023   • Chest wall recurrence of breast cancer, right (CMS/HCC) 04/11/2023   • Neck pain 02/20/2023   • LFTs abnormal 10/20/2022   • Hyponatremia 06/14/2022   • Frequent UTI 04/18/2022   • 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 06/30/2021   • Hypokalemia 06/17/2021   • Rash 06/11/2021   • Chronic kidney disease, stage 3b (CMS/HCC) 03/12/2021   • Medicare annual wellness visit, subsequent 12/14/2020   • Rhinorrhea 12/14/2020   • A-fib (CMS/HCC) 12/14/2020   • Essential hypertension 02/13/2020   • Gastroesophageal reflux disease without esophagitis 02/13/2020   • Palpitation 02/13/2020   • Prediabetes 02/13/2020   • History of breast cancer 02/13/2020       Chief Complaint  Evaluation for postmastectomy radiation therapy for chest wall recurrence of an early stage right breast cancer    Subjective      History of Present Illness:    HPI     Ms. 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  She subsequently underwent right mastectomy, sentinel lymph node biopsy and expander/implant reconstruction in 2009 for a T1N0 grade 3 invasive ductal carcinoma with negative surgical margins, ER positive/LA negative/HER2 negative for which she additionally received 5 years of tamoxifen.  She did  well until March of this year, when she palpated a subcutaneous mass in the slightly lateral central aspect of the right reconstructed breast.  Ultrasound performed on March 20 showed a 0.9 x 1.0 x 0.4 cm solid mass located at 9:00 4 cm from the nipple.  Ultrasound-guided biopsy performed on March 30 showed grade 2 invasive ductal carcinoma measuring 8 mm in greatest linear extent, ER/HI positive/HER2 negative with Ki-67 of 52%.  Bilateral breast MRI performed on April 13 showed bilateral retropectoral implants.  There was a 1.5 x 0.4 x 1.5 cm area of focal enhancement in the upper right breast with a biopsy clip artifact at the site of the chest wall recurrence with no other areas of enhancement and no suspicious axillary lymphadenopathy noted.  PET/CT performed on April 14 showed bilateral subsolid pulmonary nodules with low-level metabolic activity felt to most likely represent inflammatory/infectious nodules with 3-month follow-up recommended.  On April 21, she underwent right wide local excision of the chest wall recurrence with pathology showing a 1.2 cm moderately differentiated invasive ductal carcinoma involving the subcutaneous and skeletal muscle with positive margins medially, laterally, inferiorly and posteriorly.  Additional tissue taken from the posterior margin was positive involving skeletal muscle.  The patient has been seen by Dr. Garcia who has recommended anastrozole with consideration of abemaciclib following completion of radiation.  She is now referred for consideration of radiation therapy.    Review of Systems:  Review of Systems - Oncology     Constitutional: Negative.    HENT: Negative.    Eyes: Negative.    Respiratory: Negative.    Cardiovascular: Negative.    Gastrointestinal: Negative.    Endocrine: Negative.    Genitourinary: Negative.    Musculoskeletal: Negative.    Skin: Negative.    Allergic/Immunologic: Negative.    Neurological: Negative.    Hematological: Negative.     Psychiatric/Behavioral: Negative.      Past Medical/Surgical History  Past Medical History:   Diagnosis Date   • Abnormal ECG    • Breast cancer (CMS/HCC)     bilaterally one year apart with NO node removal and with reconstruction   • Colon polyp    • COVID-19 2021    not hospitalized-had sore throat/cough   • COVID-19 vaccine series completed     booster x 1   • Epigastric abdominal pain     06/2021 cardiac ruled out with relief from GI cocktail.  Had been onn  daily   • High blood pressure    • PAF (paroxysmal atrial fibrillation) (CMS/HCC)     follows with Dr. Borges every 3 months   • Renal insufficiency     change of cardiac meds and diurectic improved labs BUN eGFR   • UTI (urinary tract infection)      Past Surgical History:   Procedure Laterality Date   • BREAST BIOPSY      2 LEFT, 1 RIGHT   • BREAST LUMPECTOMY  2005-5   • CATARACT EXTRACTION, BILATERAL  1993   • COLONOSCOPY     • ESOPHAGOSCOPY / EGD     • HYSTERECTOMY  1985   • LAPAROSCOPIC LIVER CYST FENESTRATION  1995    liquid liver cyst and aspirated at [facility]   • MASTECTOMY  2005    left 2006, right 2009        Current Medications  Current Outpatient Medications   Medication Sig Dispense Refill   • amiodarone (PACERONE) 100 mg tablet Take 100 mg by mouth nightly.     • anastrozole (ARIMIDEX) 1 mg tablet Take  1 tablet (1 mg total) daily  Swallow whole with a drink of water. 90 tablet 3   • ascorbic acid (VITAMIN C) 500 mg tablet Take 500 mg by mouth daily.     • biotin 5,000 mcg tablet,disintegrating Take by mouth daily.     • cholecalciferol, vitamin D3, 1,000 unit (25 mcg) tablet Take 1,000 Units by mouth daily.     • coenzyme Q10 (COQ10) 10 mg capsule Take 10 mg by mouth daily.       • cranberry extract 425 mg capsule Take by mouth daily.     • hydrochlorothiazide (HYDRODIURIL) 25 mg tablet Take 25 mg by mouth every morning.     • LOSARTAN POTASSIUM, BULK, MISC Take 50 mg by mouth as needed.     • lutein 6 mg capsule Take 6 mg by  mouth daily.       • pantoprazole (PROTONIX) 40 mg EC tablet Take 1 tablet (40 mg total) by mouth every other day. (Patient taking differently: Take 40 mg by mouth every other day. Pt takes in morning) 45 tablet 3   • red yeast rice 600 mg capsule Take 1 capsule by mouth 2 (two) times a day.       • rivaroxaban (XARELTO) 15 mg tablet Take 1 tablet (15 mg total) by mouth daily with dinner. 90 tablet 1     No current facility-administered medications for this visit.       Allergies  Allergies   Allergen Reactions   • Clindamycin GI intolerance   • Codeine GI intolerance   • Macrobid [Nitrofurantoin Monohyd/M-Cryst] Rash   • Oxycodone-Acetaminophen GI intolerance     She has no history of previous radiation therapy.    Her gynecologic past medical history is markable for menarche at 16 years of age.  She is G2, P2 with her first child born when she was 29.  She underwent CHAD/BSO in 1985.  She did take hormone replacement therapy for a period of 5 years following her hysterectomy.  Social History  Social History     Socioeconomic History   • Marital status:    Occupational History   • Occupation: commercial design      Comment: retired    Tobacco Use   • Smoking status: Never   • Smokeless tobacco: Never   Vaping Use   • Vaping status: Never Used   Substance and Sexual Activity   • Alcohol use: Yes     Alcohol/week: 1.0 standard drink of alcohol     Types: 1 Glasses of wine per week     Comment: rarely   • Drug use: Never   • Sexual activity: Not Currently     Partners: Male     Social Determinants of Health     Food Insecurity: No Food Insecurity (4/10/2022)    Hunger Vital Sign    • Worried About Running Out of Food in the Last Year: Never true    • Ran Out of Food in the Last Year: Never true       Family History  Family History   Problem Relation Age of Onset   • Diabetes Biological Father    • Diabetes Biological Brother    • Multiple myeloma Biological Son    • Breast cancer Other         Sister's daughter       Family Status   Relation Name Status   • Bio Mother   at age 45   • Bio Father   at age 73   • Bio Brother  (Not Specified)   • Bio Son   at age 46   • Other niece (Not Specified)               Objective      Physical Exam:  Vital Signs for this encounter:  BP: 155/73  Heart Rate: 57  SpO2: 97 %  Weight: 70.9 kg (156 lb 3.2 oz) (05/10 1405)    Physical Exam     Sclera are nonicteric.  Pupils are equal, round and react to light.  Visual fields are full.  Extraocular movements are intact.  There is no cervical, supraclavicular or axillary adenopathy palpated.  The patient is status post bilateral mastectomies with implants in place.  There is a recent surgical scar lateral to the tattooed nipple areolar complex on the right.  There are no subcutaneous masses palpated on examination of bilateral chest wall.  Lungs are clear to percussion and auscultation.  There is no spinal or CVA tenderness.  Cardiac rhythm is regular.  Abdomen is soft and nontender with normal bowel sounds.  There is no hepatosplenomegaly and no masses are palpated.  Neurologic exam is nonfocal.      Performance Status: KPS 90     PAIN ASSESSMENT:  Score Zero    Location    Pain Intervention      LABS:  Recent Results (from the past 1008 hour(s))   Pathology Tissue Exam    Collection Time: 23 11:09 AM    Specimen: Breast, Right; Tissue   Result Value Ref Range Status    Case Report  No results found Final     Surgical Pathology                                Case: [id number]                                  Authorizing Provider:  Rebekah Jackson CRNP      Collected:           2023 1109              Ordering Location:     [facility] - Radiology Received:            2023 1126                                     - Ultrasound                                                                 Pathologist:           Lew Flores MD                                                       Specimen:     Breast, Right, Right breast 9:00 mass coil clip                                            Addendum  No results found Final     COMPUTER ASSISTED IMMUNOHISTOCHEMICAL ANALYSIS FOR ESTROGEN, PROGESTERONE, HER2 RECEPTORS AND Ki-67 IN INFILTRATING BREAST CARCINOMA      Immunohistochemical stains are performed on block A1    ESTROGEN RECEPTOR (CLONE SP1):    Positive. Nuclear staining is seen in 98% of infiltrating tumor cells     Intensity of nuclear staining:  Strong     PROGESTERONE RECEPTOR (CLONE IE2):        Positive. Nuclear staining is seen in 87% of infiltrating tumor cells     Intensity of nuclear staining:  Moderate    HER2 (CLONE 4B5)     NEGATIVE (0)    Ki-67: 52 %    Less than 10%: Favorable.  10-20%: Borderline.  20% and higher: unfavorable.      COMMENT FOR ER/PgR, HER2 AND Ki-67:  Immunohistochemical stains are performed on formalin fixed, paraffin embedded tissue. Tissue is fixed in 10% neutral buffered formalin not less than 6 hours and not more than 72 hours.  Positive and negative controls are adequate. Internal control is: positive  Cold Ischemia time is 23 seconds  Specimen is adequate for testing.  Interpretation of immunostains is performed in conjunction with the [employer] scanning system and [employer] software.    DEFINITION OF POSITIVE RESULTS FOR ER AND PgR:  Nuclear staining with any intensity, involving equal or greater than 1% of infiltrating tumor cells, is considered positive.    DEFINITION OF POSITIVE AND NEGATIVE RESULTS FOR HER2:    Staining Pattern Score HER2   Interpretation     No Membrane staining   0   Negative   Faint, partial staining of the membrane, >10%of invasive tumor cells  1+   Negative   Weak complete staining of the membrane, >10%of invasive tumor cells  Circumferential membrane IHC staining that is intense  but within =/<10% of tumor cells can be considered 2+ equivocal 2+       Indeterminate   Intense complete staining of the membrane, >10%of invasive tumor cells  3+   Positive     Product name: PATHWAY - TM HER2 (Clone 4B5); : [employer] FDA approved No: [id number]; Approval date: 11/28/2000    Reference:  Sarkis Elkins, Eduardo ACOSTA, et al : ASCO/CAP HER2 testing in breast cancer update.  Arch Pathol Lab Med 3952726;(9): doi:10.5858/arpa.4848-6750-KB  Dami Parra, Yessy TEE, et al: ASCO/CAP guideline recommendations for Immunohistochemical testing of estrogen and progesterone receptors in breast cancer. J Clin Oncol 28:2784-39585, 2010.  Nilam BINGHAM, Sarkis HENDERSON, Yessy TEE, et al. Estrogen and progesterone receptor testing in breast cancer: American Society of Clinical Oncology/College of American Pathologists guideline update Arch Pathol Lab Med (2020) 144 (5): 545-563.    The immunohistochemical test for Ki-67, estrogen and progesterone receptors used in this lab was developed and its performance characteristics determined by [employer].  It has not been cleared or approved by the U.S. Food and Drug Administration.  The FDA has determined that such clearance or approval is not necessary.  This test is used for clinical purposes. Immunohistochemical stains were performed at [facility], [address] [place] [place], [place]. Jaswinder Waggoner MD, Medical Director.        Clinical Information Right breast 9:00 mass coil clip No results found Final    Final Diagnosis  No results found Final     A.  Right breast mass, 9:00, biopsy (coil clip):               Invasive ductal carcinoma.               - Histologic grade 2 (of 3).               - Size: at least 8mm in the core.          **ATTENTION PATIENTS**  **The findings in this report have been made available for review potentially before your provider has had a chance to review and discuss the results with you.  Please allow time for your provider to review your results.  If you have any questions or concerns about these results, please contact the healthcare  "provider who ordered the test.**        Comment  No results found Final     The availability of this report in EPIC was communicated to Dr. Matthews and HAMZAH Smith on 3/31/2023 via [employer] secure chat.      Gross Description  No results found Final     The specimen is received in formalin labeled with the patient's name and \"right breast 9:00 mass coil clip, breast, right\".  It consists of a 0.4 cc aggregate of tan-red mottled to yellow cylindrical fibrofatty tissue fragments.  The specimen is submitted in toto in a mesh bag in cassette A1.  Cold Ischemia time: 23s      TATIANA Torre (ASCP)cm     Comprehensive metabolic panel    Collection Time: 04/10/23  1:56 PM   Result Value Ref Range Status    Sodium 139 136 - 144 mEQ/L Final    Potassium 4.1 3.6 - 5.1 mEQ/L Final    Chloride 100 98 - 109 mEQ/L Final    CO2 29 22 - 32 mEQ/L Final    BUN 21 (H) 8 - 20 mg/dL Final    Creatinine 1.2 (H) 0.6 - 1.1 mg/dL Final    Glucose 81 70 - 99 mg/dL Final    Calcium 9.1 8.9 - 10.3 mg/dL Final    AST (SGOT) 61 (H) 15 - 41 IU/L Final    ALT (SGPT) 67 (H) 11 - 54 IU/L Final    Alkaline Phosphatase 67 35 - 126 IU/L Final    Total Protein 6.5 6.0 - 8.2 g/dL Final    Albumin 3.6 3.4 - 5.0 g/dL Final    Bilirubin, Total 1.7 (H) 0.3 - 1.2 mg/dL Final    eGFR 42.6 (L) >=60.0 mL/min/1.73m*2 Final    Anion Gap 10 3 - 15 mEQ/L Final   CBC and Differential    Collection Time: 04/10/23  1:56 PM   Result Value Ref Range Status    WBC 6.55 3.80 - 10.50 K/uL Final    RBC 4.61 3.93 - 5.22 M/uL Final    Hemoglobin 13.5 11.8 - 15.7 g/dL Final    Hematocrit 41.3 35.0 - 45.0 % Final    MCV 89.6 83.0 - 98.0 fL Final    MCH 29.3 28.0 - 33.2 pg Final    MCHC 32.7 32.2 - 35.5 g/dL Final    RDW 14.8 (H) 11.7 - 14.4 % Final    Platelets 180 150 - 369 K/uL Final    MPV 13.3 (H) 9.4 - 12.3 fL Final    Differential Type Auto No results found Final    nRBC 0.0 <=0.0 % Final    Immature Granulocytes 0.3 % Final    Neutrophils 73.8 % Final    " Lymphocytes 15.0 % Final    Monocytes 9.2 % Final    Eosinophils 0.9 % Final    Basophils 0.8 % Final    Immature Granulocytes, Absolute 0.02 0.00 - 0.08 K/uL Final    Neutrophils, Absolute 4.84 1.70 - 7.00 K/uL Final    Lymphocytes, Absolute 0.98 (L) 1.20 - 3.50 K/uL Final    Monocytes, Absolute 0.60 0.28 - 0.80 K/uL Final    Eosinophils, Absolute 0.06 0.04 - 0.36 K/uL Final    Basophils, Absolute 0.05 0.01 - 0.10 K/uL Final   Pathology Tissue Exam    Collection Time: 04/21/23  2:20 PM    Specimen: 1: Breast, Right; Tissue    2: Breast, Right; Tissue   Result Value Ref Range Status    Case Report  No results found Final     Surgical Pathology                                Case: [id number]                                  Authorizing Provider:  Peggy Matthews,   Collected:           04/21/2023 1420                                     MD                                                                           Ordering Location:     [facility]         Received:            04/21/2023 1504                                     Operating Room                                                               Pathologist:           Jaswinder Waggoner MD                                                          Specimens:   A) - Breast, Right, Right Breast Wide Local Excision - short stitch superior, long                  stitch lateral, chromic stitch posterior                                                            B) - Breast, Right, Right Breast Wide Local Excision Additional Posterior Inferior                  Medial Margin - stitch marks new margin                                                    Clinical Information Pre-op diagnosis:  recurrence right breast cancer No results found Final    Final Diagnosis  No results found Final     A.  Skin, right breast, wide local excision:   Invasive moderately differentiated ductal carcinoma present within subcutaneous tissue and skeletal muscle (1.2  "cm).   Invasive carcinoma is present at the medial, lateral, inferior, and posterior margins.   Superior margin negative for tumor.   Focal organizing biopsy site changes.   The pathological tumor stage is rpT1c (see comment).    B.  Soft tissue, right breast additional posterior inferior medial margin, excision:   Invasive moderately differentiated ductal carcinoma involving skeletal muscle.   Invasive carcinoma is present at the inked margin.        **ATTENTION PATIENTS**  **The findings in this report have been made available for review potentially before your provider has had a chance to review and discuss the results with you.  Please allow time for your provider to review your results.  If you have any questions or concerns about these results, please contact the healthcare provider who ordered the test.**        Comment  No results found Final     Breast prognostic markers were previously reported as ER 98%, PgR 87%, and HER2 negative.      Gross Description  No results found Final     A.  The specimen is received in formalin in a container labeled with the patient's name and \" right breast wide local excision-short stitch superior, long stitch lateral, chromic stitch posterior\".  It consists of an ellipse of pale tan-pink wrinkled grossly unremarkable skin measuring 1.8 cm superior to inferior, 0.8 cm lateral to medial, and excised to a depth of 1.8 cm.  One tip is marked with a short stitch designating superior and a perpendicular edge is marked with a long stitch designating lateral.  The resection margins are yellow to pink-brown lobulated rubbery and slightly indurated along the deepest surface.  This deep surface opposite the skin is marked with a chromic stitch designating posterior.  The lateral margins are inked green and the medial margins are inked black.  The specimen is serially sectioned from superior to inferior to reveal a pale tan-gray dense irregular possible mass measuring 1.2 x 0.9 x 0.5 " "cm.  It grossly abuts both the lateral-green and medial-black margins as well as the deep/posterior margin.  It is 0.6 cm from the deep aspect of the skin.  The lesional tissue is friable and it detaches from the remainder of the specimen during sectioning.  The remaining cut surfaces are yellow-pink and lobulated to pale tan-pink and rubbery along the skin.  No additional masses or lesions are grossly identified.  A coil-shaped biopsy clip and pale tan gelatinous material are received separately in the container.    The specimen is submitted entirely between sponges as follows:  A1-superior tip  A2 through A4-central cross-sections, sequentially from superior to inferior  A5-inferior tip.  Cold Ischemia time: 19 minutes 35 seconds  B.  The specimen is received in formalin in a container labeled with the patient's name and \" right breast wide local excision additional posterior inferior medial margin stitch marks new margin\".  It consists of an irregular yellow-pink to pink-brown rubbery dull cauterized fragment of tissue measuring 2.1 x 1.2 x 0.5 cm.  One edge is centrally marked with a stitch designating the new margin and this edge is inked black.  The specimen is sectioned to reveal yellow-pink to pink-tan faintly lobulated rubbery cut surfaces.  No masses or lesions are grossly identified.  The specimen is submitted entirely and sequentially within tissue containment paper in cassettes B1 through B3.  Cold Ischemia time: 2 minutes 25 seconds    TATIANA Berry (ASC)cm     Bilirubin, direct    Collection Time: 05/01/23 12:54 PM   Result Value Ref Range Status    Bilirubin, Direct 0.3 <=0.4 mg/dL Final   IgG    Collection Time: 05/01/23 12:54 PM   Result Value Ref Range Status    Total IgG 1,279.0 537.0 - 1,535.0 mg/dL Final   IgM    Collection Time: 05/01/23 12:54 PM   Result Value Ref Range Status    IgM 193 35 - 242 mg/dL Final   IgA    Collection Time: 05/01/23 12:54 PM   Result Value Ref Range Status    " IgA 155.0 84.5 - 499.0 mg/dL Final   Liver Kidney Microsomal (LKM-1) Antibody (IgG)    Collection Time: 05/01/23 12:54 PM   Result Value Ref Range Status    Lkm-1 Antibody (Igg) <=20.0 <=20.0 U Final   Comprehensive metabolic panel    Collection Time: 05/01/23 12:54 PM   Result Value Ref Range Status    Sodium 140 136 - 144 mEQ/L Final    Potassium 4.2 3.6 - 5.1 mEQ/L Final    Chloride 104 98 - 109 mEQ/L Final    CO2 27 22 - 32 mEQ/L Final    BUN 21 (H) 8 - 20 mg/dL Final    Creatinine 1.2 (H) 0.6 - 1.1 mg/dL Final    Glucose 69 (L) 70 - 99 mg/dL Final    Calcium 9.4 8.9 - 10.3 mg/dL Final    AST (SGOT) 32 15 - 41 IU/L Final    ALT (SGPT) 60 (H) 11 - 54 IU/L Final    Alkaline Phosphatase 77 35 - 126 IU/L Final    Total Protein 6.1 6.0 - 8.2 g/dL Final    Albumin 3.6 3.4 - 5.0 g/dL Final    Bilirubin, Total 1.8 (H) 0.3 - 1.2 mg/dL Final    eGFR 42.6 (L) >=60.0 mL/min/1.73m*2 Final    Anion Gap 9 3 - 15 mEQ/L Final          Assessment/Plan      In summary, Ms. Ojeda is a 87-year-old woman who is status post left mastectomy, sentinel lymph node biopsy and implant reconstruction in 2006 for DCIS and status post right mastectomy, sentinel lymph node biopsy and implant reconstruction in 2009 for a stage Ia invasive ductal carcinoma for which she additionally received 5 years of adjuvant tamoxifen.  She was recently diagnosed with a chest wall recurrence on the right for which she underwent wide local excision on April 21 which showed tumor extending into the subcutaneous and skeletal muscle with multiple positive margins.  There is no further surgery planned and I have recommended that we proceed with a course of radiation therapy to the right chest wall and regional lymph nodes in order to decrease her risk of local regional recurrence.  I would plan to treat the right supraclavicular fossa and apex of the right axilla to a dose of 4600 cGy given over 23 fractions, right chest wall/reconstructed breast to a dose of  5000 cGy given over 25 fractions, and a boost to the tumor bed for an additional 1000 cGy given over 5 fractions.  We have discussed possible risks and side effects of radiation therapy to the right chest wall/reconstructed breast and regional lymph nodes including fatigue, redness and irritation of the skin in the treatment field, damage to the right reconstructed breast and implant including change in contour, capsular fibrosis, increased risk of infection which might require further surgery, increased risk of right upper extremity lymphedema and right-sided rib fracture, asymptomatic right apical and peripheral pulmonary fibrosis and small risk of radiation pneumonitis.  The patient understands and wishes to proceed.  She is scheduled for CT simulation on May 22.    Diagnoses and Orders Associated With This Visit:  Chest wall recurrence of breast cancer, right (CMS/HCC) (Primary)

## 2023-05-10 NOTE — LETTER
May 11, 2023                                                          Patient: Latrell Ojeda   YOB: 1935   Date of Visit: 5/10/2023       Dear Dr. Miller:    The patient is seen at the [facility] RADIATION [facility]ERAP[facility] today. Attached is my assessment and plan of care.  Thank you for the opportunity to share in Latrell Ojeda's care.       Sincerely,        Debora Navarro MD      CC: No Recipients

## 2023-05-10 NOTE — PROGRESS NOTES
Subjective      Patient ID: Latrell Ojeda is a 87 y.o. female.  1935    Diagnosis:  No diagnosis found.    HPI    The following have been reviewed and updated as appropriate in this visit:        Review of Systems   Constitutional: Negative.    HENT: Negative.    Eyes: Negative.    Respiratory: Negative.    Cardiovascular: Negative.    Gastrointestinal: Negative.    Endocrine: Negative.    Genitourinary: Negative.    Musculoskeletal: Negative.    Skin: Negative.    Allergic/Immunologic: Negative.    Neurological: Negative.    Hematological: Negative.    Psychiatric/Behavioral: Negative.        Objective     Vitals:    05/10/23 1405   BP: (!) 155/73   BP Location: Left upper arm   Patient Position: Sitting   Pulse: (!) 57   SpO2: 97%   Weight: 70.9 kg (156 lb 3.2 oz)     Body mass index is 26.81 kg/m².    Physical Exam    Assessment/Plan   Diagnoses and Orders Associated With This Visit:  There are no diagnoses linked to this encounter.

## 2023-05-10 NOTE — PROGRESS NOTES
PMH:   2006 - x2 lumpectomies in L breast + L breast mastectomy w/ reconstruction   2009 - R breast mastectomy w/ reconstruction + 5 years of Tamoxifen    First live birth: 29      Imaging/Procedures   Palpable R breast mass, contacted PCP, imaging ordered    3/20/23 R breast US; 1.0 cm solid appearing mass at the 9:00 position of the  reconstructed right breast corresponding to the new palpable lump. Ultrasound-guided biopsy recommended.    3/30/23 R breast US guided bx;   Final Diagnosis   A.  Right breast mass, 9:00, biopsy (coil clip):               Invasive ductal carcinoma, ER (+), NV (+), HER2(-)               - Histologic grade 2 (of 3).               - Size: at least 8mm in the core.     23 B/L breast MRI; There is a small focal area of enhancement at the site of recent biopsy-proven  malignancy in the right breast. There are no other areas of suspicious  enhancement bilaterally.    23 PET Scan  1. Focal hypermetabolic activity in the right breast corresponding to the  region of enhancement noted on the prior breast MRI, consistent with breast  cancer recurrence.  2. 2.  Heterogeneous attenuation of the lung parenchyma with multiple superimposed  solid and groundglass that exhibit low level metabolic activity.  These are  favored to be inflammatory/infectious nodules.  However, short interval  follow-up is recommended in 3 months to ensure stability or resolution.    23 R breast bx    Final Diagnosis   A.  Skin, right breast, wide local excision:               Invasive moderately differentiated ductal carcinoma present within subcutaneous tissue and skeletal muscle (1.2 cm).               Invasive carcinoma is present at the medial, lateral, inferior, and posterior margins.               Superior margin negative for tumor.               Focal organizing biopsy site changes.               The pathological tumor stage is rpT1c (see comment).     B.  Soft tissue, right breast additional  posterior inferior medial margin, excision:               Invasive moderately differentiated ductal carcinoma involving skeletal muscle.               Invasive carcinoma is present at the inked margin.     5/2/23 consult w/ Dr. Garcia; Arimidex prescription sent to pharmacy. Recommends addition of Abemaciclib for two years after radiation is completed.    5/8/23 post-op appt w/ Dr. Matthews; f/y CAT scan ordered for July, will see pt in August for f/u appointment

## 2023-05-12 ENCOUNTER — HOSPITAL ENCOUNTER (OUTPATIENT)
Dept: RADIOLOGY | Facility: CLINIC | Age: 88
Discharge: HOME | End: 2023-05-12
Attending: INTERNAL MEDICINE
Payer: MEDICARE

## 2023-05-12 DIAGNOSIS — Z78.0 ASYMPTOMATIC MENOPAUSAL STATE: ICD-10-CM

## 2023-05-12 PROCEDURE — 77080 DXA BONE DENSITY AXIAL: CPT

## 2023-05-15 ENCOUNTER — CLINICAL SUPPORT (OUTPATIENT)
Dept: GENETICS | Facility: HOSPITAL | Age: 88
End: 2023-05-15
Attending: INTERNAL MEDICINE
Payer: MEDICARE

## 2023-05-15 ENCOUNTER — TELEPHONE (OUTPATIENT)
Dept: HEMATOLOGY/ONCOLOGY | Facility: CLINIC | Age: 88
End: 2023-05-15
Payer: MEDICARE

## 2023-05-15 DIAGNOSIS — Z71.83 ENCOUNTER FOR NONPROCREATIVE GENETIC COUNSELING: Primary | ICD-10-CM

## 2023-05-15 DIAGNOSIS — Z85.3 HISTORY OF BREAST CANCER: ICD-10-CM

## 2023-05-15 DIAGNOSIS — Z80.3 FHX: BREAST CANCER: ICD-10-CM

## 2023-05-15 PROCEDURE — 96040 HC GENETICS COUNSELING SESSIONS: CPT | Performed by: GENETIC COUNSELOR, MS

## 2023-05-15 NOTE — LETTER
05/15/23    Peggy Matthews MD  [address]  [address]  [place] [address]    Re:  Patient Preferred Name: Latrell Ojeda  Patient Legal Name: Latrell Ojeda    Dear Dr. Matthews,    Thank you for referring your patient, Latrell Ojeda, to receive care through my office. I have enclosed a summary of the care provided to Latrell on 05/15/23.    Please contact me with any questions you may have regarding the visit.    Sincerely,             Fe Goff, [facility]    [address] [place] [address]  [phone]    CC: No Recipients

## 2023-05-15 NOTE — LETTER
05/15/23    Latrell Ojeda  [address] [place] [address]    Dear Ms. Ojeda,    Thank you for participating in the [facility] Program.  It was a pleasure working with you. Attached is documentation from our discussion(s). It will also be sent to any physicians you indicated.      You may also choose to share this documentation with your family members. Because cancer risk is based on both personal and both maternal and paternal family history factors, as well as mutation status, your relatives are recommended to review their risks and genetic testing options (if applicable) with their own healthcare providers to derive a risk-appropriate, individualized plan.      If you have any questions, concerns, or updates to your personal/family history, please contact the [facility] Risk Assessment and Genetics Program at [phone] to further review your case.    Please see below for your encounter report.    Sincerely,         Fe Goff, [facility]        Encounter Note    Indication for Appointment:  Latrell Ojeda presented for genetic counseling and cancer risk assessment at [facility] due to a personal and family history of breast cancer. Latrell was referred by Peggy Matthews MD and presented to the session with her daughter.    Personal History:   Latrell is an 87 y.o. female of [place], Belizean, Sephardic Sabianism, and Mohawk descent with primary visit diagnosis of Encounter for nonprocreative genetic counseling [Z71.83].    Latrell was diagnosed with left-sided breast cancer in 2006 at age 70. 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 She is status post lumpectomy on 03/15/2006 ([facility] Accession # [id number]) and left mastectomy on 06/09/2006 ([facility] Accession #  [id number]) once a third mass was found prior to radiation.     Latrell was then diagnosed with right-sided breast cancer in 2009 at age 73. 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 She is status post right mastectomy on 06/05/2009 ([facility] Accession # [id number]) and adjuvant hormonal therapy for five years (originally thought to be Tamoxifen now believes it was Arimidex that she took). Pathology from the surgery revealed estrogen receptor positive (100%), progesterone receptor negative, and Her2 negative infiltrating ductal carcinoma.     More recently, Latrell presented for breast imaging in March 2023 due to a new palpable lump in the right breast. Ultrasound showed a 1.0 cm solid appearing mass for which biopsy was recommended and performed on 03/30/2023. Pathology revealed estrogen receptor positive (98%), progesterone receptor positive (87%), and Her2 negative (0) invasive ductal carcinoma diagnosed at age 87 ([facility] Specimen # [id number]). Latrell is status post wide local excision on 04/21/2023 ([facility] Specimen # [id number]). Further treatment planning is in progress; Latrell reports that she is currently taking Arimidex.    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  Had been onn  daily   • Fibroid    • High blood pressure    • PAF (paroxysmal atrial fibrillation) (CMS/HCC)     follows with Dr. Borges every 3 months   • Renal insufficiency     change of cardiac meds and diurectic  "improved labs BUN eGFR   • UTI (urinary tract infection)       Past Medical History Pertinent Negatives:   Denies History Of: Date Noted   • Disease of thyroid gland 05/15/2023     Past Surgical History:   Procedure Laterality Date   • Breast biopsy      2 LEFT, 1 RIGHT   • Breast lumpectomy Left 2006   • Cataract extraction, bilateral     • Colonoscopy      6-7 years ago, no polyps   • Esophagoscopy / egd     • Laparoscopic liver cyst fenestration  1995    liquid liver cyst and aspir[facility] at [facility]   • Mastectomy      left 2006, right 2009   • Total abdominal hysterectomy w/ bilateral salpingoophorectomy      age 51, due to bleeding     No past surgical history pertinent negatives on file.     Height/Weight: (previously recorded in physician's office)  Height: 1.626 m (5' 4\")  Weight: 70.9 kg (156 lb 3.2 oz)    Gynecologic History:  Menarche Age: 16 years  Age at first live birth: 29  Menopause Status: Post-Menopause  Age at menopause: 51  Use of hormonal contraceptives: No  Use of fertility medications: No  Use of hormone replacement therapy: Yes (premarin for ~5 years)  Use of Tamoxifen/Evista: No (patient thinks she took Arimidex)    Social History     Tobacco Use   • Smoking status: Never   • Smokeless tobacco: Never   Vaping Use   • Vaping status: Never Used   Substance Use Topics   • Alcohol use: Yes     Alcohol/week: 1.0 standard drink of alcohol     Types: 1 Glasses of wine per week     Comment: rarely   • Drug use: Never       Family History:  See completed family history in pedigree below.    Genetic Education/Risk Assessment/Counseling:  Information was provided about the relationship between genes and cancer.  The concept of hereditary cancer was defined.  Natural history, risks and inheritance patterns of  cancer-associated genes were reviewed, as related to Latrell’s personal and/or family history.  Related psychosocial aspects were discussed.    Discussion of Genetic Testing:  The pros, cons, and " limitations of testing for genetic susceptibility were discussed, including but not limited to test options, possible results, potential impact on management, and psychosocial aspects.  There may be limited data on the degree of cancer risk and/or no defined management guidelines associated with some genes.  If applicable, risk assessment models and/or published tables were used to provide a mutation probability estimate. Limitations of assessment were reviewed.    Given the reported personal and/or family history, genetic testing was offered and accepted. The following testing was ordered:    [facility]  Common Hereditary Cancers Panel    Plan:  Latrell was confirmed to have understood the aforementioned information and was assisted with decision making as needed.  Informational and supportive resources were provided. Consent was obtained to share chart note(s) with physicians. Latrell is encouraged to contact the program with personal/family history updates. Latrell will be contacted via telephone when genetic test results are available.     A total of 30 minutes was spent providing [facility].

## 2023-05-15 NOTE — PROGRESS NOTES
Patient Name: Latrell Ojeda  Patient Legal Name: Latrell Ojeda  : 1935       Indication for Appointment:  Latrell Ojeda presented for genetic counseling and cancer risk assessment at [facility] due to a personal and family history of breast cancer. Latrell was referred by Peggy Matthews MD and presented to the session with her daughter.    Personal History:   Latrell is an 87 y.o. female of Mohawk, Tongan, Sephardic Religious, and Hungarian descent with primary visit diagnosis of Encounter for nonprocreative genetic counseling [Z71.83].    Latrell was diagnosed with left-sided breast cancer in  at age 70. Pathology from the biopsy on 2006 revealed estrogen and progesterone receptor weakly positive (both 10%) ductal carcinoma in situ (DCIS) [[facility] Accession # [id number]]. She is status post lumpectomy on 03/15/2006 ([facility] Accession # [id number]) and left mastectomy on 2006 ([facility] Accession # [id number]) once a third mass was found prior to radiation.     Latrell was then diagnosed with right-sided breast cancer in  at age 73. 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 She is status post right mastectomy on 2009 ([facility] Accession # [id number]) and adjuvant hormonal therapy for five years (originally thought to be Tamoxifen now believes it was Arimidex that she took). Pathology from the surgery revealed estrogen receptor positive (100%), progesterone receptor negative, and Her2 negative infiltrating ductal carcinoma.     More recently, Latrell presented for breast imaging in 2023 due to a new palpable lump in the right breast. Ultrasound showed a 1.0 cm solid appearing mass for which biopsy was recommended and performed on 2023. Pathology revealed estrogen  "receptor positive (98%), progesterone receptor positive (87%), and Her2 negative (0) invasive ductal carcinoma diagnosed at age 87 ([facility] Specimen # [id number]). Latrell is status post wide local excision on 04/21/2023 ([facility] Specimen # [id number]). Further treatment planning is in progress; Latrell reports that she is currently taking Arimidex.    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  Had been onn  daily   • Fibroid    • High blood pressure    • PAF (paroxysmal atrial fibrillation) (CMS/HCC)     follows with Dr. Borges every 3 months   • Renal insufficiency     change of cardiac meds and diurectic improved labs BUN eGFR   • UTI (urinary tract infection)       Past Medical History Pertinent Negatives:   Denies History Of: Date Noted   • Disease of thyroid gland 05/15/2023     Past Surgical History:   Procedure Laterality Date   • Breast biopsy      2 LEFT, 1 RIGHT   • Breast lumpectomy Left 2006   • Cataract extraction, bilateral     • Colonoscopy      6-7 years ago, no polyps   • Esophagoscopy / egd     • Laparoscopic liver cyst fenestration  1995    liquid liver cyst and aspirated at [facility]   • Mastectomy      left 2006, right 2009   • Total abdominal hysterectomy w/ bilateral salpingoophorectomy      age 51, due to bleeding     No past surgical history pertinent negatives on file.     Height/Weight: (previously recorded in physician's office)  Height: 1.626 m (5' 4\")  Weight: 70.9 kg (156 lb 3.2 oz)    Gynecologic History:  Menarche Age: 16 years  Age at first live birth: 29  Menopause Status: Post-Menopause  Age at menopause: 51  Use of " hormonal contraceptives: No  Use of fertility medications: No  Use of hormone replacement therapy: Yes (premarin for ~5 years)  Use of Tamoxifen/Evista: No (patient thinks she took Arimidex)    Social History     Tobacco Use   • Smoking status: Never   • Smokeless tobacco: Never   Vaping Use   • Vaping status: Never Used   Substance Use Topics   • Alcohol use: Yes     Alcohol/week: 1.0 standard drink of alcohol     Types: 1 Glasses of wine per week     Comment: rarely   • Drug use: Never       Family History:  See completed family history in pedigree below.    Genetic Education/Risk Assessment/Counseling:  Information was provided about the relationship between genes and cancer.  The concept of hereditary cancer was defined.  Natural history, risks and inheritance patterns of  cancer-associated genes were reviewed, as related to Latrell’s personal and/or family history.  Related psychosocial aspects were discussed.    Discussion of Genetic Testing:  The pros, cons, and limitations of testing for genetic susceptibility were discussed, including but not limited to test options, possible results, potential impact on management, and psychosocial aspects.  There may be limited data on the degree of cancer risk and/or no defined management guidelines associated with some genes.  If applicable, risk assessment models and/or published tables were used to provide a mutation probability estimate. Limitations of assessment were reviewed.    Given the reported personal and/or family history, genetic testing was offered and accepted. The following testing was ordered:    [facility]  Common Hereditary Cancers Panel    Plan:  Latrell was confirmed to have understood the aforementioned information and was assisted with decision making as needed.  Informational and supportive resources were provided. Consent was obtained to share chart note(s) with physicians. Latrell is encouraged to contact the program with personal/family history  updates. Latrell will be contacted via telephone when genetic test results are available.     A total of 30 minutes was spent providing genetic counseling to Latrell.

## 2023-05-15 NOTE — TELEPHONE ENCOUNTER
Please call patient.  DEXA scan shows normal results.  She should continue Arimidex which was prescribed and we will plan on a repeat DEXA scan in 2 years.  She should keep her scheduled follow-up visit in July.

## 2023-05-15 NOTE — LETTER
05/15/23    Aris Miller MD  [address] [place] [phone]    Re:  Patient Preferred Name: Latrell Ojeda  Patient Legal Name: Latrell Ojeda    Dear Dr. Miller,    I am writing to confirm that your patient, Latrell Ojeda, received care through my office on 05/15/23. I have enclosed a summary of the care provided to Latrell for your reference.    Please contact me with any questions you may have regarding the visit.    Sincerely,           Fe Goff, [facility]      CC: No Recipients

## 2023-05-22 ENCOUNTER — HOSPITAL ENCOUNTER (OUTPATIENT)
Dept: RADIATION ONCOLOGY | Facility: HOSPITAL | Age: 88
Setting detail: RADIATION/ONCOLOGY SERIES
Discharge: HOME | End: 2023-05-22
Attending: RADIOLOGY
Payer: MEDICARE

## 2023-05-22 ENCOUNTER — DOCUMENTATION (OUTPATIENT)
Dept: RADIATION ONCOLOGY | Facility: HOSPITAL | Age: 88
End: 2023-05-22
Payer: MEDICARE

## 2023-05-22 PROCEDURE — 77332 RADIATION TREATMENT AID(S): CPT | Performed by: RADIOLOGY

## 2023-05-22 PROCEDURE — 77290 THER RAD SIMULAJ FIELD CPLX: CPT | Performed by: RADIOLOGY

## 2023-05-22 PROCEDURE — 77470 SPECIAL RADIATION TREATMENT: CPT | Performed by: RADIOLOGY

## 2023-05-22 NOTE — PROGRESS NOTES
"TREATMENT PLAN OF CARE AND GOALS BREAST     Care Providers   Medical Oncologist Dr. Garcia   Radiation Oncologist Dr. Navarro   Surgeon Peggy Matthews MD   Primary Care Provider Aris Miller MD   Radiation Oncology Nurse Ally Hayes RN BSN OCN    Fidelina Schultz, [phone]   Genetics Counselor Emiliano Cota, MS, [facility] [phone]    Treatment Plan of Care   Chemotherapy, Hormonal, and Targeted therapies   Regimen Start date   Interval, Duration   ER/SD +  HER2 - Arimidex/Abemaciclib         Radiation Therapy   Treatment area/Technique Start date Interval, Duration   Right breast            -Stay hydrated. Drink plenty of fluids.     -Moisturize your skin. Moisturizers provide a seal over your skin to keep water from escaping. Thicker moisturizers work best. Choose a moisturizer that's water-based and unscented. Ask your doctor or nurse to recommend specific products.    NEVER USE A MOISTURIZER 4 HOURS PRIOR TO YOUR RADIATION TREATMENT.  Apply after treatment and nightly.     -Limit bath time. Hot water and long showers or baths can dry your skin. Take short baths or showers, and use warm -- rather than hot -- water.     - -Wash with mild soap. Avoid harsh, drying soaps such as deodorant or antibacterial types.    - Avoid direct sun. UV rays are the highest from 10 am to 4 pm- and are stronger during spring and summer months. It is suggested to use:  UV Protective clothing, Sunscreen with Zinc, or stay in a shaded area.  Do not use tanning beds.                        -Avoid underwire bras. Wear a comfortable cotton bra or camisole with shelf bra.    - Allow time for air : By wearing a loose-fitting cotton shirt or using a hair dryer set to \" COOL\" .    -Avoid Chlorine -  dries your skin.  Shower after swimming in a pool.    - Avoid antiperspirants.  Use a natural deodorant only after your daily treatments.    FATIGUE: Conserve your energy, reduce stress, and keep yourself from focusing " on the fatigue by exercising.      NO MULTIVITAMINS OR ANTIOXIDANTS during your radiation treatment.    You will meet with the Nurse/ Doctor once a week for an on treatment visit. Please plan for a longer visit on this day.     You will speak with the radiation therapists for scheduling and treatment specific requirements.      Resources you may be interested in:      Social Work, Free counseling services, Art therapy, Nutrition, Complementary therapies , Spiritual Care, Virtual programs and classes provided by [facility]. Free [facility] program.       Contact Numbers:    Triage Line  [phone]  Doctor on call (nights and weekends) [phone]  To schedule or cancel your radiation treatment appointments  [phone]  Reviewed by:    Ally Hayes RN  BSN OCN             Date completed:05/22/23

## 2023-05-24 ENCOUNTER — TELEPHONE (OUTPATIENT)
Dept: GENETICS | Age: 88
End: 2023-05-24
Payer: MEDICARE

## 2023-05-24 LAB
ABNORMALITY: NORMAL
ARIA ZRC COURSE ID: NORMAL
ARIA ZRC COURSE INTENT: NORMAL
ARIA ZRC COURSE START DATE: NORMAL
ARIA ZRC TREATMENT ELAPSED DAYS: NORMAL
ARIA ZRP FRACTIONS TREATED TO DATE: NORMAL
ARIA ZRP PLAN ID: NORMAL
ARIA ZRP PLAN NAME: NORMAL
ARIA ZRP PRESCRIBED DOSE CGY: 1000
ARIA ZRP PRESCRIBED DOSE CGY: 1000
ARIA ZRP PRESCRIBED DOSE CGY: 1034.6
ARIA ZRP PRESCRIBED DOSE CGY: 1070.3
ARIA ZRP PRESCRIBED DOSE CGY: 5000
ARIA ZRP PRESCRIBED DOSE PER FRACTION: 0.45
ARIA ZRP PRESCRIBED DOSE PER FRACTION: 0.47
ARIA ZRP PRESCRIBED DOSE PER FRACTION: 2
ARIA ZRR DOSAGE GIVEN TO DATE: 0
ARIA ZRR REFERENCE POINT ID: NORMAL
LYMPH SUBSET INTERP BLD FC-IMP: NORMAL
MLH ARIA ZRC TREATMENT DATES: NORMAL
SCAN RESULT: NORMAL

## 2023-05-24 NOTE — LETTER
05/24/23    Aris Miller MD  [address] [place] [phone]    Re:  Patient Preferred Name: Latrell Ojeda  Patient Legal Name: Latrell Ojeda    Dear Dr. Miller,    I am writing to confirm that your patient, Latrell Ojeda, received care through my office on 05/24/23. I have enclosed a summary of the care provided to Latrell for your reference.    Please contact me with any questions you may have regarding the visit.    Sincerely,           Fe Goff, K[facility]      CC: No Recipients

## 2023-05-24 NOTE — LETTER
05/24/23    Latrell Ojeda  [address] [place] [address]    Dear Ms. Ojeda,    Thank you for participating in the [facility].  It was a pleasure working with you. Attached is documentation from our discussion(s). It will also be sent to any physicians you indicated.      You may also choose to share this documentation with your family members. Because cancer risk is based on both personal and both maternal and paternal family history factors, as well as mutation status, your relatives are recommended to review their risks and genetic testing options (if applicable) with their own healthcare providers to derive a risk-appropriate, individualized plan.      If you have any questions, concerns, or updates to your personal/family history, please contact the [facility] at [phone] to further review your case.    Please see below for your encounter report.    Sincerely,         Fe Goff, [facility]        Encounter Note       Indication for Appointment:  Latrell Ojeda was seen by the Cancer Risk Assessment and Genetics Program at [facility] due to a personal and family history of breast cancer. Genetic testing was performed.    Latrell was contacted by telephone today to discuss genetic test results, risk-based management guidelines and any potential additional test options. Follow up appointments to discuss the results in more detail with our medical director may be scheduled by contacting the Cancer Risk Assessment and Genetics Program.    Genetic Test Results:    RESULT:    Negative- No Pathogenic Variants Identified    LAB/TEST:  [facility]  Common Hereditary Cancers Panel    47 Genes analyzed (see scanned results for detailed description of testing technology): APC, LUIS EDUARDO, AXIN2, BARD1, BMPR1A, BRCA1, BRCA2, BRIP1, CDH1, CDK4, CDKN2A, CHEK2, CTNNA1, DICER1, EPCAM, GREM1, HOXB13, KIT, MEN1, MLH1,  MSH2, MSH3, MSH6, MUTYH, NBN, NF1, NTHL1, PALB2, PDGFRA, PMS2, POLD1, POLE, PTEN, RAD50, RAD51C, RAD51D, SDHA, SDHB, SDHC, SDHD, SMAD4, SMARCA4, STK11, TP53, TSC1, TSC2, VHL    After receiving consent, the following result(s) were disclosed to Latrell:   - No reportable alterations were identified by sequencing and/or deletion/duplication analysis as interpreted by this laboratory.  This is referred to as an indeterminate negative result.  A mutation could be present that cannot be detected by the current tests performed or in a gene not tested. Additionally, biological family members may have a mutation in any of the genes tested Latrell does not given the negative result.    Personal History:   Latrell is an 87 y.o. female of Jamaican, Swedish, Sephardic [place], and Urdu descent.    Latrell was diagnosed with left-sided breast cancer in 2006 at age 70. 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 She is status post lumpectomy on 03/15/2006 ([facility] Accession # [id number]) and left mastectomy on 06/09/2006 ([facility] Accession # [id number]) once a third mass was found prior to radiation.      Latrell was then diagnosed with right-sided breast cancer in 2009 at age 73. 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 She is status post right mastectomy on 06/05/2009 ([facility] Accession # [id number]) and adjuvant hormonal therapy for five years (originally thought to be Tamoxifen now believes it was Arimidex that she took). Pathology from the surgery revealed estrogen receptor positive (100%), progesterone receptor negative, and Her2 negative infiltrating ductal carcinoma.      More recently, Latrell  presented for breast imaging in March 2023 due to a new palpable lump in the right breast. Ultrasound showed a 1.0 cm solid appearing mass for which biopsy was recommended and performed on 03/30/2023. Pathology revealed estrogen receptor positive (98%), progesterone receptor positive (87%), and Her2 negative (0) invasive ductal carcinoma diagnosed at age 87 ([facility] Specimen # [id number]). Latrell is status post wide local excision on 04/21/2023 ([facility] Specimen # [id number]). Further treatment planning is in progress; Latrell reports that she is currently taking Arimidex.    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  Had been onn  daily   • Fibroid    • High blood pressure    • PAF (paroxysmal atrial fibrillation) (CMS/HCC)     follows with Dr. Borges every 3 months   • Renal insufficiency     change of cardiac meds and diurectic improved labs BUN eGFR   • UTI (urinary tract infection)      Past Medical History Pertinent Negatives:   Denies History Of: Date Noted   • Disease of thyroid gland 05/15/2023     Past Surgical History:   Procedure Laterality Date   • Breast biopsy      2 LEFT, 1 RIGHT   • Breast lumpectomy Left 2006   • Cataract extraction, bilateral     • Colonoscopy      6-7 years ago, no polyps   • Esophagoscopy / egd     • Laparoscopic liver cyst fenestration  1995    liquid liver cyst and aspirated at [facility]   • Mastectomy      left 2006, right 2009   • Total abdominal hysterectomy w/ bilateral salpingoophorectomy      age 51, due to bleeding     No past surgical history pertinent negatives on file.     Gynecologic  History:  Menarche Age: 16 years  Age at first live birth: 29  Menopause Status: Post-Menopause  Age at menopause: 51  Use of hormonal contraceptives: No  Use of fertility medications: No  Use of hormone replacement therapy: Yes (premarin for ~5 years)  Use of Tamoxifen/Evista: No (patient thinks she took Arimidex)    Social History     Tobacco Use   • Smoking status: Never   • Smokeless tobacco: Never   Vaping Use   • Vaping status: Never Used   Substance Use Topics   • Alcohol use: Yes     Alcohol/week: 1.0 standard drink of alcohol     Types: 1 Glasses of wine per week     Comment: rarely   • Drug use: Never       Family History:  See completed family history in pedigree below.        Risk Assessment and Management:    As a clinically actionable mutation was not identified by the current test method(s), the cancer risks and guidelines reviewed are based on the personal and/or family history provided. As guidelines continually change and the efficacy of screening for some cancers remains under investigation, Latrell Ojeda is encouraged to review personal and family history with managing physician(s) regularly.    Site of Surveillance Coordinating Provider Recommendation Status   Breast Oncology, Surgery, Primary Care or Gynecology · Latrell is encouraged to continue with breast cancer treatment and/or surveillance as directed by treating physicians.     Latrell is currently taking Arimidex.   Gynecologic Gynecology · Pelvic exam and pap test annually or as directed by physician.    Latrell is status post CHAD/BSO.   Gastrointestinal PCP  Gastroenterology · Latrell Ojeda is advised to continue with colorectal cancer screening as directed by physician, or sooner if symptoms or changes in history warrant sooner evaluation.   · Based on the reported personal history of colorectal polyps, the patient's lifetime risk of developing colorectal cancer may be increased over that of the general population. The  patient was encouraged to review this history with managing gastroenterologist to determine frequency of colonoscopy.        Most recent colonoscopy was 6-7 years ago.   Skin PCP  Dermatology · Latrell is encouraged to practice skin protective behaviors, such as:  · Annual full-body skin examination   · Use of sun protective barriers such as sunscreens, clothing, hats and UV protective sunglasses with avoidance of mid-day sun, chronic sun exposure, and tanning beds is strongly recommended   · Awareness of ABCDEs of melanoma (asymmetry, border, color, diameter, evolution).        General Management PCP · Annual physical examination is encouraged.    · Adherence to a healthy lifestyle, including body mass index (BMI) <25 obtained through balanced diet and exercise,   · Limit intake of alcoholic beverages to less than 1 drink per day (serving equals 1 ounce of liquor, 6 ounces of wine or 8 ounces of beer)  · Not smoking.  · Of note, Latrell Ojeda is encouraged to discuss the potential side effects of the treatment rendered for curative intent of cancer including but not limited to psychosocial and physical effects, third cancer risk, other health concerns.        Plan:  Cancer risks are based on personal history, as well as on maternal and paternal family histories, mutation status, and other factors.  Relatives are encouraged to consider risk assessment and/or genetic evaluation to derive a risk-appropriate, individualized plan.  Should family member(s) be interested in genetic evaluation, the Cancer Risk Assessment and Genetics Program can provide consultation or help to find a genetic counselor in their area.    The information provided reflects current practice guidelines and may change with new medical discoveries/technology/updated personal or family history information.  Latrell was confirmed to have understood the aforementioned information and was assisted with decision making as needed.  Informational  and supportive resources were provided.  Potential psychosocial ramifications related to test results were reviewed.  Consent was obtained to share chart note(s) with physicians.  Latrell plans to discuss the above information with physicians to determine an optimal risk management plan.    Latrell should contact the program with personal/family history updates as this could alter the guidelines provided and/or available test options and/or to inquire about new information specific to this case.   As stated, there may be other genes associated with cancer risk for which Latrell was not tested.  Latrell was encouraged to contact the genetics program at [phone] with any questions or concerns and/or to periodically review test options and related insurance coverage.

## 2023-05-24 NOTE — TELEPHONE ENCOUNTER
Patient Name: Latrell Ojeda  Patient Legal Name: Latrell Ojeda  : 1935       Indication for Appointment:  Latrell Ojeda was seen by the Cancer Risk Assessment and Genetics Program at [facility] due to a personal and family history of breast cancer. Genetic testing was performed.    Latrell was contacted by telephone today to discuss genetic test results, risk-based management guidelines and any potential additional test options. Follow up appointments to discuss the results in more detail with our medical director may be scheduled by contacting the Cancer Risk Assessment and Genetics Program.    Genetic Test Results:    RESULT:    Negative- No Pathogenic Variants Identified    LAB/TEST:  [facility]  Common Hereditary Cancers Panel    47 Genes analyzed (see scanned results for detailed description of testing technology): APC, LUIS EDUARDO, AXIN2, BARD1, BMPR1A, BRCA1, BRCA2, BRIP1, CDH1, CDK4, CDKN2A, CHEK2, CTNNA1, DICER1, EPCAM, GREM1, HOXB13, KIT, MEN1, MLH1, MSH2, MSH3, MSH6, MUTYH, NBN, NF1, NTHL1, PALB2, PDGFRA, PMS2, POLD1, POLE, PTEN, RAD50, RAD51C, RAD51D, SDHA, SDHB, SDHC, SDHD, SMAD4, SMARCA4, STK11, TP53, TSC1, TSC2, VHL    After receiving consent, the following result(s) were disclosed to Latrell:   - No reportable alterations were identified by sequencing and/or deletion/duplication analysis as interpreted by this laboratory.  This is referred to as an indeterminate negative result.  A mutation could be present that cannot be detected by the current tests performed or in a gene not tested. Additionally, biological family members may have a mutation in any of the genes tested Latrell does not given the negative result.    Personal History:   Latrell is an 87 y.o. female of Portuguese, Ukrainian, Sephardic Religion, and Lao descent.    Latrell was diagnosed with left-sided breast cancer in  at age 70. Pathology from the biopsy on 2006 revealed estrogen and progesterone receptor  weakly positive (both 10%) ductal carcinoma in situ (DCIS) [[facility] Accession # [id number]]. She is status post lumpectomy on 03/15/2006 ([facility] Accession # [id number]) and left mastectomy on 06/09/2006 ([facility] Accession # [id number]) once a third mass was found prior to radiation.      Latrell was then diagnosed with right-sided breast cancer in 2009 at age 73. 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 She is status post right mastectomy on 06/05/2009 ([facility] Accession # [id number]) and adjuvant hormonal therapy for five years (originally thought to be Tamoxifen now believes it was Arimidex that she took). Pathology from the surgery revealed estrogen receptor positive (100%), progesterone receptor negative, and Her2 negative infiltrating ductal carcinoma.      More recently, Latrell presented for breast imaging in March 2023 due to a new palpable lump in the right breast. Ultrasound showed a 1.0 cm solid appearing mass for which biopsy was recommended and performed on 03/30/2023. Pathology revealed estrogen receptor positive (98%), progesterone receptor positive (87%), and Her2 negative (0) invasive ductal carcinoma diagnosed at age 87 ([facility] Specimen # [id number]). Latrell is status post wide local excision on 04/21/2023 ([facility] Specimen # [id number]). Further treatment planning is in progress; Latrell reports that she is currently taking Arimidex.    Past Medical History:   Diagnosis Date   • Abnormal ECG    • Breast cancer (CMS/HCC)     2006 (left DCIS); 2009 (right infiltrating ductal carcinoma); 2023 (right invasive ductal carcinoma)   • Colon polyp    • COVID-19 2021    not hospitalized-had sore throat/cough   • COVID-19 vaccine series completed     booster x 1   • Epigastric abdominal pain     06/2021 cardiac ruled out with  relief from GI cocktail.  Had been onn  daily   • Fibroid    • High blood pressure    • PAF (paroxysmal atrial fibrillation) (CMS/HCC)     follows with Dr. Borges every 3 months   • Renal insufficiency     change of cardiac meds and diurectic improved labs BUN eGFR   • UTI (urinary tract infection)      Past Medical History Pertinent Negatives:   Denies History Of: Date Noted   • Disease of thyroid gland 05/15/2023     Past Surgical History:   Procedure Laterality Date   • Breast biopsy      2 LEFT, 1 RIGHT   • Breast lumpectomy Left 2006   • Cataract extraction, bilateral     • Colonoscopy      6-7 years ago, no polyps   • Esophagoscopy / egd     • Laparoscopic liver cyst fenestration  1995    liquid liver cyst and aspirated at [facility]   • Mastectomy      left 2006, right 2009   • Total abdominal hysterectomy w/ bilateral salpingoophorectomy      age 51, due to bleeding     No past surgical history pertinent negatives on file.     Gynecologic History:  Menarche Age: 16 years  Age at first live birth: 29  Menopause Status: Post-Menopause  Age at menopause: 51  Use of hormonal contraceptives: No  Use of fertility medications: No  Use of hormone replacement therapy: Yes (premarin for ~5 years)  Use of Tamoxifen/Evista: No (patient thinks she took Arimidex)    Social History     Tobacco Use   • Smoking status: Never   • Smokeless tobacco: Never   Vaping Use   • Vaping status: Never Used   Substance Use Topics   • Alcohol use: Yes     Alcohol/week: 1.0 standard drink of alcohol     Types: 1 Glasses of wine per week     Comment: rarely   • Drug use: Never       Family History:  See completed family history in pedigree below.        Risk Assessment and Management:     As a clinically actionable mutation was not identified by the current test method(s), the cancer risks and guidelines reviewed are based on the personal and/or family history provided. As guidelines continually change and the efficacy of  screening for some cancers remains under investigation, Latrell Ojeda is encouraged to review personal and family history with managing physician(s) regularly.    Site of Surveillance Coordinating Provider Recommendation Status   Breast Oncology, Surgery, Primary Care or Gynecology · Latrell is encouraged to continue with breast cancer treatment and/or surveillance as directed by treating physicians.     Latrell is currently taking Arimidex.   Gynecologic Gynecology · Pelvic exam and pap test annually or as directed by physician.    Latrell is status post CHAD/BSO.   Gastrointestinal PCP  Gastroenterology · Latrell Ojeda is advised to continue with colorectal cancer screening as directed by physician, or sooner if symptoms or changes in history warrant sooner evaluation.   · Based on the reported personal history of colorectal polyps, the patient's lifetime risk of developing colorectal cancer may be increased over that of the general population. The patient was encouraged to review this history with managing gastroenterologist to determine frequency of colonoscopy.        Most recent colonoscopy was 6-7 years ago.   Skin PCP  Dermatology · Latrell is encouraged to practice skin protective behaviors, such as:  · Annual full-body skin examination   · Use of sun protective barriers such as sunscreens, clothing, hats and UV protective sunglasses with avoidance of mid-day sun, chronic sun exposure, and tanning beds is strongly recommended   · Awareness of ABCDEs of melanoma (asymmetry, border, color, diameter, evolution).         General Management PCP · Annual physical examination is encouraged.    · Adherence to a healthy lifestyle, including body mass index (BMI) <25 obtained through balanced diet and exercise,   · Limit intake of alcoholic beverages to less than 1 drink per day (serving equals 1 ounce of liquor, 6 ounces of wine or 8 ounces of beer)  · Not smoking.  · Of note, Latrell Ojeda is  encouraged to discuss the potential side effects of the treatment rendered for curative intent of cancer including but not limited to psychosocial and physical effects, third cancer risk, other health concerns.        Plan:  Cancer risks are based on personal history, as well as on maternal and paternal family histories, mutation status, and other factors.  Relatives are encouraged to consider risk assessment and/or genetic evaluation to derive a risk-appropriate, individualized plan.  Should family member(s) be interested in genetic evaluation, the Cancer Risk Assessment and Genetics Program can provide consultation or help to find a genetic counselor in their area.    The information provided reflects current practice guidelines and may change with new medical discoveries/technology/updated personal or family history information.  Latrell was confirmed to have understood the aforementioned information and was assisted with decision making as needed.  Informational and supportive resources were provided.  Potential psychosocial ramifications related to test results were reviewed.  Consent was obtained to share chart note(s) with physicians.  Latrell plans to discuss the above information with physicians to determine an optimal risk management plan.    Latrell should contact the program with personal/family history updates as this could alter the guidelines provided and/or available test options and/or to inquire about new information specific to this case.   As stated, there may be other genes associated with cancer risk for which Latrell was not tested.  Latrell was encouraged to contact the genetics program at [phone] with any questions or concerns and/or to periodically review test options and related insurance coverage.

## 2023-05-24 NOTE — LETTER
05/24/23    Peggy Matthews MD  [address]  [address]  [place] [address]    Re:  Patient Preferred Name: Latrell Ojeda  Patient Legal Name: Latrell Ojeda    Dear Dr. Matthews,    Thank you for referring your patient, Latrell Ojeda, to receive care through my office. I have enclosed a summary of the care provided to Latrell on 05/24/23.    Please contact me with any questions you may have regarding the visit.    Sincerely,             Fe Goff, [facility]    [address]  [place] [address]    CC: No Recipients

## 2023-05-31 ENCOUNTER — HOSPITAL ENCOUNTER (OUTPATIENT)
Dept: RADIATION ONCOLOGY | Facility: HOSPITAL | Age: 88
Setting detail: RADIATION/ONCOLOGY SERIES
Discharge: HOME | End: 2023-05-31
Attending: RADIOLOGY
Payer: MEDICARE

## 2023-05-31 PROCEDURE — 77280 THER RAD SIMULAJ FIELD SMPL: CPT | Performed by: RADIOLOGY

## 2023-06-01 ENCOUNTER — HOSPITAL ENCOUNTER (OUTPATIENT)
Dept: RADIATION ONCOLOGY | Facility: HOSPITAL | Age: 88
Setting detail: RADIATION/ONCOLOGY SERIES
Discharge: HOME | End: 2023-06-01
Attending: RADIOLOGY
Payer: MEDICARE

## 2023-06-01 LAB
ARIA ZRC COURSE ID: NORMAL
ARIA ZRC COURSE INTENT: NORMAL
ARIA ZRC COURSE START DATE: NORMAL
ARIA ZRC TREATMENT ELAPSED DAYS: NORMAL
ARIA ZRP FRACTIONS TREATED TO DATE: NORMAL
ARIA ZRP PLAN ID: NORMAL
ARIA ZRP PRESCRIBED DOSE CGY: 1180.2
ARIA ZRP PRESCRIBED DOSE CGY: 4600
ARIA ZRP PRESCRIBED DOSE CGY: 5000
ARIA ZRP PRESCRIBED DOSE PER FRACTION: 0.51
ARIA ZRP PRESCRIBED DOSE PER FRACTION: 2
ARIA ZRP PRESCRIBED DOSE PER FRACTION: 2
ARIA ZRR DOSAGE GIVEN TO DATE: 2
ARIA ZRR DOSAGE GIVEN TO DATE: 2.06
ARIA ZRR REFERENCE POINT ID: NORMAL
ARIA ZRR SESSION DOSAGE GIVEN: 2
ARIA ZRR SESSION DOSAGE GIVEN: 2.06
MLH ARIA ZRC TREATMENT DATES: NORMAL

## 2023-06-01 PROCEDURE — 77332 RADIATION TREATMENT AID(S): CPT | Performed by: RADIOLOGY

## 2023-06-01 PROCEDURE — 77387 GUIDANCE FOR RADJ TX DLVR: CPT | Performed by: RADIOLOGY

## 2023-06-01 PROCEDURE — 77412 RADIATION TX DELIVERY LVL 3: CPT | Performed by: RADIOLOGY

## 2023-06-02 ENCOUNTER — HOSPITAL ENCOUNTER (OUTPATIENT)
Dept: RADIATION ONCOLOGY | Facility: HOSPITAL | Age: 88
Setting detail: RADIATION/ONCOLOGY SERIES
Discharge: HOME | End: 2023-06-02
Attending: RADIOLOGY
Payer: MEDICARE

## 2023-06-02 LAB
ARIA ZRC COURSE ID: NORMAL
ARIA ZRC COURSE INTENT: NORMAL
ARIA ZRC COURSE START DATE: NORMAL
ARIA ZRC TREATMENT ELAPSED DAYS: NORMAL
ARIA ZRP FRACTIONS TREATED TO DATE: NORMAL
ARIA ZRP PLAN ID: NORMAL
ARIA ZRP PRESCRIBED DOSE CGY: 1180.2
ARIA ZRP PRESCRIBED DOSE CGY: 4600
ARIA ZRP PRESCRIBED DOSE CGY: 5000
ARIA ZRP PRESCRIBED DOSE PER FRACTION: 0.51
ARIA ZRP PRESCRIBED DOSE PER FRACTION: 2
ARIA ZRP PRESCRIBED DOSE PER FRACTION: 2
ARIA ZRR DOSAGE GIVEN TO DATE: 4
ARIA ZRR DOSAGE GIVEN TO DATE: 4.12
ARIA ZRR REFERENCE POINT ID: NORMAL
ARIA ZRR SESSION DOSAGE GIVEN: 2
ARIA ZRR SESSION DOSAGE GIVEN: 2.06
MLH ARIA ZRC TREATMENT DATES: NORMAL

## 2023-06-02 PROCEDURE — 77412 RADIATION TX DELIVERY LVL 3: CPT | Performed by: RADIOLOGY

## 2023-06-02 PROCEDURE — 77387 GUIDANCE FOR RADJ TX DLVR: CPT | Performed by: RADIOLOGY

## 2023-06-05 ENCOUNTER — RAD ONC OTV (OUTPATIENT)
Dept: RADIATION ONCOLOGY | Facility: HOSPITAL | Age: 88
End: 2023-06-05
Payer: MEDICARE

## 2023-06-05 ENCOUNTER — HOSPITAL ENCOUNTER (OUTPATIENT)
Dept: RADIATION ONCOLOGY | Facility: HOSPITAL | Age: 88
Setting detail: RADIATION/ONCOLOGY SERIES
Discharge: HOME | End: 2023-06-05
Attending: RADIOLOGY
Payer: MEDICARE

## 2023-06-05 VITALS
BODY MASS INDEX: 26.78 KG/M2 | DIASTOLIC BLOOD PRESSURE: 80 MMHG | HEART RATE: 72 BPM | OXYGEN SATURATION: 94 % | SYSTOLIC BLOOD PRESSURE: 130 MMHG | TEMPERATURE: 98 F | WEIGHT: 156 LBS

## 2023-06-05 DIAGNOSIS — C50.911 CHEST WALL RECURRENCE OF BREAST CANCER, RIGHT (CMS/HCC): Primary | ICD-10-CM

## 2023-06-05 DIAGNOSIS — C79.89 CHEST WALL RECURRENCE OF BREAST CANCER, RIGHT (CMS/HCC): Primary | ICD-10-CM

## 2023-06-05 LAB
ARIA ZRC COURSE ID: NORMAL
ARIA ZRC COURSE INTENT: NORMAL
ARIA ZRC COURSE START DATE: NORMAL
ARIA ZRC TREATMENT ELAPSED DAYS: NORMAL
ARIA ZRP FRACTIONS TREATED TO DATE: NORMAL
ARIA ZRP PLAN ID: NORMAL
ARIA ZRP PRESCRIBED DOSE CGY: 1180.2
ARIA ZRP PRESCRIBED DOSE CGY: 4600
ARIA ZRP PRESCRIBED DOSE CGY: 5000
ARIA ZRP PRESCRIBED DOSE PER FRACTION: 0.51
ARIA ZRP PRESCRIBED DOSE PER FRACTION: 2
ARIA ZRP PRESCRIBED DOSE PER FRACTION: 2
ARIA ZRR DOSAGE GIVEN TO DATE: 6
ARIA ZRR DOSAGE GIVEN TO DATE: 6.19
ARIA ZRR REFERENCE POINT ID: NORMAL
ARIA ZRR SESSION DOSAGE GIVEN: 2
ARIA ZRR SESSION DOSAGE GIVEN: 2.06
MLH ARIA ZRC TREATMENT DATES: NORMAL

## 2023-06-05 PROCEDURE — 77412 RADIATION TX DELIVERY LVL 3: CPT | Performed by: RADIOLOGY

## 2023-06-05 PROCEDURE — 77387 GUIDANCE FOR RADJ TX DLVR: CPT | Performed by: RADIOLOGY

## 2023-06-05 ASSESSMENT — ENCOUNTER SYMPTOMS
CONSTIPATION: 0
CONSTITUTIONAL NEGATIVE: 1
NAUSEA: 0
DIARRHEA: 0

## 2023-06-05 ASSESSMENT — PAIN SCALES - GENERAL: PAINLEVEL: 0-NO PAIN

## 2023-06-05 NOTE — PROGRESS NOTES
Subjective      Patient ID: Latrell Ojeda is a 87 y.o. female.  1935    Diagnosis:  Encounter Diagnosis   Name Primary?   • Chest wall recurrence of breast cancer, right (CMS/HCC) Yes       HPI     Ms. Ojeda has received 600 of a planned 4600 cGy to the right supraclavicular fossa and apex of the right axilla and 600 of a planned 5000 cGy to the right chest wall/reconstructed breast.  She has tolerated her first 3 treatments without difficulty and is using calendula on the skin in the treatment field once daily.    The following have been reviewed and updated as appropriate in this visit:        Review of Systems   Constitutional: Negative.    Gastrointestinal: Negative for constipation, diarrhea and nausea.   Skin: Negative.         Pt using calendula       Objective     Vitals:    06/05/23 1419   BP: 130/80   BP Location: Left upper arm   Patient Position: Sitting   Pulse: 72   Temp: 36.7 °C (98 °F)   TempSrc: Oral   SpO2: 94%   Weight: 70.8 kg (156 lb)     Body mass index is 26.78 kg/m².    Physical Exam     The patient is status post bilateral mastectomies with implants in place.  There is a recent surgical scar just superior laterally to the tattooed right nipple areolar complex.  There are no subcutaneous masses present in the right reconstructed breast.  There is no erythema of the skin in the treatment field.    Assessment/Plan      Continue radiation as planned.  Continue current skin care regimen.  Diagnoses and Orders Associated With This Visit:  Chest wall recurrence of breast cancer, right (CMS/HCC) (Primary)

## 2023-06-05 NOTE — LETTER
June 5, 2023                                                          Patient: Latrell Ojeda   YOB: 1935   Date of Visit: 6/5/2023       Dear Dr. Miller:    The patient is seen at the [facility] today. Attached is my assessment and plan of care.  Thank you for the opportunity to share in Latrell Ojeda's care.       Sincerely,        Debora Navarro MD      CC: No Recipients

## 2023-06-06 ENCOUNTER — HOSPITAL ENCOUNTER (OUTPATIENT)
Dept: RADIATION ONCOLOGY | Facility: HOSPITAL | Age: 88
Setting detail: RADIATION/ONCOLOGY SERIES
Discharge: HOME | End: 2023-06-06
Attending: RADIOLOGY
Payer: MEDICARE

## 2023-06-06 LAB
ARIA ZRC COURSE ID: NORMAL
ARIA ZRC COURSE INTENT: NORMAL
ARIA ZRC COURSE START DATE: NORMAL
ARIA ZRC TREATMENT ELAPSED DAYS: NORMAL
ARIA ZRP FRACTIONS TREATED TO DATE: NORMAL
ARIA ZRP PLAN ID: NORMAL
ARIA ZRP PRESCRIBED DOSE CGY: 1180.2
ARIA ZRP PRESCRIBED DOSE CGY: 4600
ARIA ZRP PRESCRIBED DOSE CGY: 5000
ARIA ZRP PRESCRIBED DOSE PER FRACTION: 0.51
ARIA ZRP PRESCRIBED DOSE PER FRACTION: 2
ARIA ZRP PRESCRIBED DOSE PER FRACTION: 2
ARIA ZRR DOSAGE GIVEN TO DATE: 8
ARIA ZRR DOSAGE GIVEN TO DATE: 8.25
ARIA ZRR REFERENCE POINT ID: NORMAL
ARIA ZRR SESSION DOSAGE GIVEN: 2
ARIA ZRR SESSION DOSAGE GIVEN: 2.06
MLH ARIA ZRC TREATMENT DATES: NORMAL

## 2023-06-06 PROCEDURE — 77412 RADIATION TX DELIVERY LVL 3: CPT | Performed by: RADIOLOGY

## 2023-06-06 PROCEDURE — 77387 GUIDANCE FOR RADJ TX DLVR: CPT | Performed by: RADIOLOGY

## 2023-06-07 ENCOUNTER — HOSPITAL ENCOUNTER (OUTPATIENT)
Dept: RADIATION ONCOLOGY | Facility: HOSPITAL | Age: 88
Setting detail: RADIATION/ONCOLOGY SERIES
Discharge: HOME | End: 2023-06-07
Attending: RADIOLOGY
Payer: MEDICARE

## 2023-06-07 LAB
ARIA ZRC COURSE ID: NORMAL
ARIA ZRC COURSE INTENT: NORMAL
ARIA ZRC COURSE START DATE: NORMAL
ARIA ZRC TREATMENT ELAPSED DAYS: NORMAL
ARIA ZRP FRACTIONS TREATED TO DATE: NORMAL
ARIA ZRP PLAN ID: NORMAL
ARIA ZRP PRESCRIBED DOSE CGY: 1180.2
ARIA ZRP PRESCRIBED DOSE CGY: 4600
ARIA ZRP PRESCRIBED DOSE CGY: 5000
ARIA ZRP PRESCRIBED DOSE PER FRACTION: 0.51
ARIA ZRP PRESCRIBED DOSE PER FRACTION: 2
ARIA ZRP PRESCRIBED DOSE PER FRACTION: 2
ARIA ZRR DOSAGE GIVEN TO DATE: 10
ARIA ZRR DOSAGE GIVEN TO DATE: 10.31
ARIA ZRR REFERENCE POINT ID: NORMAL
ARIA ZRR SESSION DOSAGE GIVEN: 2
ARIA ZRR SESSION DOSAGE GIVEN: 2.06
MLH ARIA ZRC TREATMENT DATES: NORMAL

## 2023-06-07 PROCEDURE — 77387 GUIDANCE FOR RADJ TX DLVR: CPT | Performed by: RADIOLOGY

## 2023-06-07 PROCEDURE — 77412 RADIATION TX DELIVERY LVL 3: CPT | Performed by: RADIOLOGY

## 2023-06-08 ENCOUNTER — HOSPITAL ENCOUNTER (OUTPATIENT)
Dept: RADIATION ONCOLOGY | Facility: HOSPITAL | Age: 88
Setting detail: RADIATION/ONCOLOGY SERIES
Discharge: HOME | End: 2023-06-08
Attending: RADIOLOGY
Payer: MEDICARE

## 2023-06-08 LAB
ARIA ZRC COURSE ID: NORMAL
ARIA ZRC COURSE INTENT: NORMAL
ARIA ZRC COURSE START DATE: NORMAL
ARIA ZRC TREATMENT ELAPSED DAYS: NORMAL
ARIA ZRP FRACTIONS TREATED TO DATE: NORMAL
ARIA ZRP PLAN ID: NORMAL
ARIA ZRP PRESCRIBED DOSE CGY: 1180.2
ARIA ZRP PRESCRIBED DOSE CGY: 4600
ARIA ZRP PRESCRIBED DOSE CGY: 5000
ARIA ZRP PRESCRIBED DOSE PER FRACTION: 0.51
ARIA ZRP PRESCRIBED DOSE PER FRACTION: 2
ARIA ZRP PRESCRIBED DOSE PER FRACTION: 2
ARIA ZRR DOSAGE GIVEN TO DATE: 12
ARIA ZRR DOSAGE GIVEN TO DATE: 12.37
ARIA ZRR REFERENCE POINT ID: NORMAL
ARIA ZRR SESSION DOSAGE GIVEN: 2
ARIA ZRR SESSION DOSAGE GIVEN: 2.06
MLH ARIA ZRC TREATMENT DATES: NORMAL

## 2023-06-08 PROCEDURE — 77336 RADIATION PHYSICS CONSULT: CPT | Performed by: RADIOLOGY

## 2023-06-08 PROCEDURE — 77412 RADIATION TX DELIVERY LVL 3: CPT | Performed by: RADIOLOGY

## 2023-06-08 PROCEDURE — 77387 GUIDANCE FOR RADJ TX DLVR: CPT | Performed by: RADIOLOGY

## 2023-06-09 ENCOUNTER — HOSPITAL ENCOUNTER (OUTPATIENT)
Dept: RADIATION ONCOLOGY | Facility: HOSPITAL | Age: 88
Setting detail: RADIATION/ONCOLOGY SERIES
Discharge: HOME | End: 2023-06-09
Attending: RADIOLOGY
Payer: MEDICARE

## 2023-06-09 LAB
ARIA ZRC COURSE ID: NORMAL
ARIA ZRC COURSE INTENT: NORMAL
ARIA ZRC COURSE START DATE: NORMAL
ARIA ZRC TREATMENT ELAPSED DAYS: NORMAL
ARIA ZRP FRACTIONS TREATED TO DATE: NORMAL
ARIA ZRP PLAN ID: NORMAL
ARIA ZRP PRESCRIBED DOSE CGY: 1180.2
ARIA ZRP PRESCRIBED DOSE CGY: 4600
ARIA ZRP PRESCRIBED DOSE CGY: 5000
ARIA ZRP PRESCRIBED DOSE PER FRACTION: 0.51
ARIA ZRP PRESCRIBED DOSE PER FRACTION: 2
ARIA ZRP PRESCRIBED DOSE PER FRACTION: 2
ARIA ZRR DOSAGE GIVEN TO DATE: 14
ARIA ZRR DOSAGE GIVEN TO DATE: 14.43
ARIA ZRR REFERENCE POINT ID: NORMAL
ARIA ZRR SESSION DOSAGE GIVEN: 2
ARIA ZRR SESSION DOSAGE GIVEN: 2.06
MLH ARIA ZRC TREATMENT DATES: NORMAL

## 2023-06-09 PROCEDURE — 77387 GUIDANCE FOR RADJ TX DLVR: CPT | Performed by: RADIOLOGY

## 2023-06-09 PROCEDURE — 77412 RADIATION TX DELIVERY LVL 3: CPT | Performed by: RADIOLOGY

## 2023-06-12 ENCOUNTER — HOSPITAL ENCOUNTER (OUTPATIENT)
Dept: RADIATION ONCOLOGY | Facility: HOSPITAL | Age: 88
Setting detail: RADIATION/ONCOLOGY SERIES
Discharge: HOME | End: 2023-06-12
Attending: RADIOLOGY
Payer: MEDICARE

## 2023-06-12 ENCOUNTER — RAD ONC OTV (OUTPATIENT)
Dept: RADIATION ONCOLOGY | Facility: HOSPITAL | Age: 88
End: 2023-06-12
Payer: MEDICARE

## 2023-06-12 VITALS
DIASTOLIC BLOOD PRESSURE: 65 MMHG | SYSTOLIC BLOOD PRESSURE: 155 MMHG | OXYGEN SATURATION: 96 % | HEART RATE: 68 BPM | WEIGHT: 156 LBS | BODY MASS INDEX: 26.78 KG/M2

## 2023-06-12 DIAGNOSIS — C50.911 CHEST WALL RECURRENCE OF BREAST CANCER, RIGHT (CMS/HCC): Primary | ICD-10-CM

## 2023-06-12 DIAGNOSIS — C79.89 CHEST WALL RECURRENCE OF BREAST CANCER, RIGHT (CMS/HCC): Primary | ICD-10-CM

## 2023-06-12 LAB
ARIA ZRC COURSE ID: NORMAL
ARIA ZRC COURSE INTENT: NORMAL
ARIA ZRC COURSE START DATE: NORMAL
ARIA ZRC TREATMENT ELAPSED DAYS: NORMAL
ARIA ZRP FRACTIONS TREATED TO DATE: NORMAL
ARIA ZRP PLAN ID: NORMAL
ARIA ZRP PRESCRIBED DOSE CGY: 1180.2
ARIA ZRP PRESCRIBED DOSE CGY: 4600
ARIA ZRP PRESCRIBED DOSE CGY: 5000
ARIA ZRP PRESCRIBED DOSE PER FRACTION: 0.51
ARIA ZRP PRESCRIBED DOSE PER FRACTION: 2
ARIA ZRP PRESCRIBED DOSE PER FRACTION: 2
ARIA ZRR DOSAGE GIVEN TO DATE: 16
ARIA ZRR DOSAGE GIVEN TO DATE: 16.49
ARIA ZRR REFERENCE POINT ID: NORMAL
ARIA ZRR SESSION DOSAGE GIVEN: 2
ARIA ZRR SESSION DOSAGE GIVEN: 2.06
MLH ARIA ZRC TREATMENT DATES: NORMAL

## 2023-06-12 PROCEDURE — 77387 GUIDANCE FOR RADJ TX DLVR: CPT | Performed by: RADIOLOGY

## 2023-06-12 PROCEDURE — 77412 RADIATION TX DELIVERY LVL 3: CPT | Performed by: RADIOLOGY

## 2023-06-12 ASSESSMENT — PAIN SCALES - GENERAL: PAINLEVEL: 0-NO PAIN

## 2023-06-12 ASSESSMENT — ENCOUNTER SYMPTOMS
CONSTITUTIONAL NEGATIVE: 1
FATIGUE: 0

## 2023-06-12 NOTE — PROGRESS NOTES
Subjective      Patient ID: Latrell Ojeda is a 87 y.o. female.  1935   Diagnosis Plan   1. Chest wall recurrence of breast cancer, right (CMS/HCC)          Diagnosis:  No diagnosis found.    HPI patient presents without complaints.    The following have been reviewed and updated as appropriate in this visit:        Review of Systems   Constitutional: Negative.  Negative for fatigue.   Endocrine: Negative for heat intolerance.   Skin: Negative.         Pt is using calendula       Objective     There were no vitals filed for this visit.  There is no height or weight on file to calculate BMI.    Physical Exam healed bilateral chest reconstruction breasts.  Skin is clear.  No adenopathy appreciated.  Performance status 80.    Assessment/Plan Continue radiation therapy to right chest wall and draining kodak regions as previously outlined for recurrent breast cancer.  Patient instructed to use calendula right chest wall and draining kodak regions supraclavicular and axilla.  Diagnoses and Orders Associated With This Visit:  There are no diagnoses linked to this encounter.

## 2023-06-12 NOTE — LETTER
June 12, 2023                                                          Patient: Lartell Ojeda   YOB: 1935   Date of Visit: 6/12/2023       Dear Dr. Miller:    The patient is seen at the [facility] today. Attached is my assessment and plan of care.  Thank you for the opportunity to share in Latrell Ojeda's care.       Sincerely,        Gregory J. Ochsner, MD      CC: No Recipients

## 2023-06-13 ENCOUNTER — HOSPITAL ENCOUNTER (OUTPATIENT)
Dept: RADIATION ONCOLOGY | Facility: HOSPITAL | Age: 88
Setting detail: RADIATION/ONCOLOGY SERIES
Discharge: HOME | End: 2023-06-13
Attending: RADIOLOGY
Payer: MEDICARE

## 2023-06-13 LAB
ARIA ZRC COURSE ID: NORMAL
ARIA ZRC COURSE INTENT: NORMAL
ARIA ZRC COURSE START DATE: NORMAL
ARIA ZRC TREATMENT ELAPSED DAYS: NORMAL
ARIA ZRP FRACTIONS TREATED TO DATE: NORMAL
ARIA ZRP PLAN ID: NORMAL
ARIA ZRP PRESCRIBED DOSE CGY: 1180.2
ARIA ZRP PRESCRIBED DOSE CGY: 4600
ARIA ZRP PRESCRIBED DOSE CGY: 5000
ARIA ZRP PRESCRIBED DOSE PER FRACTION: 0.51
ARIA ZRP PRESCRIBED DOSE PER FRACTION: 2
ARIA ZRP PRESCRIBED DOSE PER FRACTION: 2
ARIA ZRR DOSAGE GIVEN TO DATE: 18
ARIA ZRR DOSAGE GIVEN TO DATE: 18.56
ARIA ZRR REFERENCE POINT ID: NORMAL
ARIA ZRR SESSION DOSAGE GIVEN: 2
ARIA ZRR SESSION DOSAGE GIVEN: 2.06
MLH ARIA ZRC TREATMENT DATES: NORMAL

## 2023-06-13 PROCEDURE — 77412 RADIATION TX DELIVERY LVL 3: CPT | Performed by: RADIOLOGY

## 2023-06-13 PROCEDURE — 77387 GUIDANCE FOR RADJ TX DLVR: CPT | Performed by: RADIOLOGY

## 2023-06-14 ENCOUNTER — HOSPITAL ENCOUNTER (OUTPATIENT)
Dept: RADIATION ONCOLOGY | Facility: HOSPITAL | Age: 88
Setting detail: RADIATION/ONCOLOGY SERIES
Discharge: HOME | End: 2023-06-14
Attending: RADIOLOGY
Payer: MEDICARE

## 2023-06-14 ENCOUNTER — APPOINTMENT (EMERGENCY)
Dept: RADIOLOGY | Facility: HOSPITAL | Age: 88
End: 2023-06-14
Payer: MEDICARE

## 2023-06-14 ENCOUNTER — HOSPITAL ENCOUNTER (EMERGENCY)
Facility: HOSPITAL | Age: 88
Discharge: HOME | End: 2023-06-14
Attending: EMERGENCY MEDICINE
Payer: MEDICARE

## 2023-06-14 VITALS
HEART RATE: 82 BPM | TEMPERATURE: 98.7 F | RESPIRATION RATE: 18 BRPM | SYSTOLIC BLOOD PRESSURE: 144 MMHG | HEIGHT: 64 IN | BODY MASS INDEX: 26.46 KG/M2 | OXYGEN SATURATION: 96 % | WEIGHT: 155 LBS | DIASTOLIC BLOOD PRESSURE: 66 MMHG
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
## 2023-06-14 DIAGNOSIS — S09.90XA CLOSED HEAD INJURY, INITIAL ENCOUNTER: ICD-10-CM

## 2023-06-14 LAB
ARIA ZRC COURSE ID: NORMAL
ARIA ZRC COURSE INTENT: NORMAL
ARIA ZRC COURSE START DATE: NORMAL
ARIA ZRC TREATMENT ELAPSED DAYS: NORMAL
ARIA ZRP FRACTIONS TREATED TO DATE: NORMAL
ARIA ZRP PLAN ID: NORMAL
ARIA ZRP PRESCRIBED DOSE CGY: 1180.2
ARIA ZRP PRESCRIBED DOSE CGY: 4600
ARIA ZRP PRESCRIBED DOSE CGY: 5000
ARIA ZRP PRESCRIBED DOSE PER FRACTION: 0.51
ARIA ZRP PRESCRIBED DOSE PER FRACTION: 2
ARIA ZRP PRESCRIBED DOSE PER FRACTION: 2
ARIA ZRR DOSAGE GIVEN TO DATE: 20
ARIA ZRR DOSAGE GIVEN TO DATE: 20.62
ARIA ZRR REFERENCE POINT ID: NORMAL
ARIA ZRR SESSION DOSAGE GIVEN: 2
ARIA ZRR SESSION DOSAGE GIVEN: 2.06
MLH ARIA ZRC TREATMENT DATES: NORMAL

## 2023-06-14 PROCEDURE — 77412 RADIATION TX DELIVERY LVL 3: CPT | Performed by: RADIOLOGY

## 2023-06-14 PROCEDURE — 63700000 HC SELF-ADMINISTRABLE DRUG: Performed by: STUDENT IN AN ORGANIZED HEALTH CARE EDUCATION/TRAINING PROGRAM

## 2023-06-14 PROCEDURE — 72125 CT NECK SPINE W/O DYE: CPT | Mod: ME

## 2023-06-14 PROCEDURE — 77387 GUIDANCE FOR RADJ TX DLVR: CPT | Performed by: RADIOLOGY

## 2023-06-14 PROCEDURE — 99284 EMERGENCY DEPT VISIT MOD MDM: CPT

## 2023-06-14 PROCEDURE — 70450 CT HEAD/BRAIN W/O DYE: CPT | Mod: ME

## 2023-06-14 RX ORDER — ONDANSETRON 4 MG/1
4 TABLET, ORALLY DISINTEGRATING ORAL ONCE
Status: COMPLETED | OUTPATIENT
Start: 2023-06-14 | End: 2023-06-14

## 2023-06-14 RX ORDER — ONDANSETRON 4 MG/1
4 TABLET, ORALLY DISINTEGRATING ORAL EVERY 8 HOURS PRN
Qty: 14 TABLET | Refills: 0 | Status: SHIPPED | OUTPATIENT
Start: 2023-06-14 | End: 2023-08-16

## 2023-06-14 RX ADMIN — ONDANSETRON 4 MG: 4 TABLET, ORALLY DISINTEGRATING ORAL at 19:19

## 2023-06-14 ASSESSMENT — ENCOUNTER SYMPTOMS
HEADACHES: 1
DIZZINESS: 0
ABDOMINAL PAIN: 0
VOMITING: 1
WEAKNESS: 0
NECK PAIN: 1
LIGHT-HEADEDNESS: 0
SHORTNESS OF BREATH: 0
SPEECH DIFFICULTY: 0
FATIGUE: 0
WOUND: 0
NUMBNESS: 0
CONFUSION: 0
SEIZURES: 0
BACK PAIN: 0
NAUSEA: 1

## 2023-06-14 NOTE — ED ATTESTATION NOTE
The patient was evaluated and managed by the PA/NP/resident.  I have discussed the case with the PA/NP/resident and I have reviewed the patients imaging studies.  I am in agreement with the ED workup and treatment plan.  I will continue to be available for consultation as needed.     Godshall, Duane K, MD  06/14/23 1955

## 2023-06-14 NOTE — ED PROVIDER NOTES
"Emergency Medicine Note  HPI   HISTORY OF PRESENT ILLNESS     History provided by:  Patient   used: No      88 y/o F with PMH of HTN, palpitations, breast cancer, stage 3b CKD, and A-fib on Xarelto presents to the ED via private vehicle after a head injury.  She reports that 6 days ago she tripped over a bag on the floor causing her to land onto her left knee and strike her left head on the wall. No LOC. She felt at baseline after the fall but noted some mild left head pain that felt like \"bruising pains.\" Over the last two days, she started with headaches for which she was taking Tylenol. Today, her headache increased and she also developed nausea with 2 episodes of vomiting. Her headache is worse on her right side. She denies dizziness, lightheadedness, seizure, numbness/weakness of arms and legs, changes in hearing, vision or speech. Denies new or different neck pain from her chronic pain, back pain, abdominal pain. No other injuries or complaints. She is ambulating without difficulty.     PCP - Aris Miller.        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  Had been onn  daily   • Fibroid    • High blood pressure    • PAF (paroxysmal atrial fibrillation) (CMS/HCC)     follows with Dr. Borges every 3 months   • Renal insufficiency     change of cardiac meds and diurectic improved labs BUN eGFR   • UTI (urinary tract infection)        Past Surgical History:   Procedure " Laterality Date   • BREAST BIOPSY      2 LEFT, 1 RIGHT   • BREAST LUMPECTOMY Left 2006   • CATARACT EXTRACTION, BILATERAL     • COLONOSCOPY      6-7 years ago, no polyps   • ESOPHAGOSCOPY / EGD     • LAPAROSCOPIC LIVER CYST FENESTRATION  1995    liquid liver cyst and aspirated at [facility]   • MASTECTOMY      left 2006, right 2009   • TOTAL ABDOMINAL HYSTERECTOMY W/ BILATERAL SALPINGOOPHORECTOMY      age 51, due to bleeding       Family History   Problem Relation Age of Onset   • Diabetes Biological Father    • Diabetes Biological Brother    • Multiple myeloma Biological Son    • Breast cancer Other         Sister's daughter         Review of Systems    REVIEW OF SYSTEMS   Review of Systems   Constitutional: Negative for fatigue.   HENT: Negative for hearing loss.    Eyes: Negative for visual disturbance.   Respiratory: Negative for shortness of breath.    Cardiovascular: Negative for chest pain.   Gastrointestinal: Positive for nausea and vomiting (x2). Negative for abdominal pain.   Musculoskeletal: Positive for neck pain (chronic). Negative for back pain and gait problem.   Skin: Negative for wound.   Neurological: Positive for headaches. Negative for dizziness, seizures, syncope, speech difficulty, weakness, light-headedness and numbness.        + Head injury.  No LOC.   Psychiatric/Behavioral: Negative for confusion.         VITALS   ED Vitals    Date/Time Temp Pulse Resp BP SpO2 [facility]   06/14/23 2017 -- 82 18 144/66 -- J   06/14/23 1852 37.1 °C (98.7 °F) 87 18 160/108 96 % CNP          Pulse Ox %: 96 % (06/14/23 1852)  Pulse Ox Interpretation: Normal (06/14/23 1852)  Heart Rate: 87 (06/14/23 1852)        Physical Exam   PHYSICAL EXAM   Physical Exam  Vitals and nursing note reviewed.   Constitutional:       General: She is not in acute distress.     Appearance: She is well-developed.   HENT:      Head: Normocephalic and atraumatic.      Comments: No outward signs of trauma.     Nose: Nose normal.       Mouth/Throat:      Mouth: Mucous membranes are moist.   Eyes:      Extraocular Movements: Extraocular movements intact.      Conjunctiva/sclera: Conjunctivae normal.      Pupils: Pupils are equal, round, and reactive to light.   Neck:      Comments: No midline cervical tenderness.  No vertebral step-off or deformity.  Cardiovascular:      Rate and Rhythm: Normal rate and regular rhythm.      Heart sounds: Normal heart sounds.   Pulmonary:      Effort: Pulmonary effort is normal. No respiratory distress.      Breath sounds: Normal breath sounds.   Abdominal:      Palpations: Abdomen is soft.      Tenderness: There is no abdominal tenderness.   Musculoskeletal:         General: No tenderness. Normal range of motion.      Cervical back: Normal range of motion and neck supple. No tenderness.      Comments: No midline thoracic or lumbar tenderness.  No vertebral step-off or deformity.  Pelvis stable.  Patient moving upper and lower extremities equally without signs of pain.   Skin:     General: Skin is warm and dry.      Findings: No rash.   Neurological:      General: No focal deficit present.      Mental Status: She is alert and oriented to person, place, and time.      Comments: CN II-XII grossly intact. Symmetric facial movements. Tongue is midline. Sensation to light touch is intact in B/L upper and lower extremities. 5/5 strength to B/L upper and lower extremities. Speech is clear. No pronator drift. Normal finger to nose B/L. Gait intact.            PROCEDURES   Procedures     DATA   Results     None          Imaging Results          CT HEAD WITHOUT IV CONTRAST (Final result)  Result time 06/14/23 19:43:59    Final result                 Impression:    IMPRESSION: No acute intracranial abnormality.    COMMENT: Noncontrast CT of the head was performed.  One or more dose reduction  techniques (e.g., Automated exposure control, adjustment of the mA and/or kV  according to the patient size, use of iterative  reconstruction technique)  utilized for this examination.    There is prominence of the ventricles and sulci compatible with age appropriate  volume loss.  Mild patchy periventricular and subcortical white matter  hypoattenuation identified, nonspecific but likely sequela of mild chronic  microangiopathy. There are a few punctate sulcal calcifications, likely sequela  of prior inflammation, of overall doubtful significance. There is no evidence of  acute intracranial hemorrhage, large acute territorial infarct, extra-axial  collection, or mass-effect.  Ventricles are midline without evidence of  hydrocephalus.    Visualized paranasal sinuses and mastoid air cells are overall well aerated,  noting posterior right ethmoid retention cyst.    Intracranial atherosclerotic calcifications noted.    No calvarial fracture or sizable scalp hematoma.    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             Narrative:    CLINICAL HISTORY: Ataxia, head trauma. Fall on Xarelto.    Comparison: No relevant prior studies                               CT CERVICAL SPINE WITHOUT IV CONTRAST (Final result)  Result time 06/14/23 19:47:28    Final result                 Impression:    IMPRESSION:  No acute cervical spine fracture.    COMMENT:  Noncontrast CT of the cervical spine is performed with sagittal and coronal  reconstructions.    One or more dose reduction techniques (e.g. automated exposure control,  adjustment of the mA and/or kV according to the patient size, use of iterative  reconstruction technique) utilized for this examination.    Minimal degenerative anterolisthesis at C7-T1. No acute fracture or prevertebral  soft tissue swelling. Atlantoaxial distance and craniocervical junction are  within normal limits, noting age-appropriate degenerative change. Multilevel  degenerative disc disease without evidence of severe central canal stenosis.  Multilevel degenerative uncovertebral and facet  hypertrophy resulting in  multilevel neural foraminal narrowing of varying degrees.    Scattered atherosclerotic calcifications as well as multinodular thyroid noted.               Narrative:    CLINICAL HISTORY: Neck trauma, status post fall.  COMPARISON: No relevant prior studies                                No orders to display       Scoring tools                              ED Course & MDM   MDM / ED COURSE / CLINICAL IMPRESSION / DISPO     MDM    ED Course as of 06/14/23 2106 Wed Jun 14, 2023 1905 Impression: head injury x 6 days ago on Xarelto, now with headache and vomiting     AAOx4. Neuro intact.     Plan: CT head/c-spine, zofran, observe [EB]   1947 CT HEAD IMPRESSION: No acute intracranial abnormality. [EB]   1950 CT C-SPINE IMPRESSION: No acute cervical spine fracture. [EB]   2004 Work-up unremarkable.  Patient feeling improved after Zofran.  She feels comfortable with discharge home knowing there is no intracranial hemorrhage.  Will discharge home with PCP follow-up, concussion clinic follow-up, supportive care, Zofran, and strict return precautions.  Patient getting ride from a neighbor. [EB]   2031 Discharged by myself.  No IV in place.  All questions answered. [EB]      ED Course User Index  [EB] Tressa Gong PA C     Clinical Impression      Fall, initial encounter   Closed head injury, initial encounter        By signing my name below, I, Obaid Mohsin, attest that this documentation has been prepared under the direction and in the presence of Tressa Gong PA-C.     Tressa WILDER PA-C, personally performed the services described in this documentation, as documented by the scribe in my presence, and it is both accurate and complete.              Mohsin, Obaid  06/14/23 1925       Tressa Gong PA C  06/14/23 2106

## 2023-06-15 ENCOUNTER — TELEPHONE (OUTPATIENT)
Dept: INTERNAL MEDICINE | Facility: CLINIC | Age: 88
End: 2023-06-15
Payer: MEDICARE

## 2023-06-15 ENCOUNTER — HOSPITAL ENCOUNTER (OUTPATIENT)
Dept: RADIATION ONCOLOGY | Facility: HOSPITAL | Age: 88
Setting detail: RADIATION/ONCOLOGY SERIES
Discharge: HOME | End: 2023-06-15
Attending: RADIOLOGY
Payer: MEDICARE

## 2023-06-15 LAB
ARIA ZRC COURSE ID: NORMAL
ARIA ZRC COURSE INTENT: NORMAL
ARIA ZRC COURSE START DATE: NORMAL
ARIA ZRC TREATMENT ELAPSED DAYS: NORMAL
ARIA ZRP FRACTIONS TREATED TO DATE: NORMAL
ARIA ZRP PLAN ID: NORMAL
ARIA ZRP PRESCRIBED DOSE CGY: 1180.2
ARIA ZRP PRESCRIBED DOSE CGY: 4600
ARIA ZRP PRESCRIBED DOSE CGY: 5000
ARIA ZRP PRESCRIBED DOSE PER FRACTION: 0.51
ARIA ZRP PRESCRIBED DOSE PER FRACTION: 2
ARIA ZRP PRESCRIBED DOSE PER FRACTION: 2
ARIA ZRR DOSAGE GIVEN TO DATE: 22
ARIA ZRR DOSAGE GIVEN TO DATE: 22.68
ARIA ZRR REFERENCE POINT ID: NORMAL
ARIA ZRR SESSION DOSAGE GIVEN: 2
ARIA ZRR SESSION DOSAGE GIVEN: 2.06
MLH ARIA ZRC TREATMENT DATES: NORMAL

## 2023-06-15 PROCEDURE — 77412 RADIATION TX DELIVERY LVL 3: CPT | Performed by: RADIOLOGY

## 2023-06-15 PROCEDURE — 77387 GUIDANCE FOR RADJ TX DLVR: CPT | Performed by: RADIOLOGY

## 2023-06-15 NOTE — TELEPHONE ENCOUNTER
Spouse stated that the patient flll last evening after  Hitting  her head She has a concussion.  , taken to the ER. She was sent home but this am she awoke throwing up, headache  and  Coughing. She fell back to sleep . Spouse asked what to do as the patient wants to be awaken at 10:00.   Please advise thank you .

## 2023-06-15 NOTE — DISCHARGE INSTRUCTIONS
You were seen in the ER after a head injury. The CT of your head was negative for any intracranial abnormalities. CT of your neck was negative for acute fractures dislocations.    It is recommended that you follow up with your primary care physician for further evaluation and management. If your concussion symptoms continue, we also provided you with the information for the concussion clinic at [facility].     You may take Tylenol 500 mg every 6 hours for pain or Tylenol 1000 mg 3 times daily for pain.. You may also apply ice to the areas of pain in 20-minute increments for further pain relief.    Please rest and get adequate sleep, approximately 7-8 hours. Please limit activities that require a lot of focus, such as watching TV, participating in puzzles/games, and using your phone/computer.     Please return to the ER for any worsening headaches, dizziness, nausea, vomiting, changes in hearing, vision, or speech, numbness or weakness in the upper or lower extremities, or any other new or concerning symptoms.

## 2023-06-16 ENCOUNTER — APPOINTMENT (EMERGENCY)
Dept: RADIOLOGY | Facility: HOSPITAL | Age: 88
End: 2023-06-16
Payer: MEDICARE

## 2023-06-16 ENCOUNTER — HOSPITAL ENCOUNTER (EMERGENCY)
Facility: HOSPITAL | Age: 88
Discharge: HOME | End: 2023-06-16
Attending: EMERGENCY MEDICINE
Payer: MEDICARE

## 2023-06-16 ENCOUNTER — HOSPITAL ENCOUNTER (OUTPATIENT)
Dept: RADIATION ONCOLOGY | Facility: HOSPITAL | Age: 88
Setting detail: RADIATION/ONCOLOGY SERIES
Discharge: HOME | End: 2023-06-16
Attending: RADIOLOGY
Payer: MEDICARE

## 2023-06-16 VITALS
HEIGHT: 65 IN | DIASTOLIC BLOOD PRESSURE: 60 MMHG | TEMPERATURE: 98.6 F | BODY MASS INDEX: 25.83 KG/M2 | RESPIRATION RATE: 18 BRPM | OXYGEN SATURATION: 95 % | SYSTOLIC BLOOD PRESSURE: 125 MMHG | HEART RATE: 70 BPM | WEIGHT: 155 LBS

## 2023-06-16 DIAGNOSIS — N39.0 ACUTE UTI: ICD-10-CM

## 2023-06-16 DIAGNOSIS — R51.9 ACUTE NONINTRACTABLE HEADACHE, UNSPECIFIED HEADACHE TYPE: Primary | ICD-10-CM

## 2023-06-16 DIAGNOSIS — E86.0 DEHYDRATION: ICD-10-CM

## 2023-06-16 LAB
ALBUMIN SERPL-MCNC: 3.3 G/DL (ref 3.4–5)
ALP SERPL-CCNC: 57 IU/L (ref 35–126)
ALT SERPL-CCNC: 44 IU/L (ref 11–54)
ANION GAP SERPL CALC-SCNC: 9 MEQ/L (ref 3–15)
ARIA ZRC COURSE ID: NORMAL
ARIA ZRC COURSE INTENT: NORMAL
ARIA ZRC COURSE START DATE: NORMAL
ARIA ZRC TREATMENT ELAPSED DAYS: NORMAL
ARIA ZRP FRACTIONS TREATED TO DATE: NORMAL
ARIA ZRP PLAN ID: NORMAL
ARIA ZRP PRESCRIBED DOSE CGY: 1180.2
ARIA ZRP PRESCRIBED DOSE CGY: 4600
ARIA ZRP PRESCRIBED DOSE CGY: 5000
ARIA ZRP PRESCRIBED DOSE PER FRACTION: 0.51
ARIA ZRP PRESCRIBED DOSE PER FRACTION: 2
ARIA ZRP PRESCRIBED DOSE PER FRACTION: 2
ARIA ZRR DOSAGE GIVEN TO DATE: 24
ARIA ZRR DOSAGE GIVEN TO DATE: 24.74
ARIA ZRR REFERENCE POINT ID: NORMAL
ARIA ZRR SESSION DOSAGE GIVEN: 2
ARIA ZRR SESSION DOSAGE GIVEN: 2.06
AST SERPL-CCNC: 36 IU/L (ref 15–41)
BACTERIA URNS QL MICRO: 3 /HPF
BASOPHILS # BLD: 0.03 K/UL (ref 0.01–0.1)
BASOPHILS NFR BLD: 0.3 %
BILIRUB SERPL-MCNC: 2.5 MG/DL (ref 0.3–1.2)
BILIRUB UR QL STRIP.AUTO: NEGATIVE MG/DL
BUN SERPL-MCNC: 48 MG/DL (ref 8–20)
CALCIUM SERPL-MCNC: 8.4 MG/DL (ref 8.9–10.3)
CHLORIDE SERPL-SCNC: 94 MEQ/L (ref 98–109)
CLARITY UR REFRACT.AUTO: ABNORMAL
CO2 SERPL-SCNC: 28 MEQ/L (ref 22–32)
COLOR UR AUTO: YELLOW
CREAT SERPL-MCNC: 1.9 MG/DL (ref 0.6–1.1)
DIFFERENTIAL METHOD BLD: ABNORMAL
EOSINOPHIL # BLD: 0.01 K/UL (ref 0.04–0.36)
EOSINOPHIL NFR BLD: 0.1 %
ERYTHROCYTE [DISTWIDTH] IN BLOOD BY AUTOMATED COUNT: 14.2 % (ref 11.7–14.4)
FLUAV RNA SPEC QL NAA+PROBE: NEGATIVE
FLUBV RNA SPEC QL NAA+PROBE: NEGATIVE
GFR SERPL CREATININE-BSD FRML MDRD: 25.1 ML/MIN/1.73M*2
GLUCOSE SERPL-MCNC: 164 MG/DL (ref 70–99)
GLUCOSE UR STRIP.AUTO-MCNC: NEGATIVE MG/DL
GRAN CASTS #/AREA URNS LPF: ABNORMAL /LPF
HCT VFR BLDCO AUTO: 37.3 % (ref 35–45)
HGB BLD-MCNC: 12.4 G/DL (ref 11.8–15.7)
HGB UR QL STRIP.AUTO: 3
HYALINE CASTS #/AREA URNS LPF: ABNORMAL /LPF
IMM GRANULOCYTES # BLD AUTO: 0.05 K/UL (ref 0–0.08)
IMM GRANULOCYTES NFR BLD AUTO: 0.5 %
KETONES UR STRIP.AUTO-MCNC: NEGATIVE MG/DL
LEUKOCYTE ESTERASE UR QL STRIP.AUTO: 3
LYMPHOCYTES # BLD: 0.24 K/UL (ref 1.2–3.5)
LYMPHOCYTES NFR BLD: 2.5 %
MCH RBC QN AUTO: 29.1 PG (ref 28–33.2)
MCHC RBC AUTO-ENTMCNC: 33.2 G/DL (ref 32.2–35.5)
MCV RBC AUTO: 87.6 FL (ref 83–98)
MLH ARIA ZRC TREATMENT DATES: NORMAL
MONOCYTES # BLD: 0.57 K/UL (ref 0.28–0.8)
MONOCYTES NFR BLD: 6 %
NEUTROPHILS # BLD: 8.63 K/UL (ref 1.7–7)
NEUTS SEG NFR BLD: 90.6 %
NITRITE UR QL STRIP.AUTO: NEGATIVE
NRBC BLD-RTO: 0 %
PDW BLD AUTO: 12.7 FL (ref 9.4–12.3)
PH UR STRIP.AUTO: 5.5 [PH]
PLAT MORPH BLD: NORMAL
PLATELET # BLD AUTO: 96 K/UL (ref 150–369)
PLATELET # BLD EST: ABNORMAL 10*3/UL
POTASSIUM SERPL-SCNC: 3.9 MEQ/L (ref 3.6–5.1)
PROT SERPL-MCNC: 6.8 G/DL (ref 6–8.2)
PROT UR QL STRIP.AUTO: 1
RBC # BLD AUTO: 4.26 M/UL (ref 3.93–5.22)
RBC #/AREA URNS HPF: ABNORMAL /HPF
RBC MORPH BLD: NORMAL
RSV RNA SPEC QL NAA+PROBE: NEGATIVE
SARS-COV-2 RNA RESP QL NAA+PROBE: NEGATIVE
SODIUM SERPL-SCNC: 131 MEQ/L (ref 136–144)
SP GR UR REFRACT.AUTO: 1.02
SQUAMOUS URNS QL MICRO: ABNORMAL /HPF
UROBILINOGEN UR STRIP-ACNC: 0.2 EU/DL
WBC # BLD AUTO: 9.53 K/UL (ref 3.8–10.5)
WBC #/AREA URNS HPF: ABNORMAL /HPF

## 2023-06-16 PROCEDURE — 87637 SARSCOV2&INF A&B&RSV AMP PRB: CPT | Performed by: PHYSICIAN ASSISTANT

## 2023-06-16 PROCEDURE — 96365 THER/PROPH/DIAG IV INF INIT: CPT

## 2023-06-16 PROCEDURE — 80053 COMPREHEN METABOLIC PANEL: CPT | Performed by: PHYSICIAN ASSISTANT

## 2023-06-16 PROCEDURE — 81001 URINALYSIS AUTO W/SCOPE: CPT | Performed by: PHYSICIAN ASSISTANT

## 2023-06-16 PROCEDURE — 25800000 HC PHARMACY IV SOLUTIONS: Performed by: PHYSICIAN ASSISTANT

## 2023-06-16 PROCEDURE — 3E0337Z INTRODUCTION OF ELECTROLYTIC AND WATER BALANCE SUBSTANCE INTO PERIPHERAL VEIN, PERCUTANEOUS APPROACH: ICD-10-PCS | Performed by: EMERGENCY MEDICINE

## 2023-06-16 PROCEDURE — 36415 COLL VENOUS BLD VENIPUNCTURE: CPT | Performed by: PHYSICIAN ASSISTANT

## 2023-06-16 PROCEDURE — 85025 COMPLETE CBC W/AUTO DIFF WBC: CPT | Performed by: PHYSICIAN ASSISTANT

## 2023-06-16 PROCEDURE — 77336 RADIATION PHYSICS CONSULT: CPT | Performed by: RADIOLOGY

## 2023-06-16 PROCEDURE — 77412 RADIATION TX DELIVERY LVL 3: CPT | Performed by: RADIOLOGY

## 2023-06-16 PROCEDURE — G1004 CDSM NDSC: HCPCS

## 2023-06-16 PROCEDURE — 71045 X-RAY EXAM CHEST 1 VIEW: CPT

## 2023-06-16 PROCEDURE — 77387 GUIDANCE FOR RADJ TX DLVR: CPT | Performed by: RADIOLOGY

## 2023-06-16 PROCEDURE — 96361 HYDRATE IV INFUSION ADD-ON: CPT

## 2023-06-16 PROCEDURE — 63700000 HC SELF-ADMINISTRABLE DRUG: Performed by: PHYSICIAN ASSISTANT

## 2023-06-16 PROCEDURE — 99284 EMERGENCY DEPT VISIT MOD MDM: CPT | Mod: 25

## 2023-06-16 PROCEDURE — 63600000 HC DRUGS/DETAIL CODE: Mod: JZ | Performed by: PHYSICIAN ASSISTANT

## 2023-06-16 PROCEDURE — 3E03329 INTRODUCTION OF OTHER ANTI-INFECTIVE INTO PERIPHERAL VEIN, PERCUTANEOUS APPROACH: ICD-10-PCS | Performed by: EMERGENCY MEDICINE

## 2023-06-16 PROCEDURE — 87077 CULTURE AEROBIC IDENTIFY: CPT | Performed by: PHYSICIAN ASSISTANT

## 2023-06-16 RX ORDER — ACETAMINOPHEN 325 MG/1
975 TABLET ORAL ONCE
Status: COMPLETED | OUTPATIENT
Start: 2023-06-16 | End: 2023-06-16

## 2023-06-16 RX ORDER — CEFUROXIME AXETIL 250 MG/1
250 TABLET ORAL 2 TIMES DAILY
Qty: 14 TABLET | Refills: 0 | Status: SHIPPED | OUTPATIENT
Start: 2023-06-16 | End: 2023-06-20

## 2023-06-16 RX ADMIN — CEFTRIAXONE SODIUM 1 G: 1 INJECTION, POWDER, FOR SOLUTION INTRAMUSCULAR; INTRAVENOUS at 22:53

## 2023-06-16 RX ADMIN — SODIUM CHLORIDE 1000 ML: 9 INJECTION, SOLUTION INTRAVENOUS at 21:42

## 2023-06-16 RX ADMIN — ACETAMINOPHEN 975 MG: 325 TABLET, FILM COATED ORAL at 22:40

## 2023-06-16 ASSESSMENT — ENCOUNTER SYMPTOMS
CHILLS: 1
FEVER: 0
SHORTNESS OF BREATH: 0
DIZZINESS: 0
NECK PAIN: 1
LIGHT-HEADEDNESS: 0
HEADACHES: 1
WEAKNESS: 1

## 2023-06-17 NOTE — ED ATTESTATION NOTE
Procedures  Physical Exam  Review of Systems  [facility]    6/16/20238:52 PM  I have personally seen and examined the patient. I was involved in the care and medical decision making for this patient.    I reviewed and agree with the PA/NP/Resident's assessment and plan of care, with any exceptions as documented below.    My focused history, examination, assessment, and plan of care of Latrell Ojeda is as follows:  The patient presents with patient fell a week ago.  She is on blood thinners.  She came the emergency department on June 14 and had imaging.  Patient today felt like she was having chills and felt that her blood pressure was several times was under 100.  Exam: Alert and oriented x3  GCS 15  Blood pressure 158/63 and afebrile  Impression/Plan/Medical Decision Making: This is the patient's second ED visit.  We will check COVID, flu, RSV, urine and do a repeat CAT scan to see if there is a delayed bleed due to the persistent headache.  We will try to reassure the patient that there is nothing life-threatening and emphasized the importance of close follow-up with her primary care physician    Vital Signs Review: Vital signs have been reviewed. The oxygen saturation is  SpO2: 95 % which is normal.    I was physically present for the key/critical portions of the following procedures: None    This document was created using Dragon dictation software.  There might be some typographical errors due to this technology.    We are in a period of time with increased volumes and decreased capacity.      Porfirio Arambula MD  06/16/23 2053

## 2023-06-17 NOTE — ED PROVIDER NOTES
Emergency Medicine Note  HPI   HISTORY OF PRESENT ILLNESS     Patient is an 87 year old female presenting to the ED stating that she continues to have a headache, neck pain, and chills. She states that she slipped and fell last week and was seen here 2 days ago for this fall, had negative CT scans and was discharged home. She reports that she has weakness along with the chills along with continued headaches and neck pain and came in for further evaluation.       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  Had been onn  daily   • Fibroid    • High blood pressure    • PAF (paroxysmal atrial fibrillation) (CMS/HCC)     follows with Dr. Borges every 3 months   • Renal insufficiency     change of cardiac meds and diurectic improved labs BUN eGFR   • UTI (urinary tract infection)        Past Surgical History:   Procedure Laterality Date   • BREAST BIOPSY      2 LEFT, 1 RIGHT   • BREAST LUMPECTOMY Left 2006   • CATARACT EXTRACTION, BILATERAL     • COLONOSCOPY      6-7 years ago, no polyps   • ESOPHAGOSCOPY / EGD     • LAPAROSCOPIC LIVER CYST FENESTRATION  1995    liquid liver cyst and aspirated at [facility]   • MASTECTOMY      left 2006, right 2009   • TOTAL ABDOMINAL HYSTERECTOMY W/ BILATERAL SALPINGOOPHORECTOMY      age 51, due to bleeding       Family History   Problem Relation Age of Onset   • Diabetes Biological Father    • Diabetes Biological Brother    • Multiple myeloma Biological Son    • Breast cancer  Other         Sister's daughter       Social History     Tobacco Use   • Smoking status: Never   • Smokeless tobacco: Never   Vaping Use   • Vaping Use: Never used   Substance Use Topics   • Alcohol use: Yes     Alcohol/week: 1.0 standard drink of alcohol     Types: 1 Glasses of wine per week     Comment: rarely   • Drug use: Never         Review of Systems   REVIEW OF SYSTEMS     Review of Systems   Constitutional: Positive for chills. Negative for fever.   Respiratory: Negative for shortness of breath.    Cardiovascular: Negative for chest pain.   Musculoskeletal: Positive for neck pain.   Neurological: Positive for weakness and headaches. Negative for dizziness and light-headedness.   All other systems reviewed and are negative.        VITALS     ED Vitals    Date/Time Temp Pulse Resp BP SpO2 [facility]   06/16/23 2215 -- 86 18 167/73 93 % AEP   06/16/23 2144 -- 78 16 150/67 93 % AEP   06/16/23 2040 -- 80 18 158/63 95 % PB   06/16/23 2040 36.7 °C (98.1 °F) -- -- -- -- AEP                       Physical Exam   PHYSICAL EXAM     Physical Exam  Vitals and nursing note reviewed.   Constitutional:       General: She is not in acute distress.     Appearance: Normal appearance. She is not ill-appearing, toxic-appearing or diaphoretic.   HENT:      Head: Normocephalic and atraumatic.      Nose: Nose normal.      Mouth/Throat:      Mouth: Mucous membranes are dry.   Eyes:      General:         Right eye: No discharge.         Left eye: No discharge.      Extraocular Movements: Extraocular movements intact.      Conjunctiva/sclera: Conjunctivae normal.      Pupils: Pupils are equal, round, and reactive to light.   Cardiovascular:      Rate and Rhythm: Normal rate and regular rhythm.      Pulses: Normal pulses.      Heart sounds: Normal heart sounds. No murmur heard.  Pulmonary:      Effort: Pulmonary effort is normal. No respiratory distress.      Breath sounds: Normal breath sounds. No stridor. No wheezing, rhonchi or rales.    Musculoskeletal:         General: Normal range of motion.      Cervical back: Normal range of motion.   Skin:     General: Skin is dry.   Neurological:      General: No focal deficit present.      Mental Status: She is alert and oriented to person, place, and time.           PROCEDURES     Procedures     DATA     Results     Procedure Component Value Units Date/Time    UA with reflex culture [[id number]]  (Abnormal) Collected: 06/16/23 2217    Specimen: Urine, Clean Catch Updated: 06/16/23 2244    Narrative:      The following orders were created for panel order UA with reflex culture.  Procedure                               Abnormality         Status                     ---------                               -----------         ------                     UA Reflex to Culture (Ma...[[id number]]  Abnormal            Final result               UA Microscopic[[id number]]               Abnormal            Final result                 Please view results for these tests on the individual orders.    UA Microscopic [[id number]]  (Abnormal) Collected: 06/16/23 2217    Specimen: Urine, Clean Catch Updated: 06/16/23 2244     RBC, Urine 36 TO 50 /HPF      WBC, Urine Too Numerous To Count /HPF      Squamous Epithelial Rare /hpf      Hyaline Cast 3 TO 9 /lpf      Bacteria, Urine +3 /HPF      Granular Cast 0 TO 2 /lpf     UA Reflex to Culture (Macroscopic) [[id number]]  (Abnormal) Collected: 06/16/23 2217    Specimen: Urine, Clean Catch Updated: 06/16/23 2229     Color, Urine Yellow     Clarity, Urine Cloudy     Specific Gravity, Urine 1.020     pH, Urine 5.5     Leukocyte Esterase +3     Nitrite, Urine Negative     Protein, Urine +1     Glucose, Urine Negative mg/dL      Ketones, Urine Negative mg/dL      Urobilinogen, Urine 0.2 EU/dL      Bilirubin, Urine Negative mg/dL      Blood, Urine +3     Comment: The sensitivity of the occult blood test is equivalent to approximately 4 intact RBC/HPF.       SARS-CoV-2 (COVID-19), PCR  Nasopharynx [[id number]]  (Normal) Collected: 06/16/23 2101    Specimen: Nasopharyngeal Swab from Nasopharynx Updated: 06/16/23 2150    Narrative:      The following orders were created for panel order SARS-CoV-2 (COVID-19), PCR Nasopharynx.  Procedure                               Abnormality         Status                     ---------                               -----------         ------                     SARS-COV-2 (COVID-19)/ F...[[id number]]  Normal              Final result                 Please view results for these tests on the individual orders.    SARS-COV-2 (COVID-19)/ FLU A/B, AND RSV, PCR Nasopharynx [[id number]]  (Normal) Collected: 06/16/23 2101    Specimen: Nasopharyngeal Swab from Nasopharynx Updated: 06/16/23 2150     SARS-CoV-2 (COVID-19) Negative     Influenza A Negative     Influenza B Negative     Respiratory Syncytial Virus Negative    Narrative:      Testing performed using real-time PCR for detection of COVID-19. EUA approved validation studies performed on site.     CBC and differential [[id number]]  (Abnormal) Collected: 06/16/23 2104    Specimen: Blood, Venous Updated: 06/16/23 2135     WBC 9.53 K/uL      RBC 4.26 M/uL      Hemoglobin 12.4 g/dL      Hematocrit 37.3 %      MCV 87.6 fL      MCH 29.1 pg      MCHC 33.2 g/dL      RDW 14.2 %      Platelets 96 K/uL      Comment: SPECIMEN QUALITY CHECKED        MPV 12.7 fL      Differential Type Auto     nRBC 0.0 %      Immature Granulocytes 0.5 %      Neutrophils 90.6 %      Lymphocytes 2.5 %      Monocytes 6.0 %      Eosinophils 0.1 %      Basophils 0.3 %      Immature Granulocytes, Absolute 0.05 K/uL      Neutrophils, Absolute 8.63 K/uL      Lymphocytes, Absolute 0.24 K/uL      Monocytes, Absolute 0.57 K/uL      Eosinophils, Absolute 0.01 K/uL      Basophils, Absolute 0.03 K/uL      RBC Morphology Normal     PLT Morphology Normal     Platelet Estimate Decreased (51,000-149,000)    Comprehensive metabolic panel [[id number]]  (Abnormal)  Collected: 06/16/23 2104    Specimen: Blood, Venous Updated: 06/16/23 2127     Sodium 131 mEQ/L      Potassium 3.9 mEQ/L      Comment: Results obtained on plasma. Plasma Potassium values may be up to 0.4 mEQ/L less than serum values. The differences may be greater for patients with high platelet or white cell counts.  SLIGHT HEMOLYSIS, RESULT MAY BE INCREASED.        Chloride 94 mEQ/L      CO2 28 mEQ/L      BUN 48 mg/dL      Creatinine 1.9 mg/dL      Glucose 164 mg/dL      Calcium 8.4 mg/dL      AST (SGOT) 36 IU/L      Comment: SLIGHT HEMOLYSIS, RESULT MAY BE INCREASED.        ALT (SGPT) 44 IU/L      Comment: SLIGHT HEMOLYSIS, RESULT MAY BE INCREASED.        Alkaline Phosphatase 57 IU/L      Total Protein 6.8 g/dL      Comment: Test performed on plasma which typically contains approximately 0.4 g/dL more protein than serum.        Albumin 3.3 g/dL      Bilirubin, Total 2.5 mg/dL      Comment: SLIGHT HEMOLYSIS, RESULT MAY BE INCREASED.        eGFR 25.1 mL/min/1.73m*2      Anion Gap 9 mEQ/L           Imaging Results          CT HEAD WITHOUT IV CONTRAST (Final result)  Result time 06/16/23 22:19:26    Final result                 Impression:    IMPRESSION:  1. No acute intracranial abnormality.  2. No significant interval change from 6/14/2023.             Narrative:    CLINICAL HISTORY: Headache.    COMMENT:  TECHNIQUE: CT of the head was performed without intravenous contrast. Sagittal  and coronal reformations were rendered.  CT DOSE:  One or more dose reduction techniques (e.g. automated exposure  control, adjustment of the mA and/or kV according to patient size, use of  iterative reconstruction technique) utilized for this examination.  COMPARISON: CT head 6/14/2023.    Findings:  No acute intracranial hemorrhage. No extra-axial fluid collection, midline shift  or mass effect.  No acute transcortical infarction. Probable chronic small vessel ischemic  changes of the white matter. Intracranial atherosclerosis.   Unchanged scattered  small calcifications in the bilateral frontal sulci and interhemispheric region.  Ventricles and sulci are mildly prominent but cerebral volume is  age-appropriate.    Moderate degenerative changes of the bilateral temporomandibular joints.  Visualized paranasal sinuses and mastoid air cells are essentially clear.                               X-RAY CHEST 1 VIEW (Final result)  Result time 06/16/23 21:53:58    Final result                 Impression:    IMPRESSION:  No acute disease in the chest.                 Narrative:    CLINICAL HISTORY: weakness, chills    COMMENT:  Technique: A portable frontal view of the chest was obtained.  Comparison: 11/22/2022.    There is mild exposure of the film.  The lungs are clear and without consolidation, effusion or pneumothorax.  The heart size is normal. The hilar and mediastinal contours are normal.  The regional skeletal structures are unremarkable.                    Preliminary Interpretation    No acute abnormalities. SB                              No orders to display       Scoring tools                                  ED Course & MDM   MDM / ED COURSE / CLINICAL IMPRESSION / DISPO     Medical Decision Making  Patient is an 87 year old female presenting to the ED with chills, weakness, and continued headaches and neck pain. Patient seen 2 days ago after a fall she sustained last week. She is on thinners. She overall appears well, afebrile, lungs clear to auscultation, saturating 95% on RA. Patient does report that she is currently undergoing treatment for breast CA. Will check labs, urine, viral panel, and will obtain x-ray of the chest and perform another CT scan of the head given worsening headache.  CT scan negative, chest x-ray shows no acute abnormalities.  I reviewed the x-ray and agree with findings.  Patient with slight dehydration as creatinine was 1.9 which is slightly above her baseline.  She was given 1 L of fluids in the emergency  department.  Labs otherwise reassuring.  UA does show evidence of UTI.  Given dose of Rocephin in the emergency department will discharge on Ceftin.  Discussed follow-up with PCP and also provided follow-up with [facility] if she continues to have these headaches that it could be postconcussive syndrome given her recent traumatic head injury.  Discussed return if she has any worsening or concerning symptoms.  Patient and daughter both agreed to and verbalized understanding to treatment plan.    Acute nonintractable headache, unspecified headache type: acute illness or injury  Acute UTI: acute illness or injury  Dehydration: acute illness or injury  Amount and/or Complexity of Data Reviewed  Labs: ordered.  Radiology: ordered and independent interpretation performed.      Risk  OTC drugs.  Prescription drug management.          ED Course as of 06/30/23 1354   Fri Jun 16, 2023   2146 She has some renal insufficiency.  The last creatinine was 1.2.  The bili has been in the 2.0 range in the past.  The AST and ALT are normal.  We will give the patient a liter of fluid. [SB]   Fri Jun 30, 2023   1353 Patient returned call.  She states that she was told that she had a discrepancy in radiology readings, however review of the patient's CAT scan of the brain and chest x-ray both showed no acute disease.  I do recall calling this patient multiple times to let her know that her antibiotic needed to be changed.  She states that she did have a reaction to the Ceftin which was diarrhea and she spoke with her primary care physician who ultimately changed to Bactrim.  This was sensitive. [SC]      ED Course User Index  [SB] Porfirio Arambula MD  [SC] Kamryn Alvarado PA C     Clinical Impression      Acute nonintractable headache, unspecified headache type   Dehydration   Acute UTI     _________________     ED Disposition   Discharge                   Anup Bates PA C  06/16/23 2940

## 2023-06-17 NOTE — DISCHARGE INSTRUCTIONS
As discussed, your chest x-ray and CT scan were reassuring in the emergency department.  Your urine does show evidence of UTI in which you were started on antibiotics for and given prescription.  Please take these as prescribed.  You were also found to be slightly dehydrated in the emergency department.  Make sure you are drinking plenty of fluids and follow-up with your primary care provider.  If these headaches continue, please schedule follow-up with [facility] as you may have postconcussion syndrome given your recent fall and head injury.  Please return if you have any worsening or concerning symptoms.

## 2023-06-18 ENCOUNTER — NURSE TRIAGE (OUTPATIENT)
Dept: INTERNAL MEDICINE | Facility: CLINIC | Age: 88
End: 2023-06-18
Payer: MEDICARE

## 2023-06-18 RX ORDER — SULFAMETHOXAZOLE AND TRIMETHOPRIM 800; 160 MG/1; MG/1
1 TABLET ORAL 2 TIMES DAILY
Qty: 6 TABLET | Refills: 0 | Status: SHIPPED | OUTPATIENT
Start: 2023-06-18 | End: 2023-06-21

## 2023-06-18 NOTE — TELEPHONE ENCOUNTER
Patient was in hospital with concussion and also found to have UTI. Was discharged on Ceftin twice a day. This morning she developed episode of severe diarrhea. Will stop Ceftin, OTC probiotic recommended. Will change to Bactrim as she had reaction to Macrobid and Cipro in the past. Force fluids, if worsens fu in office tomorrow. Agrees with plan.

## 2023-06-19 ENCOUNTER — HOSPITAL ENCOUNTER (OUTPATIENT)
Dept: RADIATION ONCOLOGY | Facility: HOSPITAL | Age: 88
Setting detail: RADIATION/ONCOLOGY SERIES
Discharge: HOME | End: 2023-06-19
Attending: RADIOLOGY
Payer: MEDICARE

## 2023-06-19 LAB
ARIA ZRC COURSE ID: NORMAL
ARIA ZRC COURSE INTENT: NORMAL
ARIA ZRC COURSE START DATE: NORMAL
ARIA ZRC TREATMENT ELAPSED DAYS: NORMAL
ARIA ZRP FRACTIONS TREATED TO DATE: NORMAL
ARIA ZRP PLAN ID: NORMAL
ARIA ZRP PRESCRIBED DOSE CGY: 1180.2
ARIA ZRP PRESCRIBED DOSE CGY: 4600
ARIA ZRP PRESCRIBED DOSE CGY: 5000
ARIA ZRP PRESCRIBED DOSE PER FRACTION: 0.51
ARIA ZRP PRESCRIBED DOSE PER FRACTION: 2
ARIA ZRP PRESCRIBED DOSE PER FRACTION: 2
ARIA ZRR DOSAGE GIVEN TO DATE: 26
ARIA ZRR DOSAGE GIVEN TO DATE: 26.8
ARIA ZRR REFERENCE POINT ID: NORMAL
ARIA ZRR SESSION DOSAGE GIVEN: 2
ARIA ZRR SESSION DOSAGE GIVEN: 2.06
BACTERIA UR CULT: ABNORMAL
BACTERIA UR CULT: ABNORMAL
MLH ARIA ZRC TREATMENT DATES: NORMAL

## 2023-06-19 PROCEDURE — 77387 GUIDANCE FOR RADJ TX DLVR: CPT | Performed by: RADIOLOGY

## 2023-06-19 PROCEDURE — 77412 RADIATION TX DELIVERY LVL 3: CPT | Performed by: RADIOLOGY

## 2023-06-20 ENCOUNTER — OFFICE VISIT (OUTPATIENT)
Dept: INTERNAL MEDICINE | Facility: CLINIC | Age: 88
End: 2023-06-20
Payer: MEDICARE

## 2023-06-20 ENCOUNTER — HOSPITAL ENCOUNTER (OUTPATIENT)
Dept: RADIATION ONCOLOGY | Facility: HOSPITAL | Age: 88
Setting detail: RADIATION/ONCOLOGY SERIES
Discharge: HOME | End: 2023-06-20
Attending: RADIOLOGY
Payer: MEDICARE

## 2023-06-20 VITALS
WEIGHT: 158.6 LBS | OXYGEN SATURATION: 96 % | BODY MASS INDEX: 26.39 KG/M2 | RESPIRATION RATE: 16 BRPM | SYSTOLIC BLOOD PRESSURE: 138 MMHG | HEART RATE: 71 BPM | DIASTOLIC BLOOD PRESSURE: 60 MMHG | TEMPERATURE: 97.8 F

## 2023-06-20 DIAGNOSIS — N30.00 ACUTE CYSTITIS WITHOUT HEMATURIA: ICD-10-CM

## 2023-06-20 DIAGNOSIS — E87.1 HYPONATREMIA: ICD-10-CM

## 2023-06-20 DIAGNOSIS — I48.0 PAROXYSMAL ATRIAL FIBRILLATION (CMS/HCC): ICD-10-CM

## 2023-06-20 DIAGNOSIS — S06.0X0D CONCUSSION WITHOUT LOSS OF CONSCIOUSNESS, SUBSEQUENT ENCOUNTER: ICD-10-CM

## 2023-06-20 DIAGNOSIS — N18.32 CHRONIC KIDNEY DISEASE, STAGE 3B (CMS/HCC): ICD-10-CM

## 2023-06-20 PROBLEM — Z91.81 RISK FOR FALLS: Status: RESOLVED | Noted: 2023-06-20 | Resolved: 2023-06-20

## 2023-06-20 PROBLEM — S06.0XAA CONCUSSION: Status: ACTIVE | Noted: 2023-06-20

## 2023-06-20 PROBLEM — M54.2 NECK PAIN: Status: RESOLVED | Noted: 2023-02-20 | Resolved: 2023-06-20

## 2023-06-20 PROBLEM — Z91.81 RISK FOR FALLS: Status: ACTIVE | Noted: 2023-06-20

## 2023-06-20 LAB
ANION GAP SERPL CALC-SCNC: 10 MEQ/L (ref 3–15)
ARIA ZRC COURSE ID: NORMAL
ARIA ZRC COURSE INTENT: NORMAL
ARIA ZRC COURSE START DATE: NORMAL
ARIA ZRC TREATMENT ELAPSED DAYS: NORMAL
ARIA ZRP FRACTIONS TREATED TO DATE: NORMAL
ARIA ZRP PLAN ID: NORMAL
ARIA ZRP PRESCRIBED DOSE CGY: 1180.2
ARIA ZRP PRESCRIBED DOSE CGY: 4600
ARIA ZRP PRESCRIBED DOSE CGY: 5000
ARIA ZRP PRESCRIBED DOSE PER FRACTION: 0.51
ARIA ZRP PRESCRIBED DOSE PER FRACTION: 2
ARIA ZRP PRESCRIBED DOSE PER FRACTION: 2
ARIA ZRR DOSAGE GIVEN TO DATE: 28
ARIA ZRR DOSAGE GIVEN TO DATE: 28.87
ARIA ZRR REFERENCE POINT ID: NORMAL
ARIA ZRR SESSION DOSAGE GIVEN: 2
ARIA ZRR SESSION DOSAGE GIVEN: 2.06
BUN SERPL-MCNC: 16 MG/DL (ref 8–20)
CALCIUM SERPL-MCNC: 8.6 MG/DL (ref 8.9–10.3)
CHLORIDE SERPL-SCNC: 99 MEQ/L (ref 98–109)
CO2 SERPL-SCNC: 27 MEQ/L (ref 22–32)
CREAT SERPL-MCNC: 1.1 MG/DL (ref 0.6–1.1)
GFR SERPL CREATININE-BSD FRML MDRD: 47.1 ML/MIN/1.73M*2
GLUCOSE SERPL-MCNC: 162 MG/DL (ref 70–99)
MLH ARIA ZRC TREATMENT DATES: NORMAL
POTASSIUM SERPL-SCNC: 3.6 MEQ/L (ref 3.6–5.1)
SODIUM SERPL-SCNC: 136 MEQ/L (ref 136–144)

## 2023-06-20 PROCEDURE — 77412 RADIATION TX DELIVERY LVL 3: CPT | Performed by: RADIOLOGY

## 2023-06-20 PROCEDURE — 36415 COLL VENOUS BLD VENIPUNCTURE: CPT | Performed by: INTERNAL MEDICINE

## 2023-06-20 PROCEDURE — 80048 BASIC METABOLIC PNL TOTAL CA: CPT | Performed by: INTERNAL MEDICINE

## 2023-06-20 PROCEDURE — 99214 OFFICE O/P EST MOD 30 MIN: CPT | Performed by: INTERNAL MEDICINE

## 2023-06-20 PROCEDURE — 77387 GUIDANCE FOR RADJ TX DLVR: CPT | Performed by: RADIOLOGY

## 2023-06-20 ASSESSMENT — PATIENT HEALTH QUESTIONNAIRE - PHQ9: SUM OF ALL RESPONSES TO PHQ9 QUESTIONS 1 & 2: 0

## 2023-06-20 NOTE — ASSESSMENT & PLAN NOTE
Complaining of irregular heartbeat  Sinus on exam today  On low-dose amiodarone  · Continue amiodarone, Xarelto  · Advised to follow-up with cardiology if symptoms continue

## 2023-06-20 NOTE — PROGRESS NOTES
Chief Complaint   Patient presents with   • Other     4 month follow up  UTI  no sxs   Irregular hear beat        Subjective      Patient ID: Latrell Ojeda is a 87 y.o. female.    HPI     Concussion: Still with some symptoms.  Feels tired.  Light and sound sensitive.  Doing her ADLs.    UTI: Started on Bactrim.  She is tolerating this.    Hyponatremia: Noted on recent labs again.  Feels fatigued.    Breast cancer: Seeing surgery and oncology          Objective     Vitals:    06/20/23 1454   BP: 138/60   BP Location: Left upper arm   Patient Position: Sitting   Pulse: 71   Resp: 16   Temp: 36.6 °C (97.8 °F)   TempSrc: Oral   SpO2: 96%   Weight: 71.9 kg (158 lb 9.6 oz)       The following have been reviewed and updated as appropriate in this visit:   Allergies  Meds  Problems        Review of Systems      Current Outpatient Medications:   •  amiodarone (PACERONE) 100 mg tablet, Take 50 mg by mouth nightly. , Disp: , Rfl:   •  anastrozole (ARIMIDEX) 1 mg tablet, Take  1 tablet (1 mg total) daily  Swallow whole with a drink of water., Disp: 90 tablet, Rfl: 3  •  ascorbic acid (VITAMIN C) 500 mg tablet, Take 500 mg by mouth daily., Disp: , Rfl:   •  biotin 5,000 mcg tablet,disintegrating, Take by mouth daily., Disp: , Rfl:   •  cholecalciferol, vitamin D3, 1,000 unit (25 mcg) tablet, Take 1,000 Units by mouth daily., Disp: , Rfl:   •  coenzyme Q10 (COQ10) 10 mg capsule, Take 10 mg by mouth daily.  , Disp: , Rfl:   •  cranberry extract 425 mg capsule, Take by mouth daily., Disp: , Rfl:   •  hydrochlorothiazide (HYDRODIURIL) 25 mg tablet, Take 25 mg by mouth every morning., Disp: , Rfl:   •  LOSARTAN POTASSIUM, BULK, MISC, Take 50 mg by mouth as needed., Disp: , Rfl:   •  lutein 6 mg capsule, Take 6 mg by mouth daily.  , Disp: , Rfl:   •  ondansetron ODT (ZOFRAN-ODT) 4 mg disintegrating tablet, Take 1 tablet (4 mg total) by mouth every 8 (eight) hours as needed for nausea or vomiting for up to 7 days., Disp: 14  tablet, Rfl: 0  •  pantoprazole (PROTONIX) 40 mg EC tablet, Take 1 tablet (40 mg total) by mouth every other day. (Patient taking differently: Take 40 mg by mouth every other day. Pt takes in morning), Disp: 45 tablet, Rfl: 3  •  red yeast rice 600 mg capsule, Take 1 capsule by mouth 2 (two) times a day.  , Disp: , Rfl:   •  rivaroxaban (XARELTO) 15 mg tablet, Take 1 tablet (15 mg total) by mouth daily with dinner., Disp: 90 tablet, Rfl: 1  •  sulfamethoxazole-trimethoprim (BACTRIM DS) 800-160 mg per tablet, Take 1 tablet by mouth 2 (two) times a day for 3 days., Disp: 6 tablet, Rfl: 0    Physical Exam    Assessment/Plan   Acute cystitis without hematuria  Continue bactrim course    Concussion  Increase activity little by little.       Hyponatremia  Recent sodium low.  Possibly due to recent concussion  Currently on HCTZ also  · Check labs    A-fib (CMS/HCC)  Complaining of irregular heartbeat  Sinus on exam today  On low-dose amiodarone  · Continue amiodarone, Xarelto  · Advised to follow-up with cardiology if symptoms continue    Chronic kidney disease, stage 3b (CMS/HCC)  Recent CR high  · Recheck BMP      Orders Placed This Encounter   Procedures   • Basic metabolic panel     No orders of the defined types were placed in this encounter.      Return in about 4 months (around 10/20/2023).     SANDI BLACKBURN MD

## 2023-06-20 NOTE — PATIENT INSTRUCTIONS
Acute cystitis without hematuria  Continue bactrim course    Concussion  Increase activity little by little.       Hyponatremia  Check labs    Return in about 4 months (around 10/20/2023).

## 2023-06-21 ENCOUNTER — HOSPITAL ENCOUNTER (OUTPATIENT)
Dept: RADIATION ONCOLOGY | Facility: HOSPITAL | Age: 88
Setting detail: RADIATION/ONCOLOGY SERIES
Discharge: HOME | End: 2023-06-21
Attending: RADIOLOGY
Payer: MEDICARE

## 2023-06-21 ENCOUNTER — TELEPHONE (OUTPATIENT)
Dept: RADIATION ONCOLOGY | Facility: HOSPITAL | Age: 88
End: 2023-06-21
Payer: MEDICARE

## 2023-06-21 ENCOUNTER — RAD ONC OTV (OUTPATIENT)
Dept: RADIATION ONCOLOGY | Facility: HOSPITAL | Age: 88
End: 2023-06-21
Payer: MEDICARE

## 2023-06-21 VITALS
DIASTOLIC BLOOD PRESSURE: 69 MMHG | OXYGEN SATURATION: 97 % | BODY MASS INDEX: 25.96 KG/M2 | WEIGHT: 156 LBS | SYSTOLIC BLOOD PRESSURE: 163 MMHG

## 2023-06-21 DIAGNOSIS — C50.911 CHEST WALL RECURRENCE OF BREAST CANCER, RIGHT (CMS/HCC): Primary | ICD-10-CM

## 2023-06-21 DIAGNOSIS — C79.89 CHEST WALL RECURRENCE OF BREAST CANCER, RIGHT (CMS/HCC): Primary | ICD-10-CM

## 2023-06-21 LAB
ARIA ZRC COURSE ID: NORMAL
ARIA ZRC COURSE INTENT: NORMAL
ARIA ZRC COURSE START DATE: NORMAL
ARIA ZRC TREATMENT ELAPSED DAYS: NORMAL
ARIA ZRP FRACTIONS TREATED TO DATE: NORMAL
ARIA ZRP PLAN ID: NORMAL
ARIA ZRP PRESCRIBED DOSE CGY: 1180.2
ARIA ZRP PRESCRIBED DOSE CGY: 4600
ARIA ZRP PRESCRIBED DOSE CGY: 5000
ARIA ZRP PRESCRIBED DOSE PER FRACTION: 0.51
ARIA ZRP PRESCRIBED DOSE PER FRACTION: 2
ARIA ZRP PRESCRIBED DOSE PER FRACTION: 2
ARIA ZRR DOSAGE GIVEN TO DATE: 30
ARIA ZRR DOSAGE GIVEN TO DATE: 30.93
ARIA ZRR REFERENCE POINT ID: NORMAL
ARIA ZRR SESSION DOSAGE GIVEN: 2
ARIA ZRR SESSION DOSAGE GIVEN: 2.06
MLH ARIA ZRC TREATMENT DATES: NORMAL

## 2023-06-21 PROCEDURE — 77412 RADIATION TX DELIVERY LVL 3: CPT | Performed by: RADIOLOGY

## 2023-06-21 PROCEDURE — 77387 GUIDANCE FOR RADJ TX DLVR: CPT | Performed by: RADIOLOGY

## 2023-06-21 RX ORDER — ANASTROZOLE 1 MG/1
TABLET ORAL
COMMUNITY
End: 2023-07-14 | Stop reason: SDUPTHER

## 2023-06-21 ASSESSMENT — PAIN SCALES - GENERAL: PAINLEVEL: 2

## 2023-06-21 ASSESSMENT — ENCOUNTER SYMPTOMS
COLOR CHANGE: 1
FATIGUE: 1

## 2023-06-21 NOTE — LETTER
June 21, 2023                                                          Patient: Latrell Ojeda   YOB: 1935   Date of Visit: 6/21/2023       Dear Dr. Miller:    The patient is seen at the [facility] RAD[facility]ERAP[facility] today. Attached is my assessment and plan of care.  Thank you for the opportunity to share in Latrell Ojeda's care.       Sincerely,        Debora Navarro MD      CC: No Recipients

## 2023-06-21 NOTE — PROGRESS NOTES
Subjective      Patient ID: Latrell Ojeda is a 87 y.o. female.  1935    Diagnosis:  Encounter Diagnosis   Name Primary?   • Chest wall recurrence of breast cancer, right (CMS/HCC) Yes       HPI     Ms. Ojeda has received 3000 of a planned 4600 cGy to the right supraclavicular fossa and apex of the right axilla and 3000 of a planned 5000 cGy to the right chest wall/reconstructed breast.  She is using calendula on the skin of the treatment field once daily and notes mild pruritus superior medially.  He was seen in the emergency room 2 times last week following falls for which she underwent head CTs which were negative for acute intracranial abnormalities.  She was found to have a UTI and started on Bactrim.    The following have been reviewed and updated as appropriate in this visit:        Review of Systems   Constitutional: Positive for fatigue.   Skin: Positive for color change (slight color change) and rash (red dots- calendula).       Objective     Vitals:    06/21/23 1421   BP: (!) 163/69   BP Location: Left upper arm   Patient Position: Sitting   SpO2: 97%   Weight: 70.8 kg (156 lb)     Body mass index is 25.96 kg/m².    Physical Exam     The patient is status post bilateral mastectomies with implants in place.  There is a recent surgical scar just superior laterally to the tattooed right nipple areolar complex.  There is mild erythema present on the skin in the treatment field, most pronounced overlying the right clavicle and laterally.  There is additionally mild dermatitis superior medially.  There is no subcutaneous nodularity appreciated.    Assessment/Plan      Continue radiation as planned.  Continue calendula to the skin in the treatment field.  1% Hydrocortisone ointment as needed pruritus.  Diagnoses and Orders Associated With This Visit:  Chest wall recurrence of breast cancer, right (CMS/HCC) (Primary)

## 2023-06-22 ENCOUNTER — HOSPITAL ENCOUNTER (OUTPATIENT)
Dept: RADIATION ONCOLOGY | Facility: HOSPITAL | Age: 88
Setting detail: RADIATION/ONCOLOGY SERIES
Discharge: HOME | End: 2023-06-22
Attending: RADIOLOGY
Payer: MEDICARE

## 2023-06-22 LAB
ARIA ZRC COURSE ID: NORMAL
ARIA ZRC COURSE INTENT: NORMAL
ARIA ZRC COURSE START DATE: NORMAL
ARIA ZRC TREATMENT ELAPSED DAYS: NORMAL
ARIA ZRP FRACTIONS TREATED TO DATE: NORMAL
ARIA ZRP PLAN ID: NORMAL
ARIA ZRP PRESCRIBED DOSE CGY: 1180.2
ARIA ZRP PRESCRIBED DOSE CGY: 4600
ARIA ZRP PRESCRIBED DOSE CGY: 5000
ARIA ZRP PRESCRIBED DOSE PER FRACTION: 0.51
ARIA ZRP PRESCRIBED DOSE PER FRACTION: 2
ARIA ZRP PRESCRIBED DOSE PER FRACTION: 2
ARIA ZRR DOSAGE GIVEN TO DATE: 32
ARIA ZRR DOSAGE GIVEN TO DATE: 32.99
ARIA ZRR REFERENCE POINT ID: NORMAL
ARIA ZRR SESSION DOSAGE GIVEN: 2
ARIA ZRR SESSION DOSAGE GIVEN: 2.06
MLH ARIA ZRC TREATMENT DATES: NORMAL

## 2023-06-22 PROCEDURE — 77412 RADIATION TX DELIVERY LVL 3: CPT | Performed by: RADIOLOGY

## 2023-06-22 PROCEDURE — 77387 GUIDANCE FOR RADJ TX DLVR: CPT | Performed by: RADIOLOGY

## 2023-06-23 ENCOUNTER — HOSPITAL ENCOUNTER (OUTPATIENT)
Dept: RADIATION ONCOLOGY | Facility: HOSPITAL | Age: 88
Setting detail: RADIATION/ONCOLOGY SERIES
Discharge: HOME | End: 2023-06-23
Attending: RADIOLOGY
Payer: MEDICARE

## 2023-06-23 LAB
ARIA ZRC COURSE ID: NORMAL
ARIA ZRC COURSE INTENT: NORMAL
ARIA ZRC COURSE START DATE: NORMAL
ARIA ZRC TREATMENT ELAPSED DAYS: NORMAL
ARIA ZRP FRACTIONS TREATED TO DATE: NORMAL
ARIA ZRP PLAN ID: NORMAL
ARIA ZRP PRESCRIBED DOSE CGY: 1180.2
ARIA ZRP PRESCRIBED DOSE CGY: 4600
ARIA ZRP PRESCRIBED DOSE CGY: 5000
ARIA ZRP PRESCRIBED DOSE PER FRACTION: 0.51
ARIA ZRP PRESCRIBED DOSE PER FRACTION: 2
ARIA ZRP PRESCRIBED DOSE PER FRACTION: 2
ARIA ZRR DOSAGE GIVEN TO DATE: 34
ARIA ZRR DOSAGE GIVEN TO DATE: 35.05
ARIA ZRR REFERENCE POINT ID: NORMAL
ARIA ZRR SESSION DOSAGE GIVEN: 2
ARIA ZRR SESSION DOSAGE GIVEN: 2.06
MLH ARIA ZRC TREATMENT DATES: NORMAL

## 2023-06-23 PROCEDURE — 77336 RADIATION PHYSICS CONSULT: CPT | Performed by: RADIOLOGY

## 2023-06-23 PROCEDURE — 77387 GUIDANCE FOR RADJ TX DLVR: CPT | Performed by: RADIOLOGY

## 2023-06-23 PROCEDURE — 77412 RADIATION TX DELIVERY LVL 3: CPT | Performed by: RADIOLOGY

## 2023-06-26 ENCOUNTER — RAD ONC OTV (OUTPATIENT)
Dept: RADIATION ONCOLOGY | Facility: HOSPITAL | Age: 88
End: 2023-06-26
Payer: MEDICARE

## 2023-06-26 ENCOUNTER — HOSPITAL ENCOUNTER (OUTPATIENT)
Dept: RADIATION ONCOLOGY | Facility: HOSPITAL | Age: 88
Setting detail: RADIATION/ONCOLOGY SERIES
Discharge: HOME | End: 2023-06-26
Attending: RADIOLOGY
Payer: MEDICARE

## 2023-06-26 VITALS
BODY MASS INDEX: 25.96 KG/M2 | HEART RATE: 60 BPM | OXYGEN SATURATION: 100 % | WEIGHT: 156 LBS | DIASTOLIC BLOOD PRESSURE: 68 MMHG | TEMPERATURE: 98.5 F | SYSTOLIC BLOOD PRESSURE: 150 MMHG

## 2023-06-26 DIAGNOSIS — C50.911 CHEST WALL RECURRENCE OF BREAST CANCER, RIGHT (CMS/HCC): Primary | ICD-10-CM

## 2023-06-26 DIAGNOSIS — C79.89 CHEST WALL RECURRENCE OF BREAST CANCER, RIGHT (CMS/HCC): Primary | ICD-10-CM

## 2023-06-26 LAB
ARIA ZRC COURSE ID: NORMAL
ARIA ZRC COURSE INTENT: NORMAL
ARIA ZRC COURSE START DATE: NORMAL
ARIA ZRC TREATMENT ELAPSED DAYS: NORMAL
ARIA ZRP FRACTIONS TREATED TO DATE: NORMAL
ARIA ZRP PLAN ID: NORMAL
ARIA ZRP PRESCRIBED DOSE CGY: 1180.2
ARIA ZRP PRESCRIBED DOSE CGY: 4600
ARIA ZRP PRESCRIBED DOSE CGY: 5000
ARIA ZRP PRESCRIBED DOSE PER FRACTION: 0.51
ARIA ZRP PRESCRIBED DOSE PER FRACTION: 2
ARIA ZRP PRESCRIBED DOSE PER FRACTION: 2
ARIA ZRR DOSAGE GIVEN TO DATE: 36
ARIA ZRR DOSAGE GIVEN TO DATE: 37.11
ARIA ZRR REFERENCE POINT ID: NORMAL
ARIA ZRR SESSION DOSAGE GIVEN: 2
ARIA ZRR SESSION DOSAGE GIVEN: 2.06
MLH ARIA ZRC TREATMENT DATES: NORMAL

## 2023-06-26 PROCEDURE — 77412 RADIATION TX DELIVERY LVL 3: CPT | Performed by: RADIOLOGY

## 2023-06-26 PROCEDURE — 77300 RADIATION THERAPY DOSE PLAN: CPT | Performed by: RADIOLOGY

## 2023-06-26 PROCEDURE — 77387 GUIDANCE FOR RADJ TX DLVR: CPT | Performed by: RADIOLOGY

## 2023-06-26 ASSESSMENT — ENCOUNTER SYMPTOMS
DIARRHEA: 1
COLOR CHANGE: 1
NECK PAIN: 1
FATIGUE: 1
HEADACHES: 1

## 2023-06-26 ASSESSMENT — PAIN SCALES - GENERAL: PAINLEVEL: 2

## 2023-06-26 NOTE — PROGRESS NOTES
Subjective      Patient ID: Latrell Ojeda is a 87 y.o. female.  1935    Diagnosis:  Encounter Diagnosis   Name Primary?   • Chest wall recurrence of breast cancer, right (CMS/HCC) Yes       HPI     Ms. Ojeda has received 3600 planned 4600 cGy to the right supraclavicular fossa and apex of the right axilla and 3600 of a planned 5000 cGy to the right chest wall.  Continues to use calendula on the skin in the treatment field in addition to hydrocortisone cream 1% as needed pruritus superior medially.  She notices the mild feeling of a lump in her throat and has no other complaints at this time from fatigue.    The following have been reviewed and updated as appropriate in this visit:        Review of Systems   Constitutional: Positive for fatigue (possibly from concussion).   Gastrointestinal: Positive for diarrhea (eating rice for relief- says its under control).   Musculoskeletal: Positive for neck pain (slight pain in right side of neck area).   Skin: Positive for color change (red- calendula) and rash.   Neurological: Positive for headaches (from concussion).       Objective     Vitals:    06/26/23 1409   BP: (!) 150/68   BP Location: Left upper arm   Patient Position: Sitting   Pulse: 60   Temp: 36.9 °C (98.5 °F)   TempSrc: Oral   SpO2: 100%   Weight: 70.8 kg (156 lb)     Body mass index is 25.96 kg/m².    Physical Exam     The patient is status post bilateral mastectomies with implants in place.  There is a recent surgical scar superior laterally to the tattooed right nipple areolar complex.  There is mild to moderate erythema of the skin in the treatment field, most pronounced in the upper aspect of the reconstructed breast laterally.  There is mild to moderate dermatitis superior medially.  There is additionally moderate erythema overlying the skin of the right clavicle.    Assessment/Plan      Continue radiation, DC chest wall bolus.  Continue calendula and 1% hydrocortisone  cream.  Diagnoses and Orders Associated With This Visit:  Chest wall recurrence of breast cancer, right (CMS/HCC) (Primary)

## 2023-06-26 NOTE — LETTER
June 26, 2023                                                          Patient: Latrell Ojeda   YOB: 1935   Date of Visit: 6/26/2023       Dear Dr. Miller:    The patient is seen at the [facility] RADIATION THERAPY today. Attached is my assessment and plan of care.  Thank you for the opportunity to share in Latrell Ojeda's care.       Sincerely,        Debora Navarro MD      CC: No Recipients

## 2023-06-27 ENCOUNTER — HOSPITAL ENCOUNTER (OUTPATIENT)
Dept: RADIATION ONCOLOGY | Facility: HOSPITAL | Age: 88
Setting detail: RADIATION/ONCOLOGY SERIES
End: 2023-06-27
Attending: RADIOLOGY
Payer: MEDICARE

## 2023-06-27 ENCOUNTER — HOSPITAL ENCOUNTER (OUTPATIENT)
Dept: RADIATION ONCOLOGY | Facility: HOSPITAL | Age: 88
Setting detail: RADIATION/ONCOLOGY SERIES
Discharge: HOME | End: 2023-06-27
Attending: RADIOLOGY
Payer: MEDICARE

## 2023-06-27 LAB
ARIA ZRC COURSE ID: NORMAL
ARIA ZRC COURSE INTENT: NORMAL
ARIA ZRC COURSE START DATE: NORMAL
ARIA ZRC TREATMENT ELAPSED DAYS: NORMAL
ARIA ZRP FRACTIONS TREATED TO DATE: NORMAL
ARIA ZRP PLAN ID: NORMAL
ARIA ZRP PRESCRIBED DOSE CGY: 1180.2
ARIA ZRP PRESCRIBED DOSE CGY: 1400
ARIA ZRP PRESCRIBED DOSE CGY: 4600
ARIA ZRP PRESCRIBED DOSE PER FRACTION: 0.51
ARIA ZRP PRESCRIBED DOSE PER FRACTION: 2
ARIA ZRP PRESCRIBED DOSE PER FRACTION: 2
ARIA ZRR DOSAGE GIVEN TO DATE: 38
ARIA ZRR DOSAGE GIVEN TO DATE: 39.18
ARIA ZRR REFERENCE POINT ID: NORMAL
ARIA ZRR SESSION DOSAGE GIVEN: 2
ARIA ZRR SESSION DOSAGE GIVEN: 2.06
MLH ARIA ZRC TREATMENT DATES: NORMAL

## 2023-06-28 LAB
ARIA ZRC COURSE ID: NORMAL
ARIA ZRC COURSE INTENT: NORMAL
ARIA ZRC COURSE START DATE: NORMAL
ARIA ZRC TREATMENT ELAPSED DAYS: NORMAL
ARIA ZRP FRACTIONS TREATED TO DATE: NORMAL
ARIA ZRP PLAN ID: NORMAL
ARIA ZRP PRESCRIBED DOSE CGY: 1180.2
ARIA ZRP PRESCRIBED DOSE CGY: 1400
ARIA ZRP PRESCRIBED DOSE CGY: 4600
ARIA ZRP PRESCRIBED DOSE PER FRACTION: 0.51
ARIA ZRP PRESCRIBED DOSE PER FRACTION: 2
ARIA ZRP PRESCRIBED DOSE PER FRACTION: 2
ARIA ZRR DOSAGE GIVEN TO DATE: 40
ARIA ZRR DOSAGE GIVEN TO DATE: 41.24
ARIA ZRR REFERENCE POINT ID: NORMAL
ARIA ZRR SESSION DOSAGE GIVEN: 2
ARIA ZRR SESSION DOSAGE GIVEN: 2.06
MLH ARIA ZRC TREATMENT DATES: NORMAL

## 2023-06-29 ENCOUNTER — HOSPITAL ENCOUNTER (OUTPATIENT)
Dept: RADIATION ONCOLOGY | Facility: HOSPITAL | Age: 88
Setting detail: RADIATION/ONCOLOGY SERIES
Discharge: HOME | End: 2023-06-29
Attending: RADIOLOGY
Payer: MEDICARE

## 2023-06-29 LAB
ARIA ZRC COURSE ID: NORMAL
ARIA ZRC COURSE INTENT: NORMAL
ARIA ZRC COURSE START DATE: NORMAL
ARIA ZRC TREATMENT ELAPSED DAYS: NORMAL
ARIA ZRP FRACTIONS TREATED TO DATE: NORMAL
ARIA ZRP PLAN ID: NORMAL
ARIA ZRP PRESCRIBED DOSE CGY: 1180.2
ARIA ZRP PRESCRIBED DOSE CGY: 1400
ARIA ZRP PRESCRIBED DOSE CGY: 4600
ARIA ZRP PRESCRIBED DOSE PER FRACTION: 0.51
ARIA ZRP PRESCRIBED DOSE PER FRACTION: 2
ARIA ZRP PRESCRIBED DOSE PER FRACTION: 2
ARIA ZRR DOSAGE GIVEN TO DATE: 42
ARIA ZRR DOSAGE GIVEN TO DATE: 43.3
ARIA ZRR REFERENCE POINT ID: NORMAL
ARIA ZRR SESSION DOSAGE GIVEN: 2
ARIA ZRR SESSION DOSAGE GIVEN: 2.06
MLH ARIA ZRC TREATMENT DATES: NORMAL

## 2023-06-29 PROCEDURE — 77387 GUIDANCE FOR RADJ TX DLVR: CPT | Performed by: RADIOLOGY

## 2023-06-29 PROCEDURE — 77412 RADIATION TX DELIVERY LVL 3: CPT | Performed by: RADIOLOGY

## 2023-06-29 PROCEDURE — 77336 RADIATION PHYSICS CONSULT: CPT | Performed by: RADIOLOGY

## 2023-06-30 ENCOUNTER — HOSPITAL ENCOUNTER (OUTPATIENT)
Dept: RADIATION ONCOLOGY | Facility: HOSPITAL | Age: 88
Setting detail: RADIATION/ONCOLOGY SERIES
Discharge: HOME | End: 2023-06-30
Attending: RADIOLOGY
Payer: MEDICARE

## 2023-06-30 LAB
ARIA ZRC COURSE ID: NORMAL
ARIA ZRC COURSE INTENT: NORMAL
ARIA ZRC COURSE START DATE: NORMAL
ARIA ZRC TREATMENT ELAPSED DAYS: NORMAL
ARIA ZRP FRACTIONS TREATED TO DATE: NORMAL
ARIA ZRP PLAN ID: NORMAL
ARIA ZRP PRESCRIBED DOSE CGY: 1180.2
ARIA ZRP PRESCRIBED DOSE CGY: 1400
ARIA ZRP PRESCRIBED DOSE CGY: 4600
ARIA ZRP PRESCRIBED DOSE PER FRACTION: 0.51
ARIA ZRP PRESCRIBED DOSE PER FRACTION: 2
ARIA ZRP PRESCRIBED DOSE PER FRACTION: 2
ARIA ZRR DOSAGE GIVEN TO DATE: 44
ARIA ZRR DOSAGE GIVEN TO DATE: 45.36
ARIA ZRR REFERENCE POINT ID: NORMAL
ARIA ZRR SESSION DOSAGE GIVEN: 2
ARIA ZRR SESSION DOSAGE GIVEN: 2.06
MLH ARIA ZRC TREATMENT DATES: NORMAL

## 2023-06-30 PROCEDURE — 77412 RADIATION TX DELIVERY LVL 3: CPT | Performed by: RADIOLOGY

## 2023-06-30 PROCEDURE — 77387 GUIDANCE FOR RADJ TX DLVR: CPT | Performed by: RADIOLOGY

## 2023-07-03 ENCOUNTER — HOSPITAL ENCOUNTER (OUTPATIENT)
Dept: RADIATION ONCOLOGY | Facility: HOSPITAL | Age: 88
Setting detail: RADIATION/ONCOLOGY SERIES
Discharge: HOME | End: 2023-07-03
Attending: RADIOLOGY
Payer: MEDICARE

## 2023-07-03 ENCOUNTER — RAD ONC OTV (OUTPATIENT)
Dept: RADIATION ONCOLOGY | Facility: HOSPITAL | Age: 88
End: 2023-07-03
Payer: MEDICARE

## 2023-07-03 VITALS
OXYGEN SATURATION: 98 % | HEART RATE: 62 BPM | DIASTOLIC BLOOD PRESSURE: 69 MMHG | BODY MASS INDEX: 25.96 KG/M2 | SYSTOLIC BLOOD PRESSURE: 158 MMHG | WEIGHT: 156 LBS | TEMPERATURE: 98 F

## 2023-07-03 DIAGNOSIS — C50.911 CHEST WALL RECURRENCE OF BREAST CANCER, RIGHT (CMS/HCC): Primary | ICD-10-CM

## 2023-07-03 DIAGNOSIS — C79.89 CHEST WALL RECURRENCE OF BREAST CANCER, RIGHT (CMS/HCC): Primary | ICD-10-CM

## 2023-07-03 LAB
ARIA ZRC COURSE ID: NORMAL
ARIA ZRC COURSE INTENT: NORMAL
ARIA ZRC COURSE START DATE: NORMAL
ARIA ZRC TREATMENT ELAPSED DAYS: NORMAL
ARIA ZRP FRACTIONS TREATED TO DATE: NORMAL
ARIA ZRP PLAN ID: NORMAL
ARIA ZRP PRESCRIBED DOSE CGY: 1180.2
ARIA ZRP PRESCRIBED DOSE CGY: 1400
ARIA ZRP PRESCRIBED DOSE CGY: 4600
ARIA ZRP PRESCRIBED DOSE PER FRACTION: 0.51
ARIA ZRP PRESCRIBED DOSE PER FRACTION: 2
ARIA ZRP PRESCRIBED DOSE PER FRACTION: 2
ARIA ZRR DOSAGE GIVEN TO DATE: 46
ARIA ZRR DOSAGE GIVEN TO DATE: 47.42
ARIA ZRR REFERENCE POINT ID: NORMAL
ARIA ZRR SESSION DOSAGE GIVEN: 2
ARIA ZRR SESSION DOSAGE GIVEN: 2.06
MLH ARIA ZRC TREATMENT DATES: NORMAL

## 2023-07-03 PROCEDURE — 77334 RADIATION TREATMENT AID(S): CPT | Performed by: RADIOLOGY

## 2023-07-03 PROCEDURE — 77321 SPECIAL TELETX PORT PLAN: CPT | Performed by: RADIOLOGY

## 2023-07-03 RX ORDER — SILVER SULFADIAZINE 10 G/1000G
CREAM TOPICAL
Qty: 400 G | Refills: 1 | Status: SHIPPED | OUTPATIENT
Start: 2023-07-03 | End: 2023-08-16

## 2023-07-03 ASSESSMENT — PAIN SCALES - GENERAL: PAINLEVEL: 2

## 2023-07-03 ASSESSMENT — ENCOUNTER SYMPTOMS
RESPIRATORY NEGATIVE: 1
CONSTITUTIONAL NEGATIVE: 1
COLOR CHANGE: 1
CARDIOVASCULAR NEGATIVE: 1
GASTROINTESTINAL NEGATIVE: 1

## 2023-07-03 NOTE — LETTER
July 3, 2023                                                          Patient: Latrell Ojeda   YOB: 1935   Date of Visit: 7/3/2023       Dear Dr. Miller:    The patient is seen at the [facility] RADIATION THERAPY today. Attached is my assessment and plan of care.  Thank you for the opportunity to share in Latrell Ojeda's care.       Sincerely,        Debora Navarro MD      CC: No Recipients

## 2023-07-03 NOTE — PROGRESS NOTES
Subjective      Patient ID: Latrell Ojeda is a 87 y.o. female.  1935    Diagnosis:  Encounter Diagnosis   Name Primary?   • Chest wall recurrence of breast cancer, right (CMS/HCC) Yes       HPI     Ms. Ojeda has received 4600 of a planned 4600 cGy to the right supraclavicular fossa and apex of the right axilla and 4600 of a planned 5000 cGy to the right chest wall/reconstructed breast.  She has noted increasing irritation of the skin of the right chest wall, most pronounced laterally.  Continues to use calendula on the skin in the treatment field twice daily    The following have been reviewed and updated as appropriate in this visit:        Review of Systems   Constitutional: Negative.    Respiratory: Negative.    Cardiovascular: Negative.    Gastrointestinal: Negative.    Endocrine: Negative for heat intolerance.   Skin: Positive for color change (right breast is red, feels like a bad sunburn- calendula ). Negative for rash.        Tattoo today and has some minor bleeding (breast area)       Objective     Vitals:    07/03/23 1440   BP: (!) 158/69   BP Location: Right upper arm   Patient Position: Sitting   Pulse: 62   Temp: 36.7 °C (98 °F)   TempSrc: Oral   SpO2: 98%   Weight: 70.8 kg (156 lb)     Body mass index is 25.96 kg/m².    Physical Exam     The patient is status post bilateral mastectomies with implants in place.  There is a surgical scar superior laterally to the tattooed right nipple areolar complex.  There is moderate to brisk erythema of the skin in the treatment field, most pronounced in the lateral right reconstructed breast extending into the right axilla and in the right inframammary fold.  There is no desquamation noted.    Assessment/Plan      Continue radiation as planned- 2 more fractions to right chest wall/reconstructed breast then electron boost for an additional 1000 cGy given over 5 fractions.  A prescription was placed for Silvadene and the patient was instructed to use  this on the skin in the treatment field 3 times daily.  Diagnoses and Orders Associated With This Visit:  Chest wall recurrence of breast cancer, right (CMS/HCC) (Primary)

## 2023-07-05 LAB
ARIA ZRC COURSE ID: NORMAL
ARIA ZRC COURSE ID: NORMAL
ARIA ZRC COURSE INTENT: NORMAL
ARIA ZRC COURSE INTENT: NORMAL
ARIA ZRC COURSE START DATE: NORMAL
ARIA ZRC COURSE START DATE: NORMAL
ARIA ZRC TREATMENT ELAPSED DAYS: NORMAL
ARIA ZRC TREATMENT ELAPSED DAYS: NORMAL
ARIA ZRP FRACTIONS TREATED TO DATE: NORMAL
ARIA ZRP PLAN ID: NORMAL
ARIA ZRP PLAN NAME: NORMAL
ARIA ZRP PRESCRIBED DOSE CGY: 1000
ARIA ZRP PRESCRIBED DOSE CGY: 1000
ARIA ZRP PRESCRIBED DOSE CGY: 1034.6
ARIA ZRP PRESCRIBED DOSE CGY: 1070.3
ARIA ZRP PRESCRIBED DOSE CGY: 1400
ARIA ZRP PRESCRIBED DOSE CGY: 1600
ARIA ZRP PRESCRIBED DOSE CGY: 3600
ARIA ZRP PRESCRIBED DOSE CGY: 5000
ARIA ZRP PRESCRIBED DOSE PER FRACTION: 0.45
ARIA ZRP PRESCRIBED DOSE PER FRACTION: 0.47
ARIA ZRP PRESCRIBED DOSE PER FRACTION: 2
ARIA ZRP PRESCRIBED DOSE PER FRACTION: 3.2
ARIA ZRR DOSAGE GIVEN TO DATE: 0
ARIA ZRR DOSAGE GIVEN TO DATE: 48
ARIA ZRR DOSAGE GIVEN TO DATE: 48
ARIA ZRR DOSAGE GIVEN TO DATE: 49.48
ARIA ZRR REFERENCE POINT ID: NORMAL
ARIA ZRR SESSION DOSAGE GIVEN: 2
ARIA ZRR SESSION DOSAGE GIVEN: 2
ARIA ZRR SESSION DOSAGE GIVEN: 2.06
MLH ARIA ZRC TREATMENT DATES: NORMAL
MLH ARIA ZRC TREATMENT DATES: NORMAL

## 2023-07-06 ENCOUNTER — HOSPITAL ENCOUNTER (OUTPATIENT)
Dept: RADIATION ONCOLOGY | Facility: HOSPITAL | Age: 88
Setting detail: RADIATION/ONCOLOGY SERIES
Discharge: HOME | End: 2023-07-06
Attending: RADIOLOGY
Payer: MEDICARE

## 2023-07-06 LAB
ARIA ZRC COURSE ID: NORMAL
ARIA ZRC COURSE INTENT: NORMAL
ARIA ZRC COURSE START DATE: NORMAL
ARIA ZRC TREATMENT ELAPSED DAYS: NORMAL
ARIA ZRP FRACTIONS TREATED TO DATE: NORMAL
ARIA ZRP PLAN ID: NORMAL
ARIA ZRP PRESCRIBED DOSE CGY: 1400
ARIA ZRP PRESCRIBED DOSE PER FRACTION: 2
ARIA ZRR DOSAGE GIVEN TO DATE: 50
ARIA ZRR DOSAGE GIVEN TO DATE: 50
ARIA ZRR DOSAGE GIVEN TO DATE: 51.55
ARIA ZRR REFERENCE POINT ID: NORMAL
ARIA ZRR SESSION DOSAGE GIVEN: 2
ARIA ZRR SESSION DOSAGE GIVEN: 2
ARIA ZRR SESSION DOSAGE GIVEN: 2.06
MLH ARIA ZRC TREATMENT DATES: NORMAL

## 2023-07-06 PROCEDURE — 77412 RADIATION TX DELIVERY LVL 3: CPT | Performed by: RADIOLOGY

## 2023-07-06 PROCEDURE — 77387 GUIDANCE FOR RADJ TX DLVR: CPT | Performed by: RADIOLOGY

## 2023-07-10 ENCOUNTER — RAD ONC OTV (OUTPATIENT)
Dept: RADIATION ONCOLOGY | Facility: HOSPITAL | Age: 88
End: 2023-07-10
Payer: MEDICARE

## 2023-07-10 ENCOUNTER — TELEPHONE (OUTPATIENT)
Dept: RADIATION ONCOLOGY | Facility: HOSPITAL | Age: 88
End: 2023-07-10
Payer: MEDICARE

## 2023-07-10 VITALS — SYSTOLIC BLOOD PRESSURE: 152 MMHG | TEMPERATURE: 97.9 F | DIASTOLIC BLOOD PRESSURE: 64 MMHG | HEART RATE: 70 BPM

## 2023-07-10 DIAGNOSIS — C79.89 CHEST WALL RECURRENCE OF BREAST CANCER, RIGHT (CMS/HCC): Primary | ICD-10-CM

## 2023-07-10 DIAGNOSIS — C50.911 CHEST WALL RECURRENCE OF BREAST CANCER, RIGHT (CMS/HCC): Primary | ICD-10-CM

## 2023-07-10 ASSESSMENT — ENCOUNTER SYMPTOMS
FATIGUE: 1
COLOR CHANGE: 1
PSYCHIATRIC NEGATIVE: 1
FEVER: 1
MUSCULOSKELETAL NEGATIVE: 1

## 2023-07-10 ASSESSMENT — PAIN SCALES - GENERAL: PAINLEVEL: 8

## 2023-07-10 NOTE — PROGRESS NOTES
Subjective      Patient ID: Latrell Ojeda is a 87 y.o. female.  1935    Diagnosis:  Encounter Diagnosis   Name Primary?   • Chest wall recurrence of breast cancer, right (CMS/HCC) Yes       HPI     Ms. Ojeda has received 4600 of a planned 4600 cGy to the right supraclavicular fossa and apex of the right axilla and 5000 of a planned 6000 cGy to the right chest wall.  She was not given a treatment last Friday prior to beginning her right chest wall boost due to to brisk erythema and over the weekend developed increased desquamation with moist desquamation in the right axilla and right internal mammary fold.  She has been using Silvadene and calendula on the skin of the right chest wall and notes increased discomfort for which she has not been using any pain medication.    The following have been reviewed and updated as appropriate in this visit:        Review of Systems   Constitutional: Positive for fatigue and fever.   Musculoskeletal: Negative.    Skin: Positive for color change (dusky erythema with moist desquamation.  Pt is using Silvadene and Calendula.  Pain 8/10 - pt will take Tylenol ES with some relief.  Pt is on Arimidex.).   Psychiatric/Behavioral: Negative.    Will provide Xeroform dressing with Silvadene and netted tube top.  Pt education provided - pt verbalizes understanding.    Objective     Vitals:    07/10/23 1344   BP: (!) 152/64   Pulse: 70   Temp: 36.6 °C (97.9 °F)     There is no height or weight on file to calculate BMI.    Physical Exam    The patient is status post bilateral mastectomies with implants in place.  There is a surgical scar superior laterally to the tattooed right nipple areolar complex.  There is brisk erythema of the skin of the right chest wall/reconstructed breast with moist desquamation laterally and in the inframammary fold.  There is additionally edema overlying the right clavicle without desquamation.    Assessment/Plan      Continue to hold radiation.   The patient will continue to use Silvadene and will also begin to use Xeroform to the skin in the treatment field.  Will reevaluate on Friday and consider resuming boost for chest wall recurrence at that time.  Diagnoses and Orders Associated With This Visit:  Chest wall recurrence of breast cancer, right (CMS/HCC) (Primary)

## 2023-07-10 NOTE — LETTER
July 10, 2023                                                          Patient: Latrell Ojeda   YOB: 1935   Date of Visit: 7/10/2023       Dear Dr. Miller:    The patient is seen at the [facility] RADIATION THERAPY today. Attached is my assessment and plan of care.  Thank you for the opportunity to share in Latrell Ojeda's care.       Sincerely,        Debora Navarro MD      CC: No Recipients

## 2023-07-10 NOTE — TELEPHONE ENCOUNTER
Pt wanted to know if she will receive a radiation treatment on Friday.  Made patient aware that she will be assessed first, and may possibly be treated depending on her skin healing.

## 2023-07-11 ENCOUNTER — TELEPHONE (OUTPATIENT)
Dept: RADIATION ONCOLOGY | Facility: HOSPITAL | Age: 88
End: 2023-07-11
Payer: MEDICARE

## 2023-07-11 ENCOUNTER — HOSPITAL ENCOUNTER (OUTPATIENT)
Dept: RADIATION ONCOLOGY | Facility: HOSPITAL | Age: 88
Setting detail: RADIATION/ONCOLOGY SERIES
End: 2023-07-11
Attending: RADIOLOGY
Payer: MEDICARE

## 2023-07-11 NOTE — TELEPHONE ENCOUNTER
Returned pt's call and reinforced education on skin care.  Pt verbalizes understanding at this time.

## 2023-07-12 ENCOUNTER — RAD ONC OTV (OUTPATIENT)
Dept: RADIATION ONCOLOGY | Facility: HOSPITAL | Age: 88
End: 2023-07-12
Payer: MEDICARE

## 2023-07-12 VITALS
TEMPERATURE: 97.9 F | SYSTOLIC BLOOD PRESSURE: 176 MMHG | OXYGEN SATURATION: 99 % | WEIGHT: 156 LBS | BODY MASS INDEX: 25.96 KG/M2 | DIASTOLIC BLOOD PRESSURE: 76 MMHG | HEART RATE: 66 BPM

## 2023-07-12 DIAGNOSIS — C50.911 CHEST WALL RECURRENCE OF BREAST CANCER, RIGHT (CMS/HCC): Primary | ICD-10-CM

## 2023-07-12 DIAGNOSIS — C79.89 CHEST WALL RECURRENCE OF BREAST CANCER, RIGHT (CMS/HCC): Primary | ICD-10-CM

## 2023-07-12 ASSESSMENT — ENCOUNTER SYMPTOMS: COLOR CHANGE: 1

## 2023-07-12 ASSESSMENT — PAIN SCALES - GENERAL: PAINLEVEL: 4

## 2023-07-12 NOTE — PROGRESS NOTES
Subjective      Patient ID: Latrell Ojeda is a 87 y.o. female.  1935    Diagnosis:  Encounter Diagnosis   Name Primary?   • Chest wall recurrence of breast cancer, right (CMS/HCC) Yes       HPI     Ms. Ojeda returns for a treatment check noting continued discomfort of the skin of the right chest wall, right axilla and skin overlying the right clavicle.  He has been using Silvadene and Xeroform on the skin in the treatment field.  She has been on break with her last treatment being given last Thursday, July 6.    The following have been reviewed and updated as appropriate in this visit:        Review of Systems   Skin: Positive for color change (red, raw and yellow- xeroform and silvadene for relief) and rash ( silvadene and xeroform for skin).       Objective     Vitals:    07/12/23 1347 07/12/23 1349   BP: (!) 174/79 (!) 176/76   BP Location: Left upper arm Left upper arm   Patient Position: Sitting Standing   Pulse: 66    Temp: 36.6 °C (97.9 °F)    TempSrc: Oral    SpO2: 99%    Weight: 70.8 kg (156 lb)      Body mass index is 25.96 kg/m².    Physical Exam     The patient is status post bilateral mastectomies with implants in place.  There is a surgical scar superior laterally to the tattooed right nipple areolar complex.  There is erythema of the skin overlying the right clavicle which appears to be less brisk and resolving.  There is continued brisk erythema of the right chest wall/reconstructed breast with moist desquamation laterally and in the inframammary fold.  The area of desquamation in the axilla appears somewhat smaller as compared to Monday with areas of reepithelialization present.  The desquamation in the inframammary fold appears fairly stable.  There is no evidence of infection.    Assessment/Plan      The areas of brisk erythema and desquamation in the treatment field appear to be slowly improving with the current regimen of Xeroform and Silvadene.  Regarding the desquamation in  the inframammary fold, we once again encouraged the patient to leave the area exposed to air while she was at home, lying in a reclined position with her arm over her head.  Her anastrozole is currently on hold and she will be seeing Dr. Garcia on Friday.  I will reexamine her skin on Friday to ensure that it continues to improve with the hopes of beginning her boost treatments next Monday.  She will continue to use Silvadene and Xeroform on the skin in the treatment field.  Diagnoses and Orders Associated With This Visit:  Chest wall recurrence of breast cancer, right (CMS/HCC) (Primary)

## 2023-07-12 NOTE — LETTER
July 12, 2023                                                          Patient: Latrell Ojeda   YOB: 1935   Date of Visit: 7/12/2023       Dear Dr. Miller:    The patient is seen at the [facility] RADIATION THERAPY today. Attached is my assessment and plan of care.  Thank you for the opportunity to share in Latrell Ojeda's care.       Sincerely,        Debora Navarro MD      CC: No Recipients

## 2023-07-13 RX ORDER — PANTOPRAZOLE SODIUM 40 MG/1
40 TABLET, DELAYED RELEASE ORAL EVERY OTHER DAY
Qty: 45 TABLET | Refills: 3 | Status: SHIPPED | OUTPATIENT
Start: 2023-07-13 | End: 2024-09-05

## 2023-07-14 ENCOUNTER — RAD ONC OTV (OUTPATIENT)
Dept: RADIATION ONCOLOGY | Facility: HOSPITAL | Age: 88
End: 2023-07-14
Payer: MEDICARE

## 2023-07-14 ENCOUNTER — OFFICE VISIT (OUTPATIENT)
Dept: HEMATOLOGY/ONCOLOGY | Facility: CLINIC | Age: 88
End: 2023-07-14
Attending: INTERNAL MEDICINE
Payer: MEDICARE

## 2023-07-14 ENCOUNTER — HOSPITAL ENCOUNTER (OUTPATIENT)
Dept: RADIATION ONCOLOGY | Facility: HOSPITAL | Age: 88
Discharge: HOME | End: 2023-07-14
Payer: MEDICARE

## 2023-07-14 VITALS
DIASTOLIC BLOOD PRESSURE: 71 MMHG | WEIGHT: 154.2 LBS | HEIGHT: 63 IN | HEART RATE: 72 BPM | TEMPERATURE: 97.5 F | BODY MASS INDEX: 27.32 KG/M2 | SYSTOLIC BLOOD PRESSURE: 173 MMHG | RESPIRATION RATE: 16 BRPM | OXYGEN SATURATION: 95 %

## 2023-07-14 DIAGNOSIS — R91.8 LUNG NODULES: Primary | ICD-10-CM

## 2023-07-14 DIAGNOSIS — C79.89 CHEST WALL RECURRENCE OF BREAST CANCER, RIGHT (CMS/HCC): ICD-10-CM

## 2023-07-14 DIAGNOSIS — C50.911 CHEST WALL RECURRENCE OF BREAST CANCER, RIGHT (CMS/HCC): ICD-10-CM

## 2023-07-14 DIAGNOSIS — C50.911 CHEST WALL RECURRENCE OF BREAST CANCER, RIGHT (CMS/HCC): Primary | ICD-10-CM

## 2023-07-14 DIAGNOSIS — C79.89 CHEST WALL RECURRENCE OF BREAST CANCER, RIGHT (CMS/HCC): Primary | ICD-10-CM

## 2023-07-14 PROCEDURE — 99214 OFFICE O/P EST MOD 30 MIN: CPT | Performed by: INTERNAL MEDICINE

## 2023-07-14 RX ORDER — BISMUTH TRIBROMOPH/PETROLATUM 5"X9"
BANDAGE TOPICAL
COMMUNITY
End: 2023-08-16

## 2023-07-14 ASSESSMENT — ENCOUNTER SYMPTOMS
EYES NEGATIVE: 1
HEADACHES: 1
COLOR CHANGE: 1
CARDIOVASCULAR NEGATIVE: 1
CONSTITUTIONAL NEGATIVE: 1
WOUND: 1
FATIGUE: 1
HEMATOLOGIC/LYMPHATIC NEGATIVE: 1
RESPIRATORY NEGATIVE: 1
NECK PAIN: 1
PSYCHIATRIC NEGATIVE: 1
PSYCHIATRIC NEGATIVE: 1
MUSCULOSKELETAL NEGATIVE: 1
ABDOMINAL PAIN: 1
ENDOCRINE NEGATIVE: 1

## 2023-07-14 ASSESSMENT — PAIN SCALES - GENERAL: PAINLEVEL: 2

## 2023-07-14 NOTE — LETTER
July 14, 2023                                                          Patient: Latrell Ojeda   YOB: 1935   Date of Visit: 7/14/2023       Dear Dr. Miller:    The patient is seen at the [facility] RADIATION THERAPY today. Attached is my assessment and plan of care.  Thank you for the opportunity to share in Latrell Ojeda's care.       Sincerely,        Debora Navarro MD      CC: No Recipients

## 2023-07-14 NOTE — PROGRESS NOTES
"Patient ID:  Latrell Ojeda is a 87 y.o. female with a birth date of 1935.  she is seen today in follow up.    Referring Physician:    No ref. 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   • Chest wall recurrence of breast cancer, right (CMS/HCC)        Cancer Staging   Chest wall recurrence of breast cancer, right (CMS/HCC)  Staging form: Breast, AJCC 8th Edition  - Clinical stage from 4/10/2023: Stage IA (rcT1b, cN0, cM0, G2, ER+, DC+, HER2-) - Signed by Peggy Matthews MD on 4/11/2023      History of Present Illness:     HPI   Review of Systems   Constitutional: Positive for fatigue.   HENT:  Negative.    Eyes: Negative.    Respiratory: Negative.    Cardiovascular: Negative.    Gastrointestinal: Positive for abdominal pain.   Endocrine: Negative.    Genitourinary: Negative.     Musculoskeletal: Positive for neck pain.   Skin: Positive for wound.   Neurological: Positive for headaches.   Hematological: Negative.    Psychiatric/Behavioral: Negative.         VITAL SIGNS FOR THIS ENCOUNTER:  BP: 173/71  Temp: 36.4 °C (97.5 °F)  Temp Source: Oral  Heart Rate: 72  SpO2: 95 %  Height: 160.7 cm (5' 3.25\")  Weight: 69.9 kg (154 lb 3.2 oz) (07/14 1016)     Physical Exam    Past Medical/Surgical/Social and Family History:  Past Medical History: "   Diagnosis Date   • Abnormal ECG    • Breast cancer (CMS/HCC)      (left DCIS);  (right infiltrating ductal carcinoma);  (right invasive ductal carcinoma)   • Colon polyp    • COVID-19     not hospitalized-had sore throat/cough   • COVID-19 vaccine series completed     booster x 1   • Epigastric abdominal pain     2021 cardiac ruled out with relief from GI cocktail.  Had been onn  daily   • Fibroid    • High blood pressure    • PAF (paroxysmal atrial fibrillation) (CMS/HCC)     follows with Dr. Borges every 3 months   • Renal insufficiency     change of cardiac meds and diurectic improved labs BUN eGFR   • UTI (urinary tract infection)      Past Surgical History:   Procedure Laterality Date   • BREAST BIOPSY      2 LEFT, 1 RIGHT   • BREAST LUMPECTOMY Left    • CATARACT EXTRACTION, BILATERAL     • COLONOSCOPY      6-7 years ago, no polyps   • ESOPHAGOSCOPY / EGD     • LAPAROSCOPIC LIVER CYST FENESTRATION      liquid liver cyst and aspirated at [facility]   • MASTECTOMY      left , right    • TOTAL ABDOMINAL HYSTERECTOMY W/ BILATERAL SALPINGOOPHORECTOMY      age 51, due to bleeding     Social History     Tobacco Use   • Smoking status: Never   • Smokeless tobacco: Never   Vaping Use   • Vaping Use: Never used   Substance Use Topics   • Alcohol use: Yes     Alcohol/week: 1.0 standard drink of alcohol     Types: 1 Glasses of wine per week     Comment: rarely   • Drug use: Never     Family History   Problem Relation Age of Onset   • Diabetes Biological Father    • Diabetes Biological Brother    • Multiple myeloma Biological Son    • Breast cancer Other         Sister's daughter       OB History  OB History    Para Term  AB Living   2 2           SAB IAB Ectopic Multiple Live Births                  # Outcome Date GA Lbr Rylan/2nd Weight Sex Delivery Anes PTL Lv   2 Para            1 Para                Alvina 5 Year Risk Assessment Score: N/A         Performance  Status:  No data recorded    Pain Score  Pain Score: Two      Lab Results:  No results found for this or any previous visit (from the past 336 hour(s)).    Radiology Results:  CT HEAD WITHOUT IV CONTRAST    Result Date: 6/16/2023  IMPRESSION: 1. No acute intracranial abnormality. 2. No significant interval change from 6/14/2023.    X-RAY CHEST 1 VIEW    Result Date: 6/16/2023  IMPRESSION: No acute disease in the chest.     CT CERVICAL SPINE WITHOUT IV CONTRAST    Result Date: 6/14/2023  IMPRESSION: No acute cervical spine fracture. COMMENT: Noncontrast CT of the cervical spine is performed with sagittal and coronal reconstructions. One or more dose reduction techniques (e.g. automated exposure control, adjustment of the mA and/or kV according to the patient size, use of iterative reconstruction technique) utilized for this examination. Minimal degenerative anterolisthesis at C7-T1. No acute fracture or prevertebral soft tissue swelling. Atlantoaxial distance and craniocervical junction are within normal limits, noting age-appropriate degenerative change. Multilevel degenerative disc disease without evidence of severe central canal stenosis. Multilevel degenerative uncovertebral and facet hypertrophy resulting in multilevel neural foraminal narrowing of varying degrees. Scattered atherosclerotic calcifications as well as multinodular thyroid noted.     CT HEAD WITHOUT IV CONTRAST    Result Date: 6/14/2023  IMPRESSION: No acute intracranial abnormality. COMMENT: Noncontrast CT of the head was performed.  One or more dose reduction techniques (e.g., Automated exposure control, adjustment of the mA and/or kV according to the patient size, use of iterative reconstruction technique) utilized for this examination. There is prominence of the ventricles and sulci compatible with age appropriate volume loss.  Mild patchy periventricular and subcortical white matter hypoattenuation identified, nonspecific but likely sequela of  mild chronic microangiopathy. There are a few punctate sulcal calcifications, likely sequela of prior inflammation, of overall doubtful significance. There is no evidence of acute intracranial hemorrhage, large acute territorial infarct, extra-axial collection, or mass-effect.  Ventricles are midline without evidence of hydrocephalus. Visualized paranasal sinuses and mastoid air cells are overall well aerated, noting posterior right ethmoid retention cyst. Intracranial atherosclerotic calcifications noted. No calvarial fracture or sizable scalp hematoma. 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      Diagnoses and Orders Associated With This Visit:  Chest wall recurrence of breast cancer, right (CMS/HCC)  -     Clinic Appointment Request      [No matching plan found]  Radiation Treatments     Active   Plans   1a_Rt sclav   Most recent treatment: Dose planned: 200 cGy (fraction 23 of 23 on 7/3/2023)   Total: Dose planned: 4,600 cGy   Elapsed Days: 32 days as of 2023-07-03 @ 14:2432      1b_Rt pab   Most recent treatment: Dose planned: 51 cGy (fraction 23 of 23 on 7/3/2023)   Total: Dose planned: 1,180 cGy   Elapsed Days: 32 days as of 2023-07-03 @ 14:2432      1c_R CW NoBol   Most recent treatment: Dose planned: 200 cGy (fraction 7 of 7 on 7/6/2023)   Total: Dose planned: 1,400 cGy   Elapsed Days: 35 days as of 2023-07-06 @ 14:1531      1c_Rt CW   Most recent treatment: Dose planned: 200 cGy (fraction 18 of 25 on 6/26/2023)   Total: Dose planned: 5,000 cGy   Elapsed Days: 25 days as of 2023-06-26 @ 14:0232      Reference Points   1a_Rt sclav   Most recent treatment: Dose given: 200 cGy (on 7/3/2023)   Total: Dose given: 4,600 cGy   Elapsed Days: 32 days as of 2023-07-03 @ 14:2432      1b_Rt pab   Most recent treatment: Dose given: 200 cGy (on 7/3/2023)   Total: Dose given: 4,600 cGy   Elapsed Days: 32 days as of 2023-07-03 @ 14:2432      1c_CW calc   Most recent treatment: Dose given:  206 cGy (on 7/6/2023)   Total: Dose given: 5,155 cGy   Elapsed Days: 35 days as of 2023-07-06 @ 14:1531      1c_Rt CW   Most recent treatment: Dose given: 200 cGy (on 7/6/2023)   Total: Dose given: 5,000 cGy   Elapsed Days: 35 days as of 2023-07-06 @ 14:1531      1e_trgt Rt CW   Most recent treatment: Dose given: 200 cGy (on 7/6/2023)   Total: Dose given: 5,000 cGy   Elapsed Days: 35 days as of 2023-07-06 @ 14:1531         Historical   Plans   1a_Rt sclav   Most recent treatment: Dose planned: 45 cGy (fraction 0 of 23 on 7/5/2023)   Total: Dose planned: 1,035 cGy   Elapsed Days: : @ --      1b_Rt pab   Most recent treatment: Dose planned: 47 cGy (fraction 0 of 23 on 7/5/2023)   Total: Dose planned: 1,070 cGy   Elapsed Days: : @ --      1c_Rt CW 18Fx   Most recent treatment: Dose planned: 200 cGy (fraction 0 of 18 on 7/5/2023)   Total: Dose planned: 3,600 cGy   Elapsed Days: : @ --      1c_Rt [id number]   Most recent treatment: Dose planned: 200 cGy (fraction 0 of 25 on 7/5/2023)   Total: Dose planned: 5,000 cGy   Elapsed Days: : @ --      1d_ 9e 3mm   Most recent treatment: Dose planned: 200 cGy (fraction 0 of 5 on 7/5/2023)   Total: Dose planned: 1,000 cGy   Elapsed Days: : @ --      1d_6e   Most recent treatment: Dose planned: 320 cGy (fraction 0 of 5 on 7/5/2023)   Total: Dose planned: 1,600 cGy   Elapsed Days: : @ --      1d_6e 3mm   Most recent treatment: Dose planned: 200 cGy (fraction 0 of 5 on 7/5/2023)   Total: Dose planned: 1,000 cGy   Elapsed Days: : @ --      1d_rt 6e bst   Most recent treatment: Dose planned: 200 cGy (fraction 0 of 5 on 5/24/2023)   Total: Dose planned: 1,000 cGy   Elapsed Days: : @ --      1d_rt 9e bst   Most recent treatment: Dose planned: 200 cGy (fraction 0 of 5 on 5/24/2023)   Total: Dose planned: 1,000 cGy   Elapsed Days: : @ --      Reference Points   1a_Rt sclav   Most recent treatment: Course completed on 7/5/2023   Total: Dose given: 0 cGy   Elapsed Days: : @ --      1b_Rt pab    Most recent treatment: Course completed on 7/5/2023   Total: Dose given: 0 cGy   Elapsed Days: : @ --      1c_CW calc   Most recent treatment: Course completed on 7/5/2023   Total: Dose given: 0 cGy   Elapsed Days: : @ --      1c_Rt CW   Most recent treatment: Course completed on 7/5/2023   Total: Dose given: 0 cGy   Elapsed Days: : @ --      1d_6e   Most recent treatment: Course completed on 7/5/2023   Total: Dose given: 0 cGy   Elapsed Days: : @ --      1d_6e 3mm   Most recent treatment: Course completed on 7/5/2023   Total: Dose given: 0 cGy   Elapsed Days: : @ --      1d_D=1.2   Most recent treatment: Course completed on 5/24/2023   Total: Dose given: 0 cGy   Elapsed Days: : @ --      1d_D=2   Most recent treatment: Course completed on 5/24/2023   Total: Dose given: 0 cGy   Elapsed Days: : @ --      1d_D=2 w jose roberto   Most recent treatment: Course completed on 7/5/2023   Total: Dose given: 0 cGy   Elapsed Days: : @ --      1d_rt e boost   Most recent treatment: Course completed on 7/5/2023   Total: Dose given: 0 cGy   Elapsed Days: : @ --      1e_trgt Rt CW   Most recent treatment: Course completed on 7/5/2023   Total: Dose given: 0 cGy   Elapsed Days: : @ --                 THERAPY PLAN:  No therapy plan of the specified type found.   SUPPORTIVE PLAN:  [No matching plan found]    Assessment and Plan:  No problem-specific Assessment & Plan notes found for this encounter.

## 2023-07-14 NOTE — PROGRESS NOTES
Subjective      Patient ID: Latrell Ojeda is a 87 y.o. female.  1935    Diagnosis:  Encounter Diagnosis   Name Primary?   • Chest wall recurrence of breast cancer, right (CMS/HCC) Yes       HPI     Ms. Ojeda remains on break due to brisk skin reaction with desquamation in the right axilla and right inframammary fold, in addition to the skin overlying the right clavicle.  He states that her discomfort has improved since Wednesday.  She has continued to use Silvadene and Xeroform.    The following have been reviewed and updated as appropriate in this visit:        Review of Systems   Constitutional: Negative.    Musculoskeletal: Negative.    Skin: Positive for color change (Healing axilla - granulation tissue noted.  Under breast remains unchanged.  Educated pt not to use dressings or  Xeroform.  Pt will use Silvadene BID with time for air in morning.  Pt will use t-shirts to cover - instead of dressings.  Open area to SCLAV ).   Psychiatric/Behavioral: Negative.        Objective     There were no vitals filed for this visit.  There is no height or weight on file to calculate BMI.    Physical Exam     The patient is status post bilateral mastectomies with implants in place.  There is surgical scar superior laterally to the tattooed right nipple areolar complex.  There is erythema of the skin overlying the right clavicle with a small desquamated area.  There is continued but decreased brisk erythema of the right chest wall/reconstructed breast with moist desquamation laterally and in the inframammary fold.  There is continued reepithelialization of the desquamated skin in the right axilla.    Assessment/Plan      Continue break over the weekend, Continue Silvadene to the skin in the treatment field and leave the skin open to air as much as possible.  The patient will continue to hold her anastrozole.  See pretreat on Monday at which time we should be able to begin treatment to her boost field, which  does not overlap into the areas of desquamation.  Diagnoses and Orders Associated With This Visit:  Chest wall recurrence of breast cancer, right (CMS/HCC) (Primary)

## 2023-07-14 NOTE — PROGRESS NOTES
Latrell Ojeda is a 87 y.o. female,   :  1935    Encounter Diagnoses   Name Primary?   • Chest wall recurrence of breast cancer, right (CMS/HCC)    • Lung nodules Yes        Cancer Staging   Chest wall recurrence of breast cancer, right (CMS/HCC)  Staging form: Breast, AJCC 8th Edition  - Clinical stage from 4/10/2023: Stage IA (rcT1b, cN0, cM0, G2, ER+, WY+, HER2-) - Signed by Peggy Matthews MD on 2023      Oncology History   Chest wall recurrence of breast cancer, right (CMS/HCC)   4/10/2023 Cancer Staged    Staging form: Breast, AJCC 8th Edition  - Clinical stage from 4/10/2023: Stage IA (rcT1b, cN0, cM0, G2, ER+, WY+, HER2-)         Patient Active Problem List   Diagnosis   • Essential hypertension   • Gastroesophageal reflux disease without esophagitis   • Palpitation   • Prediabetes   • History of breast cancer   • Medicare annual wellness visit, subsequent   • Rhinorrhea   • A-fib (CMS/HCC)   • Chronic kidney disease, stage 3b (CMS/HCC)   • Rash   • Hypokalemia   • 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  She is currently undergoing radiation treatment.  She has had significant difficulty with erythema and skin breakdown her radiation is currently on hold.  She reports she has noted some improvement I will be following up with Dr. Suárez of radiation oncology later today to assess when she will be able to restart radiation.  I discussed her case with Dr. Suárez and we have opted to hold her Arimidex until after her radiation is complete just in case this is contributing to her skin symptoms.      Review of Systems - Oncology    Review of Systems:  Nursing assessment reviewed. Pertinent positive  "and negative symptoms noted in HPI, all others negative.      Temp:  [36.4 °C (97.5 °F)] 36.4 °C (97.5 °F)  Heart Rate:  [72] 72  Resp:  [16] 16  BP: (173)/(71) 173/71  Visit Vitals  BP (!) 173/71 (BP Location: Left upper arm, Patient Position: Sitting)   Pulse 72   Temp 36.4 °C (97.5 °F) (Oral)   Resp 16   Ht 1.607 m (5' 3.25\")   Wt 69.9 kg (154 lb 3.2 oz)   SpO2 95%   BMI 27.10 kg/m²     Physical Exam  Constitutional:       General: She is not in acute distress.     Appearance: She is well-developed.   HENT:      Head: Normocephalic.   Eyes:      General: No scleral icterus.     Pupils: Pupils are equal, round, and reactive to light.   Cardiovascular:      Rate and Rhythm: Normal rate and regular rhythm.   Pulmonary:      Effort: Pulmonary effort is normal.      Breath sounds: Normal breath sounds.   Chest:      Comments: Status post bilateral mastectomy.  Erythema in the radiation field over the right breast with some areas of desquamation  Abdominal:      General: There is no distension.      Palpations: Abdomen is soft.      Tenderness: There is no abdominal tenderness.   Musculoskeletal:         General: No tenderness.      Cervical back: Neck supple.   Lymphadenopathy:      Cervical: No cervical adenopathy.   Skin:     Findings: No rash.   Neurological:      Mental Status: She is alert.         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  Had been onn  daily   • Fibroid    • High blood pressure    • PAF (paroxysmal atrial fibrillation) (CMS/HCC)     follows with Dr. Borges every 3 months   • Renal insufficiency     change of cardiac meds " and diurectic improved labs BUN eGFR   • UTI (urinary tract infection)        OB History    Para Term  AB Living   2 2           SAB IAB Ectopic Multiple Live Births                  # Outcome Date GA Lbr Rylan/2nd Weight Sex Delivery Anes PTL Lv   2 Para            1 Para              Gynecologic History:  Age at menarche: No age on file  Age at first live birth: 29   Age at menopause: No age on file    Past Surgical History:   Procedure Laterality Date   • BREAST BIOPSY      2 LEFT, 1 RIGHT   • BREAST LUMPECTOMY Left    • CATARACT EXTRACTION, BILATERAL     • COLONOSCOPY      6-7 years ago, no polyps   • ESOPHAGOSCOPY / EGD     • LAPAROSCOPIC LIVER CYST FENESTRATION      liquid liver cyst and aspirated at [facility]   • MASTECTOMY      left , right    • TOTAL ABDOMINAL HYSTERECTOMY W/ BILATERAL SALPINGOOPHORECTOMY      age 51, due to bleeding       Social History     Tobacco Use   • Smoking status: Never   • Smokeless tobacco: Never   Vaping Use   • Vaping Use: Never used   Substance Use Topics   • Alcohol use: Yes     Alcohol/week: 1.0 standard drink of alcohol     Types: 1 Glasses of wine per week     Comment: rarely   • Drug use: Never       Family History   Problem Relation Age of Onset   • Diabetes Biological Father    • Diabetes Biological Brother    • Multiple myeloma Biological Son    • Breast cancer Other         Sister's daughter         Allergies  Clindamycin, Codeine, Macrobid [nitrofurantoin monohyd/m-cryst], and Oxycodone-acetaminophen    Medications    Current Outpatient Medications:   •  amiodarone (PACERONE) 100 mg tablet, Take 50 mg by mouth nightly. , Disp: , Rfl:   •  anastrozole (ARIMIDEX) 1 mg tablet, Take  1 tablet (1 mg total) daily  Swallow whole with a drink of water. (Patient not taking: Reported on 2023), Disp: 90 tablet, Rfl: 3  •  biotin 5,000 mcg tablet,disintegrating, Take by mouth daily., Disp: , Rfl:   •  cholecalciferol, vitamin D3, 1,000 unit (25  "mcg) tablet, Take 1,000 Units by mouth daily., Disp: , Rfl:   •  cranberry extract 425 mg capsule, Take by mouth daily., Disp: , Rfl:   •  hydrochlorothiazide (HYDRODIURIL) 25 mg tablet, Take 25 mg by mouth every morning., Disp: , Rfl:   •  LOSARTAN POTASSIUM, BULK, MISC, Take 50 mg by mouth as needed., Disp: , Rfl:   •  lutein 6 mg capsule, Take 6 mg by mouth daily.  , Disp: , Rfl:   •  pantoprazole (PROTONIX) 40 mg EC tablet, TAKE 1 TABLET BY MOUTH  EVERY OTHER DAY, Disp: 45 tablet, Rfl: 3  •  red yeast rice 600 mg capsule, Take 1 capsule by mouth 2 (two) times a day.  , Disp: , Rfl:   •  rivaroxaban (XARELTO) 15 mg tablet, Take 1 tablet (15 mg total) by mouth daily with dinner., Disp: 90 tablet, Rfl: 1  •  silver sulfadiazine (SILVADENE) 1 % cream, Apply to affected area tid prn, Disp: 400 g, Rfl: 1  •  ascorbic acid (VITAMIN C) 500 mg tablet, Take 500 mg by mouth daily., Disp: , Rfl:   •  bismuth tribrom-petrolatum,wh (XEROFORM) 5 X 9 \" bandage, Apply topically., Disp: , Rfl:   •  coenzyme Q10 (COQ10) 10 mg capsule, Take 10 mg by mouth daily.  , Disp: , Rfl:   •  ondansetron ODT (ZOFRAN-ODT) 4 mg disintegrating tablet, Take 1 tablet (4 mg total) by mouth every 8 (eight) hours as needed for nausea or vomiting for up to 7 days. (Patient not taking: Reported on 7/14/2023), Disp: 14 tablet, Rfl: 0     Laboratory    Recent Results (from the past 504 hour(s))   Rad Onc Aria Session Summary    Collection Time: 06/23/23  2:08 PM   Result Value Ref Range    Course ID C1     Course Start Date 5/22/2023  2:15 PM     Treatment Elapsed Days 22 days as of 2023-06-23 @ 14:0745     Course Intent Unknown     Treatment Dates       Course End Date: : @ --  First Treatment Date: 2023-06-01 @ 15:4755  Last Treatment Date: 2023-06-23 @ 14:0745      Reference Point ID 1a_Rt sclav     Dosage Given To Date Gy 34     Session Dosage Given Gy 2     Reference Point ID 1b_Rt pab     Dosage Given To Date Gy 34.52098898     Session Dosage " Given Gy 2.04982334     Reference Point ID 1c_CW calc     Dosage Given To Date Gy 35.60942480     Session Dosage Given Gy 2.01078300     Reference Point ID 1c_Rt CW     Dosage Given To Date Gy 34     Session Dosage Given Gy 2     Reference Point ID 1e_trgt Rt CW     Dosage Given To Date Gy 34     Session Dosage Given Gy 2     PLAN ID 1c_Rt CW     Fractions Treated To Date 17 of 25     Prescribed Dose Per Fraction Gy 2     Prescribed Dose cGy 5,000     PLAN ID 1a_Rt sclav     Fractions Treated To Date 17 of 23     Prescribed Dose Per Fraction Gy 2     Prescribed Dose cGy 4,600     PLAN ID 1b_Rt pab     Fractions Treated To Date 17 of 23     Prescribed Dose Per Fraction Gy 0.889521739981694     Prescribed Dose cGy 1,180.2    Rad Onc Aria Session Summary    Collection Time: 06/26/23  2:03 PM   Result Value Ref Range    Course ID C1     Course Start Date 5/22/2023  2:15 PM     Treatment Elapsed Days 25 days as of 2023-06-26 @ 14:0232     Course Intent Unknown     Treatment Dates       Course End Date: : @ --  First Treatment Date: 2023-06-01 @ 15:4755  Last Treatment Date: 2023-06-26 @ 14:0232      Reference Point ID 1a_Rt sclav     Dosage Given To Date Gy 36     Session Dosage Given Gy 2     Reference Point ID 1b_Rt pab     Dosage Given To Date Gy 36.29686027     Session Dosage Given Gy 2.95943982     Reference Point ID 1c_CW calc     Dosage Given To Date Gy 37.02735088     Session Dosage Given Gy 2.05764648     Reference Point ID 1c_Rt CW     Dosage Given To Date Gy 36     Session Dosage Given Gy 2     Reference Point ID 1e_trgt Rt CW     Dosage Given To Date Gy 36     Session Dosage Given Gy 2     PLAN ID 1c_Rt CW     Fractions Treated To Date 18 of 25     Prescribed Dose Per Fraction Gy 2     Prescribed Dose cGy 5,000     PLAN ID 1a_Rt sclav     Fractions Treated To Date 18 of 23     Prescribed Dose Per Fraction Gy 2     Prescribed Dose cGy 4,600     PLAN ID 1b_Rt pab     Fractions Treated To Date 18 of 23      Prescribed Dose Per Fraction Gy 0.244667393724190     Prescribed Dose cGy 1,180.2    Rad Onc Aria Session Summary    Collection Time: 06/27/23  2:10 PM   Result Value Ref Range    Course ID C1     Course Start Date 5/22/2023  2:15 PM     Treatment Elapsed Days 26 days as of 2023-06-27 @ 14:1030     Course Intent Unknown     Treatment Dates       Course End Date: : @ --  First Treatment Date: 2023-06-01 @ 15:4755  Last Treatment Date: 2023-06-27 @ 14:1030      Reference Point ID 1a_Rt sclav     Dosage Given To Date Gy 38     Session Dosage Given Gy 2     Reference Point ID 1b_Rt pab     Dosage Given To Date Gy 38.20299644     Session Dosage Given Gy 2.84370050     Reference Point ID 1c_CW calc     Dosage Given To Date Gy 39.86359175     Session Dosage Given Gy 2.42604960     Reference Point ID 1c_Rt CW     Dosage Given To Date Gy 38     Session Dosage Given Gy 2     Reference Point ID 1e_trgt Rt CW     Dosage Given To Date Gy 38     Session Dosage Given Gy 2     PLAN ID 1c_R CW NoBol     Fractions Treated To Date 1 of 7     Prescribed Dose Per Fraction Gy 2     Prescribed Dose cGy 1,400     PLAN ID 1a_Rt sclav     Fractions Treated To Date 19 of 23     Prescribed Dose Per Fraction Gy 2     Prescribed Dose cGy 4,600     PLAN ID 1b_Rt pab     Fractions Treated To Date 19 of 23     Prescribed Dose Per Fraction Gy 0.891445295373848     Prescribed Dose cGy 1,180.2    Rad Onc Aria Session Summary    Collection Time: 06/28/23  2:05 PM   Result Value Ref Range    Course ID C1     Course Start Date 5/22/2023  2:15 PM     Treatment Elapsed Days 27 days as of 2023-06-28 @ 14:0410     Course Intent Unknown     Treatment Dates       Course End Date: : @ --  First Treatment Date: 2023-06-01 @ 15:4755  Last Treatment Date: 2023-06-28 @ 14:0410      Reference Point ID 1a_Rt sclav     Dosage Given To Date Gy 40     Session Dosage Given Gy 2     Reference Point ID 1b_Rt pab     Dosage Given To Date Gy 40.3383156     Session Dosage  Given Gy 2.85933211     Reference Point ID 1c_CW calc     Dosage Given To Date Gy 41.00232422     Session Dosage Given Gy 2.16487433     Reference Point ID 1c_Rt CW     Dosage Given To Date Gy 40     Session Dosage Given Gy 2     Reference Point ID 1e_trgt Rt CW     Dosage Given To Date Gy 40     Session Dosage Given Gy 2     PLAN ID 1c_R CW NoBol     Fractions Treated To Date 2 of 7     Prescribed Dose Per Fraction Gy 2     Prescribed Dose cGy 1,400     PLAN ID 1a_Rt sclav     Fractions Treated To Date 20 of 23     Prescribed Dose Per Fraction Gy 2     Prescribed Dose cGy 4,600     PLAN ID 1b_Rt pab     Fractions Treated To Date 20 of 23     Prescribed Dose Per Fraction Gy 0.047378248729133     Prescribed Dose cGy 1,180.2    Rad Onc Aria Session Summary    Collection Time: 06/29/23  2:03 PM   Result Value Ref Range    Course ID C1     Course Start Date 5/22/2023  2:15 PM     Treatment Elapsed Days 28 days as of 2023-06-29 @ 14:0251     Course Intent Unknown     Treatment Dates       Course End Date: : @ --  First Treatment Date: 2023-06-01 @ 15:4755  Last Treatment Date: 2023-06-29 @ 14:0251      Reference Point ID 1a_Rt sclav     Dosage Given To Date Gy 42     Session Dosage Given Gy 2     Reference Point ID 1b_Rt pab     Dosage Given To Date Gy 42.04178233     Session Dosage Given Gy 2.01530045     Reference Point ID 1c_CW calc     Dosage Given To Date Gy 43.57726232     Session Dosage Given Gy 2.41411434     Reference Point ID 1c_Rt CW     Dosage Given To Date Gy 42     Session Dosage Given Gy 2     Reference Point ID 1e_trgt Rt CW     Dosage Given To Date Gy 42     Session Dosage Given Gy 2     PLAN ID 1c_R CW NoBol     Fractions Treated To Date 3 of 7     Prescribed Dose Per Fraction Gy 2     Prescribed Dose cGy 1,400     PLAN ID 1a_Rt sclav     Fractions Treated To Date 21 of 23     Prescribed Dose Per Fraction Gy 2     Prescribed Dose cGy 4,600     PLAN ID 1b_Rt pab     Fractions Treated To Date 21 of 23      Prescribed Dose Per Fraction Gy 0.232605708133182     Prescribed Dose cGy 1,180.2    Rad Onc Aria Session Summary    Collection Time: 06/30/23 10:49 AM   Result Value Ref Range    Course ID C1     Course Start Date 5/22/2023  2:15 PM     Treatment Elapsed Days 29 days as of 2023-06-30 @ 10:4817     Course Intent Unknown     Treatment Dates       Course End Date: : @ --  First Treatment Date: 2023-06-01 @ 15:4755  Last Treatment Date: 2023-06-30 @ 10:4817      Reference Point ID 1a_Rt sclav     Dosage Given To Date Gy 44     Session Dosage Given Gy 2     Reference Point ID 1b_Rt pab     Dosage Given To Date Gy 44.94244816     Session Dosage Given Gy 2.72731483     Reference Point ID 1c_CW calc     Dosage Given To Date Gy 45.10690385     Session Dosage Given Gy 2.17794161     Reference Point ID 1c_Rt CW     Dosage Given To Date Gy 44     Session Dosage Given Gy 2     Reference Point ID 1e_trgt Rt CW     Dosage Given To Date Gy 44     Session Dosage Given Gy 2     PLAN ID 1c_R CW NoBol     Fractions Treated To Date 4 of 7     Prescribed Dose Per Fraction Gy 2     Prescribed Dose cGy 1,400     PLAN ID 1a_Rt sclav     Fractions Treated To Date 22 of 23     Prescribed Dose Per Fraction Gy 2     Prescribed Dose cGy 4,600     PLAN ID 1b_Rt pab     Fractions Treated To Date 22 of 23     Prescribed Dose Per Fraction Gy 0.643645084191771     Prescribed Dose cGy 1,180.2    Rad Onc Aria Session Summary    Collection Time: 07/03/23  2:28 PM   Result Value Ref Range    Course ID C1     Course Start Date 5/22/2023  2:15 PM     Treatment Elapsed Days 32 days as of 2023-07-03 @ 14:2432     Course Intent Unknown     Treatment Dates       Course End Date: : @ --  First Treatment Date: 2023-06-01 @ 15:4755  Last Treatment Date: 2023-07-03 @ 14:2432      Reference Point ID 1a_Rt sclav     Dosage Given To Date Gy 46     Session Dosage Given Gy 2     Reference Point ID 1b_Rt pab     Dosage Given To Date Gy 46.88652059     Session Dosage  Given Gy 2.22208181     Reference Point ID 1c_CW calc     Dosage Given To Date Gy 47.43269112     Session Dosage Given Gy 2.78638015     Reference Point ID 1c_Rt CW     Dosage Given To Date Gy 46     Session Dosage Given Gy 2     Reference Point ID 1e_trgt Rt CW     Dosage Given To Date Gy 46     Session Dosage Given Gy 2     PLAN ID 1c_R CW NoBol     Fractions Treated To Date 5 of 7     Prescribed Dose Per Fraction Gy 2     Prescribed Dose cGy 1,400     PLAN ID 1a_Rt sclav     Fractions Treated To Date 23 of 23     Prescribed Dose Per Fraction Gy 2     Prescribed Dose cGy 4,600     PLAN ID 1b_Rt pab     Fractions Treated To Date 23 of 23     Prescribed Dose Per Fraction Gy 0.203852670879773     Prescribed Dose cGy 1,180.2    Rad Onc Aria Course Summary    Collection Time: 07/05/23  6:26 AM   Result Value Ref Range    Course ID Planning     Course Start Date 5/22/2023  3:16 PM     Treatment Elapsed Days : @ --     Course Intent Unknown     Treatment Dates       Course End Date: 2023-07-05 @ 06:2607  First Treatment Date: : @ --: @ --      Reference Point ID 1a_Rt sclav     Dosage Given To Date Gy 0.0E0     Reference Point ID 1b_Rt pab     Dosage Given To Date Gy 0.0E0     Reference Point ID 1c_CW calc     Dosage Given To Date Gy 0.0E0     Reference Point ID 1c_Rt CW     Dosage Given To Date Gy 0.0E0     Reference Point ID 1d_6e     Dosage Given To Date Gy 0.0E0     Reference Point ID 1d_6e 3mm     Dosage Given To Date Gy 0.0E0     Reference Point ID 1d_D=2 w jose roberto     Dosage Given To Date Gy 0.0E0     Reference Point ID 1d_rt e boost     Dosage Given To Date Gy 0.0E0     Reference Point ID 1e_trgt Rt CW     Dosage Given To Date Gy 0.0E0     PLAN ID 1a_Rt sclav     Plan Name 1a_Rt sclav     Fractions Treated To Date 0 of 23     Prescribed Dose Per Fraction Gy 0.100672975146622     Prescribed Dose cGy 1,034.6     PLAN ID 1b_Rt pab     Plan Name 1b_Rt pab     Fractions Treated To Date 0 of 23     Prescribed Dose Per  Fraction Gy 0.445920140605507     Prescribed Dose cGy 1,070.3     PLAN ID 1c_Rt CW 18Fx     Plan Name 1c_Rt CW 18Fx     Fractions Treated To Date 0 of 18     Prescribed Dose Per Fraction Gy 2     Prescribed Dose cGy 3,600     PLAN ID 1c_Rt CWEZ0     Plan Name 1c_Rt CWEZ0     Fractions Treated To Date 0 of 25     Prescribed Dose Per Fraction Gy 2     Prescribed Dose cGy 5,000     PLAN ID 1d_6e     Plan Name 1d_6e     Fractions Treated To Date 0 of 5     Prescribed Dose Per Fraction Gy 3.2     Prescribed Dose cGy 1,600     PLAN ID 1d_6e 3mm     Plan Name 1d_6e 3mm     Fractions Treated To Date 0 of 5     Prescribed Dose Per Fraction Gy 2     Prescribed Dose cGy 1,000     PLAN ID 1d_ 9e 3mm     Plan Name 1d_ 9e 3mm     Fractions Treated To Date 0 of 5     Prescribed Dose Per Fraction Gy 2     Prescribed Dose cGy 1,000    Rad Onc Aria Session Summary    Collection Time: 07/05/23  2:32 PM   Result Value Ref Range    Course ID C1     Course Start Date 5/22/2023  2:15 PM     Treatment Elapsed Days 34 days as of 2023-07-05 @ 14:3215     Course Intent Unknown     Treatment Dates       Course End Date: : @ --  First Treatment Date: 2023-06-01 @ 15:4755  Last Treatment Date: 2023-07-05 @ 14:3215      Reference Point ID 1c_CW calc     Dosage Given To Date Gy 49.71654746     Session Dosage Given Gy 2.83756507     Reference Point ID 1c_Rt CW     Dosage Given To Date Gy 48     Session Dosage Given Gy 2     Reference Point ID 1e_trgt Rt CW     Dosage Given To Date Gy 48     Session Dosage Given Gy 2     PLAN ID 1c_R CW NoBol     Fractions Treated To Date 6 of 7     Prescribed Dose Per Fraction Gy 2     Prescribed Dose cGy 1,400    Rad Onc Aria Session Summary    Collection Time: 07/06/23  2:15 PM   Result Value Ref Range    Course ID C1     Course Start Date 5/22/2023  2:15 PM     Treatment Elapsed Days 35 days as of 2023-07-06 @ 14:1531     Course Intent Unknown     Treatment Dates       Course End Date: : @ --  First Treatment  Date: 2023-06-01 @ 15:4755  Last Treatment Date: 2023-07-06 @ 14:1531      Reference Point ID 1c_CW calc     Dosage Given To Date Gy 51.33810274     Session Dosage Given Gy 2.87537671     Reference Point ID 1c_Rt CW     Dosage Given To Date Gy 50     Session Dosage Given Gy 2     Reference Point ID 1e_trgt Rt CW     Dosage Given To Date Gy 50     Session Dosage Given Gy 2     PLAN ID 1c_R CW NoBol     Fractions Treated To Date 7 of 7     Prescribed Dose Per Fraction Gy 2     Prescribed Dose cGy 1,400          Radiology  I have reviewed the patient's Radiology report(s).  All results are within normal limits.  CT HEAD WITHOUT IV CONTRAST    Result Date: 6/16/2023  Narrative: CLINICAL HISTORY: Headache. COMMENT: TECHNIQUE: CT of the head was performed without intravenous contrast. Sagittal and coronal reformations were rendered. CT DOSE:  One or more dose reduction techniques (e.g. automated exposure control, adjustment of the mA and/or kV according to patient size, use of iterative reconstruction technique) utilized for this examination. COMPARISON: CT head 6/14/2023. Findings: No acute intracranial hemorrhage. No extra-axial fluid collection, midline shift or mass effect. No acute transcortical infarction. Probable chronic small vessel ischemic changes of the white matter. Intracranial atherosclerosis.  Unchanged scattered small calcifications in the bilateral frontal sulci and interhemispheric region. Ventricles and sulci are mildly prominent but cerebral volume is age-appropriate. Moderate degenerative changes of the bilateral temporomandibular joints. Visualized paranasal sinuses and mastoid air cells are essentially clear.     Impression: IMPRESSION: 1. No acute intracranial abnormality. 2. No significant interval change from 6/14/2023.    X-RAY CHEST 1 VIEW    Result Date: 6/16/2023  Narrative: CLINICAL HISTORY: weakness, chills COMMENT: Technique: A portable frontal view of the chest was obtained. Comparison:  11/22/2022. There is mild exposure of the film. The lungs are clear and without consolidation, effusion or pneumothorax. The heart size is normal. The hilar and mediastinal contours are normal. The regional skeletal structures are unremarkable.     Impression: IMPRESSION: No acute disease in the chest.     CT CERVICAL SPINE WITHOUT IV CONTRAST    Result Date: 6/14/2023  Narrative: CLINICAL HISTORY: Neck trauma, status post fall. COMPARISON: No relevant prior studies     Impression: IMPRESSION: No acute cervical spine fracture. COMMENT: Noncontrast CT of the cervical spine is performed with sagittal and coronal reconstructions. One or more dose reduction techniques (e.g. automated exposure control, adjustment of the mA and/or kV according to the patient size, use of iterative reconstruction technique) utilized for this examination. Minimal degenerative anterolisthesis at C7-T1. No acute fracture or prevertebral soft tissue swelling. Atlantoaxial distance and craniocervical junction are within normal limits, noting age-appropriate degenerative change. Multilevel degenerative disc disease without evidence of severe central canal stenosis. Multilevel degenerative uncovertebral and facet hypertrophy resulting in multilevel neural foraminal narrowing of varying degrees. Scattered atherosclerotic calcifications as well as multinodular thyroid noted.     CT HEAD WITHOUT IV CONTRAST    Result Date: 6/14/2023  Narrative: CLINICAL HISTORY: Ataxia, head trauma. Fall on Xarelto. Comparison: No relevant prior studies     Impression: IMPRESSION: No acute intracranial abnormality. COMMENT: Noncontrast CT of the head was performed.  One or more dose reduction techniques (e.g., Automated exposure control, adjustment of the mA and/or kV according to the patient size, use of iterative reconstruction technique) utilized for this examination. There is prominence of the ventricles and sulci compatible with age appropriate volume loss.   Mild patchy periventricular and subcortical white matter hypoattenuation identified, nonspecific but likely sequela of mild chronic microangiopathy. There are a few punctate sulcal calcifications, likely sequela of prior inflammation, of overall doubtful significance. There is no evidence of acute intracranial hemorrhage, large acute territorial infarct, extra-axial collection, or mass-effect.  Ventricles are midline without evidence of hydrocephalus. Visualized paranasal sinuses and mastoid air cells are overall well aerated, noting posterior right ethmoid retention cyst. Intracranial atherosclerotic calcifications noted. No calvarial fracture or sizable scalp hematoma. 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  She was treated with 5 years of Arimidex following her right mastectomy.  In April 2013 she was found to have a chest wall recurrence on the right.  Cancer was ER positive (98%, MA positive (87%), HER2 negative (0), Ki-67 51%.  She underwent local excision in April 2023.  She started Arimidex in May 2023.  She is currently undergoing radiation therapy.    She is having significant issues with skin breakdown related to her radiation.  Her Arimidex is currently on hold until she finishes her radiation.  We reviewed her DEXA scan which is normal bone mineral density.  We will plan on resuming treatment with Arimidex 2 to 3 weeks after radiation is complete.  I will see her back in the office in 3 months.  We can consider the addition of abemaciclib to her antiestrogen regimen for 2 years at the time of her next visit.  We will see her back in the office in 3 months.  She was asked to call with questions or concerns prior  to her next visit.    Lung nodules  Patient had incidental finding of groundglass lung nodules on PET/CT.  These are likely inflammatory in nature.  Scheduled for follow-up CT of the chest later this month.    Discussed with Dr. Navarro.  Nubia Garcia MD

## 2023-07-14 NOTE — ASSESSMENT & PLAN NOTE
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  She was treated with 5 years of Arimidex following her right mastectomy.  In April 2013 she was found to have a chest wall recurrence on the right.  Cancer was ER positive (98%, MN positive (87%), HER2 negative (0), Ki-67 51%.  She underwent local excision in April 2023.  She started Arimidex in May 2023.  She is currently undergoing radiation therapy.    She is having significant issues with skin breakdown related to her radiation.  Her Arimidex is currently on hold until she finishes her radiation.  We reviewed her DEXA scan which is normal bone mineral density.  We will plan on resuming treatment with Arimidex 2 to 3 weeks after radiation is complete.  I will see her back in the office in 3 months.  We can consider the addition of abemaciclib to her antiestrogen regimen for 2 years at the time of her next visit.  We will see her back in the office in 3 months.  She was asked to call with questions or concerns prior to her next visit.

## 2023-07-14 NOTE — ASSESSMENT & PLAN NOTE
Patient had incidental finding of groundglass lung nodules on PET/CT.  These are likely inflammatory in nature.  Scheduled for follow-up CT of the chest later this month.

## 2023-07-17 ENCOUNTER — HOSPITAL ENCOUNTER (OUTPATIENT)
Dept: RADIOLOGY | Facility: HOSPITAL | Age: 88
Discharge: HOME | End: 2023-07-17
Attending: SURGERY
Payer: MEDICARE

## 2023-07-17 ENCOUNTER — HOSPITAL ENCOUNTER (OUTPATIENT)
Dept: RADIATION ONCOLOGY | Facility: HOSPITAL | Age: 88
Setting detail: RADIATION/ONCOLOGY SERIES
Discharge: HOME | End: 2023-07-17
Attending: RADIOLOGY
Payer: MEDICARE

## 2023-07-17 ENCOUNTER — RAD ONC OTV (OUTPATIENT)
Dept: RADIATION ONCOLOGY | Facility: HOSPITAL | Age: 88
End: 2023-07-17
Payer: MEDICARE

## 2023-07-17 VITALS — HEART RATE: 66 BPM | DIASTOLIC BLOOD PRESSURE: 68 MMHG | SYSTOLIC BLOOD PRESSURE: 160 MMHG

## 2023-07-17 DIAGNOSIS — R91.8 LUNG NODULES: ICD-10-CM

## 2023-07-17 DIAGNOSIS — C50.911 CHEST WALL RECURRENCE OF BREAST CANCER, RIGHT (CMS/HCC): Primary | ICD-10-CM

## 2023-07-17 DIAGNOSIS — C50.911 CHEST WALL RECURRENCE OF BREAST CANCER, RIGHT (CMS/HCC): ICD-10-CM

## 2023-07-17 DIAGNOSIS — C79.89 CHEST WALL RECURRENCE OF BREAST CANCER, RIGHT (CMS/HCC): Primary | ICD-10-CM

## 2023-07-17 DIAGNOSIS — C79.89 CHEST WALL RECURRENCE OF BREAST CANCER, RIGHT (CMS/HCC): ICD-10-CM

## 2023-07-17 LAB
ARIA ZRC COURSE ID: NORMAL
ARIA ZRC COURSE INTENT: NORMAL
ARIA ZRC COURSE START DATE: NORMAL
ARIA ZRC TREATMENT ELAPSED DAYS: NORMAL
ARIA ZRP FRACTIONS TREATED TO DATE: NORMAL
ARIA ZRP PLAN ID: NORMAL
ARIA ZRP PRESCRIBED DOSE CGY: 1000
ARIA ZRP PRESCRIBED DOSE PER FRACTION: 2
ARIA ZRR DOSAGE GIVEN TO DATE: 2
ARIA ZRR DOSAGE GIVEN TO DATE: 2.22
ARIA ZRR DOSAGE GIVEN TO DATE: 52
ARIA ZRR REFERENCE POINT ID: NORMAL
ARIA ZRR SESSION DOSAGE GIVEN: 2
ARIA ZRR SESSION DOSAGE GIVEN: 2
ARIA ZRR SESSION DOSAGE GIVEN: 2.22
MLH ARIA ZRC TREATMENT DATES: NORMAL

## 2023-07-17 PROCEDURE — 77412 RADIATION TX DELIVERY LVL 3: CPT | Performed by: RADIOLOGY

## 2023-07-17 PROCEDURE — G1004 CDSM NDSC: HCPCS

## 2023-07-17 ASSESSMENT — ENCOUNTER SYMPTOMS
CONSTITUTIONAL NEGATIVE: 1
MUSCULOSKELETAL NEGATIVE: 1
COLOR CHANGE: 1
PSYCHIATRIC NEGATIVE: 1

## 2023-07-17 ASSESSMENT — PAIN SCALES - GENERAL: PAINLEVEL: 0-NO PAIN

## 2023-07-17 NOTE — PROGRESS NOTES
Subjective      Patient ID: Latrell Ojeda is a 87 y.o. female.  1935    Diagnosis:  Encounter Diagnosis   Name Primary?   • Chest wall recurrence of breast cancer, right (CMS/HCC) Yes       HPI     Ms. Ojeda returns for reevaluation regarding beginning her electron boost.  She states that the skin in the treatment field feels much better.  She continues to use Silvadene on the skin in the treatment field and remains off Arimidex.    The following have been reviewed and updated as appropriate in this visit:        Review of Systems   Constitutional: Negative.    Musculoskeletal: Negative.    Skin: Positive for color change (Peeling areas to breast with some desquamation.  Under breast remains moist.  Pt using Silvadene with good effects.  Remains off Arimidex.).   Psychiatric/Behavioral: Negative.        Objective     Vitals:    07/17/23 1301   BP: (!) 160/68   Patient Position: Sitting   Pulse: 66     There is no height or weight on file to calculate BMI.    Physical Exam     The patient is status post bilateral mastectomies with implants in place.  There is a surgical scar superior laterally to the tattooed right nipple areolar complex.  There is resolving erythema of the skin overlying the right clavicle.  There is continued decrease in the erythema of the right chest wall/reconstructed breast with reepithelialization of the skin in the right axilla.  There is decreased desquamation in the right inframammary fold.    Assessment/Plan      Resume radiation- 5 electron boost treatments then follow-up in 2 to 3 weeks.  Diagnoses and Orders Associated With This Visit:  Chest wall recurrence of breast cancer, right (CMS/HCC) (Primary)

## 2023-07-17 NOTE — LETTER
July 17, 2023                                                          Patient: Latrell Ojeda   YOB: 1935   Date of Visit: 7/17/2023       Dear Dr. Miller:    The patient is seen at the [facility] today. Attached is my assessment and plan of care.  Thank you for the opportunity to share in Latrell Ojeda's care.       Sincerely,        Debora Navarro MD      CC: No Recipients

## 2023-07-18 ENCOUNTER — HOSPITAL ENCOUNTER (OUTPATIENT)
Dept: RADIATION ONCOLOGY | Facility: HOSPITAL | Age: 88
Setting detail: RADIATION/ONCOLOGY SERIES
Discharge: HOME | End: 2023-07-18
Attending: RADIOLOGY
Payer: MEDICARE

## 2023-07-18 LAB
ARIA ZRC COURSE ID: NORMAL
ARIA ZRC COURSE INTENT: NORMAL
ARIA ZRC COURSE START DATE: NORMAL
ARIA ZRC TREATMENT ELAPSED DAYS: NORMAL
ARIA ZRP FRACTIONS TREATED TO DATE: NORMAL
ARIA ZRP PLAN ID: NORMAL
ARIA ZRP PRESCRIBED DOSE CGY: 1000
ARIA ZRP PRESCRIBED DOSE PER FRACTION: 2
ARIA ZRR DOSAGE GIVEN TO DATE: 4
ARIA ZRR DOSAGE GIVEN TO DATE: 4.44
ARIA ZRR DOSAGE GIVEN TO DATE: 54
ARIA ZRR REFERENCE POINT ID: NORMAL
ARIA ZRR SESSION DOSAGE GIVEN: 2
ARIA ZRR SESSION DOSAGE GIVEN: 2
ARIA ZRR SESSION DOSAGE GIVEN: 2.22
MLH ARIA ZRC TREATMENT DATES: NORMAL

## 2023-07-18 PROCEDURE — 77412 RADIATION TX DELIVERY LVL 3: CPT | Performed by: RADIOLOGY

## 2023-07-19 ENCOUNTER — OFFICE VISIT (OUTPATIENT)
Dept: SURGERY | Facility: CLINIC | Age: 88
End: 2023-07-19
Payer: MEDICARE

## 2023-07-19 ENCOUNTER — HOSPITAL ENCOUNTER (OUTPATIENT)
Dept: RADIATION ONCOLOGY | Facility: HOSPITAL | Age: 88
Setting detail: RADIATION/ONCOLOGY SERIES
Discharge: HOME | End: 2023-07-19
Attending: RADIOLOGY
Payer: MEDICARE

## 2023-07-19 VITALS
HEART RATE: 59 BPM | BODY MASS INDEX: 26.6 KG/M2 | OXYGEN SATURATION: 97 % | WEIGHT: 155.8 LBS | DIASTOLIC BLOOD PRESSURE: 72 MMHG | TEMPERATURE: 97.6 F | SYSTOLIC BLOOD PRESSURE: 140 MMHG | HEIGHT: 64 IN

## 2023-07-19 DIAGNOSIS — C79.89 CHEST WALL RECURRENCE OF BREAST CANCER, RIGHT (CMS/HCC): Primary | ICD-10-CM

## 2023-07-19 DIAGNOSIS — R91.8 LUNG NODULES: ICD-10-CM

## 2023-07-19 DIAGNOSIS — Z85.3 PERSONAL HISTORY OF MALIGNANT NEOPLASM OF BREAST: ICD-10-CM

## 2023-07-19 DIAGNOSIS — C50.911 CHEST WALL RECURRENCE OF BREAST CANCER, RIGHT (CMS/HCC): Primary | ICD-10-CM

## 2023-07-19 LAB
ARIA ZRC COURSE ID: NORMAL
ARIA ZRC COURSE INTENT: NORMAL
ARIA ZRC COURSE START DATE: NORMAL
ARIA ZRC TREATMENT ELAPSED DAYS: NORMAL
ARIA ZRP FRACTIONS TREATED TO DATE: NORMAL
ARIA ZRP PLAN ID: NORMAL
ARIA ZRP PRESCRIBED DOSE CGY: 1000
ARIA ZRP PRESCRIBED DOSE PER FRACTION: 2
ARIA ZRR DOSAGE GIVEN TO DATE: 56
ARIA ZRR DOSAGE GIVEN TO DATE: 6
ARIA ZRR DOSAGE GIVEN TO DATE: 6.67
ARIA ZRR REFERENCE POINT ID: NORMAL
ARIA ZRR SESSION DOSAGE GIVEN: 2
ARIA ZRR SESSION DOSAGE GIVEN: 2
ARIA ZRR SESSION DOSAGE GIVEN: 2.22
MLH ARIA ZRC TREATMENT DATES: NORMAL

## 2023-07-19 PROCEDURE — 77336 RADIATION PHYSICS CONSULT: CPT | Performed by: RADIOLOGY

## 2023-07-19 PROCEDURE — 77412 RADIATION TX DELIVERY LVL 3: CPT | Performed by: RADIOLOGY

## 2023-07-19 PROCEDURE — 99024 POSTOP FOLLOW-UP VISIT: CPT | Performed by: SURGERY

## 2023-07-19 NOTE — PROGRESS NOTES
Patient ID: Latrell Ojeda                              : 1935    Visit Date: 2023  Referring Provider: No ref. provider found  PCP: Aris Miller MD  GYN: No care team member to display    Subjective   Chief Complaint: Follow-up    Latrell Ojeda is a 87 y.o. old female presenting today for second postoperative visit after wide local excision of a chest wall recurrence on the right chest wall.  She had a previous history of left breast cancer treated with lumpectomy followed by mastectomy in , right breast cancer treated in  with a mastectomy and reconstruction.  She took 5 years of tamoxifen, she noticed a new lump on her reconstructed right breast in  and ultimately had a biopsy of this which revealed an invasive ductal carcinoma, grade 2, ER positive, PA positive, HER2 negative.  Wide local excision on 2023 revealed invasive moderately differentiated ductal carcinoma present within the subcutaneous tissue and skeletal muscle measuring 1.2 cm, present at the medial lateral inferior and posterior margins.  Reexcised posterior inferior medial margin again showed moderately differentiated ductal carcinoma and evolving the skeletal muscle, present at the inked margin.  No additional surgery was done as it was unclear how much muscle needed to be removed, she is now proceeding with radiation.  She had a CT of the chest done on 2023 which showed interval improvement in the appearance of her lung fields, the previously noted pulmonary nodules were no longer definitely identified. She reports nhaving a bad reaction to the radiation with severe burning, she had to hold the radiation for a week, and now has 3 treatments left.  She is still having pain from the radiation.       Breast History:    OB/GYN Status    Currently Pregnant:  N/A   Last Menstrual Period:  N/A   Menstrual Status:   Hysterectomy   LMP Comment:   N/A   Menarche Age:     Breastfeeding?  N/A  "  Lactation Comment:   N/A     OB History        2    Para   2    Term                AB        Living           SAB        IAB        Ectopic        Multiple        Live Births                     Family History: family history includes Breast cancer in an other family member; Diabetes in her biological brother and biological father; Multiple myeloma in her biological son.  Allergies: is allergic to clindamycin, codeine, macrobid [nitrofurantoin monohyd/m-cryst], and oxycodone-acetaminophen.    Objective   Vitals:   Visit Vitals  /72   Pulse (!) 59   Temp 36.4 °C (97.6 °F)   Ht 1.626 m (5' 4\")   Wt 70.7 kg (155 lb 12.8 oz)   SpO2 97%   BMI 26.74 kg/m²     Physical Exam  Constitutional:       Appearance: Normal appearance.   HENT:      Head: Normocephalic and atraumatic.   Cardiovascular:      Rate and Rhythm: Normal rate and regular rhythm.      Heart sounds: Normal heart sounds.   Pulmonary:      Effort: Pulmonary effort is normal.      Breath sounds: Normal breath sounds.   Chest:      Comments: Status post bilateral mastectomy.  Significant erythema and burn of the right reconstructed breast, with burns extending up over the clavicle and laterally to the lateral chest wall.  Left reconstructed breast normal in appearance with no masses present.  Exam of the reconstructed breast was limited today secondary to the burns.  Abdominal:      General: Abdomen is flat.      Palpations: Abdomen is soft.   Neurological:      Mental Status: She is alert.            Assessment/Plan   Problem List Items Addressed This Visit        Respiratory    Lung nodules       Other    Chest wall recurrence of breast cancer, right (CMS/HCC) - Primary   Other Visit Diagnoses     Personal history of malignant neoplasm of breast            Latrell is undergoing radiation for her chest wall recurrence on the right.  She had significant burning from this, and had to hold radiation for a week, she now has 3 doses left.  I " encouraged her to call Dr. Garcia after she completes her radiation to review when to start her antiestrogen medication.  She has a follow up appointment with Dr. Garcia in 3 months.  As long as she is doing well, I will plan on seeing her in 6 months to alternate with medical oncology.  She will call with any concerns.   We reviewed that her CT of the chest shows resolution of the nodules, she will check with Dr. Garcia regarding any additional follow up for the lungs.  Return in about 6 months (around 1/19/2024).       Peggy Matthews MD

## 2023-07-19 NOTE — LETTER
2023     Aris Miller MD  [id number] [facility] [address]    Patient: Latrell Ojeda  YOB: 1935  Date of Visit: 2023      Dear Dr. Miller:    Thank you for referring Latrell Ojeda to me for evaluation. Below are my notes for this consultation.    If you have questions, please do not hesitate to call me. I look forward to following your patient along with you.         Sincerely,        Peggy Matthews MD        CC: MD Nubia Montano MD Carruthers, Catherine D, MD  2023 12:37 PM  Signed  Patient ID: Latrell Ojeda                              : 1935    Visit Date: 2023  Referring Provider: No ref. provider found  PCP: Aris Miller MD  GYN: No care team member to display    Subjective   Chief Complaint: Follow-up    Latrell Ojeda is a 87 y.o. old female presenting today for second postoperative visit after wide local excision of a chest wall recurrence on the right chest wall.  She had a previous history of left breast cancer treated with lumpectomy followed by mastectomy in , right breast cancer treated in  with a mastectomy and reconstruction.  She took 5 years of tamoxifen, she noticed a new lump on her reconstructed right breast in  and ultimately had a biopsy of this which revealed an invasive ductal carcinoma, grade 2, ER positive, WI positive, HER2 negative.  Wide local excision on 2023 revealed invasive moderately differentiated ductal carcinoma present within the subcutaneous tissue and skeletal muscle measuring 1.2 cm, present at the medial lateral inferior and posterior margins.  Reexcised posterior inferior medial margin again showed moderately differentiated ductal carcinoma and evolving the skeletal muscle, present at the inked margin.  No additional surgery was done as it was unclear how much muscle needed to be removed, she is now proceeding with radiation.  She  "had a CT of the chest done on 2023 which showed interval improvement in the appearance of her lung fields, the previously noted pulmonary nodules were no longer definitely identified. She reports nhaving a bad reaction to the radiation with severe burning, she had to hold the radiation for a week, and now has 3 treatments left.  She is still having pain from the radiation.      Breast History:    OB/GYN Status    Currently Pregnant:  N/A   Last Menstrual Period:  N/A   Menstrual Status:   Hysterectomy   LMP Comment:   N/A   Menarche Age:     Breastfeeding?  N/A   Lactation Comment:   N/A     OB History        2    Para   2    Term                AB        Living           SAB        IAB        Ectopic        Multiple        Live Births                     Family History: family history includes Breast cancer in an other family member; Diabetes in her biological brother and biological father; Multiple myeloma in her biological son.  Allergies: is allergic to clindamycin, codeine, macrobid [nitrofurantoin monohyd/m-cryst], and oxycodone-acetaminophen.    Objective   Vitals:   Visit Vitals  /72   Pulse (!) 59   Temp 36.4 °C (97.6 °F)   Ht 1.626 m (5' 4\")   Wt 70.7 kg (155 lb 12.8 oz)   SpO2 97%   BMI 26.74 kg/m²     Physical Exam  Constitutional:       Appearance: Normal appearance.   HENT:      Head: Normocephalic and atraumatic.   Cardiovascular:      Rate and Rhythm: Normal rate and regular rhythm.      Heart sounds: Normal heart sounds.   Pulmonary:      Effort: Pulmonary effort is normal.      Breath sounds: Normal breath sounds.   Chest:      Comments: Status post bilateral mastectomy.  Significant erythema and burn of the right reconstructed breast, with burns extending up over the clavicle and laterally to the lateral chest wall.  Left reconstructed breast normal in appearance with no masses present.  Exam of the reconstructed breast was limited today secondary to the " burns.  Abdominal:      General: Abdomen is flat.      Palpations: Abdomen is soft.   Neurological:      Mental Status: She is alert.           Assessment/Plan   Problem List Items Addressed This Visit        Respiratory    Lung nodules       Other    Chest wall recurrence of breast cancer, right (CMS/HCC) - Primary   Other Visit Diagnoses     Personal history of malignant neoplasm of breast            Latrell is undergoing radiation for her chest wall recurrence on the right.  She had significant burning from this, and had to hold radiation for a week, she now has 3 doses left.  I encouraged her to call Dr. Garcia after she completes her radiation to review when to start her antiestrogen medication.  She has a follow up appointment with Dr. Garcia in 3 months.  As long as she is doing well, I will plan on seeing her in 6 months to alternate with medical oncology.  She will call with any concerns.   We reviewed that her CT of the chest shows resolution of the nodules, she will check with Dr. Garcia regarding any additional follow up for the lungs.  Return in about 6 months (around 1/19/2024).      Peggy Matthews MD

## 2023-07-20 ENCOUNTER — TELEPHONE (OUTPATIENT)
Dept: HEMATOLOGY/ONCOLOGY | Facility: CLINIC | Age: 88
End: 2023-07-20
Payer: MEDICARE

## 2023-07-20 ENCOUNTER — HOSPITAL ENCOUNTER (OUTPATIENT)
Dept: RADIATION ONCOLOGY | Facility: HOSPITAL | Age: 88
Setting detail: RADIATION/ONCOLOGY SERIES
Discharge: HOME | End: 2023-07-20
Attending: RADIOLOGY
Payer: MEDICARE

## 2023-07-20 LAB
ARIA ZRC COURSE ID: NORMAL
ARIA ZRC COURSE INTENT: NORMAL
ARIA ZRC COURSE START DATE: NORMAL
ARIA ZRC TREATMENT ELAPSED DAYS: NORMAL
ARIA ZRP FRACTIONS TREATED TO DATE: NORMAL
ARIA ZRP PLAN ID: NORMAL
ARIA ZRP PRESCRIBED DOSE CGY: 1000
ARIA ZRP PRESCRIBED DOSE PER FRACTION: 2
ARIA ZRR DOSAGE GIVEN TO DATE: 58
ARIA ZRR DOSAGE GIVEN TO DATE: 8
ARIA ZRR DOSAGE GIVEN TO DATE: 8.89
ARIA ZRR REFERENCE POINT ID: NORMAL
ARIA ZRR SESSION DOSAGE GIVEN: 2
ARIA ZRR SESSION DOSAGE GIVEN: 2
ARIA ZRR SESSION DOSAGE GIVEN: 2.22
MLH ARIA ZRC TREATMENT DATES: NORMAL

## 2023-07-20 PROCEDURE — 77412 RADIATION TX DELIVERY LVL 3: CPT | Performed by: RADIOLOGY

## 2023-07-20 NOTE — TELEPHONE ENCOUNTER
LM on voicemail with information on when to restart arimidex. Gave instructions via voicemail and instructed to callback if she has additional questions

## 2023-07-20 NOTE — TELEPHONE ENCOUNTER
Please call patient. She Dr. Matthews and expressed confusion about when to restart her Arimidex. She should start 2-3 weeks after finishing radiation. I think her radiation will finish 7/21. If that's correct, advise she start Arimidex on 8/6/23.

## 2023-07-21 ENCOUNTER — HOSPITAL ENCOUNTER (OUTPATIENT)
Dept: RADIATION ONCOLOGY | Facility: HOSPITAL | Age: 88
Setting detail: RADIATION/ONCOLOGY SERIES
Discharge: HOME | End: 2023-07-21
Attending: RADIOLOGY
Payer: MEDICARE

## 2023-07-21 LAB
ARIA ZRC COURSE ID: NORMAL
ARIA ZRC COURSE INTENT: NORMAL
ARIA ZRC COURSE START DATE: NORMAL
ARIA ZRC TREATMENT ELAPSED DAYS: NORMAL
ARIA ZRP FRACTIONS TREATED TO DATE: NORMAL
ARIA ZRP PLAN ID: NORMAL
ARIA ZRP PRESCRIBED DOSE CGY: 1000
ARIA ZRP PRESCRIBED DOSE PER FRACTION: 2
ARIA ZRR DOSAGE GIVEN TO DATE: 10
ARIA ZRR DOSAGE GIVEN TO DATE: 11.11
ARIA ZRR DOSAGE GIVEN TO DATE: 60
ARIA ZRR REFERENCE POINT ID: NORMAL
ARIA ZRR SESSION DOSAGE GIVEN: 2
ARIA ZRR SESSION DOSAGE GIVEN: 2
ARIA ZRR SESSION DOSAGE GIVEN: 2.22
MLH ARIA ZRC TREATMENT DATES: NORMAL

## 2023-07-21 PROCEDURE — 77412 RADIATION TX DELIVERY LVL 3: CPT | Performed by: RADIOLOGY

## 2023-07-26 ENCOUNTER — DOCUMENTATION (OUTPATIENT)
Dept: RADIATION ONCOLOGY | Facility: HOSPITAL | Age: 88
End: 2023-07-26
Payer: MEDICARE

## 2023-07-26 LAB
ARIA ZRC COURSE ID: NORMAL
ARIA ZRC COURSE INTENT: NORMAL
ARIA ZRC COURSE START DATE: NORMAL
ARIA ZRC TREATMENT ELAPSED DAYS: NORMAL
ARIA ZRP FRACTIONS TREATED TO DATE: NORMAL
ARIA ZRP PLAN ID: NORMAL
ARIA ZRP PLAN NAME: NORMAL
ARIA ZRP PRESCRIBED DOSE CGY: 1000
ARIA ZRP PRESCRIBED DOSE CGY: 1180.2
ARIA ZRP PRESCRIBED DOSE CGY: 1400
ARIA ZRP PRESCRIBED DOSE CGY: 4600
ARIA ZRP PRESCRIBED DOSE CGY: 5000
ARIA ZRP PRESCRIBED DOSE PER FRACTION: 0.51
ARIA ZRP PRESCRIBED DOSE PER FRACTION: 2
ARIA ZRR DOSAGE GIVEN TO DATE: 10
ARIA ZRR DOSAGE GIVEN TO DATE: 11.11
ARIA ZRR DOSAGE GIVEN TO DATE: 46
ARIA ZRR DOSAGE GIVEN TO DATE: 46
ARIA ZRR DOSAGE GIVEN TO DATE: 50
ARIA ZRR DOSAGE GIVEN TO DATE: 51.55
ARIA ZRR DOSAGE GIVEN TO DATE: 60
ARIA ZRR REFERENCE POINT ID: NORMAL
MLH ARIA ZRC TREATMENT DATES: NORMAL

## 2023-07-26 NOTE — PROGRESS NOTES
[facility] RADIATION [facility]ER[facility]  [address] [place] [address]  Dept: [phone]  Loc: [phone]    Objective   Latrell Ojeda, 1935, has completed a course of radiation.  Latrell Ojeda was seen and treated in Radiation Oncology at [facility] RADI[facility]ER[facility].  In brief, she is an 87-year-old woman who underwent left mastectomy with expander/implant reconstruction in 2006 for high-grade DCIS and right mastectomy, sentinel lymph node biopsy and expander/implant reconstruction in 2009 for a T1N0 grade 3 invasive ductal carcinoma for which she additionally received 5 years of tamoxifen.  She did well until March of this year, when she palpated a subcutaneous mass in the slightly lateral central aspect of the right reconstructed breast with ultrasound confirming a 1.0 cm solid mass at 9:00 4 cm from the nipple.  Ultrasound-guided biopsy showed grade 2 invasive ductal carcinoma, ER/WA positive/HER2 negative.  Bilateral breast MRI showed a 1.5 cm area of focal enhancement in the upper right reconstructed breast with no others areas of enhancement and no suspicious lymphadenopathy.  PET/CT showed bilateral subsolid pulmonary nodules with low-level metabolic activity felt likely to represent inflammatory/infectious nodules.  On April 21, she underwent right wire excision of the chest wall mass with pathology showing 1.2 cm moderately differentiated invasive ductal carcinoma involving the subcutaneous and skeletal muscle with positive margins medially, laterally, inferiorly and posteriorly.  Additional tissue taken from the posterior margin was positive with tumor cells involving skeletal muscle.  The patient was started on anastrozole and referred for radiation therapy.    She was treated to the right chest wall/reconstructed breast and regional lymph nodes using a 4 field technique.  The right supraclavicular fossa and apex of the right axilla were treated with an MILDRED off  cord field using 6 MV photons.  Between June 1 and July 3, she received 4600 cGy dosed to a depth of 3 cm to this field (23 fractions in 32 elapsed days).  During the same period of time, the right axilla was boosted with a PA field so as to bring the midplane axillary dose to a total of 4600 cGy.  The right chest wall/reconstructed breast was treated with opposed tangential fields using conformal radiation therapy and both 6 and 10 MV photons.  3 mm of bolus was used on a daily basis.  Between June 1 and July 6, the patient received 5000 cGy to this field (25 fractions in 35 elapsed days).  The bolus was discontinued after 3600 cGy due to a breast skin reaction.  Following completion of this portion of the treatment, the central to lateral right chest wall where the recurrence was excised was boosted with 9 MeV electrons for an additional 1000 cGy dosed to the 90% isodose line.  This portion of the treatment was delivered between July 17 and July 21 (5 fractions in 4 elapsed days).  The total dose to the area of of recurrence was therefore 6000 cGy.  Ms. Ojeda developed brisk erythema in the skin of the treatment field most pronounced overlying the right clavicle, in the right axilla and in the right inframammary fold where desquamation was additionally noted for which she used calendula, 1% hydrocortisone cream, Silvadene and Xeroform.  She additionally had a 10-day treatment break prior to beginning her boost field, and her anastrozole was held as it was felt that this might be increasing her skin reaction.  By the completion of radiation, she had partial  reepithelialization of the desquamated areas in the right axilla, right inframammary fold and overlying the right clavicle and reduction of erythema.     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  She tolerated the treatment fair.      Treatment Intent: adjuvant.  Cancer Staging   Chest wall recurrence of breast cancer, right (CMS/HCC)  Staging form: Breast, AJCC 8th Edition  - Clinical stage from 4/10/2023: Stage IA (rcT1b, cN0, cM0, G2, ER+, WI+, HER2-) - Signed by Peggy Matthews MD on 4/11/2023       Performance Status at the End of Treatment: 80, Normal activity with effort; some signs or symptoms of disease..    LABS:  CMP  Lab Results   Component Value Date    ALBUMIN 3.3 (L) 06/16/2023    CO2 27 06/20/2023    BUN 16 06/20/2023    CREATININE 1.1 06/20/2023    GLUCOSE 162 (H) 06/20/2023    AST 36 06/16/2023    ALT 44 06/16/2023    EGFR 47.1 (L) 06/20/2023    EGFR 25.1 (L) 06/16/2023    EGFR 42.6 (L) 05/01/2023    EGFR 42.6 (L) 04/10/2023    EGFR 42.6 (L) 01/30/2023    EGFR 42.6 (L) 11/02/2022     CBC  Lab Results   Component Value Date    WBC 9.53 06/16/2023    HGB 12.4 06/16/2023    HCT 37.3 06/16/2023    MCV 87.6 06/16/2023    PLT 96 (L) 06/16/2023     04/10/2023     11/02/2022     07/22/2022     04/10/2022     03/01/2022     Tumor MarkerNo results found for: , , PSA, , CEA, HCGQUANT     Treatment Plan Summary  Radiation Therapy         Assessment/Plan     Plan for Follow-up: We plan to see the patient back in 2-3 weeks to monitor immediate post-therapy progress, which will be further documented.  The patient is also informed of the need for  continuing follow-up through your office.  She additionally will be restarting anastrozole approximately 2 to 3 weeks after completing radiation.    CMS Clinical Data Elements  End of Treatment  [facility] ONCBC[facility] RADIATION COMPLETION FORM ADVANCED       7/26/2023 8:08 AM

## 2023-08-09 ENCOUNTER — DOCUMENTATION (OUTPATIENT)
Dept: RADIATION ONCOLOGY | Facility: HOSPITAL | Age: 88
End: 2023-08-09
Payer: MEDICARE

## 2023-08-09 ENCOUNTER — OFFICE VISIT (OUTPATIENT)
Dept: URGENT CARE | Facility: CLINIC | Age: 88
End: 2023-08-09
Payer: COMMERCIAL

## 2023-08-09 VITALS
TEMPERATURE: 98 F | WEIGHT: 152 LBS | OXYGEN SATURATION: 98 % | RESPIRATION RATE: 18 BRPM | BODY MASS INDEX: 25.95 KG/M2 | HEIGHT: 64 IN | HEART RATE: 72 BPM

## 2023-08-09 DIAGNOSIS — N30.01 ACUTE CYSTITIS WITH HEMATURIA: Primary | ICD-10-CM

## 2023-08-09 LAB
SL AMB  POCT GLUCOSE, UA: NEGATIVE
SL AMB LEUKOCYTE ESTERASE,UA: NORMAL
SL AMB POCT BILIRUBIN,UA: NEGATIVE
SL AMB POCT BLOOD,UA: NORMAL
SL AMB POCT CLARITY,UA: NORMAL
SL AMB POCT COLOR,UA: NORMAL
SL AMB POCT KETONES,UA: NEGATIVE
SL AMB POCT NITRITE,UA: NEGATIVE
SL AMB POCT PH,UA: 6
SL AMB POCT SPECIFIC GRAVITY,UA: 1.01
SL AMB POCT URINE PROTEIN: NEGATIVE
SL AMB POCT UROBILINOGEN: 0.2

## 2023-08-09 PROCEDURE — 99203 OFFICE O/P NEW LOW 30 MIN: CPT | Performed by: PHYSICIAN ASSISTANT

## 2023-08-09 PROCEDURE — 81002 URINALYSIS NONAUTO W/O SCOPE: CPT | Performed by: PHYSICIAN ASSISTANT

## 2023-08-09 PROCEDURE — 87077 CULTURE AEROBIC IDENTIFY: CPT | Performed by: PHYSICIAN ASSISTANT

## 2023-08-09 PROCEDURE — 87186 SC STD MICRODIL/AGAR DIL: CPT | Performed by: PHYSICIAN ASSISTANT

## 2023-08-09 PROCEDURE — 87086 URINE CULTURE/COLONY COUNT: CPT | Performed by: PHYSICIAN ASSISTANT

## 2023-08-09 RX ORDER — PANTOPRAZOLE SODIUM 40 MG/1
TABLET, DELAYED RELEASE ORAL
COMMUNITY
Start: 2023-07-18

## 2023-08-09 RX ORDER — SULFAMETHOXAZOLE AND TRIMETHOPRIM 800; 160 MG/1; MG/1
TABLET ORAL
COMMUNITY
Start: 2023-06-18 | End: 2023-08-09 | Stop reason: ALTCHOICE

## 2023-08-09 RX ORDER — AMIODARONE HYDROCHLORIDE 200 MG/1
100 TABLET ORAL DAILY
COMMUNITY
Start: 2023-07-19

## 2023-08-09 RX ORDER — ONDANSETRON 4 MG/1
TABLET, ORALLY DISINTEGRATING ORAL
COMMUNITY
Start: 2023-06-15

## 2023-08-09 RX ORDER — HYDROCHLOROTHIAZIDE 25 MG/1
TABLET ORAL
COMMUNITY
Start: 2023-05-12

## 2023-08-09 RX ORDER — CEFUROXIME AXETIL 250 MG/1
TABLET ORAL
COMMUNITY
Start: 2023-06-17

## 2023-08-09 RX ORDER — SULFAMETHOXAZOLE AND TRIMETHOPRIM 800; 160 MG/1; MG/1
1 TABLET ORAL EVERY 12 HOURS SCHEDULED
Qty: 20 TABLET | Refills: 0 | Status: SHIPPED | OUTPATIENT
Start: 2023-08-09 | End: 2023-08-19

## 2023-08-09 RX ORDER — ANASTROZOLE 1 MG/1
TABLET ORAL
COMMUNITY
Start: 2023-07-18

## 2023-08-09 NOTE — PROGRESS NOTES
Chest Wall Recurrence Of Breast Cancer, Right  Follow Up (RADIATION)    LOV 7/17/23- 154 LBS  EOT 7/21/23    Dr Matthews- [facility] LOV 7/19/23- Next Appt 1/29/24  Dr Garcia- Hem/Onc LOV 7/14/23- Next Appt 10/16/23

## 2023-08-09 NOTE — PROGRESS NOTES
[facility]        NAME: Adelina Watts is a 81 y.o. female  : 1935    MRN: [id number]  DATE: 2023  TIME: 3:24 PM      Assessment and Plan     Acute cystitis with hematuria [N30.01]  1. Acute cystitis with hematuria  POCT urine dip    sulfamethoxazole-trimethoprim (BACTRIM DS) 800-160 mg per tablet            Patient Instructions   There are no Patient Instructions on file for this visit. Follow up with PCP in 3-5 days. Go to ER if symptoms worsen. Chief Complaint     Chief Complaint   Patient presents with   • Difficulty Urinating     2 days frequent urination. History of Present Illness     Patient presents with urinary frequency, urgency, and burning x 2 days. She states sometimes there is some blood in the urine. Review of Systems     Review of Systems   Constitutional: Negative for chills, fatigue and fever. HENT: Negative for congestion, ear pain, postnasal drip, rhinorrhea, sinus pressure, sinus pain and sore throat. Eyes: Negative for pain and visual disturbance. Respiratory: Negative for cough, chest tightness and shortness of breath. Cardiovascular: Negative for chest pain and palpitations. Gastrointestinal: Negative for abdominal pain, diarrhea, nausea and vomiting. Genitourinary: Positive for dysuria, frequency, hematuria and urgency. Negative for flank pain. Musculoskeletal: Negative for arthralgias, back pain and myalgias. Skin: Negative for rash. Neurological: Negative for dizziness, seizures, syncope, numbness and headaches. All other systems reviewed and are negative.         Current Medications       Current Outpatient Medications:   •  amiodarone 200 mg tablet, Take 100 mg by mouth daily, Disp: , Rfl:   •  anastrozole (ARIMIDEX) 1 mg tablet, , Disp: , Rfl:   •  hydrochlorothiazide (HYDRODIURIL) 25 mg tablet, , Disp: , Rfl:   •  ondansetron (ZOFRAN-ODT) 4 mg disintegrating tablet, DISSOLVE ONE TABLET BY MOUTH EVERY 8 HOURS AS NEEDED FOR NAUSEA OR VOMITING FOR UPTO 7 DAYS, Disp: , Rfl:   •  pantoprazole (PROTONIX) 40 mg tablet, , Disp: , Rfl:   •  sulfamethoxazole-trimethoprim (BACTRIM DS) 800-160 mg per tablet, Take 1 tablet by mouth every 12 (twelve) hours for 10 days, Disp: 20 tablet, Rfl: 0  •  cefuroxime (CEFTIN) 250 mg tablet, TAKE ONE TABLET BY MOUTH TWICE A DAY FOR 7 DAYS (Patient not taking: Reported on 8/9/2023), Disp: , Rfl:   •  silver sulfadiazine (SILVADENE,SSD) 1 % cream, APPLY TO AFFECTED AREA THREE TIMES A DAY AS NEEDED (Patient not taking: Reported on 8/9/2023), Disp: , Rfl:     Current Allergies     Allergies as of 08/09/2023   • (No Known Allergies)              The following portions of the patient's history were reviewed and updated as appropriate: allergies, current medications, past family history, past medical history, past social history, past surgical history, and problem list.     Past Medical History:   Diagnosis Date   • Hypertension    • Urinary tract infection        Past Surgical History:   Procedure Laterality Date   • BREAST RECONSTRUCTION         History reviewed. No pertinent family history. Medications have been verified. Objective     Pulse 72   Temp 98 °F (36.7 °C)   Resp 18   Ht 5' 4" (1.626 m)   Wt 68.9 kg (152 lb)   SpO2 98%   BMI 26.09 kg/m²   No LMP recorded. Patient is postmenopausal.         Physical Exam     Physical Exam  Vitals and nursing note reviewed. Constitutional:       Appearance: Normal appearance. She is normal weight. Cardiovascular:      Rate and Rhythm: Normal rate and regular rhythm. Heart sounds: Normal heart sounds. Pulmonary:      Effort: Pulmonary effort is normal.      Breath sounds: Normal breath sounds. Abdominal:      General: Abdomen is flat. Bowel sounds are normal.      Palpations: Abdomen is soft. Tenderness: There is no abdominal tenderness. There is no right CVA tenderness or left CVA tenderness.    Skin:     General: Skin is warm and dry. Neurological:      General: No focal deficit present. Mental Status: She is alert and oriented to person, place, and time.    Psychiatric:         Mood and Affect: Mood normal.         Behavior: Behavior normal.

## 2023-08-11 LAB — BACTERIA UR CULT: ABNORMAL

## 2023-08-16 ENCOUNTER — DOCUMENTATION (OUTPATIENT)
Dept: RADIATION ONCOLOGY | Facility: HOSPITAL | Age: 88
End: 2023-08-16
Payer: MEDICARE

## 2023-08-16 ENCOUNTER — HOSPITAL ENCOUNTER (OUTPATIENT)
Dept: RADIATION ONCOLOGY | Facility: HOSPITAL | Age: 88
Setting detail: RADIATION/ONCOLOGY SERIES
Discharge: HOME | End: 2023-08-16
Attending: RADIOLOGY
Payer: MEDICARE

## 2023-08-16 VITALS
DIASTOLIC BLOOD PRESSURE: 66 MMHG | OXYGEN SATURATION: 98 % | SYSTOLIC BLOOD PRESSURE: 153 MMHG | RESPIRATION RATE: 18 BRPM | HEART RATE: 68 BPM | BODY MASS INDEX: 27.02 KG/M2 | WEIGHT: 157.4 LBS

## 2023-08-16 DIAGNOSIS — C79.89 CHEST WALL RECURRENCE OF BREAST CANCER, RIGHT (CMS/HCC): Primary | ICD-10-CM

## 2023-08-16 DIAGNOSIS — C50.911 CHEST WALL RECURRENCE OF BREAST CANCER, RIGHT (CMS/HCC): Primary | ICD-10-CM

## 2023-08-16 RX ORDER — AMIODARONE HYDROCHLORIDE 200 MG/1
100 TABLET ORAL DAILY
COMMUNITY
Start: 2023-08-14

## 2023-08-16 ASSESSMENT — ENCOUNTER SYMPTOMS
CARDIOVASCULAR NEGATIVE: 1
CONSTITUTIONAL NEGATIVE: 1
MUSCULOSKELETAL NEGATIVE: 1
NEUROLOGICAL NEGATIVE: 1
RESPIRATORY NEGATIVE: 1
EYES NEGATIVE: 1
GASTROINTESTINAL NEGATIVE: 1
PSYCHIATRIC NEGATIVE: 1
ENDOCRINE NEGATIVE: 1
HEMATOLOGIC/LYMPHATIC NEGATIVE: 1

## 2023-08-16 ASSESSMENT — PAIN SCALES - GENERAL: PAINLEVEL: 0-NO PAIN

## 2023-08-16 NOTE — PROGRESS NOTES
Patient ID:  Latrell Ojeda is a 88 y.o. female.  1935    Referring Physician:   Peggy Matthews MD  [facility] [place] [address]  [place] [id number]    Primary Care Provider:  Aris Miller MD    Problem List:  Patient Active Problem List    Diagnosis Date Noted   • Acute cystitis without hematuria 06/20/2023   • Concussion 06/20/2023   • Renal insufficiency 05/02/2023   • Lung nodules 05/02/2023   • Chest wall recurrence of breast cancer, right (CMS/HCC) 04/11/2023   • LFTs abnormal 10/20/2022   • Hyponatremia 06/14/2022   • Frequent UTI 04/18/2022   • 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 06/30/2021   • Hypokalemia 06/17/2021   • Rash 06/11/2021   • Chronic kidney disease, stage 3b (CMS/HCC) 03/12/2021   • Medicare annual wellness visit, subsequent 12/14/2020   • Rhinorrhea 12/14/2020   • A-fib (CMS/HCC) 12/14/2020   • Essential hypertension 02/13/2020   • Gastroesophageal reflux disease without esophagitis 02/13/2020   • Palpitation 02/13/2020   • Prediabetes 02/13/2020   • History of breast cancer 02/13/2020         Cancer Staging   Chest wall recurrence of breast cancer, right (CMS/HCC)  Staging form: Breast, AJCC 8th Edition  - Clinical stage from 4/10/2023: Stage IA (rcT1b, cN0, cM0, G2, ER+, VA+, HER2-) - Signed by Peggy Matthews MD on 4/11/2023      Subjective    History of Present Illness:    HPI     Ms. 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  After developing an isolated right chest wall recurrence in March of this year, she underwent  excision with pathology showing involvement of subcutaneous and skeletal muscle with positive margins.  She has done well since completion of radiation therapy with resolving skin reaction.  She is no longer using any topical creams or ointments on the skin in the previous treatment field.  She is currently doing well with no specific complaints at this time.    Review of Systems - Oncology    Constitutional: Negative.    HENT:  Negative.    Eyes: Negative.    Respiratory: Negative.    Cardiovascular: Negative.    Gastrointestinal: Negative.    Endocrine: Negative.    Genitourinary: Negative.     Musculoskeletal: Negative.    Skin:        Skin over treated area is darker but patient denies discomfort, open or drainage.    Neurological: Negative.    Hematological: Negative.    Psychiatric/Behavioral: Negative.           Objective      Physical Exam:  Vital Signs for this encounter:  BP: 153/66  Heart Rate: 68  Resp: 18  SpO2: 98 %  Weight: 71.4 kg (157 lb 6.4 oz) (08/16 1444)    Physical Exam     Sclera are nonicteric.  Pupils are equal, round and react to light.  There is no cervical, supraclavicular or axillary adenopathy palpated.  The patient is status post bilateral mastectomies with implants in place.  There is a well-healed surgical scar lateral to the tattooed nipple areolar complex on the right.  There is mild residual hyperpigmentation present of the skin of the right chest wall extending into the right axilla.  There are no subcutaneous nodules palpated.  Lungs are clear to percussion and auscultation.  There is no spinal or CVA tenderness.  Cardiac rhythm is regular.  Abdomen is soft and nontender with normal bowel sounds.  There is no hepatosplenomegaly and no masses are palpated.    Performance Status: KPS 90     PAIN ASSESSMENT:  Score Zero    Location    Pain Intervention      LABS:  No results found for this or any previous visit (from the past 336 hour(s)).       Assessment/Plan      Ms. Ojeda  is doing well 4 weeks after completing a course of radiation therapy to the right chest wall/reconstructed breast and regional lymph nodes for a chest wall recurrence of an early stage breast cancer originally diagnosed in 2009.  She restarted letrozole which was held during her radiation when she developed a brisk skin reaction.  She has a follow-up appointment with Dr. Garcia in October and will be seeing Dr. Matthews in January.  I asked her to return for routine follow-up here in 1 year.    Diagnoses and Orders Associated With This Visit:  Chest wall recurrence of breast cancer, right (CMS/HCC) (Primary)

## 2023-08-16 NOTE — PROGRESS NOTES
Review of Systems   Constitutional: Negative.    HENT:  Negative.    Eyes: Negative.    Respiratory: Negative.    Cardiovascular: Negative.    Gastrointestinal: Negative.    Endocrine: Negative.    Genitourinary: Negative.     Musculoskeletal: Negative.    Skin:        Skin over treated area is darker but patient denies discomfort, open or drainage.    Neurological: Negative.    Hematological: Negative.    Psychiatric/Behavioral: Negative.

## 2023-08-16 NOTE — LETTER
August 17, 2023                                                          Patient: Latrell Ojeda   YOB: 1935   Date of Visit: 8/16/2023       Dear Dr. Miller:    The patient is seen at the [facility] today. Attached is my assessment and plan of care.  Thank you for the opportunity to share in Latrell Ojeda's care.       Sincerely,        Debora Navarro MD      CC: No Recipients

## 2023-09-11 DIAGNOSIS — R35.0 URINARY FREQUENCY: ICD-10-CM

## 2023-09-11 DIAGNOSIS — R30.0 DYSURIA: Primary | ICD-10-CM

## 2023-09-11 LAB
BACTERIA, POC: NEGATIVE
BILIRUBIN, POC: NEGATIVE
BLOOD URINE, POC: POSITIVE
CLARITY, POC: NORMAL
COLOR, POC: YELLOW
EXPIRATION DATE: NORMAL
GLUCOSE URINE, POC: NEGATIVE
KETONES, POC: NEGATIVE
LEUKOCYTE EST, POC: NORMAL
Lab: NORMAL
NITRITE, POC: NORMAL
PH, POC: 5.5
POCT MANUFACTURER: NORMAL
PROTEIN, POC: NORMAL
SPECIFIC GRAVITY, POC: 1.01
UROBILINOGEN, POC: 0.2

## 2023-09-11 PROCEDURE — 81001 URINALYSIS AUTO W/SCOPE: CPT | Performed by: INTERNAL MEDICINE

## 2023-09-11 PROCEDURE — 87186 SC STD MICRODIL/AGAR DIL: CPT | Performed by: INTERNAL MEDICINE

## 2023-09-11 PROCEDURE — 81002 URINALYSIS NONAUTO W/O SCOPE: CPT | Performed by: INTERNAL MEDICINE

## 2023-09-12 ENCOUNTER — TELEPHONE (OUTPATIENT)
Dept: INTERNAL MEDICINE | Facility: CLINIC | Age: 88
End: 2023-09-12
Payer: MEDICARE

## 2023-09-12 LAB
BACTERIA URNS QL MICRO: ABNORMAL /HPF
BILIRUB UR QL STRIP.AUTO: NEGATIVE MG/DL
CLARITY UR REFRACT.AUTO: ABNORMAL
COLOR UR AUTO: ABNORMAL
GLUCOSE UR STRIP.AUTO-MCNC: NEGATIVE MG/DL
HGB UR QL STRIP.AUTO: 3
HYALINE CASTS #/AREA URNS LPF: ABNORMAL /LPF
KETONES UR STRIP.AUTO-MCNC: NEGATIVE MG/DL
LEUKOCYTE ESTERASE UR QL STRIP.AUTO: 3
NITRITE UR QL STRIP.AUTO: POSITIVE
PH UR STRIP.AUTO: 6 [PH]
PROT UR QL STRIP.AUTO: 1
RBC #/AREA URNS HPF: ABNORMAL /HPF
SP GR UR REFRACT.AUTO: 1.02
SQUAMOUS URNS QL MICRO: ABNORMAL /HPF
TRANS CELLS URNS QL MICRO: 2 /HPF
UROBILINOGEN UR STRIP-ACNC: 0.2 EU/DL
WBC #/AREA URNS HPF: ABNORMAL /HPF

## 2023-09-12 NOTE — TELEPHONE ENCOUNTER
Patient request results of UTI,  I let her know we are waiting on the culture. She insisted that you send medication to the pharmacy.  Her sxs are frequency and burning . Please advise. Thank you

## 2023-09-13 ENCOUNTER — TRANSCRIBE ORDERS (OUTPATIENT)
Dept: LAB | Age: 88
End: 2023-09-13

## 2023-09-13 ENCOUNTER — APPOINTMENT (OUTPATIENT)
Dept: LAB | Age: 88
End: 2023-09-13
Attending: INTERNAL MEDICINE
Payer: MEDICARE

## 2023-09-13 DIAGNOSIS — R94.5 ABNORMAL RESULTS OF LIVER FUNCTION STUDIES: Primary | ICD-10-CM

## 2023-09-13 DIAGNOSIS — R94.5 ABNORMAL RESULTS OF LIVER FUNCTION STUDIES: ICD-10-CM

## 2023-09-13 LAB
ALBUMIN SERPL-MCNC: 3.7 G/DL (ref 3.5–5.7)
ALP SERPL-CCNC: 70 IU/L (ref 34–125)
ALT SERPL-CCNC: 32 IU/L (ref 7–52)
ANION GAP SERPL CALC-SCNC: 7 MEQ/L (ref 3–15)
AST SERPL-CCNC: 26 IU/L (ref 13–39)
BILIRUB DIRECT SERPL-MCNC: 0.3 MG/DL
BILIRUB SERPL-MCNC: 1.8 MG/DL (ref 0.3–1.2)
BUN SERPL-MCNC: 22 MG/DL (ref 7–25)
CALCIUM SERPL-MCNC: 9 MG/DL (ref 8.6–10.3)
CHLORIDE SERPL-SCNC: 99 MEQ/L (ref 98–107)
CO2 SERPL-SCNC: 31 MEQ/L (ref 21–31)
CREAT SERPL-MCNC: 1.1 MG/DL (ref 0.6–1.2)
GFR SERPL CREATININE-BSD FRML MDRD: 47 ML/MIN/1.73M*2
GLUCOSE SERPL-MCNC: 116 MG/DL (ref 70–99)
POTASSIUM SERPL-SCNC: 4.2 MEQ/L (ref 3.5–5.1)
PROT SERPL-MCNC: 6.6 G/DL (ref 6–8.2)
SODIUM SERPL-SCNC: 137 MEQ/L (ref 136–145)

## 2023-09-13 PROCEDURE — 36415 COLL VENOUS BLD VENIPUNCTURE: CPT

## 2023-09-13 PROCEDURE — 82248 BILIRUBIN DIRECT: CPT

## 2023-09-13 PROCEDURE — 80053 COMPREHEN METABOLIC PANEL: CPT

## 2023-09-14 LAB
BACTERIA UR CULT: ABNORMAL
BACTERIA UR CULT: ABNORMAL

## 2023-09-14 RX ORDER — SULFAMETHOXAZOLE AND TRIMETHOPRIM 800; 160 MG/1; MG/1
1 TABLET ORAL 2 TIMES DAILY
Qty: 4 TABLET | Refills: 0 | Status: SHIPPED | OUTPATIENT
Start: 2023-09-14 | End: 2023-09-16

## 2023-09-21 ENCOUNTER — OFFICE VISIT (OUTPATIENT)
Dept: INTERNAL MEDICINE | Facility: CLINIC | Age: 88
End: 2023-09-21
Payer: MEDICARE

## 2023-09-21 VITALS
SYSTOLIC BLOOD PRESSURE: 142 MMHG | BODY MASS INDEX: 26.26 KG/M2 | TEMPERATURE: 97.4 F | RESPIRATION RATE: 16 BRPM | OXYGEN SATURATION: 97 % | HEART RATE: 74 BPM | WEIGHT: 153 LBS | DIASTOLIC BLOOD PRESSURE: 70 MMHG

## 2023-09-21 DIAGNOSIS — J02.9 SORE THROAT: ICD-10-CM

## 2023-09-21 DIAGNOSIS — N39.0 FREQUENT UTI: ICD-10-CM

## 2023-09-21 PROCEDURE — 99214 OFFICE O/P EST MOD 30 MIN: CPT | Performed by: INTERNAL MEDICINE

## 2023-09-21 RX ORDER — NYSTATIN 100000 [USP'U]/ML
5 SUSPENSION ORAL 4 TIMES DAILY
Qty: 200 ML | Refills: 0 | Status: SHIPPED | OUTPATIENT
Start: 2023-09-21 | End: 2023-10-01

## 2023-09-21 RX ORDER — NYSTATIN 100000 [USP'U]/ML
5 SUSPENSION ORAL 4 TIMES DAILY
Qty: 200 ML | Refills: 0 | Status: SHIPPED
Start: 2023-09-21 | End: 2023-09-21

## 2023-09-21 NOTE — PROGRESS NOTES
Chief Complaint   Patient presents with    Other     Right side of neck pain  and sore  throat concerns x 3 weeks        Subjective      Patient ID: Latrell Ojeda is a 88 y.o. female.    HPI     Sore throat: Started about 3 weeks ago.  COVID was negative at home  She denies any GERD symptoms  No cough  Has some pain with swallowing  No respiratory symptoms    UTI: Given Bactrim recently    Objective     Vitals:    09/21/23 1640   BP: (!) 142/70   BP Location: Left upper arm   Patient Position: Sitting   Pulse: 74   Resp: 16   Temp: 36.3 °C (97.4 °F)   TempSrc: Oral   SpO2: 97%   Weight: 69.4 kg (153 lb)       The following have been reviewed and updated as appropriate in this visit:   Allergies  Meds  Problems        Review of Systems   Constitutional: Negative for chills and fever.   Cardiovascular: Negative for chest pain.   Gastrointestinal: Negative for abdominal pain.         Current Outpatient Medications:     amiodarone (PACERONE) 200 mg tablet, , Disp: , Rfl:     anastrozole (ARIMIDEX) 1 mg tablet, Take  1 tablet (1 mg total) daily  Swallow whole with a drink of water., Disp: 90 tablet, Rfl: 3    biotin 5,000 mcg tablet,disintegrating, Take by mouth daily., Disp: , Rfl:     cholecalciferol, vitamin D3, 1,000 unit (25 mcg) tablet, Take 1,000 Units by mouth daily., Disp: , Rfl:     coenzyme Q10 (COQ10) 10 mg capsule, Take 10 mg by mouth daily.  , Disp: , Rfl:     cranberry extract 425 mg capsule, Take by mouth daily., Disp: , Rfl:     hydrochlorothiazide (HYDRODIURIL) 25 mg tablet, Take 25 mg by mouth every morning., Disp: , Rfl:     LOSARTAN POTASSIUM, BULK, MISC, Take 50 mg by mouth as needed., Disp: , Rfl:     lutein 6 mg capsule, Take 6 mg by mouth daily.  , Disp: , Rfl:     nystatin (MYCOSTATIN) 100,000 unit/mL suspension, Swish and swallow 5 mL (500,000 Units total) 4 (four) times a day for 10 days., Disp: 200 mL, Rfl: 0    pantoprazole (PROTONIX) 40 mg EC tablet, TAKE 1 TABLET BY  MOUTH  EVERY OTHER DAY, Disp: 45 tablet, Rfl: 3    red yeast rice 600 mg capsule, Take 1 capsule by mouth 2 (two) times a day.  , Disp: , Rfl:     rivaroxaban (XARELTO) 15 mg tablet, Take 1 tablet (15 mg total) by mouth daily with dinner., Disp: 90 tablet, Rfl: 1    Physical Exam  Vitals reviewed.   Constitutional:       Appearance: She is well-developed.   HENT:      Head: Normocephalic and atraumatic.      Comments: Slight throat erythema.  Slight white lesions on base of tongue  Eyes:      Conjunctiva/sclera: Conjunctivae normal.   Neck:      Comments: Normal-sized nodules felt bilaterally  Cardiovascular:      Rate and Rhythm: Normal rate.   Pulmonary:      Effort: Pulmonary effort is normal. No respiratory distress.      Breath sounds: No wheezing or rales.   Abdominal:      Palpations: Abdomen is soft.      Tenderness: There is no abdominal tenderness. There is no guarding.         Assessment/Plan   Sore throat  Possibly due to candidiasis  Recently on antibiotics  · Will try nystatin      Frequent UTI  Sees urology  Recently with UTI and given Bactrim.  Symptoms have improved      No orders of the defined types were placed in this encounter.    New Medications Ordered This Visit   Medications    nystatin (MYCOSTATIN) 100,000 unit/mL suspension     Sig: Swish and swallow 5 mL (500,000 Units total) 4 (four) times a day for 10 days.     Dispense:  200 mL     Refill:  0       Return if symptoms worsen or fail to improve.     SANDI BLACKBURN MD

## 2023-09-22 ASSESSMENT — ENCOUNTER SYMPTOMS
CHILLS: 0
FEVER: 0
ABDOMINAL PAIN: 0

## 2023-10-16 ENCOUNTER — OFFICE VISIT (OUTPATIENT)
Dept: HEMATOLOGY/ONCOLOGY | Facility: CLINIC | Age: 88
End: 2023-10-16
Attending: INTERNAL MEDICINE
Payer: MEDICARE

## 2023-10-16 VITALS
HEART RATE: 66 BPM | WEIGHT: 155.8 LBS | OXYGEN SATURATION: 94 % | HEIGHT: 65 IN | TEMPERATURE: 96.9 F | BODY MASS INDEX: 25.96 KG/M2 | SYSTOLIC BLOOD PRESSURE: 164 MMHG | DIASTOLIC BLOOD PRESSURE: 68 MMHG

## 2023-10-16 DIAGNOSIS — C79.89 CHEST WALL RECURRENCE OF BREAST CANCER, RIGHT (CMS/HCC): ICD-10-CM

## 2023-10-16 DIAGNOSIS — C50.911 CHEST WALL RECURRENCE OF BREAST CANCER, RIGHT (CMS/HCC): ICD-10-CM

## 2023-10-16 PROCEDURE — 99214 OFFICE O/P EST MOD 30 MIN: CPT | Performed by: INTERNAL MEDICINE

## 2023-10-16 ASSESSMENT — ENCOUNTER SYMPTOMS
SORE THROAT: 1
EYES NEGATIVE: 1
NEUROLOGICAL NEGATIVE: 1
CONSTITUTIONAL NEGATIVE: 1
PSYCHIATRIC NEGATIVE: 1
CARDIOVASCULAR NEGATIVE: 1
ENDOCRINE NEGATIVE: 1
GASTROINTESTINAL NEGATIVE: 1
COUGH: 1
NECK PAIN: 1
HEMATOLOGIC/LYMPHATIC NEGATIVE: 1

## 2023-10-16 ASSESSMENT — PAIN SCALES - GENERAL: PAINLEVEL: 0-NO PAIN

## 2023-10-16 NOTE — ASSESSMENT & PLAN NOTE
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  She was treated with 5 years of Arimidex following her right mastectomy.  In March 2023 she was found to have a chest wall recurrence on the right.  Cancer was ER positive (98%, [place] positive (87%), HER2 negative (0), Ki-67 51%.  She underwent local excision in April 2023.  She started Arimidex in May 2023.  She completed adjuvant radiation therapy in July 2023.    Clinically she is doing well without any signs or symptoms of recurrence.  She is on anastrozole and seems to be tolerating this without significant difficulty.  Given her age and comorbidities, we have opted not to add adjuvant abemaciclib.  She will continue on anastrozole with the plan for at least 5 years of treatment.  She will be following up with Dr. Matthews in January and I will see her back in the office in 6 months.  She was asked to call with questions or concerns prior to her next visit.

## 2023-10-16 NOTE — PROGRESS NOTES
Review of Systems   Constitutional: Negative.    HENT:   Positive for sore throat.    Eyes: Negative.    Respiratory: Positive for cough (occasionally).    Cardiovascular: Negative.    Gastrointestinal: Negative.    Endocrine: Negative.    Genitourinary: Negative.     Musculoskeletal: Positive for neck pain (R side).   Skin: Negative.    Neurological: Negative.    Hematological: Negative.    Psychiatric/Behavioral: Negative.

## 2023-10-16 NOTE — PROGRESS NOTES
Latrell Ojeda is a 88 y.o. female,   :  1935    Encounter Diagnosis   Name Primary?    Chest wall recurrence of breast cancer, right (CMS/HCC)         Cancer Staging   Chest wall recurrence of breast cancer, right (CMS/HCC)  Staging form: Breast, AJCC 8th Edition  - Clinical stage from 4/10/2023: Stage IA (rcT1b, cN0, cM0, G2, ER+, VA+, HER2-) - Signed by Peggy Matthews MD on 2023      Oncology History   Chest wall recurrence of breast cancer, right (CMS/HCC)   4/10/2023 Cancer Staged    Staging form: Breast, AJCC 8th Edition  - Clinical stage from 4/10/2023: Stage IA (rcT1b, cN0, cM0, G2, ER+, VA+, HER2-)         Patient Active Problem List   Diagnosis    Essential hypertension    Gastroesophageal reflux disease without esophagitis    Palpitation    Prediabetes    History of breast cancer    Medicare annual wellness visit, subsequent    Rhinorrhea    A-fib (CMS/HCC)    Chronic kidney disease, stage 3b (CMS/HCC)    Rash    Hypokalemia    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    Frequent UTI    Hyponatremia    LFTs abnormal    Chest wall recurrence of breast cancer, right (CMS/HCC)    Renal insufficiency    Lung nodules    Acute cystitis without hematuria    Concussion    Sore throat       History of Present Illness  HPI  Latrell Ojeda is seen today in follow up.  She reports she is doing well.  She is tolerating anastrozole without significant side effects.  She notes some skin darkening in the region where she previously had radiation.  She has not noted any skin lesions or chest wall masses.      Review of Systems - Oncology    Review of Systems:  Nursing assessment reviewed. Pertinent positive and negative symptoms noted in HPI, all others negative.      Temp:  [36.1 °C (96.9 °F)] 36.1 °C (96.9 °F)  Heart Rate:  [66] 66  BP: (164)/(68) 164/68  Visit Vitals  BP (!) 164/68 (BP Location: Right upper arm, Patient Position: Sitting)   Pulse 66   Temp (!) 36.1 °C  "(96.9 °F) (Temporal)   Ht 1.651 m (5' 5\")   Wt 70.7 kg (155 lb 12.8 oz)   SpO2 94%   BMI 25.93 kg/m²     Physical Exam  Constitutional:       General: She is not in acute distress.     Appearance: She is well-developed.   HENT:      Head: Normocephalic.   Eyes:      General: No scleral icterus.     Pupils: Pupils are equal, round, and reactive to light.   Cardiovascular:      Rate and Rhythm: Normal rate and regular rhythm.   Pulmonary:      Effort: Pulmonary effort is normal.      Breath sounds: Normal breath sounds.   Abdominal:      General: There is no distension.      Palpations: Abdomen is soft.      Tenderness: There is no abdominal tenderness.   Musculoskeletal:         General: No tenderness.      Cervical back: Neck supple.   Lymphadenopathy:      Cervical: No cervical adenopathy.   Skin:     Findings: No rash.   Neurological:      Mental Status: She is alert.         Past Medical History:   Diagnosis Date    Abnormal ECG     Breast cancer (CMS/[facility])      (left DCIS);  (right infiltrating ductal carcinoma);  (right invasive ductal carcinoma)    Colon polyp     COVID-19     not hospitalized-had sore throat/cough    COVID-19 vaccine series completed     booster x 1    Epigastric abdominal pain     2021 cardiac ruled out with relief from GI cocktail.  Had been onn  daily    Fibroid     High blood pressure     PAF (paroxysmal atrial fibrillation) (CMS/HCC)     follows with Dr. Borges every 3 months    Renal insufficiency     change of cardiac meds and diurectic improved labs BUN eGFR    UTI (urinary tract infection)        OB History    Para Term  AB Living   2 2           SAB IAB Ectopic Multiple Live Births                  # Outcome Date GA Lbr Rylan/2nd Weight Sex Delivery Anes PTL Lv   2 Para            1 Para              Gynecologic History:  Age at menarche: No age on file  Age at first live birth: 29   Age at menopause: No age on file    Past Surgical " History:   Procedure Laterality Date    BREAST BIOPSY      2 LEFT, 1 RIGHT    BREAST LUMPECTOMY Left 2006    CATARACT EXTRACTION, BILATERAL      COLONOSCOPY      6-7 years ago, no polyps    ESOPHAGOSCOPY / EGD      LAPAROSCOPIC LIVER CYST FENESTRATION  1995    liquid liver cyst and aspirated at [facility]    MASTECTOMY      left 2006, right 2009    TOTAL ABDOMINAL HYSTERECTOMY W/ BILATERAL SALPINGOOPHORECTOMY      age 51, due to bleeding       Social History     Tobacco Use    Smoking status: Never    Smokeless tobacco: Never   Vaping Use    Vaping Use: Never used   Substance Use Topics    Alcohol use: Yes     Alcohol/week: 1.0 standard drink of alcohol     Types: 1 Glasses of wine per week     Comment: rarely    Drug use: Never       Family History   Problem Relation Age of Onset    Diabetes Biological Father     Diabetes Biological Brother     Multiple myeloma Biological Son     Breast cancer Other         Sister's daughter         Allergies  Clindamycin, Codeine, Macrobid [nitrofurantoin monohyd/m-cryst], and Oxycodone-acetaminophen    Medications    Current Outpatient Medications:     amiodarone (PACERONE) 200 mg tablet, , Disp: , Rfl:     anastrozole (ARIMIDEX) 1 mg tablet, Take  1 tablet (1 mg total) daily  Swallow whole with a drink of water., Disp: 90 tablet, Rfl: 3    biotin 5,000 mcg tablet,disintegrating, Take by mouth daily., Disp: , Rfl:     cholecalciferol, vitamin D3, 1,000 unit (25 mcg) tablet, Take 1,000 Units by mouth daily., Disp: , Rfl:     coenzyme Q10 (COQ10) 10 mg capsule, Take 10 mg by mouth daily.  , Disp: , Rfl:     cranberry extract 425 mg capsule, Take by mouth daily., Disp: , Rfl:     hydrochlorothiazide (HYDRODIURIL) 25 mg tablet, Take 25 mg by mouth every morning., Disp: , Rfl:     lutein 6 mg capsule, Take 6 mg by mouth daily.  , Disp: , Rfl:     pantoprazole (PROTONIX) 40 mg EC tablet, TAKE 1 TABLET BY MOUTH  EVERY OTHER DAY, Disp: 45 tablet, Rfl: 3    red  yeast rice 600 mg capsule, Take 1 capsule by mouth 2 (two) times a day.  , Disp: , Rfl:     rivaroxaban (XARELTO) 15 mg tablet, Take 1 tablet (15 mg total) by mouth daily with dinner., Disp: 90 tablet, Rfl: 1    LOSARTAN POTASSIUM, BULK, MISC, Take 50 mg by mouth as needed., Disp: , Rfl:      Laboratory    No results found for this or any previous visit (from the past 504 hour(s)).      Radiology  No new Radiology.  No results found.    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  She was treated with 5 years of Arimidex following her right mastectomy.  In March 2023 she was found to have a chest wall recurrence on the right.  Cancer was ER positive (98%, [place] positive (87%), HER2 negative (0), Ki-67 51%.  She underwent local excision in April 2023.  She started Arimidex in May 2023.  She completed adjuvant radiation therapy in July 2023.    Clinically she is doing well without any signs or symptoms of recurrence.  She is on anastrozole and seems to be tolerating this without significant difficulty.  Given her age and comorbidities, we have opted not to add adjuvant abemaciclib.  She will continue on anastrozole with the plan for at least 5 years of treatment.  She will be following up with Dr. Matthews in January and I will see her back in the office in 6 months.  She was asked to call with questions or concerns prior to her next visit.      Nubia Garcia MD

## 2023-10-26 ENCOUNTER — OFFICE VISIT (OUTPATIENT)
Dept: INTERNAL MEDICINE | Facility: CLINIC | Age: 88
End: 2023-10-26
Payer: MEDICARE

## 2023-10-26 VITALS
WEIGHT: 158.2 LBS | BODY MASS INDEX: 27.01 KG/M2 | HEIGHT: 64 IN | OXYGEN SATURATION: 95 % | DIASTOLIC BLOOD PRESSURE: 65 MMHG | TEMPERATURE: 98.4 F | HEART RATE: 63 BPM | RESPIRATION RATE: 16 BRPM | SYSTOLIC BLOOD PRESSURE: 140 MMHG

## 2023-10-26 DIAGNOSIS — K21.9 GASTROESOPHAGEAL REFLUX DISEASE WITHOUT ESOPHAGITIS: ICD-10-CM

## 2023-10-26 DIAGNOSIS — M54.2 NECK PAIN: ICD-10-CM

## 2023-10-26 DIAGNOSIS — I48.0 PAROXYSMAL ATRIAL FIBRILLATION (CMS/HCC): ICD-10-CM

## 2023-10-26 DIAGNOSIS — J02.9 SORE THROAT: ICD-10-CM

## 2023-10-26 DIAGNOSIS — I10 ESSENTIAL HYPERTENSION: ICD-10-CM

## 2023-10-26 DIAGNOSIS — Z00.00 HEALTHCARE MAINTENANCE: ICD-10-CM

## 2023-10-26 DIAGNOSIS — R79.89 LFTS ABNORMAL: ICD-10-CM

## 2023-10-26 PROCEDURE — 99214 OFFICE O/P EST MOD 30 MIN: CPT | Performed by: INTERNAL MEDICINE

## 2023-10-26 NOTE — ASSESSMENT & PLAN NOTE
Likely due to degenerative changes  · Start PT  · Can take tylenol.   · Tylenol 1,000mg every 8 hours as needed

## 2023-10-26 NOTE — PROGRESS NOTES
"Chief Complaint   Patient presents with    Other      4 month follow  up  Neck pain, right side        Subjective      Patient ID: Latrell Ojeda is a 88 y.o. female.    HPI     Neck pain: CT cervical spine showed degenerative changes.  Sometimes it radiates to head    Elevated LFTs: Improved.  On lower dose of amiodarone    PAF: Saw EP.  Stable on lower dose of amiodarone    GERD: Controlled    HTN: Stable      Objective     Vitals:    10/26/23 1531 10/26/23 1555   BP: (!) 148/80 140/65   BP Location: Left upper arm    Patient Position: Sitting    Pulse: 63    Resp: 16    Temp: 36.9 °C (98.4 °F)    TempSrc: Oral    SpO2: 95%    Weight: 71.8 kg (158 lb 3.2 oz)    Height: 1.632 m (5' 4.25\")        The following have been reviewed and updated as appropriate in this visit:   Allergies  Meds  Problems        Review of Systems   Constitutional: Negative for chills and fever.   Cardiovascular: Negative for chest pain.   Gastrointestinal: Negative for abdominal pain.         Current Outpatient Medications:     amiodarone (PACERONE) 200 mg tablet, Take 100 mg by mouth daily. , Disp: , Rfl:     anastrozole (ARIMIDEX) 1 mg tablet, Take  1 tablet (1 mg total) daily  Swallow whole with a drink of water., Disp: 90 tablet, Rfl: 3    biotin 5,000 mcg tablet,disintegrating, Take by mouth daily., Disp: , Rfl:     cholecalciferol, vitamin D3, 1,000 unit (25 mcg) tablet, Take 1,000 Units by mouth daily., Disp: , Rfl:     coenzyme Q10 (COQ10) 10 mg capsule, Take 10 mg by mouth daily.  , Disp: , Rfl:     cranberry extract 425 mg capsule, Take by mouth daily., Disp: , Rfl:     hydrochlorothiazide (HYDRODIURIL) 25 mg tablet, Take 25 mg by mouth every morning., Disp: , Rfl:     LOSARTAN POTASSIUM, BULK, MISC, Take 50 mg by mouth as needed., Disp: , Rfl:     lutein 6 mg capsule, Take 6 mg by mouth daily.  , Disp: , Rfl:     pantoprazole (PROTONIX) 40 mg EC tablet, TAKE 1 TABLET BY MOUTH  EVERY OTHER DAY, Disp: 45 tablet, " Rfl: 3    red yeast rice 600 mg capsule, Take 1 capsule by mouth 2 (two) times a day.  , Disp: , Rfl:     rivaroxaban (XARELTO) 15 mg tablet, Take 1 tablet (15 mg total) by mouth daily with dinner., Disp: 90 tablet, Rfl: 1    Physical Exam  Vitals reviewed.   Constitutional:       General: She is not in acute distress.     Appearance: She is well-developed.   HENT:      Head: Normocephalic and atraumatic.   Eyes:      Conjunctiva/sclera: Conjunctivae normal.   Cardiovascular:      Rate and Rhythm: Normal rate and regular rhythm.   Pulmonary:      Effort: Pulmonary effort is normal. No respiratory distress.      Breath sounds: Normal breath sounds. No wheezing or rales.   Abdominal:      General: There is no distension.      Palpations: Abdomen is soft.      Tenderness: There is no abdominal tenderness. There is no guarding.   Neurological:      Mental Status: She is alert. Mental status is at baseline.   Psychiatric:         Mood and Affect: Mood normal.         Behavior: Behavior normal.         Assessment/Plan   Neck pain  Likely due to degenerative changes  · Start PT  · Can take tylenol.   · Tylenol 1,000mg every 8 hours as needed      Sore throat  resolved with nystatin     Gastroesophageal reflux disease without esophagitis  Stable  · Continue pantoprazole every other day     Essential hypertension  BP was running low in past losartan DC'd  BP borderline high here today  · Continue HCTZ  · Check BP at home 3 times a week. Please call me if BP greater than 140/80    Healthcare maintenance  Get RSV vaccine at pharmacy     A-fib (CMS/[facility])  On lowered dose of amiodarone  · Continue amiodarone, Xarelto    LFTs abnormal  Resolved  Possibly due to amiodarone  Saw GI      Orders Placed This Encounter   Procedures    Ambulatory referral to Physical Therapy     No orders of the defined types were placed in this encounter.      Return in about 4 months (around 2/26/2024).     SANDI BLACKBURN MD

## 2023-10-26 NOTE — PATIENT INSTRUCTIONS
Neck pain  Likely due to degenerative changes  Start PT  Can take tylenol.   Tylenol 1,000mg every 8 hours as needed      Sore throat  resolved with nystatin     Gastroesophageal reflux disease without esophagitis  Continue pantoprazole every other day     Essential hypertension  BP was running low in past losartan DC'd  Continue HCTZ  Check BP at home 3 times a week. Please call me if BP greater than 140/80    Healthcare maintenance  Get RSV vaccine at pharmacy     Return in about 4 months (around 2/26/2024).

## 2023-10-26 NOTE — ASSESSMENT & PLAN NOTE
BP was running low in past losartan DC'd  BP borderline high here today  · Continue HCTZ  · Check BP at home 3 times a week. Please call me if BP greater than 140/80

## 2023-10-27 PROBLEM — J34.89 RHINORRHEA: Status: RESOLVED | Noted: 2020-12-14 | Resolved: 2023-10-27

## 2023-10-27 PROBLEM — R21 RASH: Status: RESOLVED | Noted: 2021-06-11 | Resolved: 2023-10-27

## 2023-10-27 PROBLEM — N28.9 RENAL INSUFFICIENCY: Status: RESOLVED | Noted: 2023-05-02 | Resolved: 2023-10-27

## 2023-10-27 PROBLEM — N30.00 ACUTE CYSTITIS WITHOUT HEMATURIA: Status: RESOLVED | Noted: 2023-06-20 | Resolved: 2023-10-27

## 2023-10-27 ASSESSMENT — ENCOUNTER SYMPTOMS
FEVER: 0
CHILLS: 0
ABDOMINAL PAIN: 0

## 2023-11-07 ENCOUNTER — CLINICAL SUPPORT (OUTPATIENT)
Dept: INTERNAL MEDICINE | Facility: CLINIC | Age: 88
End: 2023-11-07
Payer: MEDICARE

## 2023-11-07 DIAGNOSIS — R35.0 URINE FREQUENCY: Primary | ICD-10-CM

## 2023-11-07 LAB
BILIRUBIN, POC: NEGATIVE
BLOOD URINE, POC: POSITIVE
CLARITY, POC: ABNORMAL
COLOR, POC: YELLOW
EXPIRATION DATE: ABNORMAL
GLUCOSE URINE, POC: NEGATIVE
KETONES, POC: NEGATIVE
LEUKOCYTE EST, POC: ABNORMAL
Lab: ABNORMAL
NITRITE, POC: NEGATIVE
PH, POC: 5.5
POCT MANUFACTURER: ABNORMAL
PROTEIN, POC: NEGATIVE
SPECIFIC GRAVITY, POC: 1.01
UROBILINOGEN, POC: 0.2

## 2023-11-07 PROCEDURE — 87086 URINE CULTURE/COLONY COUNT: CPT | Performed by: INTERNAL MEDICINE

## 2023-11-07 PROCEDURE — 81003 URINALYSIS AUTO W/O SCOPE: CPT | Performed by: INTERNAL MEDICINE

## 2023-11-07 PROCEDURE — 81002 URINALYSIS NONAUTO W/O SCOPE: CPT | Performed by: INTERNAL MEDICINE

## 2023-11-08 ENCOUNTER — TELEPHONE (OUTPATIENT)
Dept: INTERNAL MEDICINE | Facility: CLINIC | Age: 88
End: 2023-11-08
Payer: MEDICARE

## 2023-11-08 LAB
BACTERIA URNS QL MICRO: ABNORMAL /HPF
BILIRUB UR QL STRIP.AUTO: NEGATIVE MG/DL
CLARITY UR REFRACT.AUTO: ABNORMAL
COLOR UR AUTO: ABNORMAL
GLUCOSE UR STRIP.AUTO-MCNC: NEGATIVE MG/DL
HGB UR QL STRIP.AUTO: 3
HYALINE CASTS #/AREA URNS LPF: ABNORMAL /LPF
KETONES UR STRIP.AUTO-MCNC: NEGATIVE MG/DL
LEUKOCYTE ESTERASE UR QL STRIP.AUTO: 3
NITRITE UR QL STRIP.AUTO: NEGATIVE
PH UR STRIP.AUTO: 6 [PH]
PROT UR QL STRIP.AUTO: ABNORMAL
RBC #/AREA URNS HPF: ABNORMAL /HPF
SP GR UR REFRACT.AUTO: 1.01
SQUAMOUS URNS QL MICRO: ABNORMAL /HPF
UROBILINOGEN UR STRIP-ACNC: 0.2 EU/DL
WBC #/AREA URNS HPF: ABNORMAL /HPF

## 2023-11-08 NOTE — TELEPHONE ENCOUNTER
Patient would like the results of her urine . She was made aware that the culture is still  in process . Patient insist that she gets Bactrim prescribed to her now. Please advise thank you

## 2023-11-09 RX ORDER — SULFAMETHOXAZOLE AND TRIMETHOPRIM 800; 160 MG/1; MG/1
1 TABLET ORAL 2 TIMES DAILY
Qty: 14 TABLET | Refills: 0 | Status: SHIPPED | OUTPATIENT
Start: 2023-11-09 | End: 2023-11-16

## 2023-11-09 NOTE — TELEPHONE ENCOUNTER
Spoke to pt.   Will send in bactrim longer course  Advised to f/u urology also for long term plan for freq UTIs

## 2023-11-10 LAB
BACTERIA UR CULT: ABNORMAL

## 2023-12-06 DIAGNOSIS — M54.2 CERVICALGIA: Primary | ICD-10-CM

## 2024-01-05 DIAGNOSIS — M54.2 CERVICALGIA: Primary | ICD-10-CM

## 2024-01-29 ENCOUNTER — OFFICE VISIT (OUTPATIENT)
Dept: SURGERY | Facility: CLINIC | Age: 89
End: 2024-01-29
Payer: MEDICARE

## 2024-01-29 VITALS
BODY MASS INDEX: 26.76 KG/M2 | OXYGEN SATURATION: 96 % | HEIGHT: 65 IN | SYSTOLIC BLOOD PRESSURE: 172 MMHG | HEART RATE: 68 BPM | TEMPERATURE: 98 F | DIASTOLIC BLOOD PRESSURE: 82 MMHG | WEIGHT: 160.6 LBS

## 2024-01-29 DIAGNOSIS — C50.911 CHEST WALL RECURRENCE OF BREAST CANCER, RIGHT (CMS/HCC): Primary | ICD-10-CM

## 2024-01-29 DIAGNOSIS — Z85.3 PERSONAL HISTORY OF MALIGNANT NEOPLASM OF BREAST: ICD-10-CM

## 2024-01-29 DIAGNOSIS — C79.89 CHEST WALL RECURRENCE OF BREAST CANCER, RIGHT (CMS/HCC): Primary | ICD-10-CM

## 2024-01-29 PROCEDURE — 99214 OFFICE O/P EST MOD 30 MIN: CPT | Performed by: SURGERY

## 2024-01-29 RX ORDER — METHENAMINE HIPPURATE 1000 MG/1
TABLET ORAL
COMMUNITY
Start: 2023-11-14 | End: 2024-08-26 | Stop reason: ALTCHOICE

## 2024-01-29 ASSESSMENT — ENCOUNTER SYMPTOMS
NECK STIFFNESS: 1
NECK PAIN: 1

## 2024-01-29 NOTE — PROGRESS NOTES
[facility]  Dr. Peggy Matthews  [address] [place] [address]  Phone: [phone]  Fax: [phone]      Patient ID: Latrell Ojeda                              : 1935    Visit Date: 2024  Referring Provider: No ref. provider found   PCP: Aris Miller MD  GYN: No care team member to display    Subjective:  Latrell presents for 6-month follow-up of breast cancer.  She was originally diagnosed with a left breast cancer treated with lumpectomy followed by mastectomy in .  She had right breast cancer treated with mastectomy and reconstruction in .  She took 5 years of tamoxifen and then noticed a new lump on her reconstructed right breast in .  Biopsy revealed invasive ductal carcinoma, grade 2, ER/KY positive, HER2 negative.  On 2023 she had a wide local excision which revealed invasive moderately differentiated ductal carcinoma present within the subcutaneous tissue and skeletal muscle measuring 1.2 cm, present at the medial, lateral, inferior, and posterior margins.  Reexcised posterior inferior medial margin also showed moderately differentiated ductal carcinoma involving the skeletal muscle, present at the inked margin.  No additional surgery was done as it was unclear how much additional tissue would need to be removed.  She proceeded with radiation.  She is on Arimidex.  She reports she is tolerating that well with no side effects.  She does report some neck pain and neck stiffness.     Oncology History:     Cancer Staging   Chest wall recurrence of breast cancer, right (CMS/HCC)  Staging form: Breast, AJCC 8th Edition  - Clinical stage from 4/10/2023: Stage IA (rcT1b, cN0, cM0, G2, ER+, KY+, HER2-) - Signed by Peggy Matthews MD on 2023         Oncology History   Chest wall recurrence of breast cancer, right (CMS/HCC)   4/10/2023 Cancer Staged    Staging form: Breast, AJCC 8th Edition  - Clinical stage from  "4/10/2023: Stage IA (rcT1b, cN0, cM0, G2, ER+, MD+, HER2-)           Review of Systems   Musculoskeletal: Positive for neck pain and neck stiffness.   All other systems reviewed and are negative.         Physical Exam:    Vitals:   Visit Vitals  BP (!) 172/82   Pulse 68   Temp 36.7 °C (98 °F)   Ht 1.651 m (5' 5\")   Wt 72.8 kg (160 lb 9.6 oz)   SpO2 96%   BMI 26.73 kg/m²     Body mass index is 26.73 kg/m².    Physical Exam  Constitutional:       Appearance: Normal appearance.   HENT:      Head: Normocephalic and atraumatic.   Cardiovascular:      Rate and Rhythm: Normal rate and regular rhythm.      Heart sounds: Normal heart sounds.   Pulmonary:      Effort: Pulmonary effort is normal.      Breath sounds: Normal breath sounds.   Chest:      Comments: Status post bilateral mastectomy with implant reconstruction.  There is some darkening of the skin of the right breast from radiation, otherwise no skin changes noted.  Right breast excisional biopsy site well-healed with no residual nodularity.  No masses noted on either reconstructed breast.  No axillary or supraclavicular adenopathy present.  Slight tenderness to palpation lateral right chest wall but no nodularity noted.  Abdominal:      General: Abdomen is flat.      Palpations: Abdomen is soft.   Musculoskeletal:         General: Normal range of motion.   Lymphadenopathy:      Cervical: No cervical adenopathy.   Skin:     General: Skin is warm and dry.   Neurological:      General: No focal deficit present.      Mental Status: She is alert and oriented to person, place, and time.   Psychiatric:         Mood and Affect: Mood normal.         Behavior: Behavior normal.       Breast Imaging: I have personally reviewed the reports and images as follows.  No new imaging to review  Impression:  Chest wall recurrence right breast  Personal history of bilateral breast cancer  Recommendation and Plan:   Latrell presents for follow-up of her left chest wall recurrence of the " right breast.  She has completed her radiation and is on Arimidex.  She reports she is tolerating this well.  She does report some pain on the lateral side of her right breast, she feels like she has some pulling when she moves this area.  She was trying calendula on the area, I recommended trying Voltaren gel to see if that would help with the pain.  She has no evidence of local recurrence or metastatic disease at this time.  She will continue self exams, healthy diet and exercise, and follow-up with me in the office in about 6 months for recheck.  She will call with any concerns.    All of the questions were answered.  The patient is in agreement with the treatment plan.      No follow-ups on file.        1/29/2024   5:05 PM    MD Peggy Alcala MD

## 2024-01-29 NOTE — LETTER
2024     Aris Miller MD  [id number] [facility] [id number]    Patient: Latrell Ojeda  YOB: 1935  Date of Visit: 2024      Dear Dr. Miller:    Thank you for referring Latrell Ojeda to me for evaluation. Below are my notes for this consultation.    If you have questions, please do not hesitate to call me. I look forward to following your patient along with you.         Sincerely,        Peggy Matthews MD        CC: Rohini Michelle Sarin Oneil, MD Sandra Urtishak, MD Gregory J Ochsner, MD Carruthers, Catherine D, MD  2024  5:06 PM  Signed      [facility]  Dr. Peggy Matthews  [address] [place] [address]  Phone: [phone]  Fax: [phone]      Patient ID: Latrell Ojeda                              : 1935    Visit Date: 2024  Referring Provider: No ref. provider found   PCP: Aris Miller MD  GYN: No care team member to display    Subjective:  Latrell presents for 6-month follow-up of breast cancer.  She was originally diagnosed with a left breast cancer treated with lumpectomy followed by mastectomy in .  She had right breast cancer treated with mastectomy and reconstruction in .  She took 5 years of tamoxifen and then noticed a new lump on her reconstructed right breast in .  Biopsy revealed invasive ductal carcinoma, grade 2, ER/OR positive, HER2 negative.  On 2023 she had a wide local excision which revealed invasive moderately differentiated ductal carcinoma present within the subcutaneous tissue and skeletal muscle measuring 1.2 cm, present at the medial, lateral, inferior, and posterior margins.  Reexcised posterior inferior medial margin also showed moderately differentiated ductal carcinoma involving the skeletal muscle, present at the inked margin.  No additional surgery was done as it was unclear how much additional tissue would need to be  "removed.  She proceeded with radiation.  She is on Arimidex.  She reports she is tolerating that well with no side effects.  She does report some neck pain and neck stiffness.     Oncology History:     Cancer Staging   Chest wall recurrence of breast cancer, right (CMS/HCC)  Staging form: Breast, AJCC 8th Edition  - Clinical stage from 4/10/2023: Stage IA (rcT1b, cN0, cM0, G2, ER+, [place]+, HER2-) - Signed by Peggy Matthews MD on 4/11/2023         Oncology History   Chest wall recurrence of breast cancer, right (CMS/HCC)   4/10/2023 Cancer Staged    Staging form: Breast, AJCC 8th Edition  - Clinical stage from 4/10/2023: Stage IA (rcT1b, cN0, cM0, G2, ER+, WY+, HER2-)           Review of Systems   Musculoskeletal: Positive for neck pain and neck stiffness.   All other systems reviewed and are negative.         Physical Exam:    Vitals:   Visit Vitals  BP (!) 172/82   Pulse 68   Temp 36.7 °C (98 °F)   Ht 1.651 m (5' 5\")   Wt 72.8 kg (160 lb 9.6 oz)   SpO2 96%   BMI 26.73 kg/m²     Body mass index is 26.73 kg/m².    Physical Exam  Constitutional:       Appearance: Normal appearance.   HENT:      Head: Normocephalic and atraumatic.   Cardiovascular:      Rate and Rhythm: Normal rate and regular rhythm.      Heart sounds: Normal heart sounds.   Pulmonary:      Effort: Pulmonary effort is normal.      Breath sounds: Normal breath sounds.   Chest:      Comments: Status post bilateral mastectomy with implant reconstruction.  There is some darkening of the skin of the right breast from radiation, otherwise no skin changes noted.  Right breast excisional biopsy site well-healed with no residual nodularity.  No masses noted on either reconstructed breast.  No axillary or supraclavicular adenopathy present.  Slight tenderness to palpation lateral right chest wall but no nodularity noted.  Abdominal:      General: Abdomen is flat.      Palpations: Abdomen is soft.   Musculoskeletal:         General: Normal range of " motion.   Lymphadenopathy:      Cervical: No cervical adenopathy.   Skin:     General: Skin is warm and dry.   Neurological:      General: No focal deficit present.      Mental Status: She is alert and oriented to person, place, and time.   Psychiatric:         Mood and Affect: Mood normal.         Behavior: Behavior normal.       Breast Imaging: I have personally reviewed the reports and images as follows.  No new imaging to review  Impression:  Chest wall recurrence right breast  Personal history of bilateral breast cancer  Recommendation and Plan:   Latrell presents for follow-up of her left chest wall recurrence of the right breast.  She has completed her radiation and is on Arimidex.  She reports she is tolerating this well.  She does report some pain on the lateral side of her right breast, she feels like she has some pulling when she moves this area.  She was trying calendula on the area, I recommended trying Voltaren gel to see if that would help with the pain.  She has no evidence of local recurrence or metastatic disease at this time.  She will continue self exams, healthy diet and exercise, and follow-up with me in the office in about 6 months for recheck.  She will call with any concerns.    All of the questions were answered.  The patient is in agreement with the treatment plan.      No follow-ups on file.        1/29/2024   5:05 PM    MD Peggy Alcala MD

## 2024-02-14 ENCOUNTER — TRANSCRIBE ORDERS (OUTPATIENT)
Dept: SCHEDULING | Age: 89
End: 2024-02-14

## 2024-02-14 DIAGNOSIS — R31.0 GROSS HEMATURIA: Primary | ICD-10-CM

## 2024-02-23 ENCOUNTER — HOSPITAL ENCOUNTER (OUTPATIENT)
Dept: RADIOLOGY | Age: 89
Discharge: HOME | End: 2024-02-23
Attending: PHYSICIAN ASSISTANT
Payer: MEDICARE

## 2024-02-23 DIAGNOSIS — R31.0 GROSS HEMATURIA: ICD-10-CM

## 2024-02-23 PROCEDURE — 74178 CT ABD&PLV WO CNTR FLWD CNTR: CPT

## 2024-02-23 PROCEDURE — 63600105 HC IODINE BASED CONTRAST: Mod: JZ

## 2024-02-23 RX ORDER — IOPAMIDOL 755 MG/ML
100 INJECTION, SOLUTION INTRAVASCULAR
Status: COMPLETED | OUTPATIENT
Start: 2024-02-23 | End: 2024-02-23

## 2024-02-23 RX ADMIN — IOPAMIDOL 100 ML: 755 INJECTION, SOLUTION INTRAVENOUS at 13:20

## 2024-02-28 ENCOUNTER — LAB REQUISITION (OUTPATIENT)
Dept: LAB | Facility: HOSPITAL | Age: 89
End: 2024-02-28
Payer: MEDICARE

## 2024-02-28 DIAGNOSIS — N39.0 URINARY TRACT INFECTION, SITE NOT SPECIFIED: ICD-10-CM

## 2024-02-28 LAB
BACTERIA URNS QL MICRO: 1 /HPF
BILIRUB UR QL STRIP.AUTO: NEGATIVE MG/DL
CLARITY UR REFRACT.AUTO: ABNORMAL
COLOR UR AUTO: ABNORMAL
GLUCOSE UR STRIP.AUTO-MCNC: NEGATIVE MG/DL
HGB UR QL STRIP.AUTO: 3
HYALINE CASTS #/AREA URNS LPF: ABNORMAL /LPF
KETONES UR STRIP.AUTO-MCNC: NEGATIVE MG/DL
LEUKOCYTE ESTERASE UR QL STRIP.AUTO: 3
NITRITE UR QL STRIP.AUTO: NEGATIVE
PH UR STRIP.AUTO: 6 [PH]
PROT UR QL STRIP.AUTO: 2
RBC #/AREA URNS HPF: ABNORMAL /HPF
SP GR UR REFRACT.AUTO: 1.01
SQUAMOUS URNS QL MICRO: ABNORMAL /HPF
UROBILINOGEN UR STRIP-ACNC: 0.2 EU/DL
WBC #/AREA URNS HPF: ABNORMAL /HPF

## 2024-02-28 PROCEDURE — 81001 URINALYSIS AUTO W/SCOPE: CPT

## 2024-02-28 PROCEDURE — 87086 URINE CULTURE/COLONY COUNT: CPT

## 2024-02-29 ENCOUNTER — OFFICE VISIT (OUTPATIENT)
Dept: INTERNAL MEDICINE | Facility: CLINIC | Age: 89
End: 2024-02-29
Payer: MEDICARE

## 2024-02-29 VITALS
TEMPERATURE: 97.9 F | HEART RATE: 59 BPM | SYSTOLIC BLOOD PRESSURE: 138 MMHG | BODY MASS INDEX: 26.33 KG/M2 | WEIGHT: 158.2 LBS | DIASTOLIC BLOOD PRESSURE: 60 MMHG | OXYGEN SATURATION: 96 % | RESPIRATION RATE: 18 BRPM

## 2024-02-29 DIAGNOSIS — I48.0 PAROXYSMAL ATRIAL FIBRILLATION (CMS/HCC): Primary | ICD-10-CM

## 2024-02-29 DIAGNOSIS — R73.03 PREDIABETES: ICD-10-CM

## 2024-02-29 DIAGNOSIS — R05.2 SUBACUTE COUGH: ICD-10-CM

## 2024-02-29 DIAGNOSIS — E78.5 HYPERLIPIDEMIA, UNSPECIFIED HYPERLIPIDEMIA TYPE: ICD-10-CM

## 2024-02-29 DIAGNOSIS — N18.32 CHRONIC KIDNEY DISEASE, STAGE 3B (CMS/HCC): ICD-10-CM

## 2024-02-29 DIAGNOSIS — N39.0 FREQUENT UTI: ICD-10-CM

## 2024-02-29 DIAGNOSIS — I10 ESSENTIAL HYPERTENSION: ICD-10-CM

## 2024-02-29 DIAGNOSIS — E55.9 VITAMIN D DEFICIENCY: ICD-10-CM

## 2024-02-29 DIAGNOSIS — R49.1 LOSS OF VOICE: ICD-10-CM

## 2024-02-29 PROBLEM — J02.9 SORE THROAT: Status: RESOLVED | Noted: 2023-09-21 | Resolved: 2024-02-29

## 2024-02-29 PROBLEM — Z00.00 HEALTHCARE MAINTENANCE: Status: RESOLVED | Noted: 2023-10-26 | Resolved: 2024-02-29

## 2024-02-29 PROBLEM — S06.0XAA CONCUSSION: Status: RESOLVED | Noted: 2023-06-20 | Resolved: 2024-02-29

## 2024-02-29 PROBLEM — E78.49 OTHER HYPERLIPIDEMIA: Status: ACTIVE | Noted: 2024-02-29

## 2024-02-29 PROCEDURE — 99214 OFFICE O/P EST MOD 30 MIN: CPT | Performed by: INTERNAL MEDICINE

## 2024-02-29 PROCEDURE — G2211 COMPLEX E/M VISIT ADD ON: HCPCS | Performed by: INTERNAL MEDICINE

## 2024-02-29 RX ORDER — METHYLPREDNISOLONE 4 MG/1
TABLET ORAL
Qty: 21 TABLET | Refills: 0 | Status: SHIPPED | OUTPATIENT
Start: 2024-02-29 | End: 2024-03-07

## 2024-03-01 ENCOUNTER — HOSPITAL ENCOUNTER (OUTPATIENT)
Dept: RADIOLOGY | Age: 89
Discharge: HOME | End: 2024-03-01
Attending: INTERNAL MEDICINE
Payer: MEDICARE

## 2024-03-01 DIAGNOSIS — R05.2 SUBACUTE COUGH: ICD-10-CM

## 2024-03-01 LAB
BACTERIA UR CULT: ABNORMAL
BACTERIA UR CULT: ABNORMAL

## 2024-03-01 PROCEDURE — 71046 X-RAY EXAM CHEST 2 VIEWS: CPT

## 2024-03-02 RX ORDER — AZITHROMYCIN 250 MG/1
TABLET, FILM COATED ORAL
Qty: 6 TABLET | Refills: 0 | Status: SHIPPED | OUTPATIENT
Start: 2024-03-02 | End: 2024-03-07

## 2024-03-02 RX ORDER — CEFPODOXIME PROXETIL 200 MG/1
200 TABLET, FILM COATED ORAL 2 TIMES DAILY
Qty: 14 TABLET | Refills: 0 | Status: SHIPPED | OUTPATIENT
Start: 2024-03-02 | End: 2024-03-09

## 2024-03-13 RX ORDER — ANASTROZOLE 1 MG/1
TABLET ORAL
Qty: 90 TABLET | Refills: 3 | Status: SHIPPED | OUTPATIENT
Start: 2024-03-13 | End: 2024-05-16

## 2024-03-29 ENCOUNTER — CLINICAL SUPPORT (OUTPATIENT)
Dept: INTERNAL MEDICINE | Facility: CLINIC | Age: 89
End: 2024-03-29
Payer: MEDICARE

## 2024-03-29 DIAGNOSIS — E55.9 VITAMIN D DEFICIENCY: ICD-10-CM

## 2024-03-29 DIAGNOSIS — I48.0 PAROXYSMAL ATRIAL FIBRILLATION (CMS/HCC): ICD-10-CM

## 2024-03-29 DIAGNOSIS — R73.03 PREDIABETES: ICD-10-CM

## 2024-03-29 DIAGNOSIS — E78.5 HYPERLIPIDEMIA, UNSPECIFIED HYPERLIPIDEMIA TYPE: ICD-10-CM

## 2024-04-10 ENCOUNTER — APPOINTMENT (OUTPATIENT)
Dept: LAB | Facility: HOSPITAL | Age: 89
End: 2024-04-10
Attending: INTERNAL MEDICINE
Payer: MEDICARE

## 2024-04-10 ENCOUNTER — TRANSCRIBE ORDERS (OUTPATIENT)
Dept: REGISTRATION | Facility: HOSPITAL | Age: 89
End: 2024-04-10

## 2024-04-10 DIAGNOSIS — R31.0 GROSS HEMATURIA: Primary | ICD-10-CM

## 2024-04-10 DIAGNOSIS — R31.0 GROSS HEMATURIA: ICD-10-CM

## 2024-04-10 LAB
25(OH)D3 SERPL-MCNC: 54 NG/ML (ref 30–100)
ALBUMIN SERPL-MCNC: 4.1 G/DL (ref 3.5–5.7)
ALP SERPL-CCNC: 65 IU/L (ref 34–125)
ALT SERPL-CCNC: 31 IU/L (ref 7–52)
AMORPH CRY #/AREA URNS HPF: ABNORMAL /HPF
ANION GAP SERPL CALC-SCNC: 9 MEQ/L (ref 3–15)
AST SERPL-CCNC: 26 IU/L (ref 13–39)
BACTERIA URNS QL MICRO: ABNORMAL /HPF
BASOPHILS # BLD: 0.04 K/UL (ref 0.01–0.1)
BASOPHILS NFR BLD: 0.8 %
BILIRUB SERPL-MCNC: 1.8 MG/DL (ref 0.3–1.2)
BILIRUB UR QL STRIP.AUTO: NEGATIVE MG/DL
BUN SERPL-MCNC: 23 MG/DL (ref 7–25)
CALCIUM SERPL-MCNC: 9.2 MG/DL (ref 8.6–10.3)
CHLORIDE SERPL-SCNC: 97 MEQ/L (ref 98–107)
CHOLEST SERPL-MCNC: 196 MG/DL
CLARITY UR REFRACT.AUTO: CLEAR
CO2 SERPL-SCNC: 29 MEQ/L (ref 21–31)
COLOR UR AUTO: YELLOW
CREAT SERPL-MCNC: 1 MG/DL (ref 0.6–1.2)
DIFFERENTIAL METHOD BLD: ABNORMAL
EGFRCR SERPLBLD CKD-EPI 2021: 54.6 ML/MIN/1.73M*2
EOSINOPHIL # BLD: 0.02 K/UL (ref 0.04–0.36)
EOSINOPHIL NFR BLD: 0.4 %
ERYTHROCYTE [DISTWIDTH] IN BLOOD BY AUTOMATED COUNT: 13.5 % (ref 11.7–14.4)
EST. AVERAGE GLUCOSE BLD GHB EST-MCNC: 146 MG/DL
GLUCOSE SERPL-MCNC: 135 MG/DL (ref 70–99)
GLUCOSE UR STRIP.AUTO-MCNC: NEGATIVE MG/DL
HBA1C MFR BLD: 6.7 %
HCT VFR BLD AUTO: 40.1 % (ref 35–45)
HDLC SERPL-MCNC: 77 MG/DL
HDLC SERPL: 2.5 {RATIO}
HGB BLD-MCNC: 13.6 G/DL (ref 11.8–15.7)
HGB UR QL STRIP.AUTO: NEGATIVE
HYALINE CASTS #/AREA URNS LPF: ABNORMAL /LPF
IMM GRANULOCYTES # BLD AUTO: 0.02 K/UL (ref 0–0.08)
IMM GRANULOCYTES NFR BLD AUTO: 0.4 %
KETONES UR STRIP.AUTO-MCNC: NEGATIVE MG/DL
LDLC SERPL CALC-MCNC: 100 MG/DL
LEUKOCYTE ESTERASE UR QL STRIP.AUTO: ABNORMAL
LYMPHOCYTES # BLD: 0.88 K/UL (ref 1.2–3.5)
LYMPHOCYTES NFR BLD: 17.1 %
MCH RBC QN AUTO: 30.5 PG (ref 28–33.2)
MCHC RBC AUTO-ENTMCNC: 33.9 G/DL (ref 32.2–35.5)
MCV RBC AUTO: 89.9 FL (ref 83–98)
MONOCYTES # BLD: 0.36 K/UL (ref 0.28–0.8)
MONOCYTES NFR BLD: 7 %
NEUTROPHILS # BLD: 3.83 K/UL (ref 1.7–7)
NEUTS SEG NFR BLD: 74.3 %
NITRITE UR QL STRIP.AUTO: NEGATIVE
NONHDLC SERPL-MCNC: 119 MG/DL
NRBC BLD-RTO: 0 %
PDW BLD AUTO: 12.7 FL (ref 9.4–12.3)
PH UR STRIP.AUTO: 6.5 [PH]
PLATELET # BLD AUTO: 136 K/UL (ref 150–369)
POTASSIUM SERPL-SCNC: 4 MEQ/L (ref 3.5–5.1)
PROT SERPL-MCNC: 6.9 G/DL (ref 6–8.2)
PROT UR QL STRIP.AUTO: NEGATIVE
RBC # BLD AUTO: 4.46 M/UL (ref 3.93–5.22)
RBC #/AREA URNS HPF: ABNORMAL /HPF
SODIUM SERPL-SCNC: 135 MEQ/L (ref 136–145)
SP GR UR REFRACT.AUTO: 1.01
SQUAMOUS URNS QL MICRO: ABNORMAL /HPF
T4 FREE SERPL-MCNC: 1.59 NG/DL (ref 0.58–1.64)
TRIGL SERPL-MCNC: 97 MG/DL
TSH SERPL DL<=0.05 MIU/L-ACNC: 7.88 MIU/L (ref 0.34–5.6)
UROBILINOGEN UR STRIP-ACNC: 0.2 EU/DL
WBC # BLD AUTO: 5.15 K/UL (ref 3.8–10.5)
WBC #/AREA URNS HPF: ABNORMAL /HPF

## 2024-04-10 PROCEDURE — 84443 ASSAY THYROID STIM HORMONE: CPT | Performed by: INTERNAL MEDICINE

## 2024-04-10 PROCEDURE — 80061 LIPID PANEL: CPT | Performed by: INTERNAL MEDICINE

## 2024-04-10 PROCEDURE — 84439 ASSAY OF FREE THYROXINE: CPT | Performed by: INTERNAL MEDICINE

## 2024-04-10 PROCEDURE — 36415 COLL VENOUS BLD VENIPUNCTURE: CPT

## 2024-04-10 PROCEDURE — 82306 VITAMIN D 25 HYDROXY: CPT | Performed by: INTERNAL MEDICINE

## 2024-04-10 PROCEDURE — 81001 URINALYSIS AUTO W/SCOPE: CPT

## 2024-04-10 PROCEDURE — 85025 COMPLETE CBC W/AUTO DIFF WBC: CPT | Performed by: INTERNAL MEDICINE

## 2024-04-10 PROCEDURE — 80053 COMPREHEN METABOLIC PANEL: CPT | Performed by: INTERNAL MEDICINE

## 2024-04-10 PROCEDURE — 83036 HEMOGLOBIN GLYCOSYLATED A1C: CPT | Performed by: INTERNAL MEDICINE

## 2024-05-16 ENCOUNTER — OFFICE VISIT (OUTPATIENT)
Dept: HEMATOLOGY/ONCOLOGY | Facility: CLINIC | Age: 89
End: 2024-05-16
Attending: INTERNAL MEDICINE
Payer: MEDICARE

## 2024-05-16 VITALS
BODY MASS INDEX: 26.46 KG/M2 | HEART RATE: 68 BPM | WEIGHT: 159 LBS | DIASTOLIC BLOOD PRESSURE: 75 MMHG | SYSTOLIC BLOOD PRESSURE: 158 MMHG | OXYGEN SATURATION: 96 %

## 2024-05-16 DIAGNOSIS — C79.89 CHEST WALL RECURRENCE OF BREAST CANCER, RIGHT (CMS/HCC): ICD-10-CM

## 2024-05-16 DIAGNOSIS — C50.911 CHEST WALL RECURRENCE OF BREAST CANCER, RIGHT (CMS/HCC): ICD-10-CM

## 2024-05-16 PROCEDURE — 99214 OFFICE O/P EST MOD 30 MIN: CPT | Performed by: INTERNAL MEDICINE

## 2024-05-16 RX ORDER — ANASTROZOLE 1 MG/1
1 TABLET ORAL DAILY
Start: 2024-05-16 | End: 2024-07-09 | Stop reason: SDUPTHER

## 2024-05-16 RX ORDER — ASCORBIC ACID 500 MG
TABLET,CHEWABLE ORAL
COMMUNITY

## 2024-05-16 ASSESSMENT — ENCOUNTER SYMPTOMS
CONSTITUTIONAL NEGATIVE: 1
CARDIOVASCULAR NEGATIVE: 1
NEUROLOGICAL NEGATIVE: 1
RESPIRATORY NEGATIVE: 1
ENDOCRINE NEGATIVE: 1
PSYCHIATRIC NEGATIVE: 1
ARTHRALGIAS: 1
ABDOMINAL DISTENTION: 1
EYES NEGATIVE: 1
HEMATOLOGIC/LYMPHATIC NEGATIVE: 1

## 2024-05-16 ASSESSMENT — PAIN SCALES - GENERAL: PAINLEVEL: 0-NO PAIN

## 2024-05-16 NOTE — PROGRESS NOTES
Review of Systems   Constitutional: Negative.    HENT:  Negative.     Eyes: Negative.    Respiratory: Negative.     Cardiovascular: Negative.    Gastrointestinal:  Positive for abdominal distention (sometimes).   Endocrine: Negative.    Genitourinary: Negative.     Musculoskeletal:  Positive for arthralgias.   Skin: Negative.    Neurological: Negative.    Hematological: Negative.    Psychiatric/Behavioral: Negative.

## 2024-05-16 NOTE — ASSESSMENT & PLAN NOTE
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  She was treated with 5 years of Arimidex following her right mastectomy.  In March 2023 she was found to have a chest wall recurrence on the right.  Cancer was ER positive (98%, GA positive (87%), HER2 negative (0), Ki-67 51%.  She underwent local excision in April 2023.  She started Arimidex in May 2023.  She completed adjuvant radiation therapy in July 2023.    Clinically she is doing well without any signs or symptoms of recurrence.  She is on anastrozole and seems to be tolerating this without significant difficulty.  Given her age and comorbidities, we have opted not to add adjuvant abemaciclib.  She will continue on anastrozole with the plan for at least 5 years of treatment.  She will be following up with Dr. Matthews in January and I will see her back in the office in 6 months.  She was asked to call with questions or concerns prior to her next visit.

## 2024-05-16 NOTE — PROGRESS NOTES
Latrell Ojeda is a 88 y.o. female,   :  1935    Encounter Diagnosis   Name Primary?    Chest wall recurrence of breast cancer, right (CMS/HCC)         Cancer Staging   Chest wall recurrence of breast cancer, right (CMS/HCC)  Staging form: Breast, AJCC 8th Edition  - Clinical stage from 4/10/2023: Stage IA (rcT1b, cN0, cM0, G2, ER+, PA+, HER2-) - Signed by Peggy Matthews MD on 2023      Oncology History   Chest wall recurrence of breast cancer, right (CMS/HCC)   4/10/2023 Cancer Staged    Staging form: Breast, AJCC 8th Edition  - Clinical stage from 4/10/2023: Stage IA (rcT1b, cN0, cM0, G2, ER+, PA+, HER2-)         Patient Active Problem List   Diagnosis    Essential hypertension    Gastroesophageal reflux disease without esophagitis    Palpitation    Prediabetes    History of breast cancer    Medicare annual wellness visit, subsequent    A-fib (CMS/HCC)    Chronic kidney disease, stage 3b (CMS/HCC)    Hypokalemia    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  She reports she is doing fairly well.  She continues on anastrozole and has not noted any new side effects.  She did note some contracture of the reconstructed right breast after her previous radiation.  She denies any difficulty with range of motion in the right arm.      Review of Systems - Oncology    Review of Systems:  Nursing assessment reviewed. Pertinent positive and negative symptoms noted in HPI, all others negative.      Heart Rate:  [68] 68  BP: (158)/(75) 158/75  Visit Vitals  BP (!) 158/75 (BP Location: Left upper arm, Patient Position: Sitting)   Pulse 68   Wt 72.1 kg (159 lb)   SpO2 96%   BMI 26.46 kg/m²     Physical Exam  Constitutional:        General: She is not in acute distress.     Appearance: She is well-developed.   HENT:      Head: Normocephalic.      Mouth/Throat:      Pharynx: Oropharynx is clear.   Eyes:      General: No scleral icterus.     Pupils: Pupils are equal, round, and reactive to light.   Cardiovascular:      Rate and Rhythm: Normal rate and regular rhythm.   Pulmonary:      Effort: Pulmonary effort is normal.      Breath sounds: Normal breath sounds.   Chest:      Comments: Status post bilateral mastectomies and reconstruction.  Postradiation changes on the right.  Abdominal:      General: There is no distension.      Palpations: Abdomen is soft.      Tenderness: There is no abdominal tenderness.   Musculoskeletal:         General: No tenderness.      Cervical back: Neck supple.   Lymphadenopathy:      Cervical: No cervical adenopathy.   Skin:     Findings: No rash.   Neurological:      Mental Status: She is alert.         Past Medical History:   Diagnosis Date    Abnormal ECG     Breast cancer (CMS/[facility])      (left DCIS);  (right infiltrating ductal carcinoma);  (right invasive ductal carcinoma)    Colon polyp     COVID-19     not hospitalized-had sore throat/cough    COVID-19 vaccine series completed     booster x 1    Epigastric abdominal pain     2021 cardiac ruled out with relief from GI cocktail.  Had been onn  daily    Fibroid     High blood pressure     PAF (paroxysmal atrial fibrillation) (CMS/[facility])     follows with Dr. Borges every 3 months    Renal insufficiency     change of cardiac meds and diurectic improved labs BUN eGFR    UTI (urinary tract infection)        OB History    Para Term  AB Living   2 2           SAB IAB Ectopic Multiple Live Births                  # Outcome Date GA Lbr Rylan/2nd Weight Sex Delivery Anes PTL Lv   2 Para            1 Para              Gynecologic History:  Age at menarche: No age on file  Age at first live birth: 29   Age at menopause: No age on  file    Past Surgical History:   Procedure Laterality Date    BREAST BIOPSY      2 LEFT, 1 RIGHT    BREAST LUMPECTOMY Left 2006    CATARACT EXTRACTION, BILATERAL      COLONOSCOPY      6-7 years ago, no polyps    ESOPHAGOSCOPY / EGD      LAPAROSCOPIC LIVER CYST FENESTRATION  1995    liquid liver cyst and aspirated at [facility]    MASTECTOMY      left 2006, right 2009    TOTAL ABDOMINAL HYSTERECTOMY W/ BILATERAL SALPINGOOPHORECTOMY      age 51, due to bleeding       Social History     Tobacco Use    Smoking status: Never    Smokeless tobacco: Never   Vaping Use    Vaping Use: Never used   Substance Use Topics    Alcohol use: Yes     Alcohol/week: 1.0 standard drink of alcohol     Types: 1 Glasses of wine per week     Comment: rarely    Drug use: Never       Family History   Problem Relation Age of Onset    Diabetes Biological Father     Diabetes Biological Brother     Multiple myeloma Biological Son     Breast cancer Other         Sister's daughter         Allergies  Clindamycin, Codeine, Macrobid [nitrofurantoin monohyd/m-cryst], and Oxycodone-acetaminophen    Medications    Current Outpatient Medications:     amiodarone (PACERONE) 200 mg tablet, Take 100 mg by mouth daily. , Disp: , Rfl:     anastrozole (ARIMIDEX) 1 mg tablet, Take  1 tablet (1 mg total) daily  Swallow whole with a drink of water., Disp: , Rfl:     ascorbic acid, vitamin C, (VITAMIN C) 500 mg tablet,chewable, daily., Disp: , Rfl:     biotin 5,000 mcg tablet,disintegrating, Take by mouth daily., Disp: , Rfl:     cholecalciferol, vitamin D3, 1,000 unit (25 mcg) tablet, Take 1,000 Units by mouth daily., Disp: , Rfl:     coenzyme Q10 (COQ10) 10 mg capsule, Take 10 mg by mouth daily.  , Disp: , Rfl:     cranberry extract 425 mg capsule, Take by mouth daily., Disp: , Rfl:     hydrochlorothiazide (HYDRODIURIL) 25 mg tablet, Take 25 mg by mouth every morning., Disp: , Rfl:     LOSARTAN POTASSIUM, BULK, MISC, Take 25 mg by mouth as needed., Disp: , Rfl:      lutein 6 mg capsule, Take 6 mg by mouth daily.  , Disp: , Rfl:     pantoprazole (PROTONIX) 40 mg EC tablet, TAKE 1 TABLET BY MOUTH  EVERY OTHER DAY, Disp: 45 tablet, Rfl: 3    red yeast rice 600 mg capsule, Take 1 capsule by mouth 2 (two) times a day.  , Disp: , Rfl:     rivaroxaban (XARELTO) 15 mg tablet, Take 1 tablet (15 mg total) by mouth daily with dinner., Disp: 90 tablet, Rfl: 1    methenamine (HIPREX) 1 gram tablet, , Disp: , Rfl:      Laboratory    No results found for this or any previous visit (from the past 504 hour(s)).      Radiology  No new Radiology.  No results found.    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  She was treated with 5 years of Arimidex following her right mastectomy.  In March 2023 she was found to have a chest wall recurrence on the right.  Cancer was ER positive (98%, AK positive (87%), HER2 negative (0), Ki-67 51%.  She underwent local excision in April 2023.  She started Arimidex in May 2023.  She completed adjuvant radiation therapy in July 2023.    Clinically she is doing well without any signs or symptoms of recurrence.  She is on anastrozole and seems to be tolerating this without significant difficulty.  Given her age and comorbidities, we have opted not to add adjuvant abemaciclib.  She will continue on anastrozole with the plan for at least 5 years of treatment.  She will be following up with Dr. Matthews in January and I will see her back in the office in 6 months.  She was asked to call with questions or concerns prior to her next visit.    Last DEXA scan from May 2023 showed normal bone mineral density.  Plan for follow-up DEXA scan in May 2025.    Nubia Garcia MD

## 2024-07-08 ENCOUNTER — OFFICE VISIT (OUTPATIENT)
Dept: INTERNAL MEDICINE | Facility: CLINIC | Age: 89
End: 2024-07-08
Payer: MEDICARE

## 2024-07-08 VITALS
HEART RATE: 70 BPM | TEMPERATURE: 98.2 F | BODY MASS INDEX: 26.23 KG/M2 | RESPIRATION RATE: 18 BRPM | OXYGEN SATURATION: 98 % | DIASTOLIC BLOOD PRESSURE: 58 MMHG | WEIGHT: 157.6 LBS | SYSTOLIC BLOOD PRESSURE: 116 MMHG

## 2024-07-08 DIAGNOSIS — I10 ESSENTIAL HYPERTENSION: ICD-10-CM

## 2024-07-08 DIAGNOSIS — I48.0 PAROXYSMAL ATRIAL FIBRILLATION (CMS/HCC): Primary | ICD-10-CM

## 2024-07-08 DIAGNOSIS — R91.1 LESION OF LUNG: ICD-10-CM

## 2024-07-08 PROBLEM — R05.2 SUBACUTE COUGH: Status: RESOLVED | Noted: 2024-02-29 | Resolved: 2024-07-08

## 2024-07-08 PROCEDURE — G2211 COMPLEX E/M VISIT ADD ON: HCPCS | Performed by: INTERNAL MEDICINE

## 2024-07-08 PROCEDURE — 99214 OFFICE O/P EST MOD 30 MIN: CPT | Performed by: INTERNAL MEDICINE

## 2024-07-08 ASSESSMENT — PATIENT HEALTH QUESTIONNAIRE - PHQ9: SUM OF ALL RESPONSES TO PHQ9 QUESTIONS 1 & 2: 0

## 2024-07-08 NOTE — PROGRESS NOTES
Chief Complaint   Patient presents with    OTHER     Patient is here for 4 month follow up. Patient has no major concerns.        Subjective      Patient ID: Latrell Ojeda is a 88 y.o. female.    HPI    PAF: Stable    HTN: Controlled    Lesion of lung: Noted on recent chest x-ray.  Treated for pneumonia.    Objective     Vitals:    07/08/24 1329   BP: (!) 116/58   BP Location: Left upper arm   Patient Position: Sitting   Pulse: 70   Resp: 18   Temp: 36.8 °C (98.2 °F)   TempSrc: Oral   SpO2: 98%   Weight: 71.5 kg (157 lb 9.6 oz)       The following have been reviewed and updated as appropriate in this visit:   Allergies  Meds  Problems        Review of Systems      Current Outpatient Medications:     amiodarone (PACERONE) 200 mg tablet, Take 100 mg by mouth daily. , Disp: , Rfl:     anastrozole (ARIMIDEX) 1 mg tablet, Take  1 tablet (1 mg total) daily  Swallow whole with a drink of water., Disp: , Rfl:     ascorbic acid, vitamin C, (VITAMIN C) 500 mg tablet,chewable, daily., Disp: , Rfl:     coenzyme Q10 (COQ10) 10 mg capsule, Take 10 mg by mouth daily.  , Disp: , Rfl:     cranberry extract 425 mg capsule, Take by mouth daily., Disp: , Rfl:     hydrochlorothiazide (HYDRODIURIL) 25 mg tablet, Take 25 mg by mouth every morning., Disp: , Rfl:     LOSARTAN POTASSIUM, BULK, MISC, Take 25 mg by mouth as needed., Disp: , Rfl:     lutein 6 mg capsule, Take 6 mg by mouth daily.  , Disp: , Rfl:     methenamine (HIPREX) 1 gram tablet, , Disp: , Rfl:     pantoprazole (PROTONIX) 40 mg EC tablet, TAKE 1 TABLET BY MOUTH  EVERY OTHER DAY, Disp: 45 tablet, Rfl: 3    red yeast rice 600 mg capsule, Take 1 capsule by mouth 2 (two) times a day.  , Disp: , Rfl:     rivaroxaban (XARELTO) 15 mg tablet, Take 1 tablet (15 mg total) by mouth daily with dinner., Disp: 90 tablet, Rfl: 1    Physical Exam    Assessment/Plan   A-fib (CMS/HCC)  On amiodarone and Xarelto  Sees cardiology    Essential hypertension  Controlled  Was getting low  BP.  Losartan changed to as needed  Continue HCTZ  Continue Losartan as needed    Lesion of lung  Recently treated for pneumonia  Symptoms improved  Questionable underlying lesion  Check CT scan of lung      Orders Placed This Encounter   Procedures    CT CHEST WITHOUT IV CONTRAST     No orders of the defined types were placed in this encounter.      Return for follow up in 4-6 months.   I attest that this visit supports the complexity inherent to evaluation and management associated with medical care services that serve as the continuing focal point for all needed health care services and/or medical care services that are part of ongoing care related to this patient's single, serious condition or a complex condition.    SANDI BLACKBURN MD

## 2024-07-08 NOTE — PATIENT INSTRUCTIONS
A-fib (CMS/HCC)  On amiodarone and Xarelto  Sees cardiology    Essential hypertension  Controlled  Continue HCTZ  Continue Losartan as needed    Lesion of lung  Check CT scan of lung    Return for follow up in 4-6 months.

## 2024-07-08 NOTE — ASSESSMENT & PLAN NOTE
Controlled  Was getting low BP.  Losartan changed to as needed  Continue HCTZ  Continue Losartan as needed

## 2024-07-09 RX ORDER — ANASTROZOLE 1 MG/1
1 TABLET ORAL DAILY
Qty: 90 TABLET | Refills: 3 | Status: SHIPPED | OUTPATIENT
Start: 2024-07-09

## 2024-07-25 NOTE — PROGRESS NOTES
[facility]  HAMZAH Smith  [address], [address]  [place] [address]  Phone: [phone]  Fax: [phone]      Patient ID: Latrell Ojeda                              : 1935    Visit Date: 2024  Referring Provider: No ref. provider found   PCP: Aris Miller MD  GYN: No care team member to display    Subjective:    Latrell is an 88-year-old who presents for interval follow-up for her personal history of breast cancer.  She was originally diagnosed with left breast cancer treated with a lumpectomy followed by mastectomy in .  She had right breast cancer treated with mastectomy and reconstruction in .  She took 5 years of tamoxifen.  She noticed a new lump in her reconstructed right breast in 2023.  Subsequent biopsy revealed invasive ductal carcinoma, grade 2, ER/TN positive, HER2 negative.  On 2023 she had a wide local excision which revealed invasive moderately differentiated ductal carcinoma present within the subcutaneous tissue and skeletal muscle measuring 1.2 cm present at the medial, lateral, inferior, and posterior margins.  Reexcised posterior, inferior, and medial margin also showed moderately differentiated ductal carcinoma involving the skeletal muscle and present at the inked margin.  No additional surgery was done as it was unclear how much additional tissue would need to be removed.  She proceeded with radiation therapy and was started on Arimidex.  She has no breast complaints today.  She denies palpating any new masses/lumps, skin changes, or breast tenderness.     Oncology History:     Cancer Staging   Chest wall recurrence of breast cancer, right (CMS/HCC)  Staging form: Breast, AJCC 8th Edition  - Clinical stage from 4/10/2023: Stage IA (rcT1b, cN0, cM0, G2, ER+, TN+, HER2-) - Signed by Peggy Matthews MD on 2023         Oncology History   Chest wall recurrence of breast cancer, right (CMS/HCC)    4/10/2023 Cancer Staged    Staging form: Breast, AJCC 8th Edition  - Clinical stage from 4/10/2023: Stage IA (rcT1b, cN0, cM0, G2, ER+, [place]+, HER2-)     4/21/2023 Surgery    Wide local right breast excisional biopsy -[facility]     6/1/2023 - 7/21/2023 Radiation Therapy    Radiation Therapy -[facility]     8/2023 -  Hormone Therapy    Arimidex -[facility]               Breast Health:  Age at menarche: 16  Age at first live birth: 29   Age of menopause: 51       Allergies: Clindamycin, Codeine, Macrobid [nitrofurantoin monohyd/m-cryst], and Oxycodone-acetaminophen    Current Medications: has a current medication list which includes the following prescription(s): amiodarone, anastrozole, ascorbic acid (vitamin c), coenzyme q10, cranberry extract, hydrochlorothiazide, losartan potassium, lutein, methenamine, pantoprazole, red yeast rice, and rivaroxaban.    Past Medical History:  has a past medical history of Abnormal ECG, Breast cancer (CMS/HCC), Colon polyp, COVID-19 (2021), COVID-19 vaccine series completed, Epigastric abdominal pain, Fibroid, High blood pressure, PAF (paroxysmal atrial fibrillation) (CMS/HCC), Renal insufficiency, and UTI (urinary tract infection).    She has no past medical history of Disease of thyroid gland.    Past Surgical History:  has a past surgical history that includes Mastectomy; Laparoscopic liver cyst fenestration (1995); Colonoscopy; Esophagoscopy / EGD; Cataract extraction, bilateral; Breast biopsy; Breast lumpectomy (Left, 2006); and Total abdominal hysterectomy w/ bilateral salpingoophorectomy.    Social History:   Social History     Tobacco Use    Smoking status: Never    Smokeless tobacco: Never   Vaping Use    Vaping Use: Never used   Substance Use Topics    Alcohol use: Yes     Alcohol/week: 1.0 standard drink of alcohol     Types: 1 Glasses of wine per week     Comment: rarely    Drug use: Never       Family History: family history includes Breast cancer in an other family  "member; Diabetes in her biological brother and biological father; Multiple myeloma in her biological son.        Physical Exam:    Vitals: Visit Vitals  BP (!) 153/69 (BP Location: Right upper arm, Patient Position: Sitting)   Pulse 71   Temp 36.7 °C (98.1 °F)   Ht 1.651 m (5' 5\")   Wt 71.6 kg (157 lb 12.8 oz)   SpO2 95%   BMI 26.26 kg/m²     Body mass index is 26.26 kg/m².    Physical Exam  Constitutional:       Appearance: Normal appearance.   HENT:      Head: Normocephalic.   Cardiovascular:      Rate and Rhythm: Normal rate.   Pulmonary:      Effort: Pulmonary effort is normal.      Breath sounds: Wheezing present.   Abdominal:      Palpations: Abdomen is soft.   Musculoskeletal:         General: Normal range of motion.      Cervical back: Normal range of motion.   Lymphadenopathy:      Upper Body:      Right upper body: No supraclavicular or axillary adenopathy.      Left upper body: No supraclavicular or axillary adenopathy.   Skin:     General: Skin is warm.   Neurological:      General: No focal deficit present.      Mental Status: She is alert and oriented to person, place, and time.   Psychiatric:         Attention and Perception: Attention normal.         Mood and Affect: Mood and affect normal.         Speech: Speech normal.         Behavior: Behavior normal. Behavior is cooperative.       Physical Examination performed in the supine and sitting position  BREAST SIZE: medium  SYMMETRY yes, right reconstructed breast sits higher than left      SKIN - Skin color, texture, turgor normal. No rashes or lesions Skin is without tethering, dimpling, retraction or increased vascularity. Mild darkening of right reconstructed breast s/p radiation.  AREOLA -surgically absent  NIPPLES -surgically absent  LYMPH NODES: infra, supra, cervical, parasternal and axillary nodes normal bilaterally  BREAST TISSUE: Right breast surgically absent, implant in place normal without discrete lesions. Left Breast surgically absent, " implant in place normal without discrete lesions.       Breast Imaging: I have personally reviewed the reports and images as follows.  No new imaging to review    Impression:  Chest wall recurrence of right breast cancer  Personal history of bilateral breast cancer    Recommendation and Plan:   Latrell is an 88 year old who presents for interval follow up for her personal history of breast cancer.  Her clinical exam is without evidence of recurrence or metastasis.  She will continue on Arimidex and follow-up with medical oncology again in November.  She has a follow-up appointment with radiation oncology next month.  We reviewed the importance of routine self exams, and reviewed indications that would warrant follow-up.  Otherwise, she will follow-up in about 6 months or sooner with any questions or concerns.  Lastly, encouraged her to continue with healthy diet, moderate exercise, and to continue with her routine and preventative health care.    All of the questions were answered.  The patient is in agreement with the treatment plan.      Return in about 6 months (around 1/26/2025).        7/26/2024   2:31 PM        HAMZAH Smith

## 2024-07-26 ENCOUNTER — APPOINTMENT (OUTPATIENT)
Age: 89
Setting detail: DERMATOLOGY
End: 2024-07-26

## 2024-07-26 ENCOUNTER — OFFICE VISIT (OUTPATIENT)
Dept: SURGERY | Facility: CLINIC | Age: 89
End: 2024-07-26
Payer: MEDICARE

## 2024-07-26 VITALS
HEART RATE: 71 BPM | HEIGHT: 65 IN | TEMPERATURE: 98.1 F | DIASTOLIC BLOOD PRESSURE: 69 MMHG | OXYGEN SATURATION: 95 % | WEIGHT: 157.8 LBS | BODY MASS INDEX: 26.29 KG/M2 | SYSTOLIC BLOOD PRESSURE: 153 MMHG

## 2024-07-26 DIAGNOSIS — Z85.3 PERSONAL HISTORY OF MALIGNANT NEOPLASM OF BREAST: ICD-10-CM

## 2024-07-26 DIAGNOSIS — R31.9 HEMATURIA, UNSPECIFIED TYPE: ICD-10-CM

## 2024-07-26 DIAGNOSIS — L82.1 OTHER SEBORRHEIC KERATOSIS: ICD-10-CM

## 2024-07-26 DIAGNOSIS — R35.0 FREQUENCY OF URINATION: ICD-10-CM

## 2024-07-26 DIAGNOSIS — C50.911 CHEST WALL RECURRENCE OF BREAST CANCER, RIGHT (CMS/HCC): Primary | ICD-10-CM

## 2024-07-26 DIAGNOSIS — D49.2 NEOPLASM OF UNSPECIFIED BEHAVIOR OF BONE, SOFT TISSUE, AND SKIN: ICD-10-CM

## 2024-07-26 DIAGNOSIS — C79.89 CHEST WALL RECURRENCE OF BREAST CANCER, RIGHT (CMS/HCC): Primary | ICD-10-CM

## 2024-07-26 DIAGNOSIS — N89.8 VAGINAL IRRITATION: ICD-10-CM

## 2024-07-26 PROCEDURE — OTHER COUNSELING: OTHER

## 2024-07-26 PROCEDURE — OTHER MIPS QUALITY: OTHER

## 2024-07-26 PROCEDURE — 99213 OFFICE O/P EST LOW 20 MIN: CPT | Performed by: NURSE PRACTITIONER

## 2024-07-26 PROCEDURE — OTHER BIOPSY BY SHAVE METHOD: OTHER

## 2024-07-26 PROCEDURE — 11102 TANGNTL BX SKIN SINGLE LES: CPT

## 2024-07-26 PROCEDURE — 99202 OFFICE O/P NEW SF 15 MIN: CPT | Mod: 25

## 2024-07-26 ASSESSMENT — LOCATION DETAILED DESCRIPTION DERM
LOCATION DETAILED: LEFT ULNAR DORSAL HAND
LOCATION DETAILED: LEFT PROXIMAL POSTERIOR UPPER ARM

## 2024-07-26 ASSESSMENT — LOCATION ZONE DERM
LOCATION ZONE: HAND
LOCATION ZONE: ARM

## 2024-07-26 ASSESSMENT — LOCATION SIMPLE DESCRIPTION DERM
LOCATION SIMPLE: LEFT HAND
LOCATION SIMPLE: LEFT POSTERIOR UPPER ARM

## 2024-07-26 NOTE — PROCEDURE: BIOPSY BY SHAVE METHOD

## 2024-08-02 ENCOUNTER — TRANSCRIBE ORDERS (OUTPATIENT)
Dept: LAB | Age: 89
End: 2024-08-02

## 2024-08-02 ENCOUNTER — APPOINTMENT (OUTPATIENT)
Dept: LAB | Age: 89
End: 2024-08-02
Attending: OPHTHALMOLOGY
Payer: MEDICARE

## 2024-08-02 ENCOUNTER — HOSPITAL ENCOUNTER (OUTPATIENT)
Dept: RADIOLOGY | Age: 89
Discharge: HOME | End: 2024-08-02
Attending: INTERNAL MEDICINE
Payer: MEDICARE

## 2024-08-02 DIAGNOSIS — R91.1 LESION OF LUNG: ICD-10-CM

## 2024-08-02 DIAGNOSIS — G70.00 MYASTHENIA GRAVIS WITHOUT (ACUTE) EXACERBATION: Primary | ICD-10-CM

## 2024-08-02 DIAGNOSIS — G70.00 MYASTHENIA GRAVIS WITHOUT (ACUTE) EXACERBATION: ICD-10-CM

## 2024-08-02 PROCEDURE — 86041 ACETYLCHOLN RCPTR BNDNG ANTB: CPT

## 2024-08-02 PROCEDURE — 36415 COLL VENOUS BLD VENIPUNCTURE: CPT

## 2024-08-02 PROCEDURE — 71250 CT THORAX DX C-: CPT

## 2024-08-08 ENCOUNTER — APPOINTMENT (OUTPATIENT)
Age: 89
Setting detail: DERMATOLOGY
End: 2024-08-08

## 2024-08-08 PROBLEM — C44.629 SQUAMOUS CELL CARCINOMA OF SKIN OF LEFT UPPER LIMB, INCLUDING SHOULDER: Status: ACTIVE | Noted: 2024-08-08

## 2024-08-08 PROCEDURE — 12042 INTMD RPR N-HF/GENIT2.6-7.5: CPT

## 2024-08-08 PROCEDURE — OTHER EXCISION: OTHER

## 2024-08-08 PROCEDURE — 11623 EXC S/N/H/F/G MAL+MRG 2.1-3: CPT

## 2024-08-08 NOTE — PROCEDURE: EXCISION
Surgeon (Optional): Dr. Augustine Pacheco
Biopsy Photograph Reviewed: Yes
Accession #: [id number]
Date Of Previous Biopsy (Optional): 7/26/24
Size Of Lesion In Cm: 1.4
X Size Of Lesion In Cm (Optional): 1.1
Size Of Margin In Cm: 0.5
Anesthesia Volume In Cc: 3
Was An Eye Clamp Used?: No
Eye Clamp Note Details: An eye clamp was used during the procedure.
Excision Method: Elliptical
Saucerization Depth: dermis and superficial adipose tissue
Repair Type: Intermediate
Intermediate / Complex Repair - Final Wound Length In Cm: 4.5
Suturegard Retention Suture: 2-0 Nylon
Retention Suture Bite Size: 3 mm
Length To Time In Minutes Device Was In Place: 10
Number Of Hemigard Strips Per Side: 1
Undermining Type: Entire Wound
Debridement Text: The wound edges were debrided prior to proceeding with the closure to facilitate wound healing.
Helical Rim Text: The closure involved the helical rim.
Vermilion Border Text: The closure involved the vermilion border.
Nostril Rim Text: The closure involved the nostril rim.
Retention Suture Text: Retention sutures were placed to support the closure and prevent dehiscence.
Primary Defect Length (In Cm): 0
Suture Removal: 14 days
Lab: [id number]
Graft Donor Site Bandage (Optional-Leave Blank If You Don't Want In Note): Steri-strips and a pressure bandage were applied to the donor site.
Epidermal Closure Graft Donor Site (Optional): simple interrupted
Billing Type: Third-Party Bill
Excision Depth: adipose tissue
Scalpel Size: 15 blade
Anesthesia Type: 1% lidocaine with 1:100,000 epinephrine and a 1:10 solution of 8.4% sodium bicarbonate
Hemostasis: Electrocautery
Estimated Blood Loss (Cc): minimal
Detail Level: Detailed
Repair Depth: use same depth as excision depth
Deep Sutures: 3-0 PDO
Epidermal Sutures: 4-0 Polypropylene
Wound Care: Petrolatum
Dressing: dry sterile dressing
Suturegard Intro: Intraoperative tissue expansion was performed, utilizing the SUTUREGARD device, in order to reduce wound tension.
Suturegard Body: The suture ends were repeatedly re-tightened and re-clamped to achieve the desired tissue expansion.
Hemigard Intro: Due to skin fragility and wound tension, it was decided to use HEMIGARD adhesive retention suture devices to permit a linear closure. The skin was cleaned and dried for a 6cm distance away from the wound. Excessive hair, if present, was removed to allow for adhesion.
Hemigard Postcare Instructions: The HEMIGARD strips are to remain completely dry for at least 5-7 days.
Positioning (Leave Blank If You Do Not Want): The patient was placed in a comfortable position exposing the surgical site.
Pre-Excision Curettage Text (Leave Blank If You Do Not Want): Prior to drawing the surgical margin the visible lesion was removed with curettage to clearly define the lesion size.
Complex Repair Preamble Text (Leave Blank If You Do Not Want): Extensive wide undermining was performed.
Intermediate Repair Preamble Text (Leave Blank If You Do Not Want): Undermining was performed with blunt dissection.
Curvilinear Excision Additional Text (Leave Blank If You Do Not Want): The margin was drawn around the clinically apparent lesion.  A curvilinear shape was then drawn on the skin incorporating the lesion and margins.  Incisions were then made along these lines to the appropriate tissue plane and the lesion was extirpated.
Fusiform Excision Additional Text (Leave Blank If You Do Not Want): The margin was drawn around the clinically apparent lesion.  A fusiform shape was then drawn on the skin incorporating the lesion and margins.  Incisions were then made along these lines to the appropriate tissue plane and the lesion was extirpated.
Elliptical Excision Additional Text (Leave Blank If You Do Not Want): The margin was drawn around the clinically apparent lesion.  An elliptical shape was then drawn on the skin incorporating the lesion and margins.  Incisions were then made along these lines to the appropriate tissue plane and the lesion was extirpated.
Saucerization Excision Additional Text (Leave Blank If You Do Not Want): The margin was drawn around the clinically apparent lesion.  Incisions were then made along these lines, in a tangential fashion, to the appropriate tissue plane and the lesion was extirpated.
Slit Excision Additional Text (Leave Blank If You Do Not Want): A linear line was drawn on the skin overlying the lesion. An incision was made slowly until the lesion was visualized.  Once visualized, the lesion was removed with blunt dissection.
Excisional Biopsy Additional Text (Leave Blank If You Do Not Want): The margin was drawn around the clinically apparent lesion. An elliptical shape was then drawn on the skin incorporating the lesion and margins.  Incisions were then made along these lines to the appropriate tissue plane and the lesion was extirpated.
Perilesional Excision Additional Text (Leave Blank If You Do Not Want): The margin was drawn around the clinically apparent lesion. Incisions were then made along these lines to the appropriate tissue plane and the lesion was extirpated.
Repair Performed By Another Provider Text (Leave Blank If You Do Not Want): After the tissue was excised the defect was repaired by another provider.
No Repair - Repaired With Adjacent Surgical Defect Text (Leave Blank If You Do Not Want): After the excision the defect was repaired concurrently with another surgical defect which was in close approximation.
Adjacent Tissue Transfer Text: The defect edges were debeveled with a #15 scalpel blade. Given the location of the defect and the proximity to free margins an adjacent tissue transfer was deemed most appropriate. Using a sterile surgical marker, an appropriate flap was drawn incorporating the defect and placing the expected incisions within the relaxed skin tension lines where possible. The area thus outlined was incised deep to adipose tissue with a #15 scalpel blade. The skin margins were undermined to an appropriate distance in all directions utilizing iris scissors and carried over to close the primary defect.
Advancement Flap (Single) Text: The defect edges were debeveled with a #15 scalpel blade. Given the location of the defect and the proximity to free margins a single advancement flap was deemed most appropriate. Using a sterile surgical marker, an appropriate advancement flap was drawn incorporating the defect and placing the expected incisions within the relaxed skin tension lines where possible. The area thus outlined was incised deep to adipose tissue with a #15 scalpel blade. The skin margins were undermined to an appropriate distance in all directions utilizing iris scissors. Following this, the designed flap was advanced and carried over into the primary defect and sutured into place.
Advancement Flap (Double) Text: The defect edges were debeveled with a #15 scalpel blade. Given the location of the defect and the proximity to free margins a double advancement flap was deemed most appropriate. Using a sterile surgical marker, the appropriate advancement flaps were drawn incorporating the defect and placing the expected incisions within the relaxed skin tension lines where possible. The area thus outlined was incised deep to adipose tissue with a #15 scalpel blade. The skin margins were undermined to an appropriate distance in all directions utilizing iris scissors. Following this, the designed flaps were advanced and carried over into the primary defect and sutured into place.
Burow's Advancement Flap Text: The defect edges were debeveled with a #15 scalpel blade. Given the location of the defect and the proximity to free margins a Burow's advancement flap was deemed most appropriate. Using a sterile surgical marker, the appropriate advancement flap was drawn incorporating the defect and placing the expected incisions within the relaxed skin tension lines where possible. The area thus outlined was incised deep to adipose tissue with a #15 scalpel blade. The skin margins were undermined to an appropriate distance in all directions utilizing iris scissors. Following this, the designed flap was advanced and carried over into the primary defect and sutured into place.
Chonodrocutaneous Helical Advancement Flap Text: The defect edges were debeveled with a #15 scalpel blade. Given the location of the defect and the proximity to free margins a chondrocutaneous helical advancement flap was deemed most appropriate. Using a sterile surgical marker, the appropriate advancement flap was drawn incorporating the defect and placing the expected incisions within the relaxed skin tension lines where possible. The area thus outlined was incised deep to adipose tissue with a #15 scalpel blade. The skin margins were undermined to an appropriate distance in all directions utilizing iris scissors. Following this, the designed flap was advanced and carried over into the primary defect and sutured into place.
Crescentic Advancement Flap Text: The defect edges were debeveled with a #15 scalpel blade. Given the location of the defect and the proximity to free margins a crescentic advancement flap was deemed most appropriate. Using a sterile surgical marker, the appropriate advancement flap was drawn incorporating the defect and placing the expected incisions within the relaxed skin tension lines where possible. The area thus outlined was incised deep to adipose tissue with a #15 scalpel blade. The skin margins were undermined to an appropriate distance in all directions utilizing iris scissors. Following this, the designed flap was advanced and carried over into the primary defect and sutured into place.
A-T Advancement Flap Text: The defect edges were debeveled with a #15 scalpel blade. Given the location of the defect, shape of the defect and the proximity to free margins an A-T advancement flap was deemed most appropriate. Using a sterile surgical marker, an appropriate advancement flap was drawn incorporating the defect and placing the expected incisions within the relaxed skin tension lines where possible. The area thus outlined was incised deep to adipose tissue with a #15 scalpel blade. The skin margins were undermined to an appropriate distance in all directions utilizing iris scissors. Following this, the designed flap was advanced and carried over into the primary defect and sutured into place.
O-T Advancement Flap Text: The defect edges were debeveled with a #15 scalpel blade. Given the location of the defect, shape of the defect and the proximity to free margins an O-T advancement flap was deemed most appropriate. Using a sterile surgical marker, an appropriate advancement flap was drawn incorporating the defect and placing the expected incisions within the relaxed skin tension lines where possible. The area thus outlined was incised deep to adipose tissue with a #15 scalpel blade. The skin margins were undermined to an appropriate distance in all directions utilizing iris scissors. Following this, the designed flap was advanced and carried over into the primary defect and sutured into place.
O-L Flap Text: The defect edges were debeveled with a #15 scalpel blade. Given the location of the defect, shape of the defect and the proximity to free margins an O-L flap was deemed most appropriate. Using a sterile surgical marker, an appropriate advancement flap was drawn incorporating the defect and placing the expected incisions within the relaxed skin tension lines where possible. The area thus outlined was incised deep to adipose tissue with a #15 scalpel blade. The skin margins were undermined to an appropriate distance in all directions utilizing iris scissors. Following this, the designed flap was advanced and carried over into the primary defect and sutured into place.
O-Z Flap Text: The defect edges were debeveled with a #15 scalpel blade. Given the location of the defect, shape of the defect and the proximity to free margins an O-Z flap was deemed most appropriate. Using a sterile surgical marker, an appropriate transposition flap was drawn incorporating the defect and placing the expected incisions within the relaxed skin tension lines where possible. The area thus outlined was incised deep to adipose tissue with a #15 scalpel blade. The skin margins were undermined to an appropriate distance in all directions utilizing iris scissors. Following this, the designed flap was carried over into the primary defect and sutured into place.
Double O-Z Flap Text: The defect edges were debeveled with a #15 scalpel blade. Given the location of the defect, shape of the defect and the proximity to free margins a Double O-Z flap was deemed most appropriate. Using a sterile surgical marker, an appropriate transposition flap was drawn incorporating the defect and placing the expected incisions within the relaxed skin tension lines where possible. The area thus outlined was incised deep to adipose tissue with a #15 scalpel blade. The skin margins were undermined to an appropriate distance in all directions utilizing iris scissors. Following this, the designed flap was carried over into the primary defect and sutured into place.
V-Y Flap Text: The defect edges were debeveled with a #15 scalpel blade. Given the location of the defect, shape of the defect and the proximity to free margins a V-Y flap was deemed most appropriate. Using a sterile surgical marker, an appropriate advancement flap was drawn incorporating the defect and placing the expected incisions within the relaxed skin tension lines where possible. The area thus outlined was incised deep to adipose tissue with a #15 scalpel blade. The skin margins were undermined to an appropriate distance in all directions utilizing iris scissors. Following this, the designed flap was advanced and carried over into the primary defect and sutured into place.
Advancement-Rotation Flap Text: The defect edges were debeveled with a #15 scalpel blade. Given the location of the defect, shape of the defect and the proximity to free margins an advancement-rotation flap was deemed most appropriate. Using a sterile surgical marker, an appropriate flap was drawn incorporating the defect and placing the expected incisions within the relaxed skin tension lines where possible. The area thus outlined was incised deep to adipose tissue with a #15 scalpel blade. The skin margins were undermined to an appropriate distance in all directions utilizing iris scissors. Following this, the designed flap was carried over into the primary defect and sutured into place.
Mercedes Flap Text: The defect edges were debeveled with a #15 scalpel blade. Given the location of the defect, shape of the defect and the proximity to free margins a Mercedes flap was deemed most appropriate. Using a sterile surgical marker, an appropriate advancement flap was drawn incorporating the defect and placing the expected incisions within the relaxed skin tension lines where possible. The area thus outlined was incised deep to adipose tissue with a #15 scalpel blade. The skin margins were undermined to an appropriate distance in all directions utilizing iris scissors. Following this, the designed flap was advanced and carried over into the primary defect and sutured into place.
Modified Advancement Flap Text: The defect edges were debeveled with a #15 scalpel blade. Given the location of the defect, shape of the defect and the proximity to free margins a modified advancement flap was deemed most appropriate. Using a sterile surgical marker, an appropriate advancement flap was drawn incorporating the defect and placing the expected incisions within the relaxed skin tension lines where possible. The area thus outlined was incised deep to adipose tissue with a #15 scalpel blade. The skin margins were undermined to an appropriate distance in all directions utilizing iris scissors. Following this, the designed flap was advanced and carried over into the primary defect and sutured into place.
Mucosal Advancement Flap Text: Given the location of the defect, shape of the defect and the proximity to free margins a mucosal advancement flap was deemed most appropriate. Incisions were made with a 15 blade scalpel in the appropriate fashion along the cutaneous vermilion border and the mucosal lip. The remaining actinically damaged mucosal tissue was excised.  The mucosal advancement flap was then elevated to the gingival sulcus with care taken to preserve the neurovascular structures and advanced into the primary defect. Care was taken to ensure that precise realignment of the vermilion border was achieved.
Peng Advancement Flap Text: The defect edges were debeveled with a #15 scalpel blade. Given the location of the defect, shape of the defect and the proximity to free margins a Peng advancement flap was deemed most appropriate. Using a sterile surgical marker, an appropriate advancement flap was drawn incorporating the defect and placing the expected incisions within the relaxed skin tension lines where possible. The area thus outlined was incised deep to adipose tissue with a #15 scalpel blade. The skin margins were undermined to an appropriate distance in all directions utilizing iris scissors. Following this, the designed flap was advanced and carried over into the primary defect and sutured into place.
Hatchet Flap Text: The defect edges were debeveled with a #15 scalpel blade. Given the location of the defect, shape of the defect and the proximity to free margins a hatchet flap was deemed most appropriate. Using a sterile surgical marker, an appropriate hatchet flap was drawn incorporating the defect and placing the expected incisions within the relaxed skin tension lines where possible. The area thus outlined was incised deep to adipose tissue with a #15 scalpel blade. The skin margins were undermined to an appropriate distance in all directions utilizing iris scissors. Following this, the designed flap was carried over into the primary defect and sutured into place.
Rotation Flap Text: The defect edges were debeveled with a #15 scalpel blade. Given the location of the defect, shape of the defect and the proximity to free margins a rotation flap was deemed most appropriate. Using a sterile surgical marker, an appropriate rotation flap was drawn incorporating the defect and placing the expected incisions within the relaxed skin tension lines where possible. The area thus outlined was incised deep to adipose tissue with a #15 scalpel blade. The skin margins were undermined to an appropriate distance in all directions utilizing iris scissors. Following this, the designed flap was carried over into the primary defect and sutured into place.
Bilateral Rotation Flap Text: The defect edges were debeveled with a #15 scalpel blade. Given the location of the defect, shape of the defect and the proximity to free margins a bilateral rotation flap was deemed most appropriate. Using a sterile surgical marker, an appropriate rotation flap was drawn incorporating the defect and placing the expected incisions within the relaxed skin tension lines where possible. The area thus outlined was incised deep to adipose tissue with a #15 scalpel blade. The skin margins were undermined to an appropriate distance in all directions utilizing iris scissors. Following this, the designed flap was carried over into the primary defect and sutured into place.
Spiral Flap Text: The defect edges were debeveled with a #15 scalpel blade. Given the location of the defect, shape of the defect and the proximity to free margins a spiral flap was deemed most appropriate. Using a sterile surgical marker, an appropriate rotation flap was drawn incorporating the defect and placing the expected incisions within the relaxed skin tension lines where possible. The area thus outlined was incised deep to adipose tissue with a #15 scalpel blade. The skin margins were undermined to an appropriate distance in all directions utilizing iris scissors. Following this, the designed flap was carried over into the primary defect and sutured into place.
Staged Advancement Flap Text: The defect edges were debeveled with a #15 scalpel blade. Given the location of the defect, shape of the defect and the proximity to free margins a staged advancement flap was deemed most appropriate. Using a sterile surgical marker, an appropriate advancement flap was drawn incorporating the defect and placing the expected incisions within the relaxed skin tension lines where possible. The area thus outlined was incised deep to adipose tissue with a #15 scalpel blade. The skin margins were undermined to an appropriate distance in all directions utilizing iris scissors. Following this, the designed flap was carried over into the primary defect and sutured into place.
Star Wedge Flap Text: The defect edges were debeveled with a #15 scalpel blade. Given the location of the defect, shape of the defect and the proximity to free margins a star wedge flap was deemed most appropriate. Using a sterile surgical marker, an appropriate rotation flap was drawn incorporating the defect and placing the expected incisions within the relaxed skin tension lines where possible. The area thus outlined was incised deep to adipose tissue with a #15 scalpel blade. The skin margins were undermined to an appropriate distance in all directions utilizing iris scissors. Following this, the designed flap was carried over into the primary defect and sutured into place.
Transposition Flap Text: The defect edges were debeveled with a #15 scalpel blade. Given the location of the defect and the proximity to free margins a transposition flap was deemed most appropriate. Using a sterile surgical marker, an appropriate transposition flap was drawn incorporating the defect. The area thus outlined was incised deep to adipose tissue with a #15 scalpel blade. The skin margins were undermined to an appropriate distance in all directions utilizing iris scissors. Following this, the designed flap was carried over into the primary defect and sutured into place.
Muscle Hinge Flap Text: The defect edges were debeveled with a #15 scalpel blade.  Given the size, depth and location of the defect and the proximity to free margins a muscle hinge flap was deemed most appropriate. Using a sterile surgical marker, an appropriate hinge flap was drawn incorporating the defect. The area thus outlined was incised with a #15 scalpel blade. The skin margins were undermined to an appropriate distance in all directions utilizing iris scissors. Following this, the designed flap was carried into the primary defect and sutured into place.
Mustarde Flap Text: The defect edges were debeveled with a #15 scalpel blade.  Given the size, depth and location of the defect and the proximity to free margins a Mustarde flap was deemed most appropriate. Using a sterile surgical marker, an appropriate flap was drawn incorporating the defect. The area thus outlined was incised with a #15 scalpel blade. The skin margins were undermined to an appropriate distance in all directions utilizing iris scissors. Following this, the designed flap was carried into the primary defect and sutured into place.
Nasal Turnover Hinge Flap Text: The defect edges were debeveled with a #15 scalpel blade.  Given the size, depth, location of the defect and the defect being full thickness a nasal turnover hinge flap was deemed most appropriate. Using a sterile surgical marker, an appropriate hinge flap was drawn incorporating the defect. The area thus outlined was incised with a #15 scalpel blade. The flap was designed to recreate the nasal mucosal lining and the alar rim. The skin margins were undermined to an appropriate distance in all directions utilizing iris scissors. Following this, the designed flap was carried over into the primary defect and sutured into place
Nasalis-Muscle-Based Myocutaneous Island Pedicle Flap Text: Using a #15 blade, an incision was made around the donor flap to the level of the nasalis muscle. Wide lateral undermining was then performed in both the subcutaneous plane above the nasalis muscle, and in a submuscular plane just above periosteum. This allowed the formation of a free nasalis muscle axial pedicle (based on the angular artery) which was still attached to the actual cutaneous flap, increasing its mobility and vascular viability. Hemostasis was obtained with pinpoint electrocoagulation. The flap was mobilized into position and the pivotal anchor points positioned and stabilized with buried interrupted sutures. Subcutaneous and dermal tissues were closed in a multilayered fashion with sutures. Tissue redundancies were excised, and the epidermal edges were apposed without significant tension and sutured with sutures.
Nasalis Myocutaneous Flap Text: Using a #15 blade, an incision was made around the donor flap to the level of the nasalis muscle. Wide lateral undermining was then performed in both the subcutaneous plane above the nasalis muscle, and in a submuscular plane just above periosteum. This allowed the formation of a free nasalis muscle axial pedicle which was still attached to the actual cutaneous flap, increasing its mobility and vascular viability. Hemostasis was obtained with pinpoint electrocoagulation. The flap was mobilized into position and the pivotal anchor points positioned and stabilized with buried interrupted sutures. Subcutaneous and dermal tissues were closed in a multilayered fashion with sutures. Tissue redundancies were excised, and the epidermal edges were apposed without significant tension and sutured with sutures.
Nasolabial Transposition Flap Text: The defect edges were debeveled with a #15 scalpel blade.  Given the size, depth and location of the defect and the proximity to free margins a nasolabial transposition flap was deemed most appropriate. Using a sterile surgical marker, an appropriate flap was drawn incorporating the defect. The area thus outlined was incised with a #15 scalpel blade. The skin margins were undermined to an appropriate distance in all directions utilizing iris scissors. Following this, the designed flap was carried into the primary defect and sutured into place.
Orbicularis Oris Muscle Flap Text: The defect edges were debeveled with a #15 scalpel blade.  Given that the defect affected the competency of the oral sphincter an orbicularis oris muscle flap was deemed most appropriate to restore this competency and normal muscle function.  Using a sterile surgical marker, an appropriate flap was drawn incorporating the defect. The area thus outlined was incised with a #15 scalpel blade. Following this, the designed flap was carried over into the primary defect and sutured into place.
Melolabial Transposition Flap Text: The defect edges were debeveled with a #15 scalpel blade. Given the location of the defect and the proximity to free margins a melolabial flap was deemed most appropriate. Using a sterile surgical marker, an appropriate melolabial transposition flap was drawn incorporating the defect. The area thus outlined was incised deep to adipose tissue with a #15 scalpel blade. The skin margins were undermined to an appropriate distance in all directions utilizing iris scissors. Following this, the designed flap was carried over into the primary defect and sutured into place.
Rectangular Flap Text: The defect edges were debeveled with a #15 scalpel blade. Given the location of the defect and the proximity to free margins a rectangular flap was deemed most appropriate. Using a sterile surgical marker, an appropriate rectangular flap was drawn incorporating the defect. The area thus outlined was incised deep to adipose tissue with a #15 scalpel blade. The skin margins were undermined to an appropriate distance in all directions utilizing iris scissors. Following this, the designed flap was carried over into the primary defect and sutured into place.
Rhombic Flap Text: The defect edges were debeveled with a #15 scalpel blade. Given the location of the defect and the proximity to free margins a rhombic flap was deemed most appropriate. Using a sterile surgical marker, an appropriate rhombic flap was drawn incorporating the defect. The area thus outlined was incised deep to adipose tissue with a #15 scalpel blade. The skin margins were undermined to an appropriate distance in all directions utilizing iris scissors. Following this, the designed flap was carried over into the primary defect and sutured into place.
Rhomboid Transposition Flap Text: The defect edges were debeveled with a #15 scalpel blade. Given the location of the defect and the proximity to free margins a rhomboid transposition flap was deemed most appropriate. Using a sterile surgical marker, an appropriate rhomboid flap was drawn incorporating the defect. The area thus outlined was incised deep to adipose tissue with a #15 scalpel blade. The skin margins were undermined to an appropriate distance in all directions utilizing iris scissors. Following this, the designed flap was carried over into the primary defect and sutured into place.
Bi-Rhombic Flap Text: The defect edges were debeveled with a #15 scalpel blade. Given the location of the defect and the proximity to free margins a bi-rhombic flap was deemed most appropriate. Using a sterile surgical marker, an appropriate rhombic flap was drawn incorporating the defect. The area thus outlined was incised deep to adipose tissue with a #15 scalpel blade. The skin margins were undermined to an appropriate distance in all directions utilizing iris scissors. Following this, the designed flap was carried over into the primary defect and sutured into place.
Helical Rim Advancement Flap Text: The defect edges were debeveled with a #15 blade scalpel.  Given the location of the defect and the proximity to free margins (helical rim) a double helical rim advancement flap was deemed most appropriate. Using a sterile surgical marker, the appropriate advancement flaps were drawn incorporating the defect and placing the expected incisions between the helical rim and antihelix where possible.  The area thus outlined was incised through and through with a #15 scalpel blade.  With a skin hook and iris scissors, the flaps were gently and sharply undermined and freed up. Folllowing this, the designed flaps were carried over into the primary defect and sutured into place.
Bilateral Helical Rim Advancement Flap Text: The defect edges were debeveled with a #15 blade scalpel.  Given the location of the defect and the proximity to free margins (helical rim) a bilateral helical rim advancement flap was deemed most appropriate. Using a sterile surgical marker, the appropriate advancement flaps were drawn incorporating the defect and placing the expected incisions between the helical rim and antihelix where possible.  The area thus outlined was incised through and through with a #15 scalpel blade.  With a skin hook and iris scissors, the flaps were gently and sharply undermined and freed up. Following this, the designed flaps were placed into the primary defect and sutured into place.
Ear Star Wedge Flap Text: The defect edges were debeveled with a #15 blade scalpel.  Given the location of the defect and the proximity to free margins (helical rim) an ear star wedge flap was deemed most appropriate. Using a sterile surgical marker, the appropriate flap was drawn incorporating the defect and placing the expected incisions between the helical rim and antihelix where possible.  The area thus outlined was incised through and through with a #15 scalpel blade. Following this, the designed flap was carried over into the primary defect and sutured into place.
Flip-Flop Flap Text: The defect edges were debeveled with a #15 blade scalpel.  Given the location of the defect and the proximity to free margins a flip-flop flap was deemed most appropriate. Using a sterile surgical marker, the appropriate flap was drawn incorporating the defect and placing the expected incisions between the helical rim and antihelix where possible.  The area thus outlined was incised through and through with a #15 scalpel blade. Following this, the designed flap was carried over into the primary defect and sutured into place.
Banner Transposition Flap Text: The defect edges were debeveled with a #15 scalpel blade. Given the location of the defect and the proximity to free margins a Banner transposition flap was deemed most appropriate. Using a sterile surgical marker, an appropriate flap was drawn around the defect. The area thus outlined was incised deep to adipose tissue with a #15 scalpel blade. The skin margins were undermined to an appropriate distance in all directions utilizing iris scissors. Following this, the designed flap was carried into the primary defect and sutured into place.
Bilobed Flap Text: The defect edges were debeveled with a #15 scalpel blade. Given the location of the defect and the proximity to free margins a bilobe flap was deemed most appropriate. Using a sterile surgical marker, an appropriate bilobe flap drawn around the defect. The area thus outlined was incised deep to adipose tissue with a #15 scalpel blade. The skin margins were undermined to an appropriate distance in all directions utilizing iris scissors. Following this, the designed flap was carried over into the primary defect and sutured into place.
Bilobed Transposition Flap Text: The defect edges were debeveled with a #15 scalpel blade. Given the location of the defect and the proximity to free margins a bilobed transposition flap was deemed most appropriate. Using a sterile surgical marker, an appropriate bilobe flap drawn around the defect. The area thus outlined was incised deep to adipose tissue with a #15 scalpel blade. The skin margins were undermined to an appropriate distance in all directions utilizing iris scissors. Following this, the designed flap was carried over into the primary defect and sutured into place.
Trilobed Flap Text: The defect edges were debeveled with a #15 scalpel blade. Given the location of the defect and the proximity to free margins a trilobed flap was deemed most appropriate. Using a sterile surgical marker, an appropriate trilobed flap was drawn around the defect. The area thus outlined was incised deep to adipose tissue with a #15 scalpel blade. The skin margins were undermined to an appropriate distance in all directions utilizing iris scissors. Following this, the designed flap was carried into the primary defect and sutured into place.
Dorsal Nasal Flap Text: The defect edges were debeveled with a #15 scalpel blade. Given the location of the defect and the proximity to free margins a dorsal nasal flap was deemed most appropriate. Using a sterile surgical marker, an appropriate dorsal nasal flap was drawn around the defect. The area thus outlined was incised deep to adipose tissue with a #15 scalpel blade. The skin margins were undermined to an appropriate distance in all directions utilizing iris scissors. Following this, the designed flap was carried into the primary defect and sutured into place.
Island Pedicle Flap Text: The defect edges were debeveled with a #15 scalpel blade. Given the location of the defect, shape of the defect and the proximity to free margins an island pedicle advancement flap was deemed most appropriate. Using a sterile surgical marker, an appropriate advancement flap was drawn incorporating the defect, outlining the appropriate donor tissue and placing the expected incisions within the relaxed skin tension lines where possible. The area thus outlined was incised deep to adipose tissue with a #15 scalpel blade. The skin margins were undermined to an appropriate distance in all directions around the primary defect and laterally outward around the island pedicle utilizing iris scissors.  There was minimal undermining beneath the pedicle flap. Following this, the flap was carried over into the primary defect and sutured into place.
Island Pedicle Flap With Canthal Suspension Text: The defect edges were debeveled with a #15 scalpel blade. Given the location of the defect, shape of the defect and the proximity to free margins an island pedicle advancement flap was deemed most appropriate. Using a sterile surgical marker, an appropriate advancement flap was drawn incorporating the defect, outlining the appropriate donor tissue and placing the expected incisions within the relaxed skin tension lines where possible. The area thus outlined was incised deep to adipose tissue with a #15 scalpel blade. The skin margins were undermined to an appropriate distance in all directions around the primary defect and laterally outward around the island pedicle utilizing iris scissors.  There was minimal undermining beneath the pedicle flap. A suspension suture was placed in the canthal tendon to prevent tension and prevent ectropion. Following this, the designed flap was placed into the primary defect and sutured into place.
Alar Island Pedicle Flap Text: The defect edges were debeveled with a #15 scalpel blade. Given the location of the defect, shape of the defect and the proximity to the alar rim an island pedicle advancement flap was deemed most appropriate. Using a sterile surgical marker, an appropriate advancement flap was drawn incorporating the defect, outlining the appropriate donor tissue and placing the expected incisions within the nasal ala running parallel to the alar rim. The area thus outlined was incised with a #15 scalpel blade. The skin margins were undermined minimally to an appropriate distance in all directions around the primary defect and laterally outward around the island pedicle utilizing iris scissors.  There was minimal undermining beneath the pedicle flap. Following this, the designed flap was carried over into the primary defect and sutured into place.
Double Island Pedicle Flap Text: The defect edges were debeveled with a #15 scalpel blade. Given the location of the defect, shape of the defect and the proximity to free margins a double island pedicle advancement flap was deemed most appropriate. Using a sterile surgical marker, an appropriate advancement flap was drawn incorporating the defect, outlining the appropriate donor tissue and placing the expected incisions within the relaxed skin tension lines where possible. The area thus outlined was incised deep to adipose tissue with a #15 scalpel blade. The skin margins were undermined to an appropriate distance in all directions around the primary defect and laterally outward around the island pedicle utilizing iris scissors.  There was minimal undermining beneath the pedicle flap. Following this, the flap was carried over into the primary defect and sutured into place.
Island Pedicle Flap-Requiring Vessel Identification Text: The defect edges were debeveled with a #15 scalpel blade. Given the location of the defect, shape of the defect and the proximity to free margins an island pedicle advancement flap was deemed most appropriate. Using a sterile surgical marker, an appropriate advancement flap was drawn, based on the axial vessel mentioned above, incorporating the defect, outlining the appropriate donor tissue and placing the expected incisions within the relaxed skin tension lines where possible. The area thus outlined was incised deep to adipose tissue with a #15 scalpel blade. The skin margins were undermined to an appropriate distance in all directions around the primary defect and laterally outward around the island pedicle utilizing iris scissors.  There was minimal undermining beneath the pedicle flap. Following this, the designed flap was carried over into the primary defect and sutured into place.
Keystone Flap Text: The defect edges were debeveled with a #15 scalpel blade. Given the location of the defect, shape of the defect a keystone flap was deemed most appropriate. Using a sterile surgical marker, an appropriate keystone flap was drawn incorporating the defect, outlining the appropriate donor tissue and placing the expected incisions within the relaxed skin tension lines where possible. The area thus outlined was incised deep to adipose tissue with a #15 scalpel blade. The skin margins were undermined to an appropriate distance in all directions around the primary defect and laterally outward around the flap utilizing iris scissors. Following this, the designed flap was carried into the primary defect and sutured into place.
O-T Plasty Text: The defect edges were debeveled with a #15 scalpel blade. Given the location of the defect, shape of the defect and the proximity to free margins an O-T plasty was deemed most appropriate. Using a sterile surgical marker, an appropriate O-T plasty was drawn incorporating the defect and placing the expected incisions within the relaxed skin tension lines where possible. The area thus outlined was incised deep to adipose tissue with a #15 scalpel blade. The skin margins were undermined to an appropriate distance in all directions utilizing iris scissors. Following this, the designed flap was carried over into the primary defect and sutured into place.
O-Z Plasty Text: The defect edges were debeveled with a #15 scalpel blade. Given the location of the defect, shape of the defect and the proximity to free margins an O-Z plasty (double transposition flap) was deemed most appropriate. Using a sterile surgical marker, the appropriate transposition flaps were drawn incorporating the defect and placing the expected incisions within the relaxed skin tension lines where possible. The area thus outlined was incised deep to adipose tissue with a #15 scalpel blade. The skin margins were undermined to an appropriate distance in all directions utilizing iris scissors. Hemostasis was achieved with electrocautery. The flaps were then transposed and carried over into place, one clockwise and the other counterclockwise, and anchored with interrupted buried subcutaneous sutures.
Double O-Z Plasty Text: The defect edges were debeveled with a #15 scalpel blade. Given the location of the defect, shape of the defect and the proximity to free margins a Double O-Z plasty (double transposition flap) was deemed most appropriate. Using a sterile surgical marker, the appropriate transposition flaps were drawn incorporating the defect and placing the expected incisions within the relaxed skin tension lines where possible. The area thus outlined was incised deep to adipose tissue with a #15 scalpel blade. The skin margins were undermined to an appropriate distance in all directions utilizing iris scissors. Hemostasis was achieved with electrocautery. The flaps were then transposed and carried over into place, one clockwise and the other counterclockwise, and anchored with interrupted buried subcutaneous sutures.
V-Y Plasty Text: The defect edges were debeveled with a #15 scalpel blade. Given the location of the defect, shape of the defect and the proximity to free margins an V-Y advancement flap was deemed most appropriate. Using a sterile surgical marker, an appropriate advancement flap was drawn incorporating the defect and placing the expected incisions within the relaxed skin tension lines where possible. The area thus outlined was incised deep to adipose tissue with a #15 scalpel blade. The skin margins were undermined to an appropriate distance in all directions utilizing iris scissors. Following this, the designed flap was advanced and carried over into the primary defect and sutured into place.
H Plasty Text: Given the location of the defect, shape of the defect and the proximity to free margins a H-plasty was deemed most appropriate for repair. Using a sterile surgical marker, the appropriate advancement arms of the H-plasty were drawn incorporating the defect and placing the expected incisions within the relaxed skin tension lines where possible. The area thus outlined was incised deep to adipose tissue with a #15 scalpel blade. The skin margins were undermined to an appropriate distance in all directions utilizing iris scissors.  The opposing advancement arms were then advanced and carried over into place in opposite direction and anchored with interrupted buried subcutaneous sutures.
W Plasty Text: The lesion was extirpated to the level of the fat with a #15 scalpel blade. Given the location of the defect, shape of the defect and the proximity to free margins a W-plasty was deemed most appropriate for repair. Using a sterile surgical marker, the appropriate transposition arms of the W-plasty were drawn incorporating the defect and placing the expected incisions within the relaxed skin tension lines where possible. The area thus outlined was incised deep to adipose tissue with a #15 scalpel blade. The skin margins were undermined to an appropriate distance in all directions utilizing iris scissors. The opposing transposition arms were then transposed and carried over into place in opposite direction and anchored with interrupted buried subcutaneous sutures.
Z Plasty Text: The lesion was extirpated to the level of the fat with a #15 scalpel blade. Given the location of the defect, shape of the defect and the proximity to free margins a Z-plasty was deemed most appropriate for repair. Using a sterile surgical marker, the appropriate transposition arms of the Z-plasty were drawn incorporating the defect and placing the expected incisions within the relaxed skin tension lines where possible. The area thus outlined was incised deep to adipose tissue with a #15 scalpel blade. The skin margins were undermined to an appropriate distance in all directions utilizing iris scissors. The opposing transposition arms were then transposed and carried over into place in opposite direction and anchored with interrupted buried subcutaneous sutures.
Double Z Plasty Text: The lesion was extirpated to the level of the fat with a #15 scalpel blade. Given the location of the defect, shape of the defect and the proximity to free margins a double Z-plasty was deemed most appropriate for repair. Using a sterile surgical marker, the appropriate transposition arms of the double Z-plasty were drawn incorporating the defect and placing the expected incisions within the relaxed skin tension lines where possible. The area thus outlined was incised deep to adipose tissue with a #15 scalpel blade. The skin margins were undermined to an appropriate distance in all directions utilizing iris scissors. The opposing transposition arms were then transposed and carried over into place in opposite direction and anchored with interrupted buried subcutaneous sutures.
Zygomaticofacial Flap Text: Given the location of the defect, shape of the defect and the proximity to free margins a zygomaticofacial flap was deemed most appropriate for repair. Using a sterile surgical marker, the appropriate flap was drawn incorporating the defect and placing the expected incisions within the relaxed skin tension lines where possible. The area thus outlined was incised deep to adipose tissue with a #15 scalpel blade with preservation of a vascular pedicle.  The skin margins were undermined to an appropriate distance in all directions utilizing iris scissors. The flap was then carried over into the defect and anchored with interrupted buried subcutaneous sutures.
Cheek Interpolation Flap Text: A decision was made to reconstruct the defect utilizing an interpolation axial flap and a staged reconstruction.  A telfa template was made of the defect.  This telfa template was then used to outline the Cheek Interpolation flap.  The donor area for the pedicle flap was then injected with anesthesia.  The flap was excised through the skin and subcutaneous tissue down to the layer of the underlying musculature.  The interpolation flap was carefully excised within this deep plane to maintain its blood supply.  The edges of the donor site were undermined.   The donor site was closed in a primary fashion.  The pedicle was then rotated into position and sutured.  Once the tube was sutured into place, adequate blood supply was confirmed with blanching and refill.  The pedicle was then wrapped with xeroform gauze and dressed appropriately with a telfa and gauze bandage to ensure continued blood supply and protect the attached pedicle.
Cheek-To-Nose Interpolation Flap Text: A decision was made to reconstruct the defect utilizing an interpolation axial flap and a staged reconstruction.  A telfa template was made of the defect.  This telfa template was then used to outline the Cheek-To-Nose Interpolation flap.  The donor area for the pedicle flap was then injected with anesthesia.  The flap was excised through the skin and subcutaneous tissue down to the layer of the underlying musculature.  The interpolation flap was carefully excised within this deep plane to maintain its blood supply.  The edges of the donor site were undermined.   The donor site was closed in a primary fashion.  The pedicle was then rotated into position and sutured.  Once the tube was sutured into place, adequate blood supply was confirmed with blanching and refill.  The pedicle was then wrapped with xeroform gauze and dressed appropriately with a telfa and gauze bandage to ensure continued blood supply and protect the attached pedicle.
Interpolation Flap Text: A decision was made to reconstruct the defect utilizing an interpolation axial flap and a staged reconstruction.  A telfa template was made of the defect.  This telfa template was then used to outline the interpolation flap.  The donor area for the pedicle flap was then injected with anesthesia.  The flap was excised through the skin and subcutaneous tissue down to the layer of the underlying musculature.  The interpolation flap was carefully excised within this deep plane to maintain its blood supply.  The edges of the donor site were undermined.   The donor site was closed in a primary fashion.  The pedicle was then rotated into position and sutured.  Once the tube was sutured into place, adequate blood supply was confirmed with blanching and refill.  The pedicle was then wrapped with xeroform gauze and dressed appropriately with a telfa and gauze bandage to ensure continued blood supply and protect the attached pedicle.
Melolabial Interpolation Flap Text: A decision was made to reconstruct the defect utilizing an interpolation axial flap and a staged reconstruction.  A telfa template was made of the defect.  This telfa template was then used to outline the melolabial interpolation flap.  The donor area for the pedicle flap was then injected with anesthesia.  The flap was excised through the skin and subcutaneous tissue down to the layer of the underlying musculature.  The pedicle flap was carefully excised within this deep plane to maintain its blood supply.  The edges of the donor site were undermined.   The donor site was closed in a primary fashion.  The pedicle was then rotated into position and sutured.  Once the tube was sutured into place, adequate blood supply was confirmed with blanching and refill.  The pedicle was then wrapped with xeroform gauze and dressed appropriately with a telfa and gauze bandage to ensure continued blood supply and protect the attached pedicle.
Mastoid Interpolation Flap Text: A decision was made to reconstruct the defect utilizing an interpolation axial flap and a staged reconstruction.  A telfa template was made of the defect.  This telfa template was then used to outline the mastoid interpolation flap.  The donor area for the pedicle flap was then injected with anesthesia.  The flap was excised through the skin and subcutaneous tissue down to the layer of the underlying musculature.  The pedicle flap was carefully excised within this deep plane to maintain its blood supply.  The edges of the donor site were undermined.   The donor site was closed in a primary fashion.  The pedicle was then rotated into position and sutured.  Once the tube was sutured into place, adequate blood supply was confirmed with blanching and refill.  The pedicle was then wrapped with xeroform gauze and dressed appropriately with a telfa and gauze bandage to ensure continued blood supply and protect the attached pedicle.
Posterior Auricular Interpolation Flap Text: A decision was made to reconstruct the defect utilizing an interpolation axial flap and a staged reconstruction.  A telfa template was made of the defect.  This telfa template was then used to outline the posterior auricular interpolation flap.  The donor area for the pedicle flap was then injected with anesthesia.  The flap was excised through the skin and subcutaneous tissue down to the layer of the underlying musculature.  The pedicle flap was carefully excised within this deep plane to maintain its blood supply.  The edges of the donor site were undermined.   The donor site was closed in a primary fashion.  The pedicle was then rotated into position and sutured.  Once the tube was sutured into place, adequate blood supply was confirmed with blanching and refill.  The pedicle was then wrapped with xeroform gauze and dressed appropriately with a telfa and gauze bandage to ensure continued blood supply and protect the attached pedicle.
Paramedian Forehead Flap Text: A decision was made to reconstruct the defect utilizing an interpolation axial flap and a staged reconstruction.  A telfa template was made of the defect.  This telfa template was then used to outline the paramedian forehead pedicle flap.  The donor area for the pedicle flap was then injected with anesthesia.  The flap was excised through the skin and subcutaneous tissue down to the layer of the underlying musculature.  The pedicle flap was carefully excised within this deep plane to maintain its blood supply.  The edges of the donor site were undermined.   The donor site was closed in a primary fashion.  The pedicle was then rotated into position and sutured.  Once the tube was sutured into place, adequate blood supply was confirmed with blanching and refill.  The pedicle was then wrapped with xeroform gauze and dressed appropriately with a telfa and gauze bandage to ensure continued blood supply and protect the attached pedicle.
Abbe Flap (Upper To Lower Lip) Text: The defect of the lower lip was assessed and measured.  Given the location and size of the defect, an Abbe flap was deemed most appropriate. Using a sterile surgical marker, an appropriate Abbe flap was measured and drawn on the upper lip. Local anesthesia was then infiltrated.  A scalpel was then used to incise the upper lip through and through the skin, vermilion, muscle and mucosa, leaving the flap pedicled on the opposite side.  The flap was then rotated and transferred to the lower lip defect.  The flap was then sutured into place with a three layer technique, closing the orbicularis oris muscle layer with subcutaneous buried sutures, followed by a mucosal layer and an epidermal layer.
Abbe Flap (Lower To Upper Lip) Text: The defect of the upper lip was assessed and measured.  Given the location and size of the defect, an Abbe flap was deemed most appropriate. Using a sterile surgical marker, an appropriate Abbe flap was measured and drawn on the lower lip. Local anesthesia was then infiltrated. A scalpel was then used to incise the upper lip through and through the skin, vermilion, muscle and mucosa, leaving the flap pedicled on the opposite side.  The flap was then rotated and transferred to the lower lip defect.  The flap was then sutured into place with a three layer technique, closing the orbicularis oris muscle layer with subcutaneous buried sutures, followed by a mucosal layer and an epidermal layer.
Estlander Flap (Upper To Lower Lip) Text: The defect of the lower lip was assessed and measured.  Given the location and size of the defect, an Estlander flap was deemed most appropriate. Using a sterile surgical marker, an appropriate Estlander flap was measured and drawn on the upper lip. Local anesthesia was then infiltrated. A scalpel was then used to incise the lateral aspect of the flap, through skin, muscle and mucosa, leaving the flap pedicled medially.  The flap was then rotated and positioned to fill the lower lip defect.  The flap was then sutured into place with a three layer technique, closing the orbicularis oris muscle layer with subcutaneous buried sutures, followed by a mucosal layer and an epidermal layer.
Lip Wedge Excision Repair Text: Given the location of the defect and the proximity to free margins a full thickness wedge repair was deemed most appropriate. Using a sterile surgical marker, the appropriate repair was drawn incorporating the defect and placing the expected incisions perpendicular to the vermilion border.  The vermilion border was also meticulously outlined to ensure appropriate reapproximation during the repair.  The area thus outlined was incised through and through with a #15 scalpel blade.  The muscularis and dermis were reaproximated with deep sutures following hemostasis. Care was taken to realign the vermilion border before proceeding with the superficial closure.  Once the vermilion was realigned the superfical and mucosal closure was finished.
Ftsg Text: The defect edges were debeveled with a #15 scalpel blade. Given the location of the defect, shape of the defect and the proximity to free margins a full thickness skin graft was deemed most appropriate. Using a sterile surgical marker, the primary defect shape was transferred to the donor site. The area thus outlined was incised deep to adipose tissue with a #15 scalpel blade.  The harvested graft was then trimmed of adipose tissue until only dermis and epidermis was left.  The skin margins of the secondary defect were undermined to an appropriate distance in all directions utilizing iris scissors.  The secondary defect was closed with interrupted buried subcutaneous sutures.  The skin edges were then re-apposed with running  sutures.  The skin graft was then placed in the primary defect and oriented appropriately.
Split-Thickness Skin Graft Text: The defect edges were debeveled with a #15 scalpel blade. Given the location of the defect, shape of the defect and the proximity to free margins a split thickness skin graft was deemed most appropriate. Using a sterile surgical marker, the primary defect shape was transferred to the donor site. The split thickness graft was then harvested.  The skin graft was then placed in the primary defect and oriented appropriately.
Pinch Graft Text: The defect edges were debeveled with a #15 scalpel blade. Given the location of the defect, shape of the defect and the proximity to free margins a pinch graft was deemed most appropriate. Using a sterile surgical marker, the primary defect shape was transferred to the donor site. The area thus outlined was incised deep to adipose tissue with a #15 scalpel blade.  The harvested graft was then trimmed of adipose tissue until only dermis and epidermis was left. The skin margins of the secondary defect were undermined to an appropriate distance in all directions utilizing iris scissors.  The secondary defect was closed with interrupted buried subcutaneous sutures.  The skin edges were then re-apposed with running  sutures.  The skin graft was then placed in the primary defect and oriented appropriately.
Burow's Graft Text: The defect edges were debeveled with a #15 scalpel blade. Given the location of the defect, shape of the defect, the proximity to free margins and the presence of a standing cone deformity a Burow's skin graft was deemed most appropriate. The standing cone was removed and this tissue was then trimmed to the shape of the primary defect. The adipose tissue was also removed until only dermis and epidermis were left.  The skin margins of the secondary defect were undermined to an appropriate distance in all directions utilizing iris scissors.  The secondary defect was closed with interrupted buried subcutaneous sutures.  The skin edges were then re-apposed with running  sutures.  The skin graft was then placed in the primary defect and oriented appropriately.
Cartilage Graft Text: The defect edges were debeveled with a #15 scalpel blade. Given the location of the defect, shape of the defect, the fact the defect involved a full thickness cartilage defect a cartilage graft was deemed most appropriate.  An appropriate donor site was identified, cleansed, and anesthetized. The cartilage graft was then harvested and transferred to the recipient site, oriented appropriately and then sutured into place.  The secondary defect was then repaired using a primary closure.
Composite Graft Text: The defect edges were debeveled with a #15 scalpel blade. Given the location of the defect, shape of the defect, the proximity to free margins and the fact the defect was full thickness a composite graft was deemed most appropriate.  The defect was outline and then transferred to the donor site.  A full thickness graft was then excised from the donor site. The graft was then placed in the primary defect, oriented appropriately and then sutured into place.  The secondary defect was then repaired using a primary closure.
Epidermal Autograft Text: The defect edges were debeveled with a #15 scalpel blade. Given the location of the defect, shape of the defect and the proximity to free margins an epidermal autograft was deemed most appropriate. Using a sterile surgical marker, the primary defect shape was transferred to the donor site. The epidermal graft was then harvested.  The skin graft was then placed in the primary defect and oriented appropriately.
Dermal Autograft Text: The defect edges were debeveled with a #15 scalpel blade. Given the location of the defect, shape of the defect and the proximity to free margins a dermal autograft was deemed most appropriate. Using a sterile surgical marker, the primary defect shape was transferred to the donor site. The area thus outlined was incised deep to adipose tissue with a #15 scalpel blade.  The harvested graft was then trimmed of adipose and epidermal tissue until only dermis was left.  The skin graft was then placed in the primary defect and oriented appropriately.
Skin Substitute Text: The defect edges were debeveled with a #15 scalpel blade. Given the location of the defect, shape of the defect and the proximity to free margins a skin substitute graft was deemed most appropriate.  The graft material was trimmed to fit the size of the defect. The graft was then placed in the primary defect and oriented appropriately.
Tissue Cultured Epidermal Autograft Text: The defect edges were debeveled with a #15 scalpel blade. Given the location of the defect, shape of the defect and the proximity to free margins a tissue cultured epidermal autograft was deemed most appropriate.  The graft was then trimmed to fit the size of the defect.  The graft was then placed in the primary defect and oriented appropriately.
Xenograft Text: The defect edges were debeveled with a #15 scalpel blade. Given the location of the defect, shape of the defect and the proximity to free margins a xenograft was deemed most appropriate.  The graft was then trimmed to fit the size of the defect.  The graft was then placed in the primary defect and oriented appropriately.
Purse String (Intermediate) Text: Given the location of the defect and the characteristics of the surrounding skin a purse string intermediate closure was deemed most appropriate.  Undermining was performed circumferentially around the surgical defect.  A purse string suture was then placed and tightened.
Purse String (Simple) Text: Given the location of the defect and the characteristics of the surrounding skin a purse string simple closure was deemed most appropriate.  Undermining was performed circumferentially around the surgical defect.  A purse string suture was then placed and tightened.
Partial Purse String (Intermediate) Text: Given the location of the defect and the characteristics of the surrounding skin an intermediate purse string closure was deemed most appropriate.  Undermining was performed circumferentially around the surgical defect.  A purse string suture was then placed and tightened. Wound tension of the circular defect prevented complete closure of the wound.
Partial Purse String (Simple) Text: Given the location of the defect and the characteristics of the surrounding skin a simple purse string closure was deemed most appropriate.  Undermining was performed circumferentially around the surgical defect.  A purse string suture was then placed and tightened. Wound tension of the circular defect prevented complete closure of the wound.
Complex Repair And Single Advancement Flap Text: The defect edges were debeveled with a #15 scalpel blade.  The primary defect was closed partially with a complex linear closure.  Given the location of the remaining defect, shape of the defect and the proximity to free margins a single advancement flap was deemed most appropriate for complete closure of the defect.  Using a sterile surgical marker, an appropriate advancement flap was drawn incorporating the defect and placing the expected incisions within the relaxed skin tension lines where possible. The area thus outlined was incised deep to adipose tissue with a #15 scalpel blade. The skin margins were undermined to an appropriate distance in all directions utilizing iris scissors and carried over to close the primary defect.
Complex Repair And Double Advancement Flap Text: The defect edges were debeveled with a #15 scalpel blade.  The primary defect was closed partially with a complex linear closure.  Given the location of the remaining defect, shape of the defect and the proximity to free margins a double advancement flap was deemed most appropriate for complete closure of the defect.  Using a sterile surgical marker, an appropriate advancement flap was drawn incorporating the defect and placing the expected incisions within the relaxed skin tension lines where possible. The area thus outlined was incised deep to adipose tissue with a #15 scalpel blade. The skin margins were undermined to an appropriate distance in all directions utilizing iris scissors and carried over to close the primary defect.
Complex Repair And Modified Advancement Flap Text: The defect edges were debeveled with a #15 scalpel blade.  The primary defect was closed partially with a complex linear closure.  Given the location of the remaining defect, shape of the defect and the proximity to free margins a modified advancement flap was deemed most appropriate for complete closure of the defect.  Using a sterile surgical marker, an appropriate advancement flap was drawn incorporating the defect and placing the expected incisions within the relaxed skin tension lines where possible. The area thus outlined was incised deep to adipose tissue with a #15 scalpel blade. The skin margins were undermined to an appropriate distance in all directions utilizing iris scissors and carried over to close the primary defect.
Complex Repair And A-T Advancement Flap Text: The defect edges were debeveled with a #15 scalpel blade.  The primary defect was closed partially with a complex linear closure.  Given the location of the remaining defect, shape of the defect and the proximity to free margins an A-T advancement flap was deemed most appropriate for complete closure of the defect.  Using a sterile surgical marker, an appropriate advancement flap was drawn incorporating the defect and placing the expected incisions within the relaxed skin tension lines where possible. The area thus outlined was incised deep to adipose tissue with a #15 scalpel blade. The skin margins were undermined to an appropriate distance in all directions utilizing iris scissors and carried over to close the primary defect.
Complex Repair And O-T Advancement Flap Text: The defect edges were debeveled with a #15 scalpel blade.  The primary defect was closed partially with a complex linear closure.  Given the location of the remaining defect, shape of the defect and the proximity to free margins an O-T advancement flap was deemed most appropriate for complete closure of the defect.  Using a sterile surgical marker, an appropriate advancement flap was drawn incorporating the defect and placing the expected incisions within the relaxed skin tension lines where possible. The area thus outlined was incised deep to adipose tissue with a #15 scalpel blade. The skin margins were undermined to an appropriate distance in all directions utilizing iris scissors and carried over to close the primary defect.
Complex Repair And O-L Flap Text: The defect edges were debeveled with a #15 scalpel blade.  The primary defect was closed partially with a complex linear closure.  Given the location of the remaining defect, shape of the defect and the proximity to free margins an O-L flap was deemed most appropriate for complete closure of the defect.  Using a sterile surgical marker, an appropriate flap was drawn incorporating the defect and placing the expected incisions within the relaxed skin tension lines where possible. The area thus outlined was incised deep to adipose tissue with a #15 scalpel blade. The skin margins were undermined to an appropriate distance in all directions utilizing iris scissors and carried over to close the primary defect.
Complex Repair And Bilobe Flap Text: The defect edges were debeveled with a #15 scalpel blade.  The primary defect was closed partially with a complex linear closure.  Given the location of the remaining defect, shape of the defect and the proximity to free margins a bilobe flap was deemed most appropriate for complete closure of the defect.  Using a sterile surgical marker, an appropriate advancement flap was drawn incorporating the defect and placing the expected incisions within the relaxed skin tension lines where possible. The area thus outlined was incised deep to adipose tissue with a #15 scalpel blade. The skin margins were undermined to an appropriate distance in all directions utilizing iris scissors and carried over to close the primary defect.
Complex Repair And Melolabial Flap Text: The defect edges were debeveled with a #15 scalpel blade.  The primary defect was closed partially with a complex linear closure.  Given the location of the remaining defect, shape of the defect and the proximity to free margins a melolabial flap was deemed most appropriate for complete closure of the defect.  Using a sterile surgical marker, an appropriate advancement flap was drawn incorporating the defect and placing the expected incisions within the relaxed skin tension lines where possible. The area thus outlined was incised deep to adipose tissue with a #15 scalpel blade. The skin margins were undermined to an appropriate distance in all directions utilizing iris scissors and carried over to close the primary defect.
Complex Repair And Rotation Flap Text: The defect edges were debeveled with a #15 scalpel blade.  The primary defect was closed partially with a complex linear closure.  Given the location of the remaining defect, shape of the defect and the proximity to free margins a rotation flap was deemed most appropriate for complete closure of the defect.  Using a sterile surgical marker, an appropriate advancement flap was drawn incorporating the defect and placing the expected incisions within the relaxed skin tension lines where possible. The area thus outlined was incised deep to adipose tissue with a #15 scalpel blade. The skin margins were undermined to an appropriate distance in all directions utilizing iris scissors and carried over to close the primary defect.
Complex Repair And Rhombic Flap Text: The defect edges were debeveled with a #15 scalpel blade.  The primary defect was closed partially with a complex linear closure.  Given the location of the remaining defect, shape of the defect and the proximity to free margins a rhombic flap was deemed most appropriate for complete closure of the defect.  Using a sterile surgical marker, an appropriate advancement flap was drawn incorporating the defect and placing the expected incisions within the relaxed skin tension lines where possible. The area thus outlined was incised deep to adipose tissue with a #15 scalpel blade. The skin margins were undermined to an appropriate distance in all directions utilizing iris scissors and carried over to close the primary defect.
Complex Repair And Transposition Flap Text: The defect edges were debeveled with a #15 scalpel blade.  The primary defect was closed partially with a complex linear closure.  Given the location of the remaining defect, shape of the defect and the proximity to free margins a transposition flap was deemed most appropriate for complete closure of the defect.  Using a sterile surgical marker, an appropriate advancement flap was drawn incorporating the defect and placing the expected incisions within the relaxed skin tension lines where possible. The area thus outlined was incised deep to adipose tissue with a #15 scalpel blade. The skin margins were undermined to an appropriate distance in all directions utilizing iris scissors and carried over to close the primary defect.
Complex Repair And V-Y Plasty Text: The defect edges were debeveled with a #15 scalpel blade.  The primary defect was closed partially with a complex linear closure.  Given the location of the remaining defect, shape of the defect and the proximity to free margins a V-Y plasty was deemed most appropriate for complete closure of the defect.  Using a sterile surgical marker, an appropriate advancement flap was drawn incorporating the defect and placing the expected incisions within the relaxed skin tension lines where possible. The area thus outlined was incised deep to adipose tissue with a #15 scalpel blade. The skin margins were undermined to an appropriate distance in all directions utilizing iris scissors and carried over to close the primary defect.
Complex Repair And M Plasty Text: The defect edges were debeveled with a #15 scalpel blade.  The primary defect was closed partially with a complex linear closure.  Given the location of the remaining defect, shape of the defect and the proximity to free margins an M plasty was deemed most appropriate for complete closure of the defect.  Using a sterile surgical marker, an appropriate advancement flap was drawn incorporating the defect and placing the expected incisions within the relaxed skin tension lines where possible. The area thus outlined was incised deep to adipose tissue with a #15 scalpel blade. The skin margins were undermined to an appropriate distance in all directions utilizing iris scissors and carried over to close the primary defect.
Complex Repair And Double M Plasty Text: The defect edges were debeveled with a #15 scalpel blade.  The primary defect was closed partially with a complex linear closure.  Given the location of the remaining defect, shape of the defect and the proximity to free margins a double M plasty was deemed most appropriate for complete closure of the defect.  Using a sterile surgical marker, an appropriate advancement flap was drawn incorporating the defect and placing the expected incisions within the relaxed skin tension lines where possible. The area thus outlined was incised deep to adipose tissue with a #15 scalpel blade. The skin margins were undermined to an appropriate distance in all directions utilizing iris scissors and carried over to close the primary defect.
Complex Repair And W Plasty Text: The defect edges were debeveled with a #15 scalpel blade.  The primary defect was closed partially with a complex linear closure.  Given the location of the remaining defect, shape of the defect and the proximity to free margins a W plasty was deemed most appropriate for complete closure of the defect.  Using a sterile surgical marker, an appropriate advancement flap was drawn incorporating the defect and placing the expected incisions within the relaxed skin tension lines where possible. The area thus outlined was incised deep to adipose tissue with a #15 scalpel blade. The skin margins were undermined to an appropriate distance in all directions utilizing iris scissors and carried over to close the primary defect.
Complex Repair And Z Plasty Text: The defect edges were debeveled with a #15 scalpel blade.  The primary defect was closed partially with a complex linear closure.  Given the location of the remaining defect, shape of the defect and the proximity to free margins a Z plasty was deemed most appropriate for complete closure of the defect.  Using a sterile surgical marker, an appropriate advancement flap was drawn incorporating the defect and placing the expected incisions within the relaxed skin tension lines where possible. The area thus outlined was incised deep to adipose tissue with a #15 scalpel blade. The skin margins were undermined to an appropriate distance in all directions utilizing iris scissors and carried over to close the primary defect.
Complex Repair And Dorsal Nasal Flap Text: The defect edges were debeveled with a #15 scalpel blade.  The primary defect was closed partially with a complex linear closure.  Given the location of the remaining defect, shape of the defect and the proximity to free margins a dorsal nasal flap was deemed most appropriate for complete closure of the defect.  Using a sterile surgical marker, an appropriate flap was drawn incorporating the defect and placing the expected incisions within the relaxed skin tension lines where possible. The area thus outlined was incised deep to adipose tissue with a #15 scalpel blade. The skin margins were undermined to an appropriate distance in all directions utilizing iris scissors and carried over to close the primary defect.
Complex Repair And Ftsg Text: The defect edges were debeveled with a #15 scalpel blade.  The primary defect was closed partially with a complex linear closure.  Given the location of the defect, shape of the defect and the proximity to free margins a full thickness skin graft was deemed most appropriate to repair the remaining defect.  The graft was trimmed to fit the size of the remaining defect.  The graft was then placed in the primary defect, oriented appropriately, and sutured into place.
Complex Repair And Burow's Graft Text: The defect edges were debeveled with a #15 scalpel blade.  The primary defect was closed partially with a complex linear closure.  Given the location of the defect, shape of the defect, the proximity to free margins and the presence of a standing cone deformity a Burow's graft was deemed most appropriate to repair the remaining defect.  The graft was trimmed to fit the size of the remaining defect.  The graft was then placed in the primary defect, oriented appropriately, and sutured into place.
Complex Repair And Split-Thickness Skin Graft Text: The defect edges were debeveled with a #15 scalpel blade.  The primary defect was closed partially with a complex linear closure.  Given the location of the defect, shape of the defect and the proximity to free margins a split thickness skin graft was deemed most appropriate to repair the remaining defect.  The graft was trimmed to fit the size of the remaining defect.  The graft was then placed in the primary defect, oriented appropriately, and sutured into place.
Complex Repair And Epidermal Autograft Text: The defect edges were debeveled with a #15 scalpel blade.  The primary defect was closed partially with a complex linear closure.  Given the location of the defect, shape of the defect and the proximity to free margins an epidermal autograft was deemed most appropriate to repair the remaining defect.  The graft was trimmed to fit the size of the remaining defect.  The graft was then placed in the primary defect, oriented appropriately, and sutured into place.
Complex Repair And Dermal Autograft Text: The defect edges were debeveled with a #15 scalpel blade.  The primary defect was closed partially with a complex linear closure.  Given the location of the defect, shape of the defect and the proximity to free margins an dermal autograft was deemed most appropriate to repair the remaining defect.  The graft was trimmed to fit the size of the remaining defect.  The graft was then placed in the primary defect, oriented appropriately, and sutured into place.
Complex Repair And Tissue Cultured Epidermal Autograft Text: The defect edges were debeveled with a #15 scalpel blade.  The primary defect was closed partially with a complex linear closure.  Given the location of the defect, shape of the defect and the proximity to free margins an tissue cultured epidermal autograft was deemed most appropriate to repair the remaining defect.  The graft was trimmed to fit the size of the remaining defect.  The graft was then placed in the primary defect, oriented appropriately, and sutured into place.
Complex Repair And Xenograft Text: The defect edges were debeveled with a #15 scalpel blade.  The primary defect was closed partially with a complex linear closure.  Given the location of the defect, shape of the defect and the proximity to free margins a xenograft was deemed most appropriate to repair the remaining defect.  The graft was trimmed to fit the size of the remaining defect.  The graft was then placed in the primary defect, oriented appropriately, and sutured into place.
Complex Repair And Skin Substitute Graft Text: The defect edges were debeveled with a #15 scalpel blade.  The primary defect was closed partially with a complex linear closure.  Given the location of the remaining defect, shape of the defect and the proximity to free margins a skin substitute graft was deemed most appropriate to repair the remaining defect.  The graft was trimmed to fit the size of the remaining defect.  The graft was then placed in the primary defect, oriented appropriately, and sutured into place.
Path Notes (To The Dermatopathologist): Please check margins.
Consent was obtained from the patient. The risks and benefits to therapy were discussed in detail. Specifically, the risks of infection, scarring, bleeding, prolonged wound healing, incomplete removal, allergy to anesthesia, nerve injury and recurrence were addressed. Prior to the procedure, the treatment site was clearly identified and confirmed by the patient. All components of Universal Protocol/PAUSE Rule completed.
Post-Care Instructions: I reviewed with the patient in detail post-care instructions. Patient is not to engage in any heavy lifting, exercise, or swimming for the next 14 days. Should the patient develop any fevers, chills, bleeding, severe pain patient will contact the office immediately.
Home Suture Removal Text: Patient was provided a home suture removal kit and will remove their sutures at home.  If they have any questions or difficulties they will call the office.
Where Do You Want The Question To Include Opioid Counseling Located?: Case Summary Tab
Information: Selecting Yes will display possible errors in your note based on the variables you have selected. This validation is only offered as a suggestion for you. PLEASE NOTE THAT THE VALIDATION TEXT WILL BE REMOVED WHEN YOU FINALIZE YOUR NOTE. IF YOU WANT TO FAX A PRELIMINARY NOTE YOU WILL NEED TO TOGGLE THIS TO 'NO' IF YOU DO NOT WANT IT IN YOUR FAXED NOTE.

## 2024-08-09 LAB — ACHR BIND AB SER-SCNC: <0.3 NMOL/L

## 2024-08-20 ENCOUNTER — DOCUMENTATION (OUTPATIENT)
Dept: RADIATION ONCOLOGY | Facility: HOSPITAL | Age: 89
End: 2024-08-20
Payer: MEDICARE

## 2024-08-20 NOTE — PROGRESS NOTES
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      LOV 8/16/23  EOT 7/21/23    Dr Garcia LOV 5/16/24 next appt w/ Kasia GOODSON on 11/19/24  Dr Matthews LOV 1/29/24 next appt w/ HAMZAH Torres LOV 7/26/24  next appt 2/12/25    Anastrozole 1mg daily     8/2/24 Ct Chest   Narrative & Impression   CLINICAL HISTORY: Abnormal chest x-ray     CT DOSE:  One or more dose reduction techniques (e.g. automated exposure  control, adjustment of the mA and/or kV according to patient size, use of  iterative reconstruction technique) utilized for this examination.     IV and oral contrast: No contrast was administered     Comparison studies: Chest x-ray dated 3/1/2024, CT of the abdomen and pelvis  dated 2/23/2024, previous CT of the chest dated 7/17/2023.     COMMENT:     Lung parenchyma: There are new coarse linear opacities with associated  groundglass mosaic attenuation in the right apex, image 22, series 2.  These may  represent postradiation changes given their coarse appearance and associated  mild bronchiectasis.  Patchy bibasal and lingular atelectasis again seen.     Mediastinum: Small lymph nodes.  No pathologic adenopathy.     Hazel: No lymphadenopathy     Heart and pericardium: Coronary artery calcifications.  Dense calcification of  the mitral annulus.     Chest wall and pleura: Small right effusion which appears improved compared to  CT dated 2/23/2024.  Bilateral breast implants.     Axilla: No lymphadenopathy     Abdomen:  Hepatic hypodensities consistent with cysts appear unchanged.  There  is a linear hypoattenuating scarlike density in the posterior aspect of the  right lobe of the  liver with associated dystrophic calcifications also  unchanged.     Other:     --  IMPRESSION:  New, right upper lobe coarse linear opacities with associated mosaic airspace  disease.  These may represent postradiation changes and correlation is  suggested.  No other interval change in incidental findings as described above.

## 2024-08-26 ENCOUNTER — HOSPITAL ENCOUNTER (OUTPATIENT)
Dept: RADIATION ONCOLOGY | Facility: HOSPITAL | Age: 89
Setting detail: RADIATION/ONCOLOGY SERIES
Discharge: HOME | End: 2024-08-26
Attending: RADIOLOGY
Payer: MEDICARE

## 2024-08-26 ENCOUNTER — APPOINTMENT (OUTPATIENT)
Age: 89
Setting detail: DERMATOLOGY
End: 2024-09-02

## 2024-08-26 VITALS
OXYGEN SATURATION: 96 % | BODY MASS INDEX: 25.56 KG/M2 | SYSTOLIC BLOOD PRESSURE: 150 MMHG | HEART RATE: 62 BPM | WEIGHT: 153.6 LBS | DIASTOLIC BLOOD PRESSURE: 67 MMHG

## 2024-08-26 DIAGNOSIS — Z48.02 ENCOUNTER FOR REMOVAL OF SUTURES: ICD-10-CM

## 2024-08-26 DIAGNOSIS — C79.89 CHEST WALL RECURRENCE OF BREAST CANCER, RIGHT (CMS/HCC): Primary | ICD-10-CM

## 2024-08-26 DIAGNOSIS — C50.911 CHEST WALL RECURRENCE OF BREAST CANCER, RIGHT (CMS/HCC): Primary | ICD-10-CM

## 2024-08-26 PROCEDURE — OTHER SUTURE REMOVAL (GLOBAL PERIOD): OTHER

## 2024-08-26 PROCEDURE — 99024 POSTOP FOLLOW-UP VISIT: CPT

## 2024-08-26 RX ORDER — CHOLECALCIFEROL (VITAMIN D3) 125 MCG
1 CAPSULE ORAL
COMMUNITY

## 2024-08-26 RX ORDER — CHOLECALCIFEROL (VITAMIN D3) 25 MCG
1000 TABLET ORAL DAILY
COMMUNITY

## 2024-08-26 ASSESSMENT — ENCOUNTER SYMPTOMS
GASTROINTESTINAL NEGATIVE: 1
FATIGUE: 1
NEUROLOGICAL NEGATIVE: 1
ARTHRALGIAS: 1
CARDIOVASCULAR NEGATIVE: 1
OCCASIONAL FEELINGS OF UNSTEADINESS: 0
RESPIRATORY NEGATIVE: 1
PSYCHIATRIC NEGATIVE: 1
LOSS OF SENSATION IN FEET: 0
DEPRESSION: 0
MYALGIAS: 1

## 2024-08-26 ASSESSMENT — LOCATION SIMPLE DESCRIPTION DERM: LOCATION SIMPLE: LEFT HAND

## 2024-08-26 ASSESSMENT — LOCATION ZONE DERM: LOCATION ZONE: HAND

## 2024-08-26 ASSESSMENT — LOCATION DETAILED DESCRIPTION DERM: LOCATION DETAILED: LEFT RADIAL DORSAL HAND

## 2024-08-26 ASSESSMENT — PAIN INTENSITY VAS: HOW INTENSE IS YOUR PAIN 0 BEING NO PAIN, 10 BEING THE MOST SEVERE PAIN POSSIBLE?: NO PAIN

## 2024-08-26 ASSESSMENT — PAIN SCALES - GENERAL: PAINLEVEL: 0-NO PAIN

## 2024-08-26 ASSESSMENT — PATIENT HEALTH QUESTIONNAIRE - PHQ9: SUM OF ALL RESPONSES TO PHQ9 QUESTIONS 1 & 2: 0

## 2024-08-26 NOTE — PROCEDURE: SUTURE REMOVAL (GLOBAL PERIOD)
Detail Level: Detailed
Add 57584 Cpt? (Important Note: In 2017 The Use Of 60050 Is Being Tracked By Cms To Determine Future Global Period Reimbursement For Global Periods): yes

## 2024-08-26 NOTE — PROGRESS NOTES
Patient ID:  Latrell Ojeda is a 89 y.o. female.  1935    Referring Physician:   No referring provider defined for this encounter.    Primary Care Provider:  Aris Miller MD    Problem List:  Patient Active Problem List    Diagnosis Date Noted    Lesion of lung 07/08/2024    Other hyperlipidemia 02/29/2024    Loss of voice 02/29/2024    Lung nodules 05/02/2023    Chest wall recurrence of breast cancer, right (CMS/HCC) 04/11/2023    Neck pain 02/20/2023    LFTs abnormal 10/20/2022    Hyponatremia 06/14/2022    Frequent UTI 04/18/2022    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 06/30/2021    Hypokalemia 06/17/2021    Chronic kidney disease, stage 3b (CMS/HCC) 03/12/2021    Medicare annual wellness visit, subsequent 12/14/2020    A-fib (CMS/HCC) 12/14/2020    Essential hypertension 02/13/2020    Gastroesophageal reflux disease without esophagitis 02/13/2020    Palpitation 02/13/2020    Prediabetes 02/13/2020    History of breast cancer 02/13/2020        Cancer Staging   Chest wall recurrence of breast cancer, right (CMS/HCC)  Staging form: Breast, AJCC 8th Edition  - Clinical stage from 4/10/2023: Stage IA (rcT1b, cN0, cM0, G2, ER+, IN+, HER2-) - Signed by Peggy Matthews MD on 4/11/2023      Subjective    History of Present Illness:  Ms. 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  After developing an isolated right chest wall recurrence in March of this year, she underwent excision with pathology showing involvement of subcutaneous and skeletal muscle with  positive margins.      She has done well since completion of radiation therapy with resolving skin reaction.  She is no longer using any topical creams or ointments on the skin in the previous treatment field.  She is currently doing well with no specific complaints at this time.    She notes elevation of the right breast implant after completion of adjuvant radiation, following resection of the chest wall recurrence.  There is no significant associated discomfort.    She is taking anastrozole daily without significant associated side effects. Latrell Ojeda continues to follow up with Dr Garcia (med/onc).    She lives at [place] and has a very good support network.        The following portions of the patient's history were reviewed and updated as appropriate: allergies, current medications, past family history, past medical history, past social history, and past surgical history.   HPI   Review of Systems   Constitutional:  Positive for fatigue (mild).   Respiratory: Negative.     Cardiovascular: Negative.    Gastrointestinal: Negative.    Genitourinary: Negative.     Musculoskeletal:  Positive for arthralgias and myalgias.   Neurological: Negative.    Psychiatric/Behavioral: Negative.              Objective      Physical Exam:  Vital Signs for this encounter:       Physical Exam  On exam, the patient appears relatively well.  She does have a significant bilateral lid lag.  The left mastectomy site and implant position is quite good without any signs of recurrence.  The right implant is approximately 2 cm higher with a little bit more upper fullness.  There is some residual scar tissue and hyperpigmentation in the right inframammary fold and right axilla.  There is slight retraction of the incision where she had removal of the chest wall recurrence.  There are no signs of recurrence at this time.    Performance Status: RTOG 1     PAIN ASSESSMENT:  Score    Location    Pain Intervention      LABS:  No  results found for this or any previous visit (from the past 336 hour(s)).       Assessment/Plan   Latrell continues to do well without any evidence of active breast disease.  After resection of a chest wall recurrence over an implant followed by adjuvant radiation, it is relatively common to observe the superior shift of the implant position, due to less available skin to cover the big implant as well as associated scar tissue.  If the implant size and position were to create a problem for her, she could have a right capsulectomy and the implants exchanged for smaller size.    She will continue with her endocrine therapy, healthy lifestyle, and program of close surveillance.  At this time she is not interested in any kind of surgical procedure.    We will see her in follow-up on an as-needed basis.  Her next appt will be with nurse practitioner Kasia GOODSON on 11/19/24 and Dr Matthews (surg/onc), in which her next appt will be with HAMZAH Smith on 2/12/25.          Diagnoses and Orders Associated With This Visit:  There are no diagnoses linked to this encounter.  TREATMENT PLAN SUMMARY:  Radiation Treatments       Active   No active radiation treatments to show.     Historical   Plans   1a_Rt sclav   Most recent treatment: Dose planned: 200 cGy (fraction 23 of 23 on 7/3/2023)   Total: Dose planned: 4,600 cGy   Elapsed Days: 50 days as of 2023-07-21 @ 12:5536      1b_Rt pab   Most recent treatment: Dose planned: 51 cGy (fraction 23 of 23 on 7/3/2023)   Total: Dose planned: 1,180 cGy   Elapsed Days: 50 days as of 2023-07-21 @ 12:5536      1c_R CW NoBol   Most recent treatment: Dose planned: 200 cGy (fraction 7 of 7 on 7/6/2023)   Total: Dose planned: 1,400 cGy   Elapsed Days: 50 days as of 2023-07-21 @ 12:5536      1c_Rt CW   Most recent treatment: Dose planned: 200 cGy (fraction 18 of 18 on 6/26/2023)   Total: Dose planned: 5,000 cGy   Elapsed Days: 50 days as of 2023-07-21 @ 12:5536      1d_rt e  boost   Most recent treatment: Dose planned: 200 cGy (fraction 5 of 5 on 7/21/2023)   Total: Dose planned: 1,000 cGy   Elapsed Days: 50 days as of 2023-07-21 @ 12:5536      1a_Rt sclav   Most recent treatment: Dose planned: 45 cGy (fraction 0 of 23 on 7/5/2023)   Total: Dose planned: 1,035 cGy   Elapsed Days: : @ --      1b_Rt pab   Most recent treatment: Dose planned: 47 cGy (fraction 0 of 23 on 7/5/2023)   Total: Dose planned: 1,070 cGy   Elapsed Days: : @ --      1c_Rt CW 18Fx   Most recent treatment: Dose planned: 200 cGy (fraction 0 of 18 on 7/5/2023)   Total: Dose planned: 3,600 cGy   Elapsed Days: : @ --      1c_Rt [id number]   Most recent treatment: Dose planned: 200 cGy (fraction 0 of 25 on 7/5/2023)   Total: Dose planned: 5,000 cGy   Elapsed Days: : @ --      1d_ 9e 3mm   Most recent treatment: Dose planned: 200 cGy (fraction 0 of 5 on 7/5/2023)   Total: Dose planned: 1,000 cGy   Elapsed Days: : @ --      1d_6e   Most recent treatment: Dose planned: 320 cGy (fraction 0 of 5 on 7/5/2023)   Total: Dose planned: 1,600 cGy   Elapsed Days: : @ --      1d_6e 3mm   Most recent treatment: Dose planned: 200 cGy (fraction 0 of 5 on 7/5/2023)   Total: Dose planned: 1,000 cGy   Elapsed Days: : @ --      1d_rt 6e bst   Most recent treatment: Dose planned: 200 cGy (fraction 0 of 5 on 5/24/2023)   Total: Dose planned: 1,000 cGy   Elapsed Days: : @ --      1d_rt 9e bst   Most recent treatment: Dose planned: 200 cGy (fraction 0 of 5 on 5/24/2023)   Total: Dose planned: 1,000 cGy   Elapsed Days: : @ --      Reference Points   1a_Rt sclav   Most recent treatment: Dose given: 200 cGy (on 7/3/2023)   Total: Dose given: 4,600 cGy   Elapsed Days: 50 days as of 2023-07-21 @ 12:5536      1b_Rt pab   Most recent treatment: Dose given: 200 cGy (on 7/3/2023)   Total: Dose given: 4,600 cGy   Elapsed Days: 50 days as of 2023-07-21 @ 12:5536      1c_CW calc   Most recent treatment: Dose given: 206 cGy (on 7/6/2023)   Total: Dose given: 5,155  cGy   Elapsed Days: 50 days as of 2023-07-21 @ 12:5536      1c_Rt CW   Most recent treatment: Dose given: 200 cGy (on 7/6/2023)   Total: Dose given: 5,000 cGy   Elapsed Days: 50 days as of 2023-07-21 @ 12:5536      1d_D=2   Most recent treatment: Dose given: 222 cGy (on 7/21/2023)   Total: Dose given: 1,111 cGy   Elapsed Days: 50 days as of 2023-07-21 @ 12:5536      1d_rt e boost   Most recent treatment: Dose given: 200 cGy (on 7/21/2023)   Total: Dose given: 1,000 cGy   Elapsed Days: 50 days as of 2023-07-21 @ 12:5536      1e_trgt Rt CW   Most recent treatment: Dose given: 200 cGy (on 7/21/2023)   Total: Dose given: 6,000 cGy   Elapsed Days: 50 days as of 2023-07-21 @ 12:5536      1a_Rt sclav   Most recent treatment: Course completed on 7/5/2023   Total: Dose given: 0 cGy   Elapsed Days: : @ --      1b_Rt pab   Most recent treatment: Course completed on 7/5/2023   Total: Dose given: 0 cGy   Elapsed Days: : @ --      1c_CW calc   Most recent treatment: Course completed on 7/5/2023   Total: Dose given: 0 cGy   Elapsed Days: : @ --      1c_Rt CW   Most recent treatment: Course completed on 7/5/2023   Total: Dose given: 0 cGy   Elapsed Days: : @ --      1d_6e   Most recent treatment: Course completed on 7/5/2023   Total: Dose given: 0 cGy   Elapsed Days: : @ --      1d_6e 3mm   Most recent treatment: Course completed on 7/5/2023   Total: Dose given: 0 cGy   Elapsed Days: : @ --      1d_D=1.2   Most recent treatment: Course completed on 5/24/2023   Total: Dose given: 0 cGy   Elapsed Days: : @ --      1d_D=2   Most recent treatment: Course completed on 5/24/2023   Total: Dose given: 0 cGy   Elapsed Days: : @ --      1d_D=2 w jose roberto   Most recent treatment: Course completed on 7/5/2023   Total: Dose given: 0 cGy   Elapsed Days: : @ --      1d_rt e boost   Most recent treatment: Course completed on 7/5/2023   Total: Dose given: 0 cGy   Elapsed Days: : @ --      1e_trgt Rt CW   Most recent treatment: Course completed on  7/5/2023   Total: Dose given: 0 cGy   Elapsed Days: : @ --                     PO CHEMOTHERAPY:  Anastrozole 1mg daily     THERAPY PLAN:  No therapy plan of the specified type found.

## 2024-08-26 NOTE — LETTER
August 26, 2024                                                          Patient: Latrell Ojeda   YOB: 1935   Date of Visit: 8/26/2024       Dear Dr. Miller:    The patient is seen at the [facility] today. Attached is my assessment and plan of care.  Thank you for the opportunity to share in Latrell Ojeda's care.       Sincerely,        Erinn Esparza MD      CC: No Recipients

## 2024-09-05 RX ORDER — PANTOPRAZOLE SODIUM 40 MG/1
40 TABLET, DELAYED RELEASE ORAL EVERY OTHER DAY
Qty: 45 TABLET | Refills: 3 | Status: SHIPPED | OUTPATIENT
Start: 2024-09-05

## 2024-09-09 ENCOUNTER — CLINICAL SUPPORT (OUTPATIENT)
Dept: INTERNAL MEDICINE | Facility: CLINIC | Age: 89
End: 2024-09-09
Payer: MEDICARE

## 2024-09-09 DIAGNOSIS — R35.0 URINARY FREQUENCY: Primary | ICD-10-CM

## 2024-09-09 DIAGNOSIS — R30.0 DYSURIA: ICD-10-CM

## 2024-09-09 LAB
BACTERIA, POC: NEGATIVE
BILIRUBIN, POC: NEGATIVE
BLOOD URINE, POC: POSITIVE
CLARITY, POC: NORMAL
COLOR, POC: NORMAL
EXPIRATION DATE: NORMAL
GLUCOSE URINE, POC: NEGATIVE
KETONES, POC: NEGATIVE
LEUKOCYTE EST, POC: NORMAL
Lab: NORMAL
NITRITE, POC: NORMAL
PH, POC: 6
POCT MANUFACTURER: NORMAL
PROTEIN, POC: NORMAL
SPECIFIC GRAVITY, POC: 1.01
UROBILINOGEN, POC: 0.2

## 2024-09-09 PROCEDURE — 81002 URINALYSIS NONAUTO W/O SCOPE: CPT | Performed by: INTERNAL MEDICINE

## 2024-09-09 PROCEDURE — 87086 URINE CULTURE/COLONY COUNT: CPT | Performed by: INTERNAL MEDICINE

## 2024-09-09 PROCEDURE — 81001 URINALYSIS AUTO W/SCOPE: CPT | Performed by: INTERNAL MEDICINE

## 2024-09-09 PROCEDURE — 87077 CULTURE AEROBIC IDENTIFY: CPT | Performed by: INTERNAL MEDICINE

## 2024-09-10 LAB
BACTERIA URNS QL MICRO: ABNORMAL /HPF
BILIRUB UR QL STRIP.AUTO: NEGATIVE MG/DL
CLARITY UR REFRACT.AUTO: ABNORMAL
COLOR UR AUTO: ABNORMAL
GLUCOSE UR STRIP.AUTO-MCNC: NEGATIVE MG/DL
HGB UR QL STRIP.AUTO: 3
HYALINE CASTS #/AREA URNS LPF: ABNORMAL /LPF
KETONES UR STRIP.AUTO-MCNC: NEGATIVE MG/DL
LEUKOCYTE ESTERASE UR QL STRIP.AUTO: 2
MUCOUS THREADS URNS QL MICRO: ABNORMAL /LPF
NITRITE UR QL STRIP.AUTO: POSITIVE
PH UR STRIP.AUTO: 6.5 [PH]
PROT UR QL STRIP.AUTO: 1
RBC #/AREA URNS HPF: ABNORMAL /HPF
SP GR UR REFRACT.AUTO: 1.01
SQUAMOUS URNS QL MICRO: ABNORMAL /HPF
UROBILINOGEN UR STRIP-ACNC: 0.2 EU/DL
WBC #/AREA URNS HPF: ABNORMAL /HPF

## 2024-09-12 LAB
BACTERIA UR CULT: ABNORMAL
BACTERIA UR CULT: ABNORMAL

## 2024-09-13 NOTE — ASSESSMENT & PLAN NOTE
Recently treated for pneumonia  Symptoms improved  Questionable underlying lesion  Check CT scan of lung   [FreeTextEntry1] : 7/3/24 TTE revealed 1. Left ventricular cavity is normal in size. Left ventricular wall thickness is normal. Left ventricular systolic function is hyperdynamic with an ejection fraction of 75 % by Felipe's method of disks.  2. Normal right ventricular cavity size, with normal wall thickness, and normal right ventricular systolic function. Tricuspid annular plane systolic excursion (TAPSE) is 1.6 cm (normal >=1.7 cm).  3. No pericardial effusion seen.  4. Compared to the transthoracic echocardiogram performed on 9/10/2023, there have been no significant interval changes.  5. There is increased LV mass and concentric hypertrophy. 6. 26 mm Medtronic Evolut FX (valve-in-valve) is present in the aortic position. There is no intravalvular regurgitation. There is no paravalvular regurgitation. There is no aortic valve stenosis. The peak transaortic velocity is 2.61 m/s, peak transaortic gradient is 27.2 mmHg and mean transaortic gradient is 14.0 mmHg with an LVOT/aortic valve VTI ratio of 0.57.   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 All great vessels arise from the true lumen. There is no signs of false lumen thrombosis or true lumen compression. Previous AVR and aortic root replacement. Mild RML and RLL bronchiolitis. 1.8 cm pancreatic tail cyst. Additional smaller cyst. Previous AP resection of the rectum and sigmoid colon.  ACR white paper guidelines suggest a contrast enhanced, pancreas- protocol abdominal CT or MR in 6 months or endoscopic ultrasound with fine needle aspiration.

## 2024-09-16 ENCOUNTER — APPOINTMENT (OUTPATIENT)
Age: 89
Setting detail: DERMATOLOGY
End: 2024-10-02

## 2024-09-16 DIAGNOSIS — Z48.817 ENCOUNTER FOR SURGICAL AFTERCARE FOLLOWING SURGERY ON THE SKIN AND SUBCUTANEOUS TISSUE: ICD-10-CM

## 2024-09-16 PROCEDURE — OTHER POST-OP WOUND CHECK: OTHER

## 2024-09-16 PROCEDURE — 99024 POSTOP FOLLOW-UP VISIT: CPT

## 2024-09-16 ASSESSMENT — LOCATION ZONE DERM: LOCATION ZONE: HAND

## 2024-09-16 ASSESSMENT — LOCATION DETAILED DESCRIPTION DERM: LOCATION DETAILED: LEFT DORSAL INDEX FINGER METACARPOPHALANGEAL JOINT

## 2024-09-16 ASSESSMENT — LOCATION SIMPLE DESCRIPTION DERM: LOCATION SIMPLE: LEFT HAND

## 2024-09-16 NOTE — PROCEDURE: POST-OP WOUND CHECK
Detail Level: Detailed
Add 70513 Cpt? (Important Note: In 2017 The Use Of 08049 Is Being Tracked By Cms To Determine Future Global Period Reimbursement For Global Periods): yes

## 2024-10-30 ENCOUNTER — CLINICAL SUPPORT (OUTPATIENT)
Dept: INTERNAL MEDICINE | Facility: CLINIC | Age: 89
End: 2024-10-30
Payer: MEDICARE

## 2024-10-30 DIAGNOSIS — R35.0 FREQUENT URINATION: ICD-10-CM

## 2024-10-30 DIAGNOSIS — Z78.9 DISCOMFORT: ICD-10-CM

## 2024-10-30 DIAGNOSIS — R35.0 FREQUENT URINATION: Primary | ICD-10-CM

## 2024-10-30 LAB
BACTERIA URNS QL MICRO: ABNORMAL /HPF
BACTERIA, POC: ABNORMAL
BILIRUB UR QL STRIP.AUTO: NEGATIVE MG/DL
BILIRUBIN, POC: ABNORMAL
BLOOD URINE, POC: ABNORMAL
CLARITY UR REFRACT.AUTO: ABNORMAL
CLARITY, POC: ABNORMAL
COLOR UR AUTO: YELLOW
COLOR, POC: ABNORMAL
EXPIRATION DATE: ABNORMAL
GLUCOSE UR STRIP.AUTO-MCNC: NEGATIVE MG/DL
GLUCOSE URINE, POC: ABNORMAL
HGB UR QL STRIP.AUTO: 1
HYALINE CASTS #/AREA URNS LPF: ABNORMAL /LPF
KETONES UR STRIP.AUTO-MCNC: NEGATIVE MG/DL
KETONES, POC: ABNORMAL
LEUKOCYTE EST, POC: ABNORMAL
LEUKOCYTE ESTERASE UR QL STRIP.AUTO: 3
Lab: ABNORMAL
NITRITE UR QL STRIP.AUTO: NEGATIVE
NITRITE, POC: ABNORMAL
PH UR STRIP.AUTO: 6.5 [PH]
PH, POC: ABNORMAL
POCT MANUFACTURER: ABNORMAL
PROT UR QL STRIP.AUTO: NEGATIVE
PROTEIN, POC: ABNORMAL
RBC #/AREA URNS HPF: ABNORMAL /HPF
SP GR UR REFRACT.AUTO: 1.01
SPECIFIC GRAVITY, POC: ABNORMAL
SQUAMOUS URNS QL MICRO: ABNORMAL /HPF
UROBILINOGEN UR STRIP-ACNC: 0.2 EU/DL
UROBILINOGEN, POC: ABNORMAL
WBC #/AREA URNS HPF: ABNORMAL /HPF

## 2024-10-30 PROCEDURE — 81002 URINALYSIS NONAUTO W/O SCOPE: CPT | Performed by: INTERNAL MEDICINE

## 2024-10-30 PROCEDURE — 87186 SC STD MICRODIL/AGAR DIL: CPT | Performed by: INTERNAL MEDICINE

## 2024-10-30 PROCEDURE — 81001 URINALYSIS AUTO W/SCOPE: CPT | Performed by: INTERNAL MEDICINE

## 2024-10-31 ENCOUNTER — LAB REQUISITION (OUTPATIENT)
Dept: LAB | Facility: HOSPITAL | Age: 89
End: 2024-10-31
Attending: UROLOGY
Payer: MEDICARE

## 2024-10-31 DIAGNOSIS — N39.0 URINARY TRACT INFECTION, SITE NOT SPECIFIED: ICD-10-CM

## 2024-10-31 LAB
BACTERIA URNS QL MICRO: 1 /HPF
BILIRUB UR QL STRIP.AUTO: ABNORMAL MG/DL
CLARITY UR REFRACT.AUTO: ABNORMAL
COLOR UR AUTO: ABNORMAL
GLUCOSE UR STRIP.AUTO-MCNC: ABNORMAL MG/DL
HGB UR QL STRIP.AUTO: ABNORMAL
HYALINE CASTS #/AREA URNS LPF: ABNORMAL /LPF
KETONES UR STRIP.AUTO-MCNC: ABNORMAL MG/DL
LEUKOCYTE ESTERASE UR QL STRIP.AUTO: ABNORMAL
NITRITE UR QL STRIP.AUTO: ABNORMAL
PH UR STRIP.AUTO: ABNORMAL [PH]
PROT UR QL STRIP.AUTO: ABNORMAL
RBC #/AREA URNS HPF: ABNORMAL /HPF
SP GR UR REFRACT.AUTO: 1.01
SQUAMOUS URNS QL MICRO: ABNORMAL /HPF
UROBILINOGEN UR STRIP-ACNC: ABNORMAL EU/DL
WBC #/AREA URNS HPF: ABNORMAL /HPF

## 2024-10-31 PROCEDURE — 87086 URINE CULTURE/COLONY COUNT: CPT | Performed by: UROLOGY

## 2024-10-31 PROCEDURE — 81001 URINALYSIS AUTO W/SCOPE: CPT | Performed by: UROLOGY

## 2024-11-02 LAB
BACTERIA UR CULT: ABNORMAL

## 2024-11-18 ENCOUNTER — APPOINTMENT (OUTPATIENT)
Age: 89
Setting detail: DERMATOLOGY
End: 2024-11-18

## 2024-11-18 DIAGNOSIS — L92.3 FOREIGN BODY GRANULOMA OF THE SKIN AND SUBCUTANEOUS TISSUE: ICD-10-CM

## 2024-11-18 DIAGNOSIS — Z48.817 ENCOUNTER FOR SURGICAL AFTERCARE FOLLOWING SURGERY ON THE SKIN AND SUBCUTANEOUS TISSUE: ICD-10-CM

## 2024-11-18 PROCEDURE — 11900 INJECT SKIN LESIONS </W 7: CPT

## 2024-11-18 PROCEDURE — OTHER INJECTION: OTHER

## 2024-11-18 ASSESSMENT — LOCATION SIMPLE DESCRIPTION DERM: LOCATION SIMPLE: LEFT HAND

## 2024-11-18 ASSESSMENT — LOCATION DETAILED DESCRIPTION DERM: LOCATION DETAILED: 2ND WEB SPACE LEFT HAND

## 2024-11-18 ASSESSMENT — LOCATION ZONE DERM: LOCATION ZONE: HAND

## 2024-11-18 NOTE — PROCEDURE: INJECTION
Dose Administered (Numbers Only - Mg, G, Mcg, Units, Cc): .5
Lot # (Optional): [id number]
Treatment Number: 1
Post-Care Instructions: I reviewed with the patient in detail post-care instructions. Patient understands to keep the injection sites clean and call the clinic if there is any redness, swelling or pain.
Medication (1) And Associated J-Code Units: 5-Fluorouracil, 500mg
Procedure Information: Please note that the numeric value listed in the Medication (1) and associated J-code units and Medication (2) and associated J-code units variables are j-code amounts and do not represent either the concentration or the total amount of the medications injected.  I strongly recommend selecting no to the Render J-code information in note question. This will allow your note to be more clear. If you are billing j-codes with your injection codes you need to document the total amount of the medication injected. This amount should match the j-code units. For example, if you are injecting Triamcinolone 40mg as an intramuscular injection you would select 40 for the dose field.. This would allow you to document  with 4 units (40mg = 10mg x 4). The total volume is not used to calculate j-codes only the amount of the medication administered.
Type Of Vial Used?: Multi-Dose
Route: IL
Dose Administered (Numbers Only - Mg, G, Mcg, Units, Cc): 0
Detail Level: None
Bill J-Code: yes
Expiration Date (Optional): 04/2025
Bill For Wasted Drug?: no
Consent: The risks of the medication were reviewed with the patient.

## 2024-11-29 ENCOUNTER — CLINICAL SUPPORT (OUTPATIENT)
Dept: INTERNAL MEDICINE | Facility: CLINIC | Age: 89
End: 2024-11-29
Payer: MEDICARE

## 2024-11-29 DIAGNOSIS — R35.0 FREQUENCY OF URINATION: Primary | ICD-10-CM

## 2024-11-29 DIAGNOSIS — R31.9 HEMATURIA, UNSPECIFIED TYPE: ICD-10-CM

## 2024-11-29 DIAGNOSIS — N89.8 VAGINAL IRRITATION: ICD-10-CM

## 2024-11-29 LAB
BACTERIA URNS QL MICRO: ABNORMAL /HPF
BACTERIA, POC: ABNORMAL
BILIRUB UR QL STRIP.AUTO: NEGATIVE MG/DL
BILIRUBIN, POC: ABNORMAL
BLOOD URINE, POC: ABNORMAL
CLARITY UR REFRACT.AUTO: ABNORMAL
CLARITY, POC: ABNORMAL
COLOR UR AUTO: ABNORMAL
COLOR, POC: ABNORMAL
EXPIRATION DATE: ABNORMAL
GLUCOSE UR STRIP.AUTO-MCNC: NEGATIVE MG/DL
GLUCOSE URINE, POC: ABNORMAL
HGB UR QL STRIP.AUTO: 3
HYALINE CASTS #/AREA URNS LPF: ABNORMAL /LPF
KETONES UR STRIP.AUTO-MCNC: NEGATIVE MG/DL
KETONES, POC: ABNORMAL
LEUKOCYTE EST, POC: ABNORMAL
LEUKOCYTE ESTERASE UR QL STRIP.AUTO: 3
Lab: ABNORMAL
NITRITE UR QL STRIP.AUTO: NEGATIVE
NITRITE, POC: ABNORMAL
PH UR STRIP.AUTO: 6.5 [PH]
PH, POC: ABNORMAL
POCT MANUFACTURER: ABNORMAL
PROT UR QL STRIP.AUTO: 1
PROTEIN, POC: ABNORMAL
RBC #/AREA URNS HPF: ABNORMAL /HPF
SP GR UR REFRACT.AUTO: 1.01
SPECIFIC GRAVITY, POC: ABNORMAL
SQUAMOUS URNS QL MICRO: ABNORMAL /HPF
UROBILINOGEN UR STRIP-ACNC: 0.2 EU/DL
UROBILINOGEN, POC: ABNORMAL
WBC #/AREA URNS HPF: ABNORMAL /HPF

## 2024-11-29 PROCEDURE — 81001 URINALYSIS AUTO W/SCOPE: CPT | Performed by: INTERNAL MEDICINE

## 2024-11-29 PROCEDURE — 81002 URINALYSIS NONAUTO W/O SCOPE: CPT | Performed by: INTERNAL MEDICINE

## 2024-11-29 PROCEDURE — 87077 CULTURE AEROBIC IDENTIFY: CPT | Performed by: INTERNAL MEDICINE

## 2024-12-01 LAB
BACTERIA UR CULT: ABNORMAL
BACTERIA UR CULT: ABNORMAL

## 2024-12-02 ENCOUNTER — APPOINTMENT (OUTPATIENT)
Age: 89
Setting detail: DERMATOLOGY
End: 2024-12-02

## 2024-12-02 DIAGNOSIS — D49.2 NEOPLASM OF UNSPECIFIED BEHAVIOR OF BONE, SOFT TISSUE, AND SKIN: ICD-10-CM

## 2024-12-02 PROCEDURE — OTHER SHAVE REMOVAL: OTHER

## 2024-12-02 PROCEDURE — 11307 SHAVE SKIN LESION 1.1-2.0 CM: CPT

## 2024-12-02 ASSESSMENT — LOCATION ZONE DERM: LOCATION ZONE: HAND

## 2024-12-02 ASSESSMENT — LOCATION DETAILED DESCRIPTION DERM: LOCATION DETAILED: LEFT DORSAL INDEX FINGER METACARPOPHALANGEAL JOINT

## 2024-12-02 ASSESSMENT — LOCATION SIMPLE DESCRIPTION DERM: LOCATION SIMPLE: LEFT HAND

## 2024-12-02 NOTE — PROCEDURE: SHAVE REMOVAL
Medical Necessity Information: It is in your best interest to select a reason for this procedure from the list below. All of these items fulfill various CMS LCD requirements except the new and changing color options.
Medical Necessity Clause: This procedure was medically necessary because the lesion that was treated was:
Lab: [id number]
Lab Facility: [id number]
Detail Level: Detailed
Was A Bandage Applied: Yes
Size Of Lesion In Cm (Required): 1.2
Depth Of Shave: dermis
Biopsy Method: Personna blade
Anesthesia Type: 1% lidocaine with 1:100,000 epinephrine
Hemostasis: Drysol
Wound Care: Petrolatum
Path Notes (To The Dermatopathologist): Please comment on surgical margins
Render Path Notes In Note?: No
Consent was obtained from the patient. The risks and benefits to therapy were discussed in detail. Specifically, the risks of infection, scarring, bleeding, prolonged wound healing, incomplete removal, allergy to anesthesia, nerve injury and recurrence were addressed. Prior to the procedure, the treatment site was clearly identified and confirmed by the patient. All components of Universal Protocol/PAUSE Rule completed.
Post-Care Instructions: I reviewed with the patient in detail post-care instructions. Patient is to keep the bandage on the biopsy site dry overnight, and then apply aquaphor or petrolatum twice daily for four days. Patient may apply warm water soaks to remove any crusting.
Notification Instructions: Patient will be notified of pathology results. However, patient instructed to call the office if not contacted within 2 weeks.
Billing Type: Third-Party Bill

## 2024-12-09 ENCOUNTER — OFFICE VISIT (OUTPATIENT)
Dept: INTERNAL MEDICINE | Facility: CLINIC | Age: 89
End: 2024-12-09
Payer: MEDICARE

## 2024-12-09 VITALS — OXYGEN SATURATION: 98 % | DIASTOLIC BLOOD PRESSURE: 70 MMHG | SYSTOLIC BLOOD PRESSURE: 120 MMHG | HEART RATE: 86 BPM

## 2024-12-09 DIAGNOSIS — R91.1 LESION OF LUNG: ICD-10-CM

## 2024-12-09 DIAGNOSIS — I10 ESSENTIAL HYPERTENSION: ICD-10-CM

## 2024-12-09 DIAGNOSIS — K59.00 CONSTIPATION, UNSPECIFIED CONSTIPATION TYPE: Primary | ICD-10-CM

## 2024-12-09 DIAGNOSIS — E11.9 TYPE 2 DIABETES MELLITUS WITHOUT COMPLICATION, WITHOUT LONG-TERM CURRENT USE OF INSULIN (CMS/HCC): ICD-10-CM

## 2024-12-09 DIAGNOSIS — N18.32 CHRONIC KIDNEY DISEASE, STAGE 3B (CMS/HCC): ICD-10-CM

## 2024-12-09 PROCEDURE — 99214 OFFICE O/P EST MOD 30 MIN: CPT | Performed by: INTERNAL MEDICINE

## 2024-12-09 PROCEDURE — G2211 COMPLEX E/M VISIT ADD ON: HCPCS | Performed by: INTERNAL MEDICINE

## 2024-12-09 ASSESSMENT — ENCOUNTER SYMPTOMS
CHILLS: 0
ABDOMINAL PAIN: 0
FEVER: 0

## 2024-12-09 NOTE — PROGRESS NOTES
Chief Complaint   Patient presents with    Other     4-6 month follow up  Constipation  Da fatigue after breakfast   BP concerns   UTI       Subjective      Patient ID: Latrell Ojeda is a 89 y.o. female.    HPI    Constipation: Going on for past couple months.  Having small balls of stool.  She started taking prunes.  Somewhat better but still with same symptoms    HTN: Saw cardiology.  On losartan daily instead of as needed now    Fatigue: She feels fatigued after breakfast for about half an hour to an hour.  States her BP as low 100s at that time.  Without losartan her blood pressure was going up to 180s at night    CKD: Needs labs    DM2: Last A1c increased to DMN from prediabetes    Objective     Vitals:    12/09/24 1323   BP: 120/70   BP Location: Left upper arm   Patient Position: Sitting   Pulse: 86   TempSrc: Oral   SpO2: 98%       The following have been reviewed and updated as appropriate in this visit:   Allergies  Meds  Problems        Review of Systems   Constitutional:  Negative for chills and fever.   Cardiovascular:  Negative for chest pain.   Gastrointestinal:  Negative for abdominal pain.         Current Outpatient Medications:     amiodarone (PACERONE) 200 mg tablet, Take 100 mg by mouth daily. , Disp: , Rfl:     anastrozole (ARIMIDEX) 1 mg tablet, Take  1 tablet (1 mg total) daily  Swallow whole with a drink of water., Disp: 90 tablet, Rfl: 3    ascorbic acid, vitamin C, (VITAMIN C) 500 mg tablet,chewable, daily., Disp: , Rfl:     biotin 5,000 mcg tablet,disintegrating, 1 tablet daily., Disp: , Rfl:     cholecalciferol, vitamin D3, 1,000 unit (25 mcg) tablet, Take 1,000 Units by mouth daily., Disp: , Rfl:     coenzyme Q10 (COQ10) 10 mg capsule, Take 10 mg by mouth daily.  , Disp: , Rfl:     cranberry extract 425 mg capsule, Take by mouth daily., Disp: , Rfl:     hydrochlorothiazide (HYDRODIURIL) 25 mg tablet, Take 25 mg by mouth every morning., Disp: , Rfl:     LOSARTAN POTASSIUM,  BULK, MISC, Take 25 mg by mouth daily., Disp: , Rfl:     lutein 6 mg capsule, Take 6 mg by mouth daily.  , Disp: , Rfl:     pantoprazole (PROTONIX) 40 mg EC tablet, TAKE 1 TABLET BY MOUTH EVERY  OTHER DAY, Disp: 45 tablet, Rfl: 3    red yeast rice 600 mg capsule, Take 1 capsule by mouth 2 (two) times a day.  , Disp: , Rfl:     rivaroxaban (XARELTO) 15 mg tablet, Take 1 tablet (15 mg total) by mouth daily with dinner., Disp: 90 tablet, Rfl: 1    Physical Exam  Vitals reviewed.   Constitutional:       General: She is not in acute distress.     Appearance: She is well-developed.   HENT:      Head: Normocephalic and atraumatic.   Eyes:      Conjunctiva/sclera: Conjunctivae normal.   Cardiovascular:      Rate and Rhythm: Normal rate and regular rhythm.   Pulmonary:      Effort: Pulmonary effort is normal. No respiratory distress.      Breath sounds: Normal breath sounds. No wheezing or rales.   Abdominal:      General: There is no distension.      Palpations: Abdomen is soft.      Tenderness: There is no abdominal tenderness. There is no guarding.   Neurological:      Mental Status: She is alert. Mental status is at baseline.   Psychiatric:         Mood and Affect: Mood normal.         Behavior: Behavior normal.         Assessment/Plan   Constipation  Start miralax 1 capful Monday Wednesday Friday  Continue prunes    Type 2 diabetes mellitus without complication, without long-term current use of insulin (CMS/[facility])  A1c increased to diabetes on last blood check  Continue diet management  Check labs    Chronic kidney disease, stage 3b (CMS/HCC)  Avoid dehydration  Avoid NSAIDs (Ibuprofen, naproxen, Motrin, Advil, Aleve)  Check labs    Essential hypertension  Now on losartan daily due to high BP at night  Continue HCTZ, losartan    Lesion of lung  CT shows scarring likely from radiation  She saw pulmonology  Going to have another scan done      Orders Placed This Encounter   Procedures    CBC and Differential     Comprehensive metabolic panel    TSH w reflex FT4    Hemoglobin A1c    Microalbumin/Creatinine Ur Random     No orders of the defined types were placed in this encounter.      Return for follow up in 4-6 months.   I attest that this visit supports the complexity inherent to evaluation and management associated with medical care services that serve as the continuing focal point for all needed health care services and/or medical care services that are part of ongoing care related to this patient's single, serious condition or a complex condition.    SANDI BLCAKBURN MD

## 2024-12-09 NOTE — PATIENT INSTRUCTIONS
Constipation  Start miralax 1 capful Monday Wednesday friday    Type 2 diabetes mellitus without complication, without long-term current use of insulin (CMS/HCC)  Check labs    Chronic kidney disease, stage 3b (CMS/HCC)  Avoid dehydration  Avoid NSAIDs (Ibuprofen, naproxen, Motrin, Advil, Aleve)  Check labs    Return for follow up in 4-6 months.

## 2024-12-10 ENCOUNTER — OFFICE VISIT (OUTPATIENT)
Dept: HEMATOLOGY/ONCOLOGY | Facility: CLINIC | Age: 89
End: 2024-12-10
Payer: MEDICARE

## 2024-12-10 VITALS
DIASTOLIC BLOOD PRESSURE: 66 MMHG | WEIGHT: 156.8 LBS | SYSTOLIC BLOOD PRESSURE: 157 MMHG | TEMPERATURE: 96.8 F | OXYGEN SATURATION: 96 % | BODY MASS INDEX: 26.09 KG/M2 | HEART RATE: 68 BPM

## 2024-12-10 DIAGNOSIS — C50.911 CHEST WALL RECURRENCE OF BREAST CANCER, RIGHT (CMS/HCC): ICD-10-CM

## 2024-12-10 DIAGNOSIS — Z79.811 LONG TERM CURRENT USE OF AROMATASE INHIBITOR: Primary | ICD-10-CM

## 2024-12-10 DIAGNOSIS — C79.89 CHEST WALL RECURRENCE OF BREAST CANCER, RIGHT (CMS/HCC): ICD-10-CM

## 2024-12-10 PROCEDURE — 99213 OFFICE O/P EST LOW 20 MIN: CPT

## 2024-12-10 RX ORDER — MULTIVITAMIN
1 TABLET ORAL 2 TIMES DAILY
COMMUNITY

## 2024-12-10 ASSESSMENT — ENCOUNTER SYMPTOMS
NUMBNESS: 1
RESPIRATORY NEGATIVE: 1
CARDIOVASCULAR NEGATIVE: 1
PSYCHIATRIC NEGATIVE: 1
GASTROINTESTINAL NEGATIVE: 1
HEMATOLOGIC/LYMPHATIC NEGATIVE: 1
MUSCULOSKELETAL NEGATIVE: 1
EYES NEGATIVE: 1
FATIGUE: 1
ENDOCRINE NEGATIVE: 1

## 2024-12-10 ASSESSMENT — PAIN SCALES - GENERAL: PAINLEVEL_OUTOF10: 0-NO PAIN

## 2024-12-10 NOTE — ASSESSMENT & PLAN NOTE
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  She was treated with 5 years of Arimidex following her right mastectomy.  In March 2023 she was found to have a chest wall recurrence on the right.  Cancer was ER positive (98%, MS positive (87%), HER2 negative (0), Ki-67 51%.  She underwent local excision in April 2023.  She started Arimidex in May 2023.  She completed adjuvant radiation therapy in July 2023.    Ms. Ojeda continues on anastrozole which she seems to be tolerating overall well without any significant endocrine related side effects.  She is opted not to continue with abemaciclib given age and comorbidities.  Plan is to continue at least 5 years of anastrozole.  She is due for DEXA scan in May 2025, I placed orders for this today.  Her clinical breast exam today shows no evidence of recurrence or any suspicious findings, she does have some mild swelling in the right lower quadrant of the right breast with significant scar tissue at mastectomy scar.  We discussed possibly proceeding with lymphedema physical therapy to see if there is any utility for a swell spot in her bra to help with fluid buildup, she would like to try massage techniques first I have encouraged her to discuss this with breast surgery at her follow-up in February or to contact us if she would like to proceed with lymphedema physical therapy in the interim.  She had a CT of the chest in August which showed no concerning features in her chest wall or any lymphadenopathy, she is going to have a follow-up CT scan to follow-up on possible postradiation changes causing linear opacities in the right upper lobe of her lung.  We discussed we will continue to see her every 6 months for continued follow-up.  She was asked to call with any concerns or questions.

## 2024-12-10 NOTE — PROGRESS NOTES
Latrell Ojeda is a 89 y.o. female,   :  1935    Encounter Diagnoses   Name Primary?    Chest wall recurrence of breast cancer, right (CMS/HCC)     Long term current use of aromatase inhibitor Yes        Cancer Staging   Chest wall recurrence of breast cancer, right (CMS/HCC)  Staging form: Breast, AJCC 8th Edition  - Clinical stage from 4/10/2023: Stage IA (rcT1b, cN0, cM0, G2, ER+, ID+, HER2-) - Signed by Peggy Matthews MD on 2023      Oncology History   Chest wall recurrence of breast cancer, right (CMS/HCC)   4/10/2023 Cancer Staged    Staging form: Breast, AJCC 8th Edition  - Clinical stage from 4/10/2023: Stage IA (rcT1b, cN0, cM0, G2, ER+, ID+, HER2-)     2023 Surgery    Wide local right breast excisional biopsy -[facility]     2023 - 2023 Radiation Therapy    Radiation Therapy -[facility]     2023 -  Hormone Therapy    Arimidex -[facility]         Patient Active Problem List   Diagnosis    Essential hypertension    Gastroesophageal reflux disease without esophagitis    Palpitation    Type 2 diabetes mellitus without complication, without long-term current use of insulin (CMS/HCC)    History of breast cancer    Medicare annual wellness visit, subsequent    A-fib (CMS/HCC)    Chronic kidney disease, stage 3b (CMS/HCC)    Hypokalemia    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    Ms. 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  She was treated with 5 years of Arimidex following her mastectomy.   Unfortunately in March 2023 she was found to have chest wall recurrence on the right side.  She underwent local excision in April 2023 which revealed invasive moderately differentiated ductal carcinoma in subcutaneous tissue and skeletal muscle measuring 1.2 cm, invasive carcinoma was present at the medial, lateral, inferior and posterior margins though the superior margin was negative for tumor.  Pathologic tumor stage was offered pT1c.  The soft tissue also sampled at the time of her surgery in April 2023 at the posterior inferior medial margin revealed invasive moderately differentiated ductal carcinoma involving skeletal muscle and invasive carcinoma is present at the inked margin.  She was treated with radiation which she completed in July 2023 and also began on treatment with endocrine therapy with anastrozole following radiation treatments.  She appears to be tolerating her anastrozole well and denies any significant hot flashes or arthralgias.  She has had 3 UTIs in the last 3 months, follows with urology who is managing her antibiotic therapy.  She just completed her most recent antibiotic therapy course and her urinary symptoms have resolved and she is hoping that she does not have recurrence of the symptoms.  Recently she has been experiencing intermittent fatigue following breakfast with resolves pretty quickly.  She denies any new breast symptoms but has ongoing pain in the right lower quadrant and right upper quadrant of her breast occasionally, the symptoms are worse at her mastectomy scar.  She has a decreased range of motion in some directions because of the tightness she feels from her breast as her implant sits higher in the right side than the left side.  She also noted she had a precancerous lesion on her left hand which was excised recently.  She had a CT of her chest completed in August and is going to have a short-term follow-up CT scan because they were possible postradiation changes noted,  though no lung nodules noted on exam.  I have seen Ms. Latrell Ojeda   today on behalf of Dr. Nubia Garcia.              Review of Systems - Oncology    Review of Systems:    Constitutional:  Positive for fatigue.   HENT:  Negative.     Eyes: Negative.    Respiratory: Negative.     Cardiovascular: Negative.    Gastrointestinal: Negative.    Endocrine: Negative.    Genitourinary: Negative.     Musculoskeletal: Negative.    Skin: Negative.    Neurological:  Positive for numbness (under both toes).   Hematological: Negative.    Psychiatric/Behavioral: Negative.     Nursing assessment reviewed. Pertinent positive and negative symptoms noted in HPI, all others negative.    Temp:  [36 °C (96.8 °F)] 36 °C (96.8 °F)  Heart Rate:  [68-86] 68  BP: (120-157)/(66-70) 157/66  Visit Vitals  BP (!) 157/66 (BP Location: Right upper arm, Patient Position: Sitting)   Pulse 68   Temp (!) 36 °C (96.8 °F) (Temporal)   Wt 71.1 kg (156 lb 12.8 oz)   SpO2 96%   BMI 26.09 kg/m²     Physical Exam  Vitals and nursing note reviewed.   Constitutional:       General: She is not in acute distress.     Appearance: Normal appearance. She is not ill-appearing.   HENT:      Head: Normocephalic and atraumatic.   Eyes:      General: No scleral icterus.     Extraocular Movements: Extraocular movements intact.      Conjunctiva/sclera: Conjunctivae normal.   Cardiovascular:      Rate and Rhythm: Normal rate and regular rhythm.      Heart sounds: Normal heart sounds. No murmur heard.  Pulmonary:      Effort: Pulmonary effort is normal. No respiratory distress.      Breath sounds: Normal breath sounds. No stridor. No wheezing, rhonchi or rales.   Chest:      Chest wall: No tenderness.   Breasts:     Right: Absent.      Left: Absent.          Comments: Bilateral implants, R breast higher than L breast. Right breast with significant scar tissue with retraction, shiny appearance and mild swelling in RLQ. Hyperpigmentation noted under R  breast.  Abdominal:      General: Bowel sounds are normal. There is no distension.      Palpations: Abdomen is soft. There is no hepatomegaly or splenomegaly.      Tenderness: There is no abdominal tenderness.   Musculoskeletal:      Cervical back: Normal range of motion and neck supple.      Right lower leg: No edema.      Left lower leg: No edema.   Lymphadenopathy:      Cervical: No cervical adenopathy.      Upper Body:      Right upper body: No supraclavicular or axillary adenopathy.      Left upper body: No supraclavicular or axillary adenopathy.   Skin:     General: Skin is warm and dry.      Coloration: Skin is not jaundiced.      Findings: Bruising present.      Comments: Left hand with bandaid over recent excision    Neurological:      Mental Status: She is alert and oriented to person, place, and time.   Psychiatric:         Mood and Affect: Mood normal.         Behavior: Behavior normal.         Thought Content: Thought content normal.         Judgment: Judgment normal.         Past Medical History:   Diagnosis Date    Abnormal ECG     Breast cancer (CMS/HCC)      (left DCIS);  (right infiltrating ductal carcinoma);  (right invasive ductal carcinoma)    Colon polyp     COVID-19     not hospitalized-had sore throat/cough    COVID-19 vaccine series completed     booster x 1    Epigastric abdominal pain     2021 cardiac ruled out with relief from GI cocktail.  Had been onn  daily    Fibroid     High blood pressure     PAF (paroxysmal atrial fibrillation) (CMS/HCC)     follows with Dr. Borges every 3 months    Renal insufficiency     change of cardiac meds and diurectic improved labs BUN eGFR    UTI (urinary tract infection)        OB History    Para Term  AB Living   2 2           SAB IAB Ectopic Multiple Live Births                  # Outcome Date GA Lbr Rylan/2nd Weight Sex Type Anes PTL Lv   2 Para            1 Para                Past Surgical History   Procedure  Laterality Date    Breast biopsy      2 LEFT, 1 RIGHT    Breast lumpectomy Left 2006    Cataract extraction, bilateral      Colonoscopy      6-7 years ago, no polyps    EGD N/A 2/9/2022    Performed by Tiffany Mcclain DO at  GI    Esophagoscopy / egd      Laparoscopic liver cyst fenestration  1995    liquid liver cyst and aspirated at [facility]    Mastectomy      left 2006, right 2009    Skin biopsy Left 08/08/2024    left hand squamuos cell carcinoma    Total abdominal hysterectomy w/ bilateral salpingoophorectomy      age 51, due to bleeding    WIDE LOCAL RIGHT BREAST EXCISIONAL BIOPSY Right 4/21/2023    Performed by Peggy Matthews MD at [facility]       Social History     Tobacco Use    Smoking status: Never    Smokeless tobacco: Never   Vaping Use    Vaping status: Never Used   Substance Use Topics    Alcohol use: Yes     Alcohol/week: 1.0 standard drink of alcohol     Types: 1 Glasses of wine per week     Comment: rarely    Drug use: Never       Family History   Problem Relation Name Age of Onset    Diabetes Biological Father      Diabetes Biological Brother      Multiple myeloma Biological Son      Breast cancer Other niece         Sister's daughter       Allergies  Clindamycin, Codeine, Macrobid [nitrofurantoin monohyd/m-cryst], and Oxycodone-acetaminophen    Medications    Current Outpatient Medications:     amiodarone (PACERONE) 200 mg tablet, Take 100 mg by mouth daily. , Disp: , Rfl:     anastrozole (ARIMIDEX) 1 mg tablet, Take  1 tablet (1 mg total) daily  Swallow whole with a drink of water., Disp: 90 tablet, Rfl: 3    ascorbic acid, vitamin C, (VITAMIN C) 500 mg tablet,chewable, daily., Disp: , Rfl:     biotin 5,000 mcg tablet,disintegrating, 1 tablet daily., Disp: , Rfl:     calcium carbonate-vitamin D3 600 mg-10 mcg (400 unit) per tablet, Take 1 tablet by mouth 2 times daily., Disp: , Rfl:     cholecalciferol, vitamin D3, 1,000 unit (25 mcg) tablet, Take 1,000 Units by mouth daily., Disp:  , Rfl:     coenzyme Q10 (COQ10) 10 mg capsule, Take 10 mg by mouth daily.  , Disp: , Rfl:     cranberry extract 425 mg capsule, Take by mouth daily., Disp: , Rfl:     hydrochlorothiazide (HYDRODIURIL) 25 mg tablet, Take 25 mg by mouth every morning., Disp: , Rfl:     LOSARTAN POTASSIUM, BULK, MISC, Take 25 mg by mouth daily., Disp: , Rfl:     lutein 6 mg capsule, Take 6 mg by mouth daily.  , Disp: , Rfl:     pantoprazole (PROTONIX) 40 mg EC tablet, TAKE 1 TABLET BY MOUTH EVERY  OTHER DAY, Disp: 45 tablet, Rfl: 3    red yeast rice 600 mg capsule, Take 1 capsule by mouth 2 (two) times a day.  , Disp: , Rfl:     rivaroxaban (XARELTO) 15 mg tablet, Take 1 tablet (15 mg total) by mouth daily with dinner., Disp: 90 tablet, Rfl: 1     Laboratory  Recent Results (from the past 4 weeks)   POCT urinalysis dipstick    Collection Time: 11/29/24 10:30 AM   Result Value Ref Range    Color, UA      Clarity, UA      Glucose, UA      Bilirubin, UA      Ketones, UA      Spec Grav, UA      Blood, UA      pH, UA      Protein, UA      Urobilinogen, UA      Leukocytes, UA      Nitrite, UA      Bacteria, UA      Expiration Date      Lot Number      POCT      UA Reflex to Culture (Macroscopic)    Collection Time: 11/29/24  2:45 PM    Specimen: Urine, Clean Catch   Result Value Ref Range    Color, Urine Orange (A) Yellow, Colorless    Clarity, Urine Turbid (A) Clear    Specific Gravity, Urine 1.015 1.005 - 1.030    pH, Urine 6.5 4.5 - 8.0    Leukocyte Esterase +3 (A) Negative    Nitrite, Urine Negative Negative    Protein, Urine +1 (A) Negative    Glucose, Urine Negative Negative mg/dL    Ketones, Urine Negative Negative mg/dL    Urobilinogen, Urine 0.2 <2.0 EU/dL EU/dL    Bilirubin, Urine Negative Negative mg/dL    Blood, Urine +3 (A) Negative   UA Microscopic    Collection Time: 11/29/24  2:45 PM   Result Value Ref Range    RBC, Urine Too Numerous To Count (A) 0 TO 4 /HPF    WBC, Urine Too Numerous To Count (A) 0 TO 3 /HPF     Squamous Epithelial None Seen None Seen /hpf    Hyaline Cast None Seen None Seen /lpf    Bacteria, Urine Rare (A) None Seen /HPF   Urine culture    Collection Time: 11/29/24  2:45 PM    Specimen: Urine, Clean Catch   Result Value Ref Range    Urine Culture **Positive Culture** (A)     Urine Culture >=100,000 cfu/mL Citrobacter freundii complex        Susceptibility    Citrobacter freundii complex - AUTOMATED JONATHAN SUSCEPTIBILITY*     Amikacin*         * Amikacin testing can be performed upon request.     Amoxicillin + Clavulanate  Resistant ug/ml     Ampicillin  Resistant ug/ml     Ampicillin + Sulbactam  Resistant ug/ml     Aztreonam  Susceptible ug/ml     Cefazolin  Resistant ug/ml     Cefepime  Susceptible ug/ml     Cefoxitin  Resistant ug/ml     Ceftazidime  Susceptible ug/ml     Ceftazidime + Avibactam  Susceptible ug/ml     Ceftolozane + Tazobactam  Susceptible ug/ml     Ceftriaxone  Susceptible ug/ml     Cefuroxime  Resistant ug/ml     Ciprofloxacin  Susceptible ug/ml     Ertapenem  Susceptible ug/ml     Gentamicin  Susceptible ug/ml     Imipenem  Susceptible ug/ml     Levofloxacin  Susceptible ug/ml     Meropenem  Susceptible ug/ml     Meropenem-Vaborbactam  Susceptible ug/ml     Minocycline  Susceptible ug/ml     Moxifloxacin  Susceptible ug/ml     Nitrofurantoin  Susceptible ug/ml     Piperacillin + Tazobactam  Susceptible ug/ml     Tetracycline  Susceptible ug/ml     Tobramycin  Susceptible ug/ml     Trimethoprim + Sulfamethoxazole  Susceptible ug/ml     * Clinical outcomes data for aminoglycosides as monotherapy for systemic infections are limited and have resulted in worse treatment outcomes (for infections outside of the urinary tract) compared with other therapies.   Combination therapy for most indications other than UTIs should be considered. Consultation with an infectious diseases specialist is recommended.       Radiology  No results found.    I have reviewed the patient's Radiology report(s).   Significant abnormals are linear opacities in RUL on CT chest 8/2/2024.    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  She was treated with 5 years of Arimidex following her right mastectomy.  In March 2023 she was found to have a chest wall recurrence on the right.  Cancer was ER positive (98%, CA positive (87%), HER2 negative (0), Ki-67 51%.  She underwent local excision in April 2023.  She started Arimidex in May 2023.  She completed adjuvant radiation therapy in July 2023.    Ms. Ojeda continues on anastrozole which she seems to be tolerating overall well without any significant endocrine related side effects.  She is opted not to continue with abemaciclib given age and comorbidities.  Plan is to continue at least 5 years of anastrozole.  She is due for DEXA scan in May 2025, I placed orders for this today.  Her clinical breast exam today shows no evidence of recurrence or any suspicious findings, she does have some mild swelling in the right lower quadrant of the right breast with significant scar tissue at mastectomy scar.  We discussed possibly proceeding with lymphedema physical therapy to see if there is any utility for a swell spot in her bra to help with fluid buildup, she would like to try massage techniques first I have encouraged her to discuss this with breast surgery at her follow-up in February or to contact us if she would like to proceed with lymphedema physical therapy in the interim.  She had a CT of the chest in August which showed no concerning features in her chest wall or any lymphadenopathy, she is going to have a follow-up CT scan to follow-up on possible postradiation changes causing linear opacities in the right upper lobe of her lung.  We discussed we will continue to see  her every 6 months for continued follow-up.  She was asked to call with any concerns or questions.      I spent  25 minutes on this date of service performing the following activities: obtaining history, performing examination, entering orders, documenting, preparing for visit, obtaining / reviewing records, providing counseling and education, independently reviewing study/studies, communicating results, and coordinating care.     HAMZAH Jones

## 2024-12-10 NOTE — LETTER
December 10, 2024    Patient: Latrell Ojeda   YOB: 1935   Date of Visit: 12/10/2024       Dear Dr. Miller:    The patient is seen back at the [facility] [place] today in follow up with regard to her   1. Long term current use of aromatase inhibitor    2. Chest wall recurrence of breast cancer, right (CMS/HCC)    . Attached is my assessment and plan of care.         Sincerely,        HAMZAH Jones    CC: HAMZAH Smith MD

## 2024-12-10 NOTE — PROGRESS NOTES
Review of Systems   Constitutional:  Positive for fatigue.   HENT:  Negative.     Eyes: Negative.    Respiratory: Negative.     Cardiovascular: Negative.    Gastrointestinal: Negative.    Endocrine: Negative.    Genitourinary: Negative.     Musculoskeletal: Negative.    Skin: Negative.    Neurological:  Positive for numbness (under both toes).   Hematological: Negative.    Psychiatric/Behavioral: Negative.

## 2024-12-16 ENCOUNTER — APPOINTMENT (OUTPATIENT)
Dept: LAB | Age: 89
End: 2024-12-16
Attending: INTERNAL MEDICINE
Payer: MEDICARE

## 2024-12-16 ENCOUNTER — APPOINTMENT (OUTPATIENT)
Age: 89
Setting detail: DERMATOLOGY
End: 2024-12-16

## 2024-12-16 DIAGNOSIS — Z48.817 ENCOUNTER FOR SURGICAL AFTERCARE FOLLOWING SURGERY ON THE SKIN AND SUBCUTANEOUS TISSUE: ICD-10-CM

## 2024-12-16 DIAGNOSIS — E11.9 TYPE 2 DIABETES MELLITUS WITHOUT COMPLICATION, WITHOUT LONG-TERM CURRENT USE OF INSULIN (CMS/HCC): ICD-10-CM

## 2024-12-16 DIAGNOSIS — K59.00 CONSTIPATION, UNSPECIFIED CONSTIPATION TYPE: ICD-10-CM

## 2024-12-16 DIAGNOSIS — N18.32 CHRONIC KIDNEY DISEASE, STAGE 3B (CMS/HCC): ICD-10-CM

## 2024-12-16 LAB
ALBUMIN SERPL-MCNC: 4.2 G/DL (ref 3.5–5.7)
ALP SERPL-CCNC: 64 IU/L (ref 34–125)
ALT SERPL-CCNC: 21 IU/L (ref 7–52)
ANION GAP SERPL CALC-SCNC: 8 MEQ/L (ref 3–15)
AST SERPL-CCNC: 16 IU/L (ref 13–39)
BASOPHILS # BLD: 0.04 K/UL (ref 0.01–0.1)
BASOPHILS NFR BLD: 0.7 %
BILIRUB SERPL-MCNC: 1.4 MG/DL (ref 0.3–1.2)
BUN SERPL-MCNC: 27 MG/DL (ref 7–25)
CALCIUM SERPL-MCNC: 9.5 MG/DL (ref 8.6–10.3)
CHLORIDE SERPL-SCNC: 98 MEQ/L (ref 98–107)
CO2 SERPL-SCNC: 29 MEQ/L (ref 21–31)
CREAT SERPL-MCNC: 1.1 MG/DL (ref 0.6–1.2)
CREAT UR-MCNC: 46.6 MG/DL
DIFFERENTIAL METHOD BLD: ABNORMAL
EGFRCR SERPLBLD CKD-EPI 2021: 48.4 ML/MIN/1.73M*2
EOSINOPHIL # BLD: 0.04 K/UL (ref 0.04–0.36)
EOSINOPHIL NFR BLD: 0.7 %
ERYTHROCYTE [DISTWIDTH] IN BLOOD BY AUTOMATED COUNT: 13.9 % (ref 11.7–14.4)
EST. AVERAGE GLUCOSE BLD GHB EST-MCNC: 134 MG/DL
GLUCOSE SERPL-MCNC: 126 MG/DL (ref 70–99)
HBA1C MFR BLD: 6.3 %
HCT VFR BLD AUTO: 38.9 % (ref 35–45)
HGB BLD-MCNC: 12.9 G/DL (ref 11.8–15.7)
IMM GRANULOCYTES # BLD AUTO: 0.01 K/UL (ref 0–0.08)
IMM GRANULOCYTES NFR BLD AUTO: 0.2 %
LYMPHOCYTES # BLD: 0.83 K/UL (ref 1.2–3.5)
LYMPHOCYTES NFR BLD: 14.9 %
MCH RBC QN AUTO: 30 PG (ref 28–33.2)
MCHC RBC AUTO-ENTMCNC: 33.2 G/DL (ref 32.2–35.5)
MCV RBC AUTO: 90.5 FL (ref 83–98)
MICROALBUMIN UR-MCNC: 7.2 MG/L
MICROALBUMIN/CREAT UR: 15.5 UG/MG
MONOCYTES # BLD: 0.5 K/UL (ref 0.28–0.8)
MONOCYTES NFR BLD: 9 %
NEUTROPHILS # BLD: 4.15 K/UL (ref 1.7–7)
NEUTS SEG NFR BLD: 74.5 %
NRBC BLD-RTO: 0 %
PLATELET # BLD AUTO: 151 K/UL (ref 150–369)
PMV BLD AUTO: 12.5 FL (ref 9.4–12.3)
POTASSIUM SERPL-SCNC: 4.1 MEQ/L (ref 3.5–5.1)
PROT SERPL-MCNC: 6.7 G/DL (ref 6–8.2)
RBC # BLD AUTO: 4.3 M/UL (ref 3.93–5.22)
SODIUM SERPL-SCNC: 135 MEQ/L (ref 136–145)
TSH SERPL DL<=0.05 MIU/L-ACNC: 7.04 MIU/L (ref 0.34–5.6)
WBC # BLD AUTO: 5.57 K/UL (ref 3.8–10.5)

## 2024-12-16 PROCEDURE — 36415 COLL VENOUS BLD VENIPUNCTURE: CPT

## 2024-12-16 PROCEDURE — OTHER POST-OP WOUND CHECK: OTHER

## 2024-12-16 PROCEDURE — 80053 COMPREHEN METABOLIC PANEL: CPT

## 2024-12-16 PROCEDURE — 82043 UR ALBUMIN QUANTITATIVE: CPT

## 2024-12-16 PROCEDURE — 99024 POSTOP FOLLOW-UP VISIT: CPT

## 2024-12-16 PROCEDURE — 83036 HEMOGLOBIN GLYCOSYLATED A1C: CPT

## 2024-12-16 PROCEDURE — 84443 ASSAY THYROID STIM HORMONE: CPT

## 2024-12-16 PROCEDURE — 84439 ASSAY OF FREE THYROXINE: CPT

## 2024-12-16 PROCEDURE — 85025 COMPLETE CBC W/AUTO DIFF WBC: CPT

## 2024-12-16 ASSESSMENT — LOCATION DETAILED DESCRIPTION DERM: LOCATION DETAILED: LEFT RADIAL DORSAL HAND

## 2024-12-16 ASSESSMENT — LOCATION ZONE DERM: LOCATION ZONE: HAND

## 2024-12-16 ASSESSMENT — LOCATION SIMPLE DESCRIPTION DERM: LOCATION SIMPLE: LEFT HAND

## 2024-12-16 NOTE — PROCEDURE: POST-OP WOUND CHECK
Detail Level: Detailed
Add 05073 Cpt? (Important Note: In 2017 The Use Of 90492 Is Being Tracked By Cms To Determine Future Global Period Reimbursement For Global Periods): yes

## 2024-12-17 LAB — T4 FREE SERPL-MCNC: 1.54 NG/DL (ref 0.58–1.64)

## 2025-01-08 ENCOUNTER — TRANSCRIBE ORDERS (OUTPATIENT)
Dept: SCHEDULING | Age: 89
End: 2025-01-08

## 2025-01-08 DIAGNOSIS — R91.8 OTHER NONSPECIFIC ABNORMAL FINDING OF LUNG FIELD: ICD-10-CM

## 2025-01-08 DIAGNOSIS — I48.91 UNSPECIFIED ATRIAL FIBRILLATION (CMS/HCC): Primary | ICD-10-CM

## 2025-01-08 DIAGNOSIS — R06.02 SHORTNESS OF BREATH: ICD-10-CM

## 2025-01-21 ENCOUNTER — CLINICAL SUPPORT (OUTPATIENT)
Dept: INTERNAL MEDICINE | Facility: CLINIC | Age: 89
End: 2025-01-21
Payer: MEDICARE

## 2025-01-21 DIAGNOSIS — R30.0 DYSURIA: Primary | ICD-10-CM

## 2025-01-21 LAB
AMORPH CRY #/AREA URNS HPF: ABNORMAL /HPF
BACTERIA URNS QL MICRO: 3 /HPF
BILIRUB UR QL STRIP.AUTO: NEGATIVE MG/DL
BILIRUBIN, POC: NEGATIVE
BLOOD URINE, POC: POSITIVE
CLARITY UR REFRACT.AUTO: ABNORMAL
CLARITY, POC: ABNORMAL
COLOR UR AUTO: ABNORMAL
COLOR, POC: YELLOW
EXPIRATION DATE: ABNORMAL
GLUCOSE UR STRIP.AUTO-MCNC: NEGATIVE MG/DL
GLUCOSE URINE, POC: NEGATIVE
HGB UR QL STRIP.AUTO: 2
HYALINE CASTS #/AREA URNS LPF: ABNORMAL /LPF
KETONES UR STRIP.AUTO-MCNC: NEGATIVE MG/DL
KETONES, POC: NEGATIVE
LEUKOCYTE EST, POC: ABNORMAL
LEUKOCYTE ESTERASE UR QL STRIP.AUTO: 3
Lab: ABNORMAL
NITRITE UR QL STRIP.AUTO: POSITIVE
NITRITE, POC: ABNORMAL
PH UR STRIP.AUTO: 7 [PH]
PH, POC: 7
POCT MANUFACTURER: ABNORMAL
PROT UR QL STRIP.AUTO: 1
PROTEIN, POC: ABNORMAL
RBC #/AREA URNS HPF: ABNORMAL /HPF
SP GR UR REFRACT.AUTO: 1.01
SPECIFIC GRAVITY, POC: 1.01
SQUAMOUS URNS QL MICRO: ABNORMAL /HPF
UROBILINOGEN UR STRIP-ACNC: 0.2 EU/DL
UROBILINOGEN, POC: 0.2
WBC #/AREA URNS HPF: ABNORMAL /HPF

## 2025-01-21 PROCEDURE — 87184 SC STD DISK METHOD PER PLATE: CPT | Performed by: INTERNAL MEDICINE

## 2025-01-21 PROCEDURE — 81002 URINALYSIS NONAUTO W/O SCOPE: CPT | Performed by: INTERNAL MEDICINE

## 2025-01-21 PROCEDURE — 81001 URINALYSIS AUTO W/SCOPE: CPT | Performed by: INTERNAL MEDICINE

## 2025-01-25 LAB
BACTERIA UR CULT: ABNORMAL
BACTERIA UR CULT: ABNORMAL

## 2025-02-14 ENCOUNTER — HOSPITAL ENCOUNTER (OUTPATIENT)
Dept: RADIOLOGY | Age: 89
Discharge: HOME | End: 2025-02-14
Attending: INTERNAL MEDICINE
Payer: MEDICARE

## 2025-02-14 DIAGNOSIS — R06.02 SHORTNESS OF BREATH: ICD-10-CM

## 2025-02-14 DIAGNOSIS — R91.8 OTHER NONSPECIFIC ABNORMAL FINDING OF LUNG FIELD: ICD-10-CM

## 2025-02-14 DIAGNOSIS — I48.91 UNSPECIFIED ATRIAL FIBRILLATION (CMS/HCC): ICD-10-CM

## 2025-02-14 PROCEDURE — 71250 CT THORAX DX C-: CPT

## 2025-02-17 ENCOUNTER — OFFICE VISIT (OUTPATIENT)
Dept: SURGERY | Facility: CLINIC | Age: 89
End: 2025-02-17
Payer: MEDICARE

## 2025-02-17 VITALS
HEART RATE: 69 BPM | SYSTOLIC BLOOD PRESSURE: 130 MMHG | OXYGEN SATURATION: 89 % | BODY MASS INDEX: 30.82 KG/M2 | HEIGHT: 60 IN | DIASTOLIC BLOOD PRESSURE: 71 MMHG | WEIGHT: 157 LBS

## 2025-02-17 DIAGNOSIS — C50.911 CHEST WALL RECURRENCE OF BREAST CANCER, RIGHT (CMS/HCC): Primary | ICD-10-CM

## 2025-02-17 DIAGNOSIS — C79.89 CHEST WALL RECURRENCE OF BREAST CANCER, RIGHT (CMS/HCC): Primary | ICD-10-CM

## 2025-02-17 DIAGNOSIS — Z85.3 PERSONAL HISTORY OF MALIGNANT NEOPLASM OF BREAST: ICD-10-CM

## 2025-02-17 PROCEDURE — 99213 OFFICE O/P EST LOW 20 MIN: CPT | Performed by: NURSE PRACTITIONER

## 2025-02-17 RX ORDER — AMLODIPINE BESYLATE 2.5 MG/1
2.5 TABLET ORAL
COMMUNITY
Start: 2025-01-31

## 2025-02-17 NOTE — PROGRESS NOTES
[facility]  HAMZAH Smith  [address], [address]  [place] [address]  Phone: [phone]  Fax: [phone]      Patient ID: Latrell Ojeda                              : 1935    Visit Date: 2025  Referring Provider: No ref. provider found   PCP: Aris Miller MD  GYN: No care team member to display    Subjective:    Latrell is an 89-year-old who presents for interval follow-up for her personal history of breast cancer.  She was originally diagnosed with left breast cancer treated with a lumpectomy followed by mastectomy in .  She had right breast cancer treated with mastectomy and reconstruction in .  She took 5 years of tamoxifen.  She noticed a new lump in her reconstructed right breast in 2023.  Subsequent biopsy revealed invasive ductal carcinoma, grade 2, ER/RI positive, HER2 negative.  On 2023 she had a wide local excision which revealed invasive moderately differentiated ductal carcinoma present within the subcutaneous tissue and skeletal muscle measuring 1.2 cm present at the medial, lateral, inferior, and posterior margins.  Reexcised posterior, inferior, and medial margin also showed moderately differentiated ductal carcinoma involving the skeletal muscle and present at the inked margin.  No additional surgery was done as it was unclear how much additional tissue would need to be removed.  She proceeded with radiation therapy and was started on Arimidex which she generally tolerates well. She denies palpating any new masses/lumps, skin changes, or breast tenderness.     Oncology History:     Cancer Staging   Chest wall recurrence of breast cancer, right (CMS/HCC)  Staging form: Breast, AJCC 8th Edition  - Clinical stage from 4/10/2023: Stage IA (rcT1b, cN0, cM0, G2, ER+, RI+, HER2-) - Signed by Peggy Matthews MD on 2023         Oncology History   Chest wall recurrence of breast cancer, right (CMS/HCC)    4/10/2023 Cancer Staged    Staging form: Breast, AJCC 8th Edition  - Clinical stage from 4/10/2023: Stage IA (rcT1b, cN0, cM0, G2, ER+, SC+, HER2-)     4/21/2023 Surgery    Wide local right breast excisional biopsy -[facility]     6/1/2023 - 7/21/2023 Radiation Therapy    Radiation Therapy -[facility]     8/2023 -  Hormone Therapy    Arimidex -[facility]               Breast Health:  Age at menarche: 16  Age at first live birth: 29   Age of menopause: 51       Allergies: Clindamycin, Codeine, Macrobid [nitrofurantoin monohyd/m-cryst], and Oxycodone-acetaminophen    Current Medications: has a current medication list which includes the following prescription(s): amiodarone, amlodipine, anastrozole, ascorbic acid (vitamin c), biotin, calcium carbonate-vitamin d3, cholecalciferol (vitamin d3), coenzyme q10, cranberry extract, hydrochlorothiazide, losartan potassium, lutein, pantoprazole, red yeast rice, and rivaroxaban.    Past Medical History:  has a past medical history of Abnormal ECG, Breast cancer (CMS/HCC), Colon polyp, COVID-19 (2021), COVID-19 vaccine series completed, Epigastric abdominal pain, Fibroid, High blood pressure, PAF (paroxysmal atrial fibrillation) (CMS/HCC), Renal insufficiency, and UTI (urinary tract infection).    She has no past medical history of Disease of thyroid gland.    Past Surgical History:  has a past surgical history that includes Mastectomy; Laparoscopic liver cyst fenestration (1995); Colonoscopy; Esophagoscopy / EGD; Cataract extraction, bilateral; Breast biopsy; Breast lumpectomy (Left, 2006); Total abdominal hysterectomy w/ bilateral salpingoophorectomy; and Skin biopsy (Left, 08/08/2024).    Social History:   Social History     Tobacco Use    Smoking status: Never    Smokeless tobacco: Never   Vaping Use    Vaping status: Never Used   Substance Use Topics    Alcohol use: Yes     Alcohol/week: 1.0 standard drink of alcohol     Types: 1 Glasses of wine per week     Comment:  rarely    Drug use: Never       Family History: family history includes Breast cancer in an other family member; Diabetes in her biological brother and biological father; Multiple myeloma in her biological son.        Physical Exam:    Vitals: Visit Vitals  /71 (BP Location: Left upper arm, Patient Position: Sitting)   Pulse 69   Ht 1.524 m (5')   Wt 71.2 kg (157 lb)   SpO2 (!) 89%   BMI 30.66 kg/m²       Body mass index is 30.66 kg/m².    Physical Exam  Constitutional:       Appearance: Normal appearance.   HENT:      Head: Normocephalic.   Cardiovascular:      Rate and Rhythm: Normal rate.   Pulmonary:      Effort: Pulmonary effort is normal.   Abdominal:      Palpations: Abdomen is soft.   Musculoskeletal:         General: Normal range of motion.      Cervical back: Normal range of motion.   Lymphadenopathy:      Upper Body:      Right upper body: No supraclavicular or axillary adenopathy.      Left upper body: No supraclavicular or axillary adenopathy.   Skin:     General: Skin is warm.   Neurological:      General: No focal deficit present.      Mental Status: She is alert and oriented to person, place, and time.   Psychiatric:         Attention and Perception: Attention normal.         Mood and Affect: Mood and affect normal.         Speech: Speech normal.         Behavior: Behavior normal. Behavior is cooperative.       Physical Examination performed in the supine and sitting position  BREAST SIZE: medium  SYMMETRY yes, right reconstructed breast sits higher than left      SKIN - Skin color, texture, turgor normal. No rashes or lesions Skin is without tethering, dimpling, retraction or increased vascularity. Mild darkening of right reconstructed breast s/p radiation.  AREOLA -surgically absent  NIPPLES -surgically absent  LYMPH NODES: infra, supra, cervical, parasternal and axillary nodes normal bilaterally  BREAST TISSUE: Right breast surgically absent, implant in place normal without discrete lesions.  Left Breast surgically absent, implant in place normal without discrete lesions.  Fibrosis and scar tissue on the right.      Breast Imaging: I have personally reviewed the reports and images as follows.  No new imaging to review    Impression:  Chest wall recurrence of right breast cancer  Personal history of bilateral breast cancer    Recommendation and Plan:   Latrell is an 89-year old who presents for interval follow up for her personal history of breast cancer.  Her clinical exam is without evidence of recurrence or metastasis.  She will continue on Arimidex and follow-up with medical oncology again in June.  We reviewed the importance of routine self exams, and reviewed indications that would warrant follow-up.  Otherwise, she will follow-up in about 6 months or sooner with any questions or concerns.  Lastly, encouraged her to continue with healthy diet, moderate exercise, and to continue with her routine and preventative health care.    All of the questions were answered.  The patient is in agreement with the treatment plan.      Return in about 6 months (around 8/17/2025).        2/17/2025   10:38 AM        HAMZAH Smith

## 2025-03-24 ENCOUNTER — TELEPHONE (OUTPATIENT)
Dept: INTERNAL MEDICINE | Facility: CLINIC | Age: 89
End: 2025-03-24
Payer: MEDICARE

## 2025-03-24 NOTE — TELEPHONE ENCOUNTER
She should follow-up in the office for cough as we have not seen her for this  In the meantime, she can take over-the-counter Robitussin DM

## 2025-03-24 NOTE — TELEPHONE ENCOUNTER
Patient  stated that she has an awful cough. She said that the wellness nurse advised her to call and get a stronger medication.  Patient is sensitive to codeine. Please advise.

## 2025-03-25 ENCOUNTER — OFFICE VISIT (OUTPATIENT)
Dept: INTERNAL MEDICINE | Facility: CLINIC | Age: 89
End: 2025-03-25
Payer: MEDICARE

## 2025-03-25 VITALS
OXYGEN SATURATION: 94 % | HEART RATE: 80 BPM | TEMPERATURE: 100.4 F | SYSTOLIC BLOOD PRESSURE: 150 MMHG | DIASTOLIC BLOOD PRESSURE: 66 MMHG | RESPIRATION RATE: 20 BRPM

## 2025-03-25 DIAGNOSIS — J18.9 PNEUMONIA DUE TO INFECTIOUS ORGANISM, UNSPECIFIED LATERALITY, UNSPECIFIED PART OF LUNG: ICD-10-CM

## 2025-03-25 DIAGNOSIS — N18.32 CHRONIC KIDNEY DISEASE, STAGE 3B (CMS/HCC): ICD-10-CM

## 2025-03-25 DIAGNOSIS — E11.9 TYPE 2 DIABETES MELLITUS WITHOUT COMPLICATION, WITHOUT LONG-TERM CURRENT USE OF INSULIN (CMS/HCC): ICD-10-CM

## 2025-03-25 DIAGNOSIS — R05.1 ACUTE COUGH: Primary | ICD-10-CM

## 2025-03-25 LAB
FLUAV RNA SPEC QL NAA+PROBE: NEGATIVE
FLUBV RNA SPEC QL NAA+PROBE: NEGATIVE
RSV RNA SPEC QL NAA+PROBE: NEGATIVE
SARS-COV-2 RNA RESP QL NAA+PROBE: NEGATIVE

## 2025-03-25 PROCEDURE — 87637 SARSCOV2&INF A&B&RSV AMP PRB: CPT | Performed by: INTERNAL MEDICINE

## 2025-03-25 PROCEDURE — 99214 OFFICE O/P EST MOD 30 MIN: CPT | Performed by: INTERNAL MEDICINE

## 2025-03-25 RX ORDER — BENZONATATE 100 MG/1
100 CAPSULE ORAL 3 TIMES DAILY PRN
Qty: 30 CAPSULE | Refills: 0 | Status: SHIPPED | OUTPATIENT
Start: 2025-03-25 | End: 2025-04-04

## 2025-03-25 RX ORDER — DOXYCYCLINE HYCLATE 100 MG
100 TABLET ORAL 2 TIMES DAILY
Qty: 20 TABLET | Refills: 0 | Status: SHIPPED | OUTPATIENT
Start: 2025-03-25 | End: 2025-04-04

## 2025-03-25 RX ORDER — AMOXICILLIN AND CLAVULANATE POTASSIUM 875; 125 MG/1; MG/1
1 TABLET, FILM COATED ORAL 2 TIMES DAILY
Qty: 20 TABLET | Refills: 0 | Status: SHIPPED | OUTPATIENT
Start: 2025-03-25 | End: 2025-04-04

## 2025-03-25 ASSESSMENT — ENCOUNTER SYMPTOMS
FEVER: 0
ABDOMINAL PAIN: 0
CHILLS: 0

## 2025-03-25 NOTE — PATIENT INSTRUCTIONS
PNA (pneumonia)  Start antiboitics  Continue delsym for cough  Can take tessalon for cough  If not improving, please go to ER  Keep up with fluids    Return if symptoms worsen or fail to improve.

## 2025-03-25 NOTE — ASSESSMENT & PLAN NOTE
Bilateral crackles  Possible aspiration from recent nausea and vomiting  Start antiboitics  Continue delsym for cough  Can take tessalon for cough  If not improving, please go to ER  Keep up with fluids

## 2025-03-25 NOTE — PROGRESS NOTES
Chief Complaint   Patient presents with    Other     Cough since Friday, febrile       Subjective      Patient ID: Latrell Ojeda is a 89 y.o. female.    HPI    Cough: 4 days ago, she had episode of nausea, vomiting, diarrhea.  This resolved.  Subsequently she developed a cough.  She denies any nasal congestion, sore throat, runny nose  Has had fever at home.  Took Tylenol.  Poor appetite but is drinking fluids.  She forced herself to eat today.  No further nausea or vomiting    Objective     Vitals:    03/25/25 1617   BP: (!) 150/66   BP Location: Left upper arm   Patient Position: Sitting   Pulse: 80   Resp: 20   Temp: 38 °C (100.4 °F)   TempSrc: Oral   SpO2: 94%       The following have been reviewed and updated as appropriate in this visit:   Allergies  Meds  Problems        Review of Systems   Constitutional:  Negative for chills and fever.   Cardiovascular:  Negative for chest pain.   Gastrointestinal:  Negative for abdominal pain.         Current Outpatient Medications:     amiodarone (PACERONE) 200 mg tablet, Take 100 mg by mouth daily. , Disp: , Rfl:     amLODIPine (NORVASC) 2.5 mg tablet, Take 2.5 mg by mouth daily., Disp: , Rfl:     amoxicillin-pot clavulanate (AUGMENTIN) 875-125 mg per tablet, Take 1 tablet by mouth 2 (two) times a day for 10 days., Disp: 20 tablet, Rfl: 0    anastrozole (ARIMIDEX) 1 mg tablet, Take  1 tablet (1 mg total) daily  Swallow whole with a drink of water., Disp: 90 tablet, Rfl: 3    ascorbic acid, vitamin C, (VITAMIN C) 500 mg tablet,chewable, daily., Disp: , Rfl:     benzonatate (TESSALON) 100 mg capsule, Take 1 capsule (100 mg total) by mouth 3 (three) times a day as needed for cough for up to 10 days., Disp: 30 capsule, Rfl: 0    biotin 5,000 mcg tablet,disintegrating, 1 tablet daily., Disp: , Rfl:     calcium carbonate-vitamin D3 600 mg-10 mcg (400 unit) per tablet, Take 1 tablet by mouth 2 times daily., Disp: , Rfl:     cholecalciferol, vitamin D3, 1,000 unit (25  mcg) tablet, Take 1,000 Units by mouth daily., Disp: , Rfl:     coenzyme Q10 (COQ10) 10 mg capsule, Take 10 mg by mouth daily.  , Disp: , Rfl:     cranberry extract 425 mg capsule, Take by mouth daily., Disp: , Rfl:     doxycycline hyclate (VIBRA-TABS) 100 mg tablet, Take 1 tablet (100 mg total) by mouth 2 (two) times a day for 10 days., Disp: 20 tablet, Rfl: 0    hydrochlorothiazide (HYDRODIURIL) 25 mg tablet, Take 25 mg by mouth every morning., Disp: , Rfl:     LOSARTAN POTASSIUM, BULK, MISC, Take 50 mg by mouth daily., Disp: , Rfl:     lutein 6 mg capsule, Take 6 mg by mouth daily.  , Disp: , Rfl:     pantoprazole (PROTONIX) 40 mg EC tablet, TAKE 1 TABLET BY MOUTH EVERY  OTHER DAY, Disp: 45 tablet, Rfl: 3    red yeast rice 600 mg capsule, Take 1 capsule by mouth 2 (two) times a day.  , Disp: , Rfl:     rivaroxaban (XARELTO) 15 mg tablet, Take 1 tablet (15 mg total) by mouth daily with dinner., Disp: 90 tablet, Rfl: 1    Physical Exam  Vitals reviewed.   Constitutional:       General: She is not in acute distress.     Appearance: She is well-developed.   HENT:      Head: Normocephalic and atraumatic.   Eyes:      Conjunctiva/sclera: Conjunctivae normal.   Cardiovascular:      Rate and Rhythm: Normal rate and regular rhythm.   Pulmonary:      Effort: Pulmonary effort is normal. No respiratory distress.      Comments: Bilateral mid to lower crackles  Abdominal:      General: There is no distension.      Palpations: Abdomen is soft.      Tenderness: There is no abdominal tenderness. There is no guarding.   Neurological:      Mental Status: She is alert. Mental status is at baseline.   Psychiatric:         Mood and Affect: Mood normal.         Behavior: Behavior normal.         Assessment/Plan   PNA (pneumonia)  Bilateral crackles  Possible aspiration from recent nausea and vomiting  Start antiboitics  Continue delsym for cough  Can take tessalon for cough  If not improving, please go to ER  Keep up with  fluids    Acute cough  Rapid flu negative in the office  Viral swab pending  Start antibiotics for pneumonia      Orders Placed This Encounter   Procedures    COVID-19, FLU A+B AND RSV [facility]     New Medications Ordered This Visit   Medications    amoxicillin-pot clavulanate (AUGMENTIN) 875-125 mg per tablet     Sig: Take 1 tablet by mouth 2 (two) times a day for 10 days.     Dispense:  20 tablet     Refill:  0    doxycycline hyclate (VIBRA-TABS) 100 mg tablet     Sig: Take 1 tablet (100 mg total) by mouth 2 (two) times a day for 10 days.     Dispense:  20 tablet     Refill:  0    benzonatate (TESSALON) 100 mg capsule     Sig: Take 1 capsule (100 mg total) by mouth 3 (three) times a day as needed for cough for up to 10 days.     Dispense:  30 capsule     Refill:  0       Return if symptoms worsen or fail to improve.     SANDI BLACKBURN MD

## 2025-03-25 NOTE — TELEPHONE ENCOUNTER
Patient called again this morning.  Scheduled her for 4:20 today.    Pt called stating she will begin scrambler therapy on July 8.  Pt currently takes pregabalin and wanted to know when she needs to stop taking the medication.  She started to wean herself off starting 6/15 but her pain is increasing.  Pt would like more clarification on the weaning process due to her pain increase.

## 2025-03-26 ENCOUNTER — TELEPHONE (OUTPATIENT)
Dept: INTERNAL MEDICINE | Facility: CLINIC | Age: 89
End: 2025-03-26
Payer: MEDICARE

## 2025-03-26 ENCOUNTER — RESULTS FOLLOW-UP (OUTPATIENT)
Dept: INTERNAL MEDICINE | Facility: CLINIC | Age: 89
End: 2025-03-26

## 2025-03-26 NOTE — TELEPHONE ENCOUNTER
Patient would like to know if she can take a probiotic  while on the additional meds for her illness. Please advise. Thank you

## 2025-04-14 ENCOUNTER — OFFICE VISIT (OUTPATIENT)
Dept: INTERNAL MEDICINE | Facility: CLINIC | Age: 89
End: 2025-04-14
Payer: MEDICARE

## 2025-04-14 VITALS
HEART RATE: 68 BPM | SYSTOLIC BLOOD PRESSURE: 146 MMHG | TEMPERATURE: 97.6 F | RESPIRATION RATE: 16 BRPM | WEIGHT: 157 LBS | OXYGEN SATURATION: 95 % | DIASTOLIC BLOOD PRESSURE: 66 MMHG | BODY MASS INDEX: 30.66 KG/M2

## 2025-04-14 DIAGNOSIS — R05.1 ACUTE COUGH: ICD-10-CM

## 2025-04-14 DIAGNOSIS — M79.89 LEG SWELLING: ICD-10-CM

## 2025-04-14 DIAGNOSIS — J18.9 PNEUMONIA DUE TO INFECTIOUS ORGANISM, UNSPECIFIED LATERALITY, UNSPECIFIED PART OF LUNG: Primary | ICD-10-CM

## 2025-04-14 DIAGNOSIS — I10 ESSENTIAL HYPERTENSION: ICD-10-CM

## 2025-04-14 PROCEDURE — G2211 COMPLEX E/M VISIT ADD ON: HCPCS | Performed by: INTERNAL MEDICINE

## 2025-04-14 PROCEDURE — 99214 OFFICE O/P EST MOD 30 MIN: CPT | Performed by: INTERNAL MEDICINE

## 2025-04-14 RX ORDER — FLUTICASONE PROPIONATE 50 MCG
2 SPRAY, SUSPENSION (ML) NASAL DAILY
Qty: 16 G | Refills: 5 | Status: SHIPPED | OUTPATIENT
Start: 2025-04-14

## 2025-04-14 ASSESSMENT — ENCOUNTER SYMPTOMS
ABDOMINAL PAIN: 0
CHILLS: 0
FEVER: 0

## 2025-04-14 NOTE — ASSESSMENT & PLAN NOTE
Meds adjusted by cardiology  She is currently off amlodipine and losartan  Continue hydrochlorothiazide

## 2025-04-14 NOTE — ASSESSMENT & PLAN NOTE
Start compression socks 8-15 mm hg during they day  If not improving, please see vein specialist. [facility], [phone]

## 2025-04-14 NOTE — PATIENT INSTRUCTIONS
PNA (pneumonia)  Improved  Check chest xray    Acute cough  Start flonase    Leg swelling  Start compression socks 8-15 mm hg during they day  If not improving, please see vein specialist. [facility], [phone]        Return in about 4 months (around 8/14/2025).

## 2025-04-14 NOTE — PROGRESS NOTES
Chief Complaint   Patient presents with    Other     4 month follow up  Pain when taking deep breaths  Swollen feet buy the end of the day  Still hoarse       Subjective      Patient ID: Latrell Ojeda is a 89 y.o. female.    HPI    Pneumonia: Symptomatically improved.  Has mild cough.  Constantly clearing her throat.  Mild pain in left anterior ribs with cough.    HTN: Med changes made by cardiology. Off amlodipine, losartan and losartan for several weeks.      Leg swelling: Worse at the end of the day.  Patient states cardiology does not think this is related to her cardiac status        Objective     Vitals:    04/14/25 1317   BP: (!) 146/66   BP Location: Left upper arm   Patient Position: Sitting   Pulse: 68   Resp: 16   Temp: 36.4 °C (97.6 °F)   TempSrc: Oral   SpO2: 95%   Weight: 71.2 kg (157 lb)       The following have been reviewed and updated as appropriate in this visit:   Allergies  Meds  Problems        Review of Systems   Constitutional:  Negative for chills and fever.   Cardiovascular:  Negative for chest pain.   Gastrointestinal:  Negative for abdominal pain.         Current Outpatient Medications:     amiodarone (PACERONE) 200 mg tablet, Take 100 mg by mouth daily. , Disp: , Rfl:     anastrozole (ARIMIDEX) 1 mg tablet, Take  1 tablet (1 mg total) daily  Swallow whole with a drink of water., Disp: 90 tablet, Rfl: 3    ascorbic acid, vitamin C, (VITAMIN C) 500 mg tablet,chewable, daily., Disp: , Rfl:     biotin 5,000 mcg tablet,disintegrating, 1 tablet daily., Disp: , Rfl:     calcium carbonate-vitamin D3 600 mg-10 mcg (400 unit) per tablet, Take 1 tablet by mouth 2 times daily., Disp: , Rfl:     cholecalciferol, vitamin D3, 1,000 unit (25 mcg) tablet, Take 1,000 Units by mouth daily., Disp: , Rfl:     coenzyme Q10 (COQ10) 10 mg capsule, Take 10 mg by mouth daily.  , Disp: , Rfl:     cranberry extract 425 mg capsule, Take by mouth daily., Disp: , Rfl:     fluticasone propionate (FLONASE) 50  mcg/actuation nasal spray, Administer 2 sprays into each nostril daily., Disp: 16 g, Rfl: 5    hydrochlorothiazide (HYDRODIURIL) 25 mg tablet, Take 25 mg by mouth every morning., Disp: , Rfl:     LOSARTAN POTASSIUM, BULK, MISC, Take 50 mg by mouth daily., Disp: , Rfl:     lutein 6 mg capsule, Take 6 mg by mouth daily.  , Disp: , Rfl:     pantoprazole (PROTONIX) 40 mg EC tablet, TAKE 1 TABLET BY MOUTH EVERY  OTHER DAY, Disp: 45 tablet, Rfl: 3    red yeast rice 600 mg capsule, Take 1 capsule by mouth 2 (two) times a day.  , Disp: , Rfl:     rivaroxaban (XARELTO) 15 mg tablet, Take 1 tablet (15 mg total) by mouth daily with dinner., Disp: 90 tablet, Rfl: 1    Physical Exam  Vitals reviewed.   Constitutional:       General: She is not in acute distress.     Appearance: She is well-developed.   HENT:      Head: Normocephalic and atraumatic.      Mouth/Throat:      Comments: Postnasal drip  Eyes:      Conjunctiva/sclera: Conjunctivae normal.   Cardiovascular:      Rate and Rhythm: Normal rate and regular rhythm.   Pulmonary:      Effort: Pulmonary effort is normal. No respiratory distress.      Breath sounds: Normal breath sounds. No wheezing or rales.   Abdominal:      General: There is no distension.      Palpations: Abdomen is soft.      Tenderness: There is no abdominal tenderness. There is no guarding.   Musculoskeletal:      Comments: +1 bilateral lower extremity edema   Neurological:      Mental Status: She is alert. Mental status is at baseline.   Psychiatric:         Mood and Affect: Mood normal.         Behavior: Behavior normal.         Assessment/Plan   PNA (pneumonia)  Improved with antibiotics  Still with mild cough  Check chest xray    Acute cough  Recently treated for pneumonia  Postnasal drip on exam   start flonase  Check chest x-ray    Leg swelling  Start compression socks 8-15 mm hg during they day  If not improving, please see vein specialist. [facility], [phone]        Essential  hypertension  Meds adjusted by cardiology  She is currently off amlodipine and losartan  Continue hydrochlorothiazide      Orders Placed This Encounter   Procedures    X-RAY CHEST 2 VIEWS     New Medications Ordered This Visit   Medications    fluticasone propionate (FLONASE) 50 mcg/actuation nasal spray     Sig: Administer 2 sprays into each nostril daily.     Dispense:  16 g     Refill:  5       Return in about 4 months (around 8/14/2025).   I attest that this visit supports the complexity inherent to evaluation and management associated with medical care services that serve as the continuing focal point for all needed health care services and/or medical care services that are part of ongoing care related to this patient's single, serious condition or a complex condition.    SANDI BLACKBURN MD

## 2025-04-15 ENCOUNTER — RESULTS FOLLOW-UP (OUTPATIENT)
Dept: INTERNAL MEDICINE | Facility: CLINIC | Age: 89
End: 2025-04-15

## 2025-04-15 ENCOUNTER — HOSPITAL ENCOUNTER (OUTPATIENT)
Dept: RADIOLOGY | Age: 89
Discharge: HOME | End: 2025-04-15
Attending: INTERNAL MEDICINE
Payer: MEDICARE

## 2025-04-15 DIAGNOSIS — J18.9 PNEUMONIA DUE TO INFECTIOUS ORGANISM, UNSPECIFIED LATERALITY, UNSPECIFIED PART OF LUNG: ICD-10-CM

## 2025-04-15 PROCEDURE — 71046 X-RAY EXAM CHEST 2 VIEWS: CPT

## 2025-05-08 RX ORDER — ANASTROZOLE 1 MG/1
TABLET ORAL
Qty: 90 TABLET | Refills: 3 | Status: SHIPPED | OUTPATIENT
Start: 2025-05-08

## 2025-06-09 ENCOUNTER — OFFICE VISIT (OUTPATIENT)
Facility: HOSPITAL | Age: 89
End: 2025-06-09
Payer: MEDICARE

## 2025-06-09 VITALS
OXYGEN SATURATION: 95 % | BODY MASS INDEX: 31.33 KG/M2 | HEART RATE: 61 BPM | DIASTOLIC BLOOD PRESSURE: 63 MMHG | TEMPERATURE: 97.3 F | SYSTOLIC BLOOD PRESSURE: 128 MMHG | WEIGHT: 160.4 LBS

## 2025-06-09 DIAGNOSIS — C50.911 CHEST WALL RECURRENCE OF BREAST CANCER, RIGHT (CMS/HCC): ICD-10-CM

## 2025-06-09 DIAGNOSIS — C79.89 CHEST WALL RECURRENCE OF BREAST CANCER, RIGHT (CMS/HCC): ICD-10-CM

## 2025-06-09 PROCEDURE — G0463 HOSPITAL OUTPT CLINIC VISIT: HCPCS | Performed by: INTERNAL MEDICINE

## 2025-06-09 PROCEDURE — 99214 OFFICE O/P EST MOD 30 MIN: CPT | Performed by: INTERNAL MEDICINE

## 2025-06-09 ASSESSMENT — ENCOUNTER SYMPTOMS
MUSCULOSKELETAL NEGATIVE: 1
GASTROINTESTINAL NEGATIVE: 1
LEG SWELLING: 1
ENDOCRINE NEGATIVE: 1
NUMBNESS: 1
HEMATOLOGIC/LYMPHATIC NEGATIVE: 1
EYES NEGATIVE: 1
PSYCHIATRIC NEGATIVE: 1
FATIGUE: 1
COUGH: 1

## 2025-06-09 ASSESSMENT — PAIN SCALES - GENERAL: PAINLEVEL_OUTOF10: 2

## 2025-06-09 NOTE — PROGRESS NOTES
"Review of Systems   Constitutional:  Positive for fatigue (\"only if I walk a lot\").   HENT:  Negative.     Eyes: Negative.    Respiratory:  Positive for cough (lingering cough from recent pneumonia).    Cardiovascular:  Positive for leg swelling (BL ankles).   Gastrointestinal: Negative.    Endocrine: Negative.    Genitourinary: Negative.     Musculoskeletal: Negative.    Skin: Negative.    Neurological:  Positive for numbness (in feet, \"not full numbness\").   Hematological: Negative.    Psychiatric/Behavioral: Negative.         "

## 2025-06-09 NOTE — PROGRESS NOTES
Latrell Ojeda is a 89 y.o. female,   :  1935    Encounter Diagnosis   Name Primary?    Chest wall recurrence of breast cancer, right (CMS/HCC)         Cancer Staging   Chest wall recurrence of breast cancer, right (CMS/HCC)  Staging form: Breast, AJCC 8th Edition  - Clinical stage from 4/10/2023: Stage IA (rcT1b, cN0, cM0, G2, ER+, IA+, HER2-) - Signed by Peggy Matthews MD on 2023      Oncology History   Chest wall recurrence of breast cancer, right (CMS/HCC)   4/10/2023 Cancer Staged    Staging form: Breast, AJCC 8th Edition  - Clinical stage from 4/10/2023: Stage IA (rcT1b, cN0, cM0, G2, ER+, IA+, HER2-)     2023 Surgery    Wide local right breast excisional biopsy -[facility]     2023 - 2023 Radiation Therapy    Radiation Therapy -[facility]     2023 -  Hormone Therapy    Arimidex -[facility]         Patient Active Problem List   Diagnosis    Essential hypertension    Gastroesophageal reflux disease without esophagitis    Palpitation    Type 2 diabetes mellitus without complication, without long-term current use of insulin (CMS/HCC)    History of breast cancer    Medicare annual wellness visit, subsequent    A-fib (CMS/HCC)    Chronic kidney disease, stage 3b (CMS/HCC)    Hypokalemia    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  Overall she reports she is feeling about the same.  She notes some occasional tenderness at the medial margin of her right breast implant.  She has not noted any skin lesions.  She is not having fevers or chills, chest pain or shortness of breath.  She continues on anastrozole and has not noted any new side  effects.      Review of Systems - Oncology    Review of Systems:  Nursing assessment reviewed. Pertinent positive and negative symptoms noted in HPI, all others negative.      Temp:  [36.3 °C (97.3 °F)] 36.3 °C (97.3 °F)  Heart Rate:  [61] 61  BP: (128)/(63) 128/63  Visit Vitals  /63 (BP Location: Left upper arm, Patient Position: Sitting)   Pulse 61   Temp 36.3 °C (97.3 °F) (Temporal)   Wt 72.8 kg (160 lb 6.4 oz)   SpO2 95%   BMI 31.33 kg/m²     Physical Exam  Constitutional:       General: She is not in acute distress.     Appearance: She is well-developed.   HENT:      Head: Normocephalic.      Mouth/Throat:      Pharynx: Oropharynx is clear.   Eyes:      General: No scleral icterus.     Pupils: Pupils are equal, round, and reactive to light.   Cardiovascular:      Rate and Rhythm: Normal rate and regular rhythm.   Pulmonary:      Effort: Pulmonary effort is normal.      Breath sounds: Normal breath sounds.   Abdominal:      General: There is no distension.      Palpations: Abdomen is soft.      Tenderness: There is no abdominal tenderness.   Musculoskeletal:         General: No tenderness.      Cervical back: Neck supple.   Lymphadenopathy:      Cervical: No cervical adenopathy.   Skin:     Findings: No rash.   Neurological:      Mental Status: She is alert.         Past Medical History[1]    OB History    Para Term  AB Living   2 2       SAB IAB Ectopic Multiple Live Births             # Outcome Date GA Lbr Rylan/2nd Weight Sex Type Anes PTL Lv   2 Para            1 Para              Gynecologic History:  Age at menarche: No age on file  Age at first live birth: 29   Age at menopause: No age on file    Past Surgical History   Procedure Laterality Date    Breast biopsy      2 LEFT, 1 RIGHT    Breast lumpectomy Left     Cataract extraction, bilateral      Colonoscopy      6-7 years ago, no polyps    EGD N/A 2022    Performed by Tiffany Mcclain DO at  GI    Esophagoscopy / egd       Laparoscopic liver cyst fenestration  1995    liquid liver cyst and aspirated at [facility]    Mastectomy      left 2006, right 2009    Skin biopsy Left 08/08/2024    left hand squamuos cell carcinoma    Total abdominal hysterectomy w/ bilateral salpingoophorectomy      age 51, due to bleeding    WIDE LOCAL RIGHT BREAST EXCISIONAL BIOPSY Right 4/21/2023    Performed by Peggy Matthews MD at [facility] [place]       Social History     Tobacco Use    Smoking status: Never    Smokeless tobacco: Never   Vaping Use    Vaping status: Never Used   Substance Use Topics    Alcohol use: Yes     Alcohol/week: 1.0 standard drink of alcohol     Types: 1 Glasses of wine per week     Comment: rarely    Drug use: Never       Family History   Problem Relation Name Age of Onset    Diabetes Biological Father      Diabetes Biological Brother      Multiple myeloma Biological Son      Breast cancer Other niece         Sister's daughter         Allergies  Clindamycin, Codeine, Macrobid [nitrofurantoin monohyd/m-cryst], and Oxycodone-acetaminophen    Medications    Current Outpatient Medications:     amiodarone (PACERONE) 200 mg tablet, Take 100 mg by mouth daily. , Disp: , Rfl:     anastrozole (ARIMIDEX) 1 mg tablet, TAKE 1 TABLET BY MOUTH DAILY,  SWALLOW WHOLE WITH A DRINK OF  WATER, Disp: 90 tablet, Rfl: 3    ascorbic acid, vitamin C, (VITAMIN C) 500 mg tablet,chewable, daily., Disp: , Rfl:     biotin 5,000 mcg tablet,disintegrating, 1 tablet daily., Disp: , Rfl:     calcium carbonate-vitamin D3 600 mg-10 mcg (400 unit) per tablet, Take 1 tablet by mouth 2 times daily., Disp: , Rfl:     cholecalciferol, vitamin D3, 1,000 unit (25 mcg) tablet, Take 1,000 Units by mouth daily., Disp: , Rfl:     coenzyme Q10 (COQ10) 10 mg capsule, Take 10 mg by mouth daily.  , Disp: , Rfl:     cranberry extract 425 mg capsule, Take by mouth daily., Disp: , Rfl:     fluticasone propionate (FLONASE) 50 mcg/actuation nasal spray, Administer 2 sprays into  each nostril daily., Disp: 16 g, Rfl: 5    hydrochlorothiazide (HYDRODIURIL) 25 mg tablet, Take 25 mg by mouth every morning., Disp: , Rfl:     LOSARTAN POTASSIUM, BULK, MISC, Take 50 mg by mouth daily. (Patient taking differently: Take 50 mg by mouth as needed.), Disp: , Rfl:     lutein 6 mg capsule, Take 6 mg by mouth daily.  , Disp: , Rfl:     pantoprazole (PROTONIX) 40 mg EC tablet, TAKE 1 TABLET BY MOUTH EVERY  OTHER DAY, Disp: 45 tablet, Rfl: 3    red yeast rice 600 mg capsule, Take 1 capsule by mouth 2 (two) times a day.  , Disp: , Rfl:     rivaroxaban (XARELTO) 15 mg tablet, Take 1 tablet (15 mg total) by mouth daily with dinner., Disp: 90 tablet, Rfl: 1     Laboratory    No results found for this or any previous visit (from the past 3 weeks).      Radiology  No new Radiology.  No results found.    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  She was treated with 5 years of Arimidex following her right mastectomy.  In March 2023 she was found to have a chest wall recurrence on the right.  Cancer was ER positive (98%, UT positive (87%), HER2 negative (0), Ki-67 51%.  She underwent local excision in April 2023.  She started Arimidex in May 2023.  She completed adjuvant radiation therapy in July 2023.    Clinically she is doing well without any signs or symptoms of recurrence.  She is on anastrozole and seems to be tolerating this without significant difficulty.  Given her age and comorbidities, we opted not to add adjuvant abemaciclib.  She will continue on anastrozole with the plan for at least 5 years of treatment.  She will be following up with Dr. Matthews in September and I will see her back in the office in 6 months.  She was asked to call with questions or concerns prior to her next  visit.      Nubia Garcia MD                [1]   Past Medical History:  Diagnosis Date    Abnormal ECG     Breast cancer (CMS/HCC)     2006 (left DCIS); 2009 (right infiltrating ductal carcinoma); 2023 (right invasive ductal carcinoma)    Colon polyp     COVID-19 2021    not hospitalized-had sore throat/cough    COVID-19 vaccine series completed     booster x 1    Epigastric abdominal pain     06/2021 cardiac ruled out with relief from GI cocktail.  Had been onn  daily    Fibroid     High blood pressure     PAF (paroxysmal atrial fibrillation) (CMS/HCC)     follows with Dr. Borges every 3 months    Renal insufficiency     change of cardiac meds and diurectic improved labs BUN eGFR    UTI (urinary tract infection)

## 2025-06-09 NOTE — ASSESSMENT & PLAN NOTE
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  She was treated with 5 years of Arimidex following her right mastectomy.  In March 2023 she was found to have a chest wall recurrence on the right.  Cancer was ER positive (98%, NC positive (87%), HER2 negative (0), Ki-67 51%.  She underwent local excision in April 2023.  She started Arimidex in May 2023.  She completed adjuvant radiation therapy in July 2023.    Clinically she is doing well without any signs or symptoms of recurrence.  She is on anastrozole and seems to be tolerating this without significant difficulty.  Given her age and comorbidities, we opted not to add adjuvant abemaciclib.  She will continue on anastrozole with the plan for at least 5 years of treatment.  She will be following up with Dr. Matthews in September and I will see her back in the office in 6 months.  She was asked to call with questions or concerns prior to her next visit.

## (undated) DEVICE — TIP BOVIE BLADE COATED INSULATED 2.75IN

## (undated) DEVICE — GOWN SURGICAL REINFORCED X-LAR

## (undated) DEVICE — MOUTHPIECE 60FR ENDO BITEBLOCK

## (undated) DEVICE — SOLN IRRIG .9%SOD 1000ML

## (undated) DEVICE — BLADE SCALPEL #15

## (undated) DEVICE — GAUZE 8X4 12 PLY RFID DOUBLE XRAY

## (undated) DEVICE — STRIP STERI 1X5IN

## (undated) DEVICE — ACCUGRID 4.65

## (undated) DEVICE — SUTURE MAXON 3-0 UNDYED 1X30 V-20

## (undated) DEVICE — SOLN IRRIG STER WATER 1000ML

## (undated) DEVICE — SUTURE MONOCRYL 4-0  Y496G PS-2 I8IN

## (undated) DEVICE — PACK MINOR BMH

## (undated) DEVICE — GLOVE 6 1/2 PROTEXIS PI MICRO

## (undated) DEVICE — SUTURE VICRYL 3-0 J416H SH UNDYED

## (undated) DEVICE — APPLICATOR CHLORAPREP 10.5ML ORANGE TINT

## (undated) DEVICE — TUBING SMOKE EVAC PENCIL COATED

## (undated) DEVICE — MARKER SURGICAL SKIN

## (undated) DEVICE — GLOVE SZ 6.5 LINER PROTEXIS PI BL

## (undated) DEVICE — COVER LIGHTHANDLE

## (undated) DEVICE — SYRINGE DISP LUER-LOK 10 CC

## (undated) DEVICE — NEEDLE DISP HYPO 25GX1-1/2IN

## (undated) DEVICE — ADHESIVE SKIN DERMABOND ADVANCED 0.7ML

## (undated) DEVICE — TOWEL SURGICAL W17XL27IN BLUE COTTON STANDARD PREWASHED DELI

## (undated) DEVICE — FORCEP COLD RADIAL JAW

## (undated) DEVICE — PAD GROUND ELECTROSURGICAL W/CORD

## (undated) DEVICE — SUTURE SOFSILK 2-0 BLACK 1X30 V-20

## (undated) DEVICE — SUTURE CHROMIC 2-0 GUT UNDYED 1X30 V-20

## (undated) DEVICE — SLEEVE SCD COMFORT KNEE LENGTH MED